# Patient Record
Sex: MALE | Race: BLACK OR AFRICAN AMERICAN | Employment: OTHER | URBAN - METROPOLITAN AREA
[De-identification: names, ages, dates, MRNs, and addresses within clinical notes are randomized per-mention and may not be internally consistent; named-entity substitution may affect disease eponyms.]

---

## 2017-05-04 PROBLEM — I50.9 CHF, ACUTE ON CHRONIC (HCC): Status: ACTIVE | Noted: 2017-05-04

## 2017-05-04 PROBLEM — I10 ESSENTIAL HYPERTENSION: Status: ACTIVE | Noted: 2017-05-04

## 2017-05-05 PROBLEM — M54.2 NECK PAIN: Status: ACTIVE | Noted: 2017-05-05

## 2018-03-01 ASSESSMENT — COPD QUESTIONNAIRES: CAT_SEVERITY: SEVERE

## 2018-03-01 ASSESSMENT — ENCOUNTER SYMPTOMS: SHORTNESS OF BREATH: 1

## 2018-03-01 NOTE — ANESTHESIA PRE-OP
Gabriele Astorga       Department of Anesthesiology  Pre-Anesthesia Evaluation/Consultation       Name:  Gabriele Astorga                                         Age:  74 y.o. MRN:  [id number]           Procedure (Scheduled):  Cervical fusion anterior and disection , C4-C5, C5-C6, C6-C7  Surgeon:  Dr. Medina Ramirez     Allergies   Allergen Reactions    Ace Inhibitors Shortness Of Breath and Swelling    Lisinopril Other (See Comments)     Angioedema     Patient Active Problem List   Diagnosis    Hepatitis C    Testicular swelling    [facility]    NSTEMI (non-ST elevated myocardial infarction) [facility]    CHF, acute on chronic ([facility]    Essential hypertension    Neck pain    Cervical disc herniation     Past Medical History:   Diagnosis Date    Chest pain 3-6-2015    lexiscan nuclear stress    Chronic diastolic CHF (congestive heart failure) ([facility]     Echo 1/18/2016; EF 57%    COPD (chronic obstructive pulmonary disease) (HCC)     Hepatitis C     Hilar lymphadenopathy     CT 1/2016; 1.8 cm    Hypertension     Irregular heart beat     Oxygen dependent      Past Surgical History:   Procedure Laterality Date    COLONOSCOPY      HERNIA REPAIR      tracee inguinal repair     Social History   Substance Use Topics    Smoking status: Former Smoker     Packs/day: 0.50     Types: Cigarettes     Quit date: 1/16/2015    Smokeless tobacco: Never Used    Alcohol use 8.4 oz/week     14 Cans of beer per week      Comment: 2-3 beers daily     Medications  No current facility-administered medications on file prior to encounter.       Current Outpatient Prescriptions on File Prior to Encounter   Medication Sig Dispense Refill    budesonide-formoterol (SYMBICORT) 160-4.5 MCG/ACT AERO Inhale 2 puffs into the lungs 2 times daily Do & bring      cetirizine (ZYRTEC) 10 MG tablet Take 10 mg by mouth daily      hydrOXYzine (ATARAX) 25 MG tablet Take 25 mg by mouth nightly      losartan (COZAAR) 100 MG tablet Take 31.3 35/11/3331     HCG (If Applicable) No results found for: PREGTESTUR, PREGSERUM, HCG, HCGQUANT   ABGs No results found for: PHART, PO2ART, QRI8QTZ, CXY6GOO, BEART, B3SETZYP   Type & Screen (If Applicable)  No results found for: [place] Obrien [address]     Radiology (05/07/17)  1. No evidence of pulmonary embolism. .   2. Cardiomegaly. 3. Severe centrilobular emphysematous changes most significant within   the upper lobes. There is bibasal dependent subsegmental atelectasis,   right lower lobe fibrosis, and interval left lower lobe discoid   atelectasis. 4. Enlarged main common artery consistent with pulmonary hypertension. 5. Stable 1.8 cm right hilar lymph node. Cardiac Testing (5/5/17)   Mild left ventricular concentric hypertrophy noted.   Normal left ventricular ejection fraction.   Measured ejection fraction is 65 %.   The left atrium is mildly dilated.   Mildly dilated right ventricle.   Mild right ventricular hypertrophy.   Mildly enlarged right atrium size.   Physiologic and/or trace tricuspid regurgitation.   Mildly dilated aortic root. EKG (5/12/17) unable to view results in [facility]. Anesthesia Evaluation  Patient summary reviewed and Nursing notes reviewed no history of anesthetic complications:   Airway: Mallampati: II  TM distance: >3 FB   Neck ROM: full  Comment: Pt demonstrated full ROM. However, c/o pain on doing so. Mouth opening: > = 3 FB Dental:          Pulmonary:   (+) COPD: severe,  shortness of breath: chronic,  decreased breath sounds,  asthma (pt has hay fever.): allergic asthma,                           ROS comment: Quit smoking in 2013. Before quitting pt smoked \"at least\" 0.5 ppd for 50+ years.    Cardiovascular:    (+) hypertension: no interval change, dysrhythmias (pt states he gets an \"extra beat from time to time\"):, CHF:,       ECG reviewed  Rhythm: regular  Rate: normal  Echocardiogram reviewed         Beta Blocker:  Dose within 24 Hrs (pt takes carvedilol

## 2018-03-08 NOTE — H&P
Neck Pain    This is a chronic problem. The current episode started more than 1 year ago. The problem occurs constantly. The problem has been gradually worsening. The pain is associated with nothing. The pain is present in the left side and midline. The quality of the pain is described as stabbing and shooting. The pain is at a severity of 8/10. The pain is moderate. The symptoms are aggravated by position. Stiffness is present in the morning. Associated symptoms include chest pain, headaches and tingling. Pertinent negatives include no fever, leg pain, numbness, pain with swallowing, paresis, photophobia, syncope, trouble swallowing, visual change, weakness or weight loss. He has tried NSAIDs for the symptoms. The treatment provided mild relief.         Review of Systems   Negative fr headache  Negative for abdominal pain  Negative for seizure  Negative for stroke. Respiratory: Positive for shortness of breath. Cardiovascular: Positive for chest pain and palpitations. Negative for syncope. Gastrointestinal: Negative. Endocrine: Negative. Genitourinary: Negative. Musculoskeletal: Positive for neck pain. Skin: Negative. Allergic/Immunologic: Negative. Neurological: Positive for tingling and headaches. Negative for weakness and numbness. Hematological: Bruises/bleeds easily. Psychiatric/Behavioral: Positive for dysphoric mood. The patient is nervous/anxious.          Objective:   Physical Exam   Constitutional: He is oriented to person, place, and time. He appears well-developed and well-nourished. No distress. He is not intubated. HENT:   Head: Normocephalic and atraumatic. Right Ear: External ear normal.   Left Ear: External ear normal.   Nose: Nose normal.   Mouth/Throat: Oropharynx is clear and moist. No oropharyngeal exudate. Eyes: Conjunctivae and EOM are normal. Pupils are equal, round, and reactive to light. Right eye exhibits no discharge. Left eye exhibits no discharge.  No

## 2018-03-09 ENCOUNTER — HOSPITAL ENCOUNTER (OUTPATIENT)
Dept: SURGERY | Age: 68
Discharge: HOME | End: 2018-03-10
Attending: NEUROLOGICAL SURGERY | Admitting: NEUROLOGICAL SURGERY
Payer: COMMERCIAL

## 2018-03-09 DIAGNOSIS — M54.2 NECK PAIN: Primary | ICD-10-CM

## 2018-03-09 DIAGNOSIS — Z01.812 PRE-OPERATIVE LABORATORY EXAMINATION: ICD-10-CM

## 2018-03-09 PROBLEM — M50.20 CERVICAL DISC HERNIATION: Status: ACTIVE | Noted: 2018-03-09

## 2018-03-09 PROCEDURE — G8988 SELF CARE GOAL STATUS: HCPCS

## 2018-03-09 PROCEDURE — 76000 FLUOROSCOPY <1 HR PHYS/QHP: CPT

## 2018-03-09 PROCEDURE — 9990 CHARGE CONVERSION

## 2018-03-09 PROCEDURE — 97530 THERAPEUTIC ACTIVITIES: CPT

## 2018-03-09 PROCEDURE — C1713 ANCHOR/SCREW BN/BN,TIS/BN: HCPCS

## 2018-03-09 PROCEDURE — 97161 PT EVAL LOW COMPLEX 20 MIN: CPT

## 2018-03-09 PROCEDURE — G8979 MOBILITY GOAL STATUS: HCPCS

## 2018-03-09 PROCEDURE — 00600 ANES PX CRV SPINE&CORD NOS: CPT

## 2018-03-09 PROCEDURE — G8987 SELF CARE CURRENT STATUS: HCPCS

## 2018-03-09 PROCEDURE — 88304 TISSUE EXAM BY PATHOLOGIST: CPT

## 2018-03-09 PROCEDURE — L8699 PROSTHETIC IMPLANT NOS: HCPCS

## 2018-03-09 PROCEDURE — 97165 OT EVAL LOW COMPLEX 30 MIN: CPT

## 2018-03-09 PROCEDURE — 22554 ARTHRD ANT NTRBD MIN DSC CRV: CPT

## 2018-03-09 PROCEDURE — G8978 MOBILITY CURRENT STATUS: HCPCS

## 2018-03-09 RX ORDER — SODIUM CHLORIDE 0.9 % (FLUSH) 0.9 %
10 SYRINGE (ML) INJECTION PRN
Status: DISCONTINUED | OUTPATIENT
Start: 2018-03-09 | End: 2018-03-09 | Stop reason: HOSPADM

## 2018-03-09 RX ORDER — ACETAMINOPHEN 325 MG/1
650 TABLET ORAL EVERY 4 HOURS PRN
Status: DISCONTINUED | OUTPATIENT
Start: 2018-03-09 | End: 2018-03-10 | Stop reason: HOSPADM

## 2018-03-09 RX ORDER — HYDROCODONE BITARTRATE AND ACETAMINOPHEN 5; 325 MG/1; MG/1
2 TABLET ORAL EVERY 4 HOURS PRN
Status: DISCONTINUED | OUTPATIENT
Start: 2018-03-09 | End: 2018-03-10 | Stop reason: HOSPADM

## 2018-03-09 RX ORDER — FAMOTIDINE 20 MG/1
20 TABLET, FILM COATED ORAL 2 TIMES DAILY
Status: DISCONTINUED | OUTPATIENT
Start: 2018-03-09 | End: 2018-03-10 | Stop reason: HOSPADM

## 2018-03-09 RX ORDER — PREDNISOLONE ACETATE 10 MG/ML
1 SUSPENSION/ DROPS OPHTHALMIC 2 TIMES DAILY
Status: DISCONTINUED | OUTPATIENT
Start: 2018-03-09 | End: 2018-03-10 | Stop reason: HOSPADM

## 2018-03-09 RX ORDER — MEPERIDINE HYDROCHLORIDE 50 MG/ML
12.5 INJECTION INTRAMUSCULAR; INTRAVENOUS; SUBCUTANEOUS EVERY 5 MIN PRN
Status: DISCONTINUED | OUTPATIENT
Start: 2018-03-09 | End: 2018-03-09 | Stop reason: HOSPADM

## 2018-03-09 RX ORDER — CETIRIZINE HYDROCHLORIDE 10 MG/1
10 TABLET ORAL DAILY
Status: DISCONTINUED | OUTPATIENT
Start: 2018-03-09 | End: 2018-03-10 | Stop reason: HOSPADM

## 2018-03-09 RX ORDER — DOCUSATE SODIUM 100 MG/1
100 CAPSULE, LIQUID FILLED ORAL 2 TIMES DAILY
Status: DISCONTINUED | OUTPATIENT
Start: 2018-03-09 | End: 2018-03-10 | Stop reason: HOSPADM

## 2018-03-09 RX ORDER — HYDROXYZINE HYDROCHLORIDE 10 MG/1
25 TABLET, FILM COATED ORAL NIGHTLY
Status: DISCONTINUED | OUTPATIENT
Start: 2018-03-09 | End: 2018-03-10 | Stop reason: HOSPADM

## 2018-03-09 RX ORDER — DEXAMETHASONE SODIUM PHOSPHATE 4 MG/ML
4 INJECTION, SOLUTION INTRA-ARTICULAR; INTRALESIONAL; INTRAMUSCULAR; INTRAVENOUS; SOFT TISSUE EVERY 6 HOURS
Status: COMPLETED | OUTPATIENT
Start: 2018-03-09 | End: 2018-03-10

## 2018-03-09 RX ORDER — POLYVINYL ALCOHOL 14 MG/ML
1 SOLUTION/ DROPS OPHTHALMIC DAILY
Status: DISCONTINUED | OUTPATIENT
Start: 2018-03-09 | End: 2018-03-10 | Stop reason: HOSPADM

## 2018-03-09 RX ORDER — LOSARTAN POTASSIUM 50 MG/1
100 TABLET ORAL DAILY
Status: DISCONTINUED | OUTPATIENT
Start: 2018-03-09 | End: 2018-03-10 | Stop reason: HOSPADM

## 2018-03-09 RX ORDER — LABETALOL HYDROCHLORIDE 5 MG/ML
5 INJECTION, SOLUTION INTRAVENOUS EVERY 10 MIN PRN
Status: DISCONTINUED | OUTPATIENT
Start: 2018-03-09 | End: 2018-03-09 | Stop reason: HOSPADM

## 2018-03-09 RX ORDER — DOXYCYCLINE HYCLATE 100 MG/1
100 CAPSULE ORAL DAILY
Status: DISCONTINUED | OUTPATIENT
Start: 2018-03-09 | End: 2018-03-10 | Stop reason: HOSPADM

## 2018-03-09 RX ORDER — ONDANSETRON 2 MG/ML
4 INJECTION INTRAMUSCULAR; INTRAVENOUS EVERY 6 HOURS PRN
Status: DISCONTINUED | OUTPATIENT
Start: 2018-03-09 | End: 2018-03-10 | Stop reason: HOSPADM

## 2018-03-09 RX ORDER — KETOCONAZOLE 20 MG/G
CREAM TOPICAL DAILY
Status: DISCONTINUED | OUTPATIENT
Start: 2018-03-09 | End: 2018-03-10 | Stop reason: HOSPADM

## 2018-03-09 RX ORDER — HYDROMORPHONE HCL 110MG/55ML
0.25 PATIENT CONTROLLED ANALGESIA SYRINGE INTRAVENOUS EVERY 5 MIN PRN
Status: DISCONTINUED | OUTPATIENT
Start: 2018-03-09 | End: 2018-03-09 | Stop reason: HOSPADM

## 2018-03-09 RX ORDER — SODIUM CHLORIDE 0.9 % (FLUSH) 0.9 %
10 SYRINGE (ML) INJECTION EVERY 12 HOURS SCHEDULED
Status: DISCONTINUED | OUTPATIENT
Start: 2018-03-09 | End: 2018-03-10 | Stop reason: HOSPADM

## 2018-03-09 RX ORDER — SODIUM CHLORIDE 0.9 % (FLUSH) 0.9 %
10 SYRINGE (ML) INJECTION PRN
Status: DISCONTINUED | OUTPATIENT
Start: 2018-03-09 | End: 2018-03-10 | Stop reason: HOSPADM

## 2018-03-09 RX ORDER — ALBUTEROL SULFATE 2.5 MG/3ML
2.5 SOLUTION RESPIRATORY (INHALATION) EVERY 6 HOURS PRN
Status: DISCONTINUED | OUTPATIENT
Start: 2018-03-09 | End: 2018-03-10 | Stop reason: HOSPADM

## 2018-03-09 RX ORDER — SODIUM CHLORIDE 9 MG/ML
INJECTION, SOLUTION INTRAVENOUS CONTINUOUS
Status: DISCONTINUED | OUTPATIENT
Start: 2018-03-09 | End: 2018-03-09

## 2018-03-09 RX ORDER — HYDROCODONE BITARTRATE AND ACETAMINOPHEN 5; 325 MG/1; MG/1
1 TABLET ORAL EVERY 4 HOURS PRN
Status: DISCONTINUED | OUTPATIENT
Start: 2018-03-09 | End: 2018-03-10 | Stop reason: HOSPADM

## 2018-03-09 RX ORDER — CARVEDILOL 25 MG/1
25 TABLET ORAL 2 TIMES DAILY WITH MEALS
Status: DISCONTINUED | OUTPATIENT
Start: 2018-03-09 | End: 2018-03-10 | Stop reason: HOSPADM

## 2018-03-09 RX ORDER — CYCLOBENZAPRINE HCL 10 MG
10 TABLET ORAL 3 TIMES DAILY PRN
Status: DISCONTINUED | OUTPATIENT
Start: 2018-03-09 | End: 2018-03-10 | Stop reason: HOSPADM

## 2018-03-09 RX ORDER — ALBUTEROL SULFATE 90 UG/1
2 AEROSOL, METERED RESPIRATORY (INHALATION) EVERY 6 HOURS PRN
Status: DISCONTINUED | OUTPATIENT
Start: 2018-03-09 | End: 2018-03-10 | Stop reason: HOSPADM

## 2018-03-09 RX ORDER — SODIUM CHLORIDE 0.9 % (FLUSH) 0.9 %
10 SYRINGE (ML) INJECTION EVERY 12 HOURS SCHEDULED
Status: DISCONTINUED | OUTPATIENT
Start: 2018-03-09 | End: 2018-03-09 | Stop reason: HOSPADM

## 2018-03-09 RX ORDER — HYDROMORPHONE HCL 110MG/55ML
0.5 PATIENT CONTROLLED ANALGESIA SYRINGE INTRAVENOUS EVERY 5 MIN PRN
Status: DISCONTINUED | OUTPATIENT
Start: 2018-03-09 | End: 2018-03-09 | Stop reason: HOSPADM

## 2018-03-09 RX ORDER — PROMETHAZINE HYDROCHLORIDE 25 MG/ML
25 INJECTION, SOLUTION INTRAMUSCULAR; INTRAVENOUS PRN
Status: DISCONTINUED | OUTPATIENT
Start: 2018-03-09 | End: 2018-03-09 | Stop reason: HOSPADM

## 2018-03-09 RX ADMIN — CARVEDILOL 25 MG: 25 TABLET, FILM COATED ORAL at 17:02

## 2018-03-09 RX ADMIN — LOSARTAN POTASSIUM 100 MG: 50 TABLET, FILM COATED ORAL at 17:03

## 2018-03-09 RX ADMIN — Medication: at 21:36

## 2018-03-09 RX ADMIN — DEXAMETHASONE SODIUM PHOSPHATE 4 MG: 4 INJECTION, SOLUTION INTRAMUSCULAR; INTRAVENOUS at 13:38

## 2018-03-09 RX ADMIN — HYDROMORPHONE HYDROCHLORIDE 0.5 MG: 1 INJECTION, SOLUTION INTRAMUSCULAR; INTRAVENOUS; SUBCUTANEOUS at 13:21

## 2018-03-09 RX ADMIN — PREDNISOLONE ACETATE 1 DROP: 10 SUSPENSION/ DROPS OPHTHALMIC at 21:33

## 2018-03-09 RX ADMIN — CEFAZOLIN SODIUM 2 G: 2 SOLUTION INTRAVENOUS at 21:30

## 2018-03-09 RX ADMIN — LABETALOL HYDROCHLORIDE 5 MG: 5 INJECTION, SOLUTION INTRAVENOUS at 09:41

## 2018-03-09 RX ADMIN — SODIUM CHLORIDE: 9 INJECTION, SOLUTION INTRAVENOUS at 06:29

## 2018-03-09 RX ADMIN — HYDROMORPHONE HYDROCHLORIDE 0.5 MG: 1 INJECTION, SOLUTION INTRAMUSCULAR; INTRAVENOUS; SUBCUTANEOUS at 21:28

## 2018-03-09 RX ADMIN — DEXAMETHASONE SODIUM PHOSPHATE 4 MG: 4 INJECTION, SOLUTION INTRAMUSCULAR; INTRAVENOUS at 17:21

## 2018-03-09 RX ADMIN — DEXAMETHASONE SODIUM PHOSPHATE 4 MG: 4 INJECTION, SOLUTION INTRAMUSCULAR; INTRAVENOUS at 23:49

## 2018-03-09 RX ADMIN — LABETALOL HYDROCHLORIDE 5 MG: 5 INJECTION, SOLUTION INTRAVENOUS at 10:10

## 2018-03-09 RX ADMIN — DOCUSATE SODIUM 100 MG: 100 CAPSULE, LIQUID FILLED ORAL at 21:38

## 2018-03-09 RX ADMIN — HYDROXYZINE HYDROCHLORIDE 25 MG: 10 TABLET, FILM COATED ORAL at 21:32

## 2018-03-09 RX ADMIN — CEFAZOLIN SODIUM 2 G: 2 SOLUTION INTRAVENOUS at 13:46

## 2018-03-09 RX ADMIN — DOXYCYCLINE HYCLATE 100 MG: 100 CAPSULE, GELATIN COATED ORAL at 17:02

## 2018-03-09 RX ADMIN — Medication 10 ML: at 21:37

## 2018-03-09 RX ADMIN — FAMOTIDINE 20 MG: 20 TABLET ORAL at 21:38

## 2018-03-09 RX ADMIN — Medication 0.25 MG: at 10:05

## 2018-03-09 RX ADMIN — Medication 10 ML: at 23:50

## 2018-03-09 ASSESSMENT — PAIN DESCRIPTION - PAIN TYPE
TYPE: SURGICAL PAIN
TYPE: ACUTE PAIN

## 2018-03-09 ASSESSMENT — PAIN - FUNCTIONAL ASSESSMENT: PAIN_FUNCTIONAL_ASSESSMENT: 0-10

## 2018-03-09 ASSESSMENT — PAIN SCALES - GENERAL
PAINLEVEL_OUTOF10: 5
PAINLEVEL_OUTOF10: 0
PAINLEVEL_OUTOF10: 8
PAINLEVEL_OUTOF10: 3
PAINLEVEL_OUTOF10: 0
PAINLEVEL_OUTOF10: 8
PAINLEVEL_OUTOF10: 0

## 2018-03-09 ASSESSMENT — PAIN DESCRIPTION - DESCRIPTORS
DESCRIPTORS: CONSTANT;ACHING
DESCRIPTORS: ACHING;DISCOMFORT;DULL
DESCRIPTORS: PRESSURE

## 2018-03-09 ASSESSMENT — PAIN DESCRIPTION - FREQUENCY: FREQUENCY: INTERMITTENT

## 2018-03-09 ASSESSMENT — PAIN DESCRIPTION - LOCATION
LOCATION: NECK
LOCATION: NECK

## 2018-03-09 NOTE — PAYOR INFORMATION
Patient Tennie Schaumann:  [de-identified]  Primary AUTH/CERT:    [address] Name:   [address]  Primary Insurance Plan Name:  [employer] [facility]  Primary Insurance Group Number:    Primary Insurance Plan Type: X  Primary Insurance Policy Number:  [id number]

## 2018-03-09 NOTE — PROGRESS NOTES
Povidine-iodine 5% nasal antiseptic pre-op prep completed 15 seconds per nare and repeated. 2% CHG pre-op skin prep completed in appropriate order using all 6 cloths:    With 1st cloth, wipe the neck, chest, and abdomen. Scrub inside nd  around the navel/belly-button area.  With 2nd cloth, wipe both arms, starting each with the shoulder  and ending at the fingertips. Be sure to thoroughly wipe the arm  pit area.  With 3rd cloth, wipe the right and left hip followed by the groin. Be sure to wipe folds in the abdominal and groin areas.  With 4th cloth, wipe both legs, starting at the thigh and ending   at the toes. Be sure to thoroughly wipe behind the knees.  With 5th cloth, wipe the back starting at the base of the neck and   Ending at the waist line.  With 6th cloth, wipe the buttocks.

## 2018-03-10 VITALS
WEIGHT: 157 LBS | OXYGEN SATURATION: 94 % | SYSTOLIC BLOOD PRESSURE: 133 MMHG | TEMPERATURE: 98.4 F | RESPIRATION RATE: 16 BRPM | BODY MASS INDEX: 23.79 KG/M2 | HEIGHT: 68 IN | HEART RATE: 65 BPM | DIASTOLIC BLOOD PRESSURE: 81 MMHG

## 2018-03-10 PROCEDURE — 2580000003 HC RX 258: Performed by: NEUROLOGICAL SURGERY

## 2018-03-10 PROCEDURE — 99024 POSTOP FOLLOW-UP VISIT: CPT | Performed by: PHYSICIAN ASSISTANT

## 2018-03-10 PROCEDURE — 97530 THERAPEUTIC ACTIVITIES: CPT

## 2018-03-10 PROCEDURE — 2700000000 HC OXYGEN THERAPY PER DAY

## 2018-03-10 PROCEDURE — 6360000002 HC RX W HCPCS: Performed by: NEUROLOGICAL SURGERY

## 2018-03-10 PROCEDURE — 6370000000 HC RX 637 (ALT 250 FOR IP): Performed by: NEUROLOGICAL SURGERY

## 2018-03-10 RX ORDER — CYCLOBENZAPRINE HCL 10 MG
10 TABLET ORAL 3 TIMES DAILY PRN
Qty: 90 TABLET | Refills: 0 | Status: SHIPPED | OUTPATIENT
Start: 2018-03-10 | End: 2018-05-14 | Stop reason: SDUPTHER

## 2018-03-10 RX ORDER — HYDROCODONE BITARTRATE AND ACETAMINOPHEN 5; 325 MG/1; MG/1
1 TABLET ORAL EVERY 4 HOURS PRN
Qty: 120 TABLET | Refills: 0 | Status: SHIPPED | OUTPATIENT
Start: 2018-03-10 | End: 2018-04-11 | Stop reason: SDUPTHER

## 2018-03-10 RX ADMIN — CARVEDILOL 25 MG: 25 TABLET, FILM COATED ORAL at 09:45

## 2018-03-10 RX ADMIN — LOSARTAN POTASSIUM 100 MG: 50 TABLET, FILM COATED ORAL at 09:49

## 2018-03-10 RX ADMIN — DEXAMETHASONE SODIUM PHOSPHATE 4 MG: 4 INJECTION, SOLUTION INTRAMUSCULAR; INTRAVENOUS at 06:59

## 2018-03-10 RX ADMIN — CEFAZOLIN SODIUM 2 G: 2 SOLUTION INTRAVENOUS at 06:59

## 2018-03-10 RX ADMIN — HYDROMORPHONE HYDROCHLORIDE 0.5 MG: 1 INJECTION, SOLUTION INTRAMUSCULAR; INTRAVENOUS; SUBCUTANEOUS at 09:43

## 2018-03-10 RX ADMIN — KETOCONAZOLE: 20 CREAM TOPICAL at 09:51

## 2018-03-10 RX ADMIN — FAMOTIDINE 20 MG: 20 TABLET ORAL at 09:45

## 2018-03-10 RX ADMIN — MOMETASONE FUROATE AND FORMOTEROL FUMARATE DIHYDRATE 2 PUFF: 100; 5 AEROSOL RESPIRATORY (INHALATION) at 09:45

## 2018-03-10 RX ADMIN — Medication: at 09:51

## 2018-03-10 RX ADMIN — CETIRIZINE HYDROCHLORIDE 10 MG: 10 TABLET, FILM COATED ORAL at 09:51

## 2018-03-10 RX ADMIN — Medication 10 ML: at 15:43

## 2018-03-10 RX ADMIN — Medication 10 ML: at 06:59

## 2018-03-10 RX ADMIN — DOCUSATE SODIUM 100 MG: 100 CAPSULE, LIQUID FILLED ORAL at 09:45

## 2018-03-10 RX ADMIN — Medication 10 ML: at 02:00

## 2018-03-10 RX ADMIN — DOXYCYCLINE HYCLATE 100 MG: 100 CAPSULE, GELATIN COATED ORAL at 09:50

## 2018-03-10 RX ADMIN — HYDROMORPHONE HYDROCHLORIDE 0.5 MG: 1 INJECTION, SOLUTION INTRAMUSCULAR; INTRAVENOUS; SUBCUTANEOUS at 15:39

## 2018-03-10 RX ADMIN — PREDNISOLONE ACETATE 1 DROP: 10 SUSPENSION/ DROPS OPHTHALMIC at 09:46

## 2018-03-10 RX ADMIN — HYDROMORPHONE HYDROCHLORIDE 0.5 MG: 1 INJECTION, SOLUTION INTRAMUSCULAR; INTRAVENOUS; SUBCUTANEOUS at 02:00

## 2018-03-10 RX ADMIN — POLYVINYL ALCOHOL 1 DROP: 14 SOLUTION/ DROPS OPHTHALMIC at 09:52

## 2018-03-10 RX ADMIN — CEFAZOLIN SODIUM 2 G: 2 SOLUTION INTRAVENOUS at 15:05

## 2018-03-10 ASSESSMENT — PAIN DESCRIPTION - PROGRESSION
CLINICAL_PROGRESSION: GRADUALLY IMPROVING
CLINICAL_PROGRESSION: GRADUALLY IMPROVING

## 2018-03-10 ASSESSMENT — PAIN SCALES - GENERAL
PAINLEVEL_OUTOF10: 8
PAINLEVEL_OUTOF10: 3
PAINLEVEL_OUTOF10: 9
PAINLEVEL_OUTOF10: 7
PAINLEVEL_OUTOF10: 0
PAINLEVEL_OUTOF10: 4

## 2018-03-10 ASSESSMENT — PAIN DESCRIPTION - DESCRIPTORS
DESCRIPTORS: ACHING
DESCRIPTORS: ACHING
DESCRIPTORS: ACHING;DISCOMFORT;SORE

## 2018-03-10 ASSESSMENT — PAIN DESCRIPTION - LOCATION
LOCATION: NECK

## 2018-03-10 ASSESSMENT — PAIN DESCRIPTION - ORIENTATION
ORIENTATION: RIGHT;ANTERIOR
ORIENTATION: RIGHT;ANTERIOR

## 2018-03-10 ASSESSMENT — PAIN DESCRIPTION - PAIN TYPE
TYPE: SURGICAL PAIN

## 2018-03-10 NOTE — OP NOTE
[address]                   [address] [facility]  [facility]                                 OPERATIVE REPORT    PATIENT NAME: Hien Méndez                   :        1950  MED REC NO:   [id number]                            ROOM:       [id number]  ACCOUNT NO:   [de-identified]                          ADMIT DATE: 2018  PROVIDER:     Jennyfer Daly MD    DATE OF PROCEDURE:  2018    PREOPERATIVE DIAGNOSIS:  C4-C5, C5-C6, and C6-C7 cervical herniated nucleus  pulposus. POSTOPERATIVE DIAGNOSIS:  C4-C5, C5-C6, and C6-C7 cervical herniated  nucleus pulposus. OPERATION PERFORMED:  1. C4-C5, C5-C6, and C6-C7 anterior cervical diskectomy and fusion with  the use of Medtronic Cornerstone machined allograft and Pouring Poundstronic Zevo  plate and 38-YD screws. 2.  Use of intraoperative fluoroscopy, interpreted by myself, the surgeon. ANESTHESIA:  Generalized endotracheal anesthesia. SURGEON:  Jennyfer Daly MD    ASSISTANT:  Husam De La Cruz PA-C    COMPLICATIONS:  None. ESTIMATED BLOOD LOSS:  50 mL. SPECIMEN:  Disk. OPERATIVE INDICATIONS:  The patient is a 53-year-old gentleman who  presented to the office with severe neck pain radiated into his arms with  numbness, tingling, and weakness. He had an MRI that showed he had severe  spinal stenosis. He did have evidence of myelopathy on examination and  after risks, benefits, and alternatives were discussed with the patient, it  was determined that he would undergo the above-listed procedure. OPERATIVE PROCEDURE:  The patient was brought into the operating room. A  time-out was performed where he was identified by his name, medical record  number, and the operative procedure, which he was about to undergo. Next,  induction of generalized endotracheal anesthesia was then commenced. Upon  completion of induction of generalized endotracheal anesthesia, he received  preoperative antibiotics. body.  I distracted it  across the C5-C6 disk space, performed an annulotomy with a #11 blade. I  removed disk material with pituitary rongeurs and curettes. I prepared  both the superior and inferior endplates. I then took down the posterior  longitudinal ligament going from the right uncovertebral joint to the left  uncovertebral joint and after this was done, I then placed a #7 Medtronic  Cornerstone rasp followed by #7 Medtronic Cornerstone trial and ultimately  a #7 Medtronic Cornerstone piece of machined allograft into the C5-C6 disk  space. I removed the [place] post that was in the C5 vertebral body. I  placed into the C7 vertebral body at the [place] across the C6-C7 disk  space, performed an annulotomy with a #11 blade. I removed the disk  material with pituitary rongeurs and curettes and I then took the posterior  longitudinal ligament from the left uncovertebral joint through the right  uncovertebral joint and after I completed the diskectomy, I prepared the  endplate. I placed a #6 Medtronic Cornerstone trial followed by #6  Medtronic Cornerstone rasp and ultimately #6 Medtronic Cornerstone piece of  machined allograft into the C6-C7 disk space. Next, I then proceeded to  then place a Medtronic Zevo plate along the anterior aspect of the spine. I used the intraoperative fluoroscopy to ensure that the plate was placed  appropriately. I used 15-mm screws to fix the plate to the spine and after  this was done, I then inspected the wound for any evidence of bleeding. Adequate hemostasis was obtained using both monopolar and bipolar cautery. I then irrigated the wound with copious amount of antibiotic impregnated  saline and proceeded to then close the wound in layers using 2-0 Vicryl for  the platysma, 3-0 Vicryl for the subcutaneous layer, and 4-0 Monocryl in a  subcuticular fashion for the skin. Dermabond was applied over the skin  surface. A dry sterile dressing was placed over this.   The

## 2018-03-10 NOTE — PLAN OF CARE
Problem: Falls - Risk of  Intervention: Fall precautions    Jose Antonio alert, conversant, doing many of his ADLs this am.   Declined flu shot though we offered. Cousin came to pick him up / give him ride home.

## 2018-03-10 NOTE — PROGRESS NOTES
Department of Neurosurgery  Progress Note    CHIEF COMPLAINT: pt s/p cervical fusion. SUBJECTIVE:  Tolerating op site pain ok. No arm pain. No CP or SOB    REVIEW OF SYSTEMS :  Constitutional: Negative for chills and fever. Neurological: Negative for dizziness, tremors and speech change. OBJECTIVE:   VITALS:  BP (!) 160/101   Pulse 57   Temp 98.1 °F (36.7 °C)   Resp 16   Ht 5' 8\" (1.727 m)   Wt 157 lb (71.2 kg)   SpO2 99%   BMI 23.87 kg/m²   PHYSICAL:  CONSTITUTIONAL:  awake, alert, cooperative, no apparent distress, and appears stated age. MCGEE. Op stie benign.   Dressing removed, c-collar on    DATA:  CBC:   Lab Results   Component Value Date    WBC 6.3 03/01/2018    RBC 4.48 03/01/2018    HGB 13.6 03/01/2018    HCT 43.1 03/01/2018    MCV 96.2 03/01/2018    MCH 30.4 03/01/2018    MCHC 31.6 03/01/2018    RDW 13.8 03/01/2018     03/01/2018    MPV 10.5 03/01/2018     BMP:    Lab Results   Component Value Date     03/01/2018    K 4.6 03/01/2018    CL 97 03/01/2018    CO2 33 03/01/2018    BUN 19 03/01/2018    LABALBU 4.4 08/15/2016    LABALBU 4.6 07/14/2011    CREATININE 1.1 03/01/2018    CALCIUM 9.9 03/01/2018    GFRAA >60 03/01/2018    LABGLOM >60 03/01/2018    GLUCOSE 96 03/01/2018    GLUCOSE 101 07/14/2011     PT/INR:    Lab Results   Component Value Date    PROTIME 11.9 03/01/2018    PROTIME 13.8 07/14/2011    INR 1.1 03/01/2018     PTT:    Lab Results   Component Value Date    APTT 31.3 08/15/2016   [APTT}    Current Inpatient Medications  Current Facility-Administered Medications: albuterol sulfate  (90 Base) MCG/ACT inhaler 2 puff, 2 puff, Inhalation, Q6H PRN  albuterol (PROVENTIL) nebulizer solution 2.5 mg, 2.5 mg, Nebulization, Q6H PRN  mometasone-formoterol (DULERA) 100-5 MCG/ACT inhaler 2 puff, 2 puff, Inhalation, BID  polyvinyl alcohol (LIQUIFILM TEARS) 1.4 % ophthalmic solution 1 drop, 1 drop, Both Eyes, Daily  carvedilol (COREG) tablet 25 mg, 25 mg, Oral, BID WC  cetirizine (ZYRTEC) tablet 10 mg, 10 mg, Oral, Daily  doxycycline hyclate (VIBRAMYCIN) capsule 100 mg, 100 mg, Oral, Daily  hydrocortisone 2.5 % cream, , Topical, BID  hydrOXYzine (ATARAX) tablet 25 mg, 25 mg, Oral, Nightly  ketoconazole (NIZORAL) 2 % cream, , Topical, Daily  losartan (COZAAR) tablet 100 mg, 100 mg, Oral, Daily  prednisoLONE acetate (PRED FORTE) 1 % ophthalmic suspension 1 drop, 1 drop, Both Eyes, BID  sodium chloride flush 0.9 % injection 10 mL, 10 mL, Intravenous, 2 times per day  sodium chloride flush 0.9 % injection 10 mL, 10 mL, Intravenous, PRN  acetaminophen (TYLENOL) tablet 650 mg, 650 mg, Oral, Q4H PRN  docusate sodium (COLACE) capsule 100 mg, 100 mg, Oral, BID  ondansetron (ZOFRAN) injection 4 mg, 4 mg, Intravenous, Q6H PRN  ceFAZolin (ANCEF) 2 g in dextrose 3 % 50 mL IVPB (duplex), 2 g, Intravenous, Q8H  HYDROcodone-acetaminophen (NORCO) 5-325 MG per tablet 1 tablet, 1 tablet, Oral, Q4H PRN **OR** HYDROcodone-acetaminophen (NORCO) 5-325 MG per tablet 2 tablet, 2 tablet, Oral, Q4H PRN  HYDROmorphone (DILAUDID) injection 0.25 mg, 0.25 mg, Intravenous, Q3H PRN **OR** HYDROmorphone (DILAUDID) injection 0.5 mg, 0.5 mg, Intravenous, Q3H PRN  cyclobenzaprine (FLEXERIL) tablet 10 mg, 10 mg, Oral, TID PRN  benzocaine-menthol (CEPACOL SORE THROAT) lozenge 1 lozenge, 1 lozenge, Oral, Q2H PRN  famotidine (PEPCID) tablet 20 mg, 20 mg, Oral, BID    ASSESSMENT:   · POD#1 C4-7 ACDF - doing well    PLAN:  · PT/OT  · Pain control  · Cervical collar x 3 months  · D/c home      Electronically signed by KUMAR Briggs on 3/10/2018 at 9:31 AM

## 2018-03-15 ENCOUNTER — TELEPHONE (OUTPATIENT)
Dept: NEUROSURGERY | Age: 68
End: 2018-03-15

## 2018-03-15 NOTE — TELEPHONE ENCOUNTER
Patient states there is swelling above and below his incision that was not there yesterday. No redness or drainage. No other symptoms noted.      [phone]

## 2018-04-06 DIAGNOSIS — M54.2 NECK PAIN: Primary | ICD-10-CM

## 2018-04-11 ENCOUNTER — HOSPITAL ENCOUNTER (OUTPATIENT)
Age: 68
Discharge: HOME | End: 2018-04-13
Payer: COMMERCIAL

## 2018-04-11 ENCOUNTER — HOSPITAL ENCOUNTER (OUTPATIENT)
Dept: GENERAL RADIOLOGY | Age: 68
Discharge: HOME | End: 2018-04-13
Payer: COMMERCIAL

## 2018-04-11 ENCOUNTER — OFFICE VISIT (OUTPATIENT)
Dept: NEUROSURGERY | Age: 68
End: 2018-04-11

## 2018-04-11 VITALS
HEART RATE: 93 BPM | HEIGHT: 68 IN | SYSTOLIC BLOOD PRESSURE: 141 MMHG | DIASTOLIC BLOOD PRESSURE: 114 MMHG | WEIGHT: 154 LBS | BODY MASS INDEX: 23.34 KG/M2

## 2018-04-11 DIAGNOSIS — M54.2 NECK PAIN: ICD-10-CM

## 2018-04-11 PROCEDURE — 99024 POSTOP FOLLOW-UP VISIT: CPT | Performed by: PHYSICIAN ASSISTANT

## 2018-04-11 PROCEDURE — 72040 X-RAY EXAM NECK SPINE 2-3 VW: CPT

## 2018-04-11 RX ORDER — HYDROCODONE BITARTRATE AND ACETAMINOPHEN 5; 325 MG/1; MG/1
1 TABLET ORAL EVERY 4 HOURS PRN
Qty: 120 TABLET | Refills: 0 | Status: SHIPPED | OUTPATIENT
Start: 2018-04-11 | End: 2018-05-14 | Stop reason: SDUPTHER

## 2018-05-14 ENCOUNTER — TELEPHONE (OUTPATIENT)
Dept: NEUROSURGERY | Age: 68
End: 2018-05-14

## 2018-05-14 DIAGNOSIS — M54.2 NECK PAIN: ICD-10-CM

## 2018-05-14 RX ORDER — HYDROCODONE BITARTRATE AND ACETAMINOPHEN 5; 325 MG/1; MG/1
1 TABLET ORAL EVERY 4 HOURS PRN
Qty: 120 TABLET | Refills: 0 | Status: SHIPPED | OUTPATIENT
Start: 2018-05-14 | End: 2018-06-11 | Stop reason: SDUPTHER

## 2018-05-14 RX ORDER — CYCLOBENZAPRINE HCL 10 MG
10 TABLET ORAL 3 TIMES DAILY PRN
Qty: 90 TABLET | Refills: 0 | Status: SHIPPED | OUTPATIENT
Start: 2018-05-14 | End: 2018-06-11 | Stop reason: SDUPTHER

## 2018-05-18 DIAGNOSIS — M54.2 NECK PAIN: Primary | ICD-10-CM

## 2018-05-30 ENCOUNTER — OFFICE VISIT (OUTPATIENT)
Dept: NEUROSURGERY | Age: 68
End: 2018-05-30

## 2018-05-30 VITALS
WEIGHT: 152 LBS | HEIGHT: 66 IN | SYSTOLIC BLOOD PRESSURE: 140 MMHG | DIASTOLIC BLOOD PRESSURE: 60 MMHG | HEART RATE: 93 BPM | BODY MASS INDEX: 24.43 KG/M2

## 2018-05-30 DIAGNOSIS — M54.2 NECK PAIN: Primary | ICD-10-CM

## 2018-05-30 PROCEDURE — 99024 POSTOP FOLLOW-UP VISIT: CPT | Performed by: PHYSICIAN ASSISTANT

## 2018-05-31 DIAGNOSIS — M54.2 NECK PAIN: Primary | ICD-10-CM

## 2018-06-04 ENCOUNTER — HOSPITAL ENCOUNTER (OUTPATIENT)
Dept: GENERAL RADIOLOGY | Age: 68
Discharge: HOME | End: 2018-06-06
Payer: COMMERCIAL

## 2018-06-04 ENCOUNTER — HOSPITAL ENCOUNTER (OUTPATIENT)
Age: 68
Discharge: HOME | End: 2018-06-06
Payer: COMMERCIAL

## 2018-06-04 DIAGNOSIS — M54.2 NECK PAIN: ICD-10-CM

## 2018-06-04 PROCEDURE — 72040 X-RAY EXAM NECK SPINE 2-3 VW: CPT

## 2018-06-11 DIAGNOSIS — M54.2 NECK PAIN: ICD-10-CM

## 2018-06-11 RX ORDER — CYCLOBENZAPRINE HCL 10 MG
10 TABLET ORAL 3 TIMES DAILY PRN
Qty: 90 TABLET | Refills: 0 | Status: SHIPPED | OUTPATIENT
Start: 2018-06-11 | End: 2019-06-11

## 2018-06-11 RX ORDER — HYDROCODONE BITARTRATE AND ACETAMINOPHEN 5; 325 MG/1; MG/1
1 TABLET ORAL EVERY 4 HOURS PRN
Qty: 120 TABLET | Refills: 0 | Status: ON HOLD | OUTPATIENT
Start: 2018-06-11 | End: 2019-05-02 | Stop reason: SDUPTHER

## 2018-07-11 DIAGNOSIS — M54.2 NECK PAIN: Primary | ICD-10-CM

## 2018-09-21 ENCOUNTER — APPOINTMENT (OUTPATIENT)
Dept: GENERAL RADIOLOGY | Age: 68
DRG: 190 | End: 2018-09-21
Payer: COMMERCIAL

## 2018-09-21 ENCOUNTER — HOSPITAL ENCOUNTER (INPATIENT)
Age: 68
LOS: 2 days | Discharge: HOME | DRG: 190 | End: 2018-09-24
Attending: EMERGENCY MEDICINE | Admitting: FAMILY MEDICINE
Payer: COMMERCIAL

## 2018-09-21 DIAGNOSIS — J96.01 ACUTE RESPIRATORY FAILURE WITH HYPOXIA AND HYPERCAPNIA (HCC): Primary | ICD-10-CM

## 2018-09-21 DIAGNOSIS — J44.1 COPD EXACERBATION (HCC): ICD-10-CM

## 2018-09-21 DIAGNOSIS — J96.02 ACUTE RESPIRATORY FAILURE WITH HYPOXIA AND HYPERCAPNIA (HCC): Primary | ICD-10-CM

## 2018-09-21 LAB
BASOPHILS ABSOLUTE: 0.06 E9/L (ref 0–0.2)
BASOPHILS RELATIVE PERCENT: 0.7 % (ref 0–2)
EKG ATRIAL RATE: 105 BPM
EKG P AXIS: 78 DEGREES
EKG P-R INTERVAL: 170 MS
EKG Q-T INTERVAL: 364 MS
EKG QRS DURATION: 84 MS
EKG QTC CALCULATION (BAZETT): 481 MS
EKG R AXIS: 62 DEGREES
EKG T AXIS: 63 DEGREES
EKG VENTRICULAR RATE: 105 BPM
EOSINOPHILS ABSOLUTE: 0.01 E9/L (ref 0.05–0.5)
EOSINOPHILS RELATIVE PERCENT: 0.1 % (ref 0–6)
HCT VFR BLD CALC: 42.4 % (ref 37–54)
HEMOGLOBIN: 13.1 G/DL (ref 12.5–16.5)
IMMATURE GRANULOCYTES #: 0.04 E9/L
IMMATURE GRANULOCYTES %: 0.5 % (ref 0–5)
LYMPHOCYTES ABSOLUTE: 1.14 E9/L (ref 1.5–4)
LYMPHOCYTES RELATIVE PERCENT: 13.1 % (ref 20–42)
MCH RBC QN AUTO: 30.1 PG (ref 26–35)
MCHC RBC AUTO-ENTMCNC: 30.9 % (ref 32–34.5)
MCV RBC AUTO: 97.5 FL (ref 80–99.9)
MONOCYTES ABSOLUTE: 0.97 E9/L (ref 0.1–0.95)
MONOCYTES RELATIVE PERCENT: 11.2 % (ref 2–12)
NEUTROPHILS ABSOLUTE: 6.47 E9/L (ref 1.8–7.3)
NEUTROPHILS RELATIVE PERCENT: 74.4 % (ref 43–80)
PDW BLD-RTO: 12.6 FL (ref 11.5–15)
PLATELET # BLD: 246 E9/L (ref 130–450)
PMV BLD AUTO: 10.3 FL (ref 7–12)
RBC # BLD: 4.35 E12/L (ref 3.8–5.8)
WBC # BLD: 8.7 E9/L (ref 4.5–11.5)

## 2018-09-21 PROCEDURE — 85025 COMPLETE CBC W/AUTO DIFF WBC: CPT

## 2018-09-21 PROCEDURE — 71045 X-RAY EXAM CHEST 1 VIEW: CPT

## 2018-09-21 PROCEDURE — 80048 BASIC METABOLIC PNL TOTAL CA: CPT

## 2018-09-21 PROCEDURE — 96375 TX/PRO/DX INJ NEW DRUG ADDON: CPT

## 2018-09-21 PROCEDURE — 83605 ASSAY OF LACTIC ACID: CPT

## 2018-09-21 PROCEDURE — 36415 COLL VENOUS BLD VENIPUNCTURE: CPT

## 2018-09-21 PROCEDURE — 6360000002 HC RX W HCPCS: Performed by: EMERGENCY MEDICINE

## 2018-09-21 PROCEDURE — 93005 ELECTROCARDIOGRAM TRACING: CPT | Performed by: EMERGENCY MEDICINE

## 2018-09-21 PROCEDURE — 84484 ASSAY OF TROPONIN QUANT: CPT

## 2018-09-21 PROCEDURE — 87040 BLOOD CULTURE FOR BACTERIA: CPT

## 2018-09-21 PROCEDURE — 99285 EMERGENCY DEPT VISIT HI MDM: CPT

## 2018-09-21 RX ORDER — SODIUM CHLORIDE 0.9 % (FLUSH) 0.9 %
10 SYRINGE (ML) INJECTION PRN
Status: DISCONTINUED | OUTPATIENT
Start: 2018-09-21 | End: 2018-09-24 | Stop reason: HOSPADM

## 2018-09-21 RX ORDER — IPRATROPIUM BROMIDE AND ALBUTEROL SULFATE 2.5; .5 MG/3ML; MG/3ML
3 SOLUTION RESPIRATORY (INHALATION) ONCE
Status: COMPLETED | OUTPATIENT
Start: 2018-09-21 | End: 2018-09-22

## 2018-09-21 RX ORDER — METHYLPREDNISOLONE SODIUM SUCCINATE 125 MG/2ML
125 INJECTION, POWDER, LYOPHILIZED, FOR SOLUTION INTRAMUSCULAR; INTRAVENOUS ONCE
Status: COMPLETED | OUTPATIENT
Start: 2018-09-21 | End: 2018-09-21

## 2018-09-21 RX ADMIN — METHYLPREDNISOLONE SODIUM SUCCINATE 125 MG: 125 INJECTION, POWDER, FOR SOLUTION INTRAMUSCULAR; INTRAVENOUS at 23:55

## 2018-09-21 ASSESSMENT — ENCOUNTER SYMPTOMS
CHEST TIGHTNESS: 1
DIARRHEA: 0
EYE PAIN: 0
NAUSEA: 0
SHORTNESS OF BREATH: 1
EYE REDNESS: 0
SORE THROAT: 0
ABDOMINAL PAIN: 0
WHEEZING: 0
SINUS PRESSURE: 0
COUGH: 0
VOMITING: 0
EYE DISCHARGE: 0
BACK PAIN: 0

## 2018-09-22 PROBLEM — J96.02 ACUTE RESPIRATORY FAILURE WITH HYPOXIA AND HYPERCAPNIA (HCC): Status: ACTIVE | Noted: 2018-09-22

## 2018-09-22 PROBLEM — J96.01 ACUTE RESPIRATORY FAILURE WITH HYPOXIA AND HYPERCAPNIA (HCC): Status: ACTIVE | Noted: 2018-09-22

## 2018-09-22 LAB
AADO2: 82.3 MMHG
ALBUMIN SERPL-MCNC: 4.6 G/DL (ref 3.5–5.2)
ALP BLD-CCNC: 60 U/L (ref 40–129)
ALT SERPL-CCNC: 13 U/L (ref 0–40)
AMPHETAMINE SCREEN, URINE: NOT DETECTED
ANION GAP SERPL CALCULATED.3IONS-SCNC: 11 MMOL/L (ref 7–16)
ANION GAP SERPL CALCULATED.3IONS-SCNC: 6 MMOL/L (ref 7–16)
AST SERPL-CCNC: 30 U/L (ref 0–39)
B.E.: 6.8 MMOL/L (ref -3–3)
B.E.: 8.5 MMOL/L (ref -3–3)
BARBITURATE SCREEN URINE: NOT DETECTED
BASOPHILS ABSOLUTE: 0 E9/L (ref 0–0.2)
BASOPHILS RELATIVE PERCENT: 0 % (ref 0–2)
BENZODIAZEPINE SCREEN, URINE: NOT DETECTED
BILIRUB SERPL-MCNC: 0.5 MG/DL (ref 0–1.2)
BUN BLDV-MCNC: 14 MG/DL (ref 8–23)
BUN BLDV-MCNC: 16 MG/DL (ref 8–23)
CALCIUM SERPL-MCNC: 9.6 MG/DL (ref 8.6–10.2)
CALCIUM SERPL-MCNC: 9.7 MG/DL (ref 8.6–10.2)
CANNABINOID SCREEN URINE: POSITIVE
CARDIOPULMONARY BYPASS: NO
CHLORIDE BLD-SCNC: 91 MMOL/L (ref 98–107)
CHLORIDE BLD-SCNC: 92 MMOL/L (ref 98–107)
CHOLESTEROL, TOTAL: 232 MG/DL (ref 0–199)
CO2: 36 MMOL/L (ref 22–29)
CO2: 39 MMOL/L (ref 22–29)
COCAINE METABOLITE SCREEN URINE: NOT DETECTED
COHB: 1.1 % (ref 0–1.5)
CREAT SERPL-MCNC: 1.2 MG/DL (ref 0.7–1.2)
CREAT SERPL-MCNC: 1.3 MG/DL (ref 0.7–1.2)
CRITICAL NOTIFICATION: YES
CRITICAL: ABNORMAL
D DIMER: <200 NG/ML DDU
DATE ANALYZED: ABNORMAL
DATE OF COLLECTION: ABNORMAL
DELIVERY SYSTEMS: ABNORMAL
DEVICE: ABNORMAL
EOSINOPHILS ABSOLUTE: 0.06 E9/L (ref 0.05–0.5)
EOSINOPHILS RELATIVE PERCENT: 1 % (ref 0–6)
FIO2 ARTERIAL: 4
FIO2: 40 %
GFR AFRICAN AMERICAN: >60
GFR AFRICAN AMERICAN: >60
GFR NON-AFRICAN AMERICAN: >60 ML/MIN/1.73
GFR NON-AFRICAN AMERICAN: >60 ML/MIN/1.73
GLUCOSE BLD-MCNC: 150 MG/DL (ref 74–109)
GLUCOSE BLD-MCNC: 154 MG/DL (ref 74–109)
HCO3 ARTERIAL: 37.4 MMOL/L (ref 22–26)
HCO3: 34 MMOL/L (ref 22–26)
HCT VFR BLD CALC: 43 % (ref 37–54)
HDLC SERPL-MCNC: 82 MG/DL
HEMOGLOBIN: 13.5 G/DL (ref 12.5–16.5)
HHB: 1.3 % (ref 0–5)
L. PNEUMOPHILA SEROGP 1 UR AG: NORMAL
LAB: ABNORMAL
LACTIC ACID: 1.2 MMOL/L (ref 0.5–2.2)
LDL CHOLESTEROL CALCULATED: 137 MG/DL (ref 0–99)
LYMPHOCYTES ABSOLUTE: 0.55 E9/L (ref 1.5–4)
LYMPHOCYTES RELATIVE PERCENT: 10 % (ref 20–42)
Lab: ABNORMAL
MAGNESIUM: 2.3 MG/DL (ref 1.6–2.6)
MCH RBC QN AUTO: 30.5 PG (ref 26–35)
MCHC RBC AUTO-ENTMCNC: 31.4 % (ref 32–34.5)
MCV RBC AUTO: 97.1 FL (ref 80–99.9)
METHADONE SCREEN, URINE: NOT DETECTED
METHB: 0.2 % (ref 0–1.5)
MODE: ABNORMAL
MONOCYTES ABSOLUTE: 0.33 E9/L (ref 0.1–0.95)
MONOCYTES RELATIVE PERCENT: 6 % (ref 2–12)
NEUTROPHILS ABSOLUTE: 4.57 E9/L (ref 1.8–7.3)
NEUTROPHILS RELATIVE PERCENT: 83 % (ref 43–80)
NUCLEATED RED BLOOD CELLS: 1 /100 WBC
O2 CONTENT: 19.8 ML/DL
O2 SATURATION: 95.6 % (ref 92–98.5)
O2 SATURATION: 98.7 % (ref 92–98.5)
O2HB: 97.4 % (ref 94–97)
OPERATOR ID: 377
OPERATOR ID: 557
OPIATE SCREEN URINE: NOT DETECTED
PATIENT TEMP: 37 C
PCO2 ARTERIAL: 70.8 MMHG (ref 35–45)
PCO2: 59.7 MMHG (ref 35–45)
PDW BLD-RTO: 12.7 FL (ref 11.5–15)
PEEP/CPAP: 6 CMH2O
PFO2: 3.11 MMHG/%
PH BLOOD GAS: 7.33 (ref 7.35–7.45)
PH BLOOD GAS: 7.37 (ref 7.35–7.45)
PHENCYCLIDINE SCREEN URINE: NOT DETECTED
PHOSPHORUS: 3.6 MG/DL (ref 2.5–4.5)
PLATELET # BLD: 264 E9/L (ref 130–450)
PMV BLD AUTO: 10.5 FL (ref 7–12)
PO2 ARTERIAL: 88.5 MMHG (ref 60–80)
PO2: 124.2 MMHG (ref 60–100)
POTASSIUM REFLEX MAGNESIUM: 4.3 MMOL/L (ref 3.5–5)
POTASSIUM SERPL-SCNC: 5.1 MMOL/L (ref 3.5–5)
PRO-BNP: 415 PG/ML (ref 0–125)
PROPOXYPHENE SCREEN: NOT DETECTED
PS: 12 CMH20
RBC # BLD: 4.43 E12/L (ref 3.8–5.8)
RBC # BLD: NORMAL 10*6/UL
RI(T): 0.66
SODIUM BLD-SCNC: 136 MMOL/L (ref 132–146)
SODIUM BLD-SCNC: 139 MMOL/L (ref 132–146)
SOURCE, BLOOD GAS: ABNORMAL
SOURCE, BLOOD GAS: ABNORMAL
STREP PNEUMONIAE ANTIGEN, URINE: NORMAL
THB: 14.3 G/DL (ref 11.5–16.5)
TIME ANALYZED: 715
TOTAL PROTEIN: 8.9 G/DL (ref 6.4–8.3)
TRIGL SERPL-MCNC: 63 MG/DL (ref 0–149)
TROPONIN: <0.01 NG/ML (ref 0–0.03)
TSH SERPL DL<=0.05 MIU/L-ACNC: 0.32 UIU/ML (ref 0.27–4.2)
VLDLC SERPL CALC-MCNC: 13 MG/DL
WBC # BLD: 5.5 E9/L (ref 4.5–11.5)

## 2018-09-22 PROCEDURE — 2580000003 HC RX 258: Performed by: FAMILY MEDICINE

## 2018-09-22 PROCEDURE — 83735 ASSAY OF MAGNESIUM: CPT

## 2018-09-22 PROCEDURE — 83880 ASSAY OF NATRIURETIC PEPTIDE: CPT

## 2018-09-22 PROCEDURE — 36415 COLL VENOUS BLD VENIPUNCTURE: CPT

## 2018-09-22 PROCEDURE — 2500000003 HC RX 250 WO HCPCS: Performed by: EMERGENCY MEDICINE

## 2018-09-22 PROCEDURE — 6370000000 HC RX 637 (ALT 250 FOR IP): Performed by: INTERNAL MEDICINE

## 2018-09-22 PROCEDURE — 82805 BLOOD GASES W/O2 SATURATION: CPT

## 2018-09-22 PROCEDURE — 96365 THER/PROPH/DIAG IV INF INIT: CPT

## 2018-09-22 PROCEDURE — 94640 AIRWAY INHALATION TREATMENT: CPT

## 2018-09-22 PROCEDURE — 6360000002 HC RX W HCPCS: Performed by: FAMILY MEDICINE

## 2018-09-22 PROCEDURE — 94660 CPAP INITIATION&MGMT: CPT

## 2018-09-22 PROCEDURE — 84100 ASSAY OF PHOSPHORUS: CPT

## 2018-09-22 PROCEDURE — 82803 BLOOD GASES ANY COMBINATION: CPT

## 2018-09-22 PROCEDURE — 85025 COMPLETE CBC W/AUTO DIFF WBC: CPT

## 2018-09-22 PROCEDURE — 6360000002 HC RX W HCPCS: Performed by: INTERNAL MEDICINE

## 2018-09-22 PROCEDURE — 87450 HC DIRECT STREP B ANTIGEN: CPT

## 2018-09-22 PROCEDURE — 87040 BLOOD CULTURE FOR BACTERIA: CPT

## 2018-09-22 PROCEDURE — 85378 FIBRIN DEGRADE SEMIQUANT: CPT

## 2018-09-22 PROCEDURE — 94761 N-INVAS EAR/PLS OXIMETRY MLT: CPT

## 2018-09-22 PROCEDURE — 84443 ASSAY THYROID STIM HORMONE: CPT

## 2018-09-22 PROCEDURE — 6360000002 HC RX W HCPCS

## 2018-09-22 PROCEDURE — 6370000000 HC RX 637 (ALT 250 FOR IP): Performed by: FAMILY MEDICINE

## 2018-09-22 PROCEDURE — 94644 CONT INHLJ TX 1ST HOUR: CPT

## 2018-09-22 PROCEDURE — 80061 LIPID PANEL: CPT

## 2018-09-22 PROCEDURE — 2580000003 HC RX 258: Performed by: EMERGENCY MEDICINE

## 2018-09-22 PROCEDURE — 2700000000 HC OXYGEN THERAPY PER DAY

## 2018-09-22 PROCEDURE — 2060000000 HC ICU INTERMEDIATE R&B

## 2018-09-22 PROCEDURE — 80307 DRUG TEST PRSMV CHEM ANLYZR: CPT

## 2018-09-22 PROCEDURE — 80053 COMPREHEN METABOLIC PANEL: CPT

## 2018-09-22 PROCEDURE — 6370000000 HC RX 637 (ALT 250 FOR IP): Performed by: EMERGENCY MEDICINE

## 2018-09-22 PROCEDURE — 36600 WITHDRAWAL OF ARTERIAL BLOOD: CPT

## 2018-09-22 PROCEDURE — G0480 DRUG TEST DEF 1-7 CLASSES: HCPCS

## 2018-09-22 RX ORDER — BUDESONIDE 0.5 MG/2ML
0.5 INHALANT ORAL 2 TIMES DAILY
Status: DISCONTINUED | OUTPATIENT
Start: 2018-09-22 | End: 2018-09-24 | Stop reason: HOSPADM

## 2018-09-22 RX ORDER — ALBUTEROL SULFATE 2.5 MG/3ML
2.5 SOLUTION RESPIRATORY (INHALATION) 4 TIMES DAILY
Status: DISCONTINUED | OUTPATIENT
Start: 2018-09-22 | End: 2018-09-22

## 2018-09-22 RX ORDER — LOSARTAN POTASSIUM 100 MG/1
100 TABLET ORAL DAILY
Status: DISCONTINUED | OUTPATIENT
Start: 2018-09-22 | End: 2018-09-24 | Stop reason: HOSPADM

## 2018-09-22 RX ORDER — METHYLPREDNISOLONE SODIUM SUCCINATE 40 MG/ML
20 INJECTION, POWDER, LYOPHILIZED, FOR SOLUTION INTRAMUSCULAR; INTRAVENOUS EVERY 8 HOURS
Status: DISCONTINUED | OUTPATIENT
Start: 2018-09-22 | End: 2018-09-24 | Stop reason: HOSPADM

## 2018-09-22 RX ORDER — IPRATROPIUM BROMIDE AND ALBUTEROL SULFATE 2.5; .5 MG/3ML; MG/3ML
3 SOLUTION RESPIRATORY (INHALATION) EVERY 4 HOURS
Status: DISCONTINUED | OUTPATIENT
Start: 2018-09-22 | End: 2018-09-24 | Stop reason: HOSPADM

## 2018-09-22 RX ORDER — TRIAMCINOLONE ACETONIDE 1 MG/G
CREAM TOPICAL 2 TIMES DAILY
Status: DISCONTINUED | OUTPATIENT
Start: 2018-09-22 | End: 2018-09-24 | Stop reason: HOSPADM

## 2018-09-22 RX ORDER — METHYLPREDNISOLONE SODIUM SUCCINATE 125 MG/2ML
80 INJECTION, POWDER, LYOPHILIZED, FOR SOLUTION INTRAMUSCULAR; INTRAVENOUS EVERY 8 HOURS
Status: DISCONTINUED | OUTPATIENT
Start: 2018-09-22 | End: 2018-09-22

## 2018-09-22 RX ORDER — POLYVINYL ALCOHOL 14 MG/ML
1 SOLUTION/ DROPS OPHTHALMIC DAILY
Status: DISCONTINUED | OUTPATIENT
Start: 2018-09-22 | End: 2018-09-24 | Stop reason: HOSPADM

## 2018-09-22 RX ORDER — FORMOTEROL FUMARATE 20 UG/2ML
20 SOLUTION RESPIRATORY (INHALATION) 2 TIMES DAILY
Status: DISCONTINUED | OUTPATIENT
Start: 2018-09-22 | End: 2018-09-24 | Stop reason: HOSPADM

## 2018-09-22 RX ORDER — KETOCONAZOLE 20 MG/G
CREAM TOPICAL DAILY
Status: DISCONTINUED | OUTPATIENT
Start: 2018-09-22 | End: 2018-09-24 | Stop reason: HOSPADM

## 2018-09-22 RX ORDER — SODIUM CHLORIDE 0.9 % (FLUSH) 0.9 %
10 SYRINGE (ML) INJECTION EVERY 12 HOURS SCHEDULED
Status: DISCONTINUED | OUTPATIENT
Start: 2018-09-22 | End: 2018-09-24 | Stop reason: HOSPADM

## 2018-09-22 RX ORDER — ASPIRIN 81 MG/1
81 TABLET, CHEWABLE ORAL DAILY
Status: DISCONTINUED | OUTPATIENT
Start: 2018-09-22 | End: 2018-09-24 | Stop reason: HOSPADM

## 2018-09-22 RX ORDER — ALBUTEROL SULFATE 2.5 MG/3ML
2.5 SOLUTION RESPIRATORY (INHALATION) EVERY 6 HOURS PRN
Status: DISCONTINUED | OUTPATIENT
Start: 2018-09-22 | End: 2018-09-22

## 2018-09-22 RX ORDER — IPRATROPIUM BROMIDE AND ALBUTEROL SULFATE 2.5; .5 MG/3ML; MG/3ML
1 SOLUTION RESPIRATORY (INHALATION)
Status: DISCONTINUED | OUTPATIENT
Start: 2018-09-22 | End: 2018-09-22

## 2018-09-22 RX ORDER — PREDNISOLONE ACETATE 10 MG/ML
1 SUSPENSION/ DROPS OPHTHALMIC 2 TIMES DAILY
Status: DISCONTINUED | OUTPATIENT
Start: 2018-09-22 | End: 2018-09-24 | Stop reason: HOSPADM

## 2018-09-22 RX ORDER — ALBUTEROL SULFATE 90 UG/1
2 AEROSOL, METERED RESPIRATORY (INHALATION) EVERY 6 HOURS PRN
Status: DISCONTINUED | OUTPATIENT
Start: 2018-09-22 | End: 2018-09-22

## 2018-09-22 RX ORDER — HYDROXYZINE HYDROCHLORIDE 25 MG/1
25 TABLET, FILM COATED ORAL NIGHTLY
Status: DISCONTINUED | OUTPATIENT
Start: 2018-09-22 | End: 2018-09-24 | Stop reason: HOSPADM

## 2018-09-22 RX ORDER — HYDROCODONE BITARTRATE AND ACETAMINOPHEN 5; 325 MG/1; MG/1
1 TABLET ORAL EVERY 4 HOURS PRN
Status: DISCONTINUED | OUTPATIENT
Start: 2018-09-22 | End: 2018-09-24 | Stop reason: HOSPADM

## 2018-09-22 RX ORDER — VITAMIN E 268 MG
400 CAPSULE ORAL DAILY
Status: DISCONTINUED | OUTPATIENT
Start: 2018-09-22 | End: 2018-09-24 | Stop reason: HOSPADM

## 2018-09-22 RX ORDER — CETIRIZINE HYDROCHLORIDE 10 MG/1
10 TABLET ORAL DAILY
Status: DISCONTINUED | OUTPATIENT
Start: 2018-09-22 | End: 2018-09-24 | Stop reason: HOSPADM

## 2018-09-22 RX ORDER — ACETAMINOPHEN 325 MG/1
650 TABLET ORAL EVERY 4 HOURS PRN
Status: DISCONTINUED | OUTPATIENT
Start: 2018-09-22 | End: 2018-09-24 | Stop reason: HOSPADM

## 2018-09-22 RX ORDER — SODIUM CHLORIDE 0.9 % (FLUSH) 0.9 %
10 SYRINGE (ML) INJECTION PRN
Status: DISCONTINUED | OUTPATIENT
Start: 2018-09-22 | End: 2018-09-24 | Stop reason: HOSPADM

## 2018-09-22 RX ORDER — CYCLOBENZAPRINE HCL 10 MG
10 TABLET ORAL 3 TIMES DAILY PRN
Status: DISCONTINUED | OUTPATIENT
Start: 2018-09-22 | End: 2018-09-24 | Stop reason: HOSPADM

## 2018-09-22 RX ORDER — CARVEDILOL 25 MG/1
25 TABLET ORAL 2 TIMES DAILY WITH MEALS
Status: DISCONTINUED | OUTPATIENT
Start: 2018-09-22 | End: 2018-09-24 | Stop reason: HOSPADM

## 2018-09-22 RX ADMIN — METHYLPREDNISOLONE SODIUM SUCCINATE 20 MG: 40 INJECTION, POWDER, FOR SOLUTION INTRAMUSCULAR; INTRAVENOUS at 16:29

## 2018-09-22 RX ADMIN — HYDROCORTISONE: 25 CREAM TOPICAL at 20:57

## 2018-09-22 RX ADMIN — Medication 10 ML: at 21:01

## 2018-09-22 RX ADMIN — ASPIRIN 81 MG 81 MG: 81 TABLET ORAL at 09:36

## 2018-09-22 RX ADMIN — POLYVINYL ALCOHOL 1 DROP: 14 SOLUTION/ DROPS OPHTHALMIC at 09:36

## 2018-09-22 RX ADMIN — ENOXAPARIN SODIUM 40 MG: 40 INJECTION, SOLUTION INTRAVENOUS; SUBCUTANEOUS at 03:19

## 2018-09-22 RX ADMIN — ROFLUMILAST 500 MCG: 500 TABLET ORAL at 14:02

## 2018-09-22 RX ADMIN — HYDROXYZINE HYDROCHLORIDE 25 MG: 25 TABLET, FILM COATED ORAL at 21:01

## 2018-09-22 RX ADMIN — PREDNISOLONE ACETATE 1 DROP: 10 SUSPENSION/ DROPS OPHTHALMIC at 20:56

## 2018-09-22 RX ADMIN — DOXYCYCLINE 100 MG: 100 INJECTION, POWDER, LYOPHILIZED, FOR SOLUTION INTRAVENOUS at 01:18

## 2018-09-22 RX ADMIN — TRIAMCINOLONE ACETONIDE: 1 CREAM TOPICAL at 09:37

## 2018-09-22 RX ADMIN — FORMOTEROL FUMARATE DIHYDRATE 20 MCG: 20 SOLUTION RESPIRATORY (INHALATION) at 09:31

## 2018-09-22 RX ADMIN — KETOCONAZOLE: 20 CREAM TOPICAL at 09:37

## 2018-09-22 RX ADMIN — IPRATROPIUM BROMIDE AND ALBUTEROL SULFATE 3 AMPULE: .5; 3 SOLUTION RESPIRATORY (INHALATION) at 00:02

## 2018-09-22 RX ADMIN — CARVEDILOL 25 MG: 25 TABLET, FILM COATED ORAL at 16:28

## 2018-09-22 RX ADMIN — ENOXAPARIN SODIUM 40 MG: 40 INJECTION SUBCUTANEOUS at 21:00

## 2018-09-22 RX ADMIN — TRIAMCINOLONE ACETONIDE: 1 CREAM TOPICAL at 20:57

## 2018-09-22 RX ADMIN — PREDNISOLONE ACETATE 1 DROP: 10 SUSPENSION/ DROPS OPHTHALMIC at 09:36

## 2018-09-22 RX ADMIN — IPRATROPIUM BROMIDE AND ALBUTEROL SULFATE 1 AMPULE: .5; 3 SOLUTION RESPIRATORY (INHALATION) at 06:13

## 2018-09-22 RX ADMIN — FORMOTEROL FUMARATE DIHYDRATE 20 MCG: 20 SOLUTION RESPIRATORY (INHALATION) at 17:33

## 2018-09-22 RX ADMIN — METHYLPREDNISOLONE SODIUM SUCCINATE 80 MG: 125 INJECTION, POWDER, FOR SOLUTION INTRAMUSCULAR; INTRAVENOUS at 09:38

## 2018-09-22 RX ADMIN — CETIRIZINE HYDROCHLORIDE 10 MG: 10 TABLET, FILM COATED ORAL at 09:36

## 2018-09-22 RX ADMIN — HYDROCORTISONE: 25 CREAM TOPICAL at 09:37

## 2018-09-22 RX ADMIN — CYCLOBENZAPRINE HYDROCHLORIDE 10 MG: 10 TABLET, FILM COATED ORAL at 21:00

## 2018-09-22 RX ADMIN — LOSARTAN POTASSIUM 100 MG: 100 TABLET ORAL at 11:49

## 2018-09-22 RX ADMIN — BUDESONIDE 500 MCG: 0.5 SUSPENSION RESPIRATORY (INHALATION) at 17:33

## 2018-09-22 RX ADMIN — NEOMYCIN, POLYMYXIN B SULFATES, DEXAMETHASONE: 1; 3.5; 1 OINTMENT OPHTHALMIC at 21:01

## 2018-09-22 RX ADMIN — CARVEDILOL 25 MG: 25 TABLET, FILM COATED ORAL at 09:36

## 2018-09-22 RX ADMIN — HYDROCODONE BITARTRATE AND ACETAMINOPHEN 1 TABLET: 5; 325 TABLET ORAL at 21:00

## 2018-09-22 RX ADMIN — IPRATROPIUM BROMIDE AND ALBUTEROL SULFATE 1 AMPULE: .5; 3 SOLUTION RESPIRATORY (INHALATION) at 21:59

## 2018-09-22 RX ADMIN — IPRATROPIUM BROMIDE AND ALBUTEROL SULFATE 1 AMPULE: .5; 3 SOLUTION RESPIRATORY (INHALATION) at 13:18

## 2018-09-22 RX ADMIN — Medication 10 ML: at 09:37

## 2018-09-22 RX ADMIN — BUDESONIDE 500 MCG: 0.5 SUSPENSION RESPIRATORY (INHALATION) at 09:33

## 2018-09-22 RX ADMIN — METHYLPREDNISOLONE SODIUM SUCCINATE 20 MG: 40 INJECTION, POWDER, FOR SOLUTION INTRAMUSCULAR; INTRAVENOUS at 23:25

## 2018-09-22 ASSESSMENT — PAIN SCALES - GENERAL
PAINLEVEL_OUTOF10: 0
PAINLEVEL_OUTOF10: 0
PAINLEVEL_OUTOF10: 6
PAINLEVEL_OUTOF10: 7

## 2018-09-22 ASSESSMENT — PAIN DESCRIPTION - ORIENTATION: ORIENTATION: ANTERIOR

## 2018-09-22 ASSESSMENT — PAIN DESCRIPTION - PAIN TYPE: TYPE: CHRONIC PAIN

## 2018-09-22 ASSESSMENT — PAIN DESCRIPTION - LOCATION: LOCATION: NECK

## 2018-09-22 NOTE — CONSULTS
bring    Historical Provider, MD   cetirizine (ZYRTEC) 10 MG tablet Take 10 mg by mouth daily    Historical Provider, MD   hydrOXYzine (ATARAX) 25 MG tablet Take 25 mg by mouth nightly    Historical Provider, MD   losartan (COZAAR) 100 MG tablet Take 100 mg by mouth daily    Historical Provider, MD   DOXYCYCLINE HYCLATE PO Take by mouth daily    Historical Provider, MD   ketoconazole (NIZORAL) 2 % cream Apply topically daily Apply topically daily. Historical Provider, MD   hydrocortisone 2.5 % cream Apply topically 2 times daily Apply topically 2 times daily. Historical Provider, MD   triamcinolone (KENALOG) 0.1 % cream Apply topically 2 times daily Apply topically 2 times daily. Historical Provider, MD   OXYGEN Inhale 3 L into the lungs    Historical Provider, MD   carvedilol (COREG) 25 MG tablet Take 25 mg by mouth 2 times daily (with meals) 2 tablets two times per day    Historical Provider, MD   vitamin E 400 UNIT capsule Take 400 Units by mouth daily    Historical Provider, MD   carboxymethylcellulose 1 % ophthalmic solution Place 1 drop into both eyes daily    Historical Provider, MD   prednisoLONE acetate (PRED FORTE) 1 % ophthalmic suspension Place 1 drop into both eyes 2 times daily    Historical Provider, MD   Neomycin-Polymyxin-Dexameth (MAXITROL) 0.1 % OINT Place 0.5 inches into both eyes nightly    Historical Provider, MD   albuterol (PROVENTIL HFA;VENTOLIN HFA) 108 (90 BASE) MCG/ACT inhaler Inhale 2 puffs into the lungs every 6 hours as needed for Wheezing or Shortness of Breath     Historical Provider, MD   albuterol (PROVENTIL) (2.5 MG/3ML) 0.083% nebulizer solution Take 3 mLs by nebulization every 6 hours as needed for Wheezing. 10/8/12   Lexus Gonzalez MD   aspirin 81 MG tablet Take 81 mg by mouth daily.       Historical Provider, MD       Allergies  Ace inhibitors and Lisinopril    Social Hx  Social History     Social History    Marital status: Single     Spouse name: N/A    Number of children: N/A    Years of education: N/A     Occupational History    Not on file. Social History Main Topics    Smoking status: Former Smoker     Packs/day: 0.50     Types: Cigarettes     Quit date: 1/16/2015    Smokeless tobacco: Never Used    Alcohol use 8.4 oz/week     14 Cans of beer per week      Comment: 2-3 beers daily    Drug use: No    Sexual activity: No     Other Topics Concern    Not on file     Social History Narrative    No narrative on file       Review of Systems  All bolded are positive; please see HPI  General:  Fever, chills, diaphoresis, fatigue, malaise, night sweats, weight loss  Psychological:  Anxiety, disorientation, hallucinations. ENT:  Epistaxis, headaches, vertigo, visual changes. Cardiovascular:  Chest pain, irregular heartbeats, palpitations, paroxysmal nocturnal dyspnea. Respiratory:  Shortness of breath, coughing, sputum production, hemoptysis, wheezing, orthopnea.   Gastrointestinal:  Nausea, vomiting, diarrhea, heartburn, constipation, abdominal pain, hematemesis, hematochezia, melena, acholic stools  Genito-Urinary:  Dysuria, urgency, frequency, hematuria  Musculoskeletal:  Joint pain, joint stiffness, joint swelling, muscle pain  Neurology:  Headache, focal neurological deficits, weakness, numbness, paresthesia  Derm:  Rashes, ulcers, excoriations, bruising  Extremities:  Decreased ROM, peripheral edema, mottling    Physical Examination  Vitals:  BP (!) 181/92   Pulse 72   Temp 97.6 °F (36.4 °C) (Oral)   Resp 28   Ht 5' 6\" (1.676 m)   Wt 150 lb 2 oz (68.1 kg)   SpO2 100%   BMI 24.23 kg/m²   General Appearance:  awake, alert, and oriented to person, place, time, and purpose; appears stated age and cooperative; on BiPAP  HEENT:  NCAT; PERRL; conjunctiva pink, sclera clear  Neck:  no adenopathy, bruit, JVD, tenderness, masses, or nodules; supple, symmetrical, trachea midline, thyroid not enlarged  Lung:  Severely diminished bilaterally; no use of accessory

## 2018-09-22 NOTE — H&P
tablet, Take 100 mg by mouth daily  DOXYCYCLINE HYCLATE PO, Take by mouth daily  ketoconazole (NIZORAL) 2 % cream, Apply topically daily Apply topically daily. hydrocortisone 2.5 % cream, Apply topically 2 times daily Apply topically 2 times daily. triamcinolone (KENALOG) 0.1 % cream, Apply topically 2 times daily Apply topically 2 times daily. OXYGEN, Inhale 3 L into the lungs  carvedilol (COREG) 25 MG tablet, Take 25 mg by mouth 2 times daily (with meals) 2 tablets two times per day  vitamin E 400 UNIT capsule, Take 400 Units by mouth daily  carboxymethylcellulose 1 % ophthalmic solution, Place 1 drop into both eyes daily  prednisoLONE acetate (PRED FORTE) 1 % ophthalmic suspension, Place 1 drop into both eyes 2 times daily  Neomycin-Polymyxin-Dexameth (MAXITROL) 0.1 % OINT, Place 0.5 inches into both eyes nightly  albuterol (PROVENTIL HFA;VENTOLIN HFA) 108 (90 BASE) MCG/ACT inhaler, Inhale 2 puffs into the lungs every 6 hours as needed for Wheezing or Shortness of Breath   albuterol (PROVENTIL) (2.5 MG/3ML) 0.083% nebulizer solution, Take 3 mLs by nebulization every 6 hours as needed for Wheezing. aspirin 81 MG tablet, Take 81 mg by mouth daily. Allergies:  Ace inhibitors and Lisinopril    Social History:   TOBACCO:   reports that he quit smoking about 3 years ago. His smoking use included Cigarettes. He smoked 0.50 packs per day. He has never used smokeless tobacco.  ETOH:   reports that he drinks about 8.4 oz of alcohol per week . DRUGS:   reports that he does not use drugs.     Family History:   Family History   Problem Relation Age of Onset    Heart Disease Mother     Alcohol Abuse Father     Diabetes Father     Diabetes Sister     Heart Disease Sister     Diabetes Sister     High Blood Pressure Sister      REVIEW OF SYSTEMS:  CONSTITUTIONAL:  positive for  sweats and fatigue  HEENT:  negative  RESPIRATORY:  positive for  dyspnea, wheezing and pleuritic pain  CARDIOVASCULAR:  positive for

## 2018-09-22 NOTE — ED NOTES
Pt came in on RA sating 51%. Pt states that he was only off the o2 for the drive over. Pt placed on 6L nc. Pt has hx of COPD.       Vaughn Toth RN  09/21/18 [id number]

## 2018-09-22 NOTE — PROGRESS NOTES
Dr. Danita Horta returned call regarding admission orders. Home meds were reviewed with Dr. Cindy Sutton and all home meds were ordered except Doxycycline Hyclate. Dr. Cindy Sutton was notified that patient received Doxycycline 100mg IV in ED. She also gave the following new orders: 1) activity as tolerated; 2) regular diet; 3) Lovenox 40mg subcutaneous daily. RN notified Dr. Cindy Sutton that patient received Lovenox 40mg while in ED at approximately 0320 and she stated to start Lovenox daily on 9/22/18 at 2100;  4) Methylprednisolone 80mg IV every eight hours; 5) Continue bi-pap with settings as ordered; 6) Consult Dr. Kandis Harada.

## 2018-09-23 ENCOUNTER — APPOINTMENT (OUTPATIENT)
Dept: CT IMAGING | Age: 68
DRG: 190 | End: 2018-09-23
Payer: COMMERCIAL

## 2018-09-23 LAB
ALBUMIN SERPL-MCNC: 3.8 G/DL (ref 3.5–5.2)
ALP BLD-CCNC: 74 U/L (ref 40–129)
ALT SERPL-CCNC: 9 U/L (ref 0–40)
ANION GAP SERPL CALCULATED.3IONS-SCNC: 5 MMOL/L (ref 7–16)
AST SERPL-CCNC: 19 U/L (ref 0–39)
BASOPHILS ABSOLUTE: 0.01 E9/L (ref 0–0.2)
BASOPHILS RELATIVE PERCENT: 0.1 % (ref 0–2)
BILIRUB SERPL-MCNC: 0.3 MG/DL (ref 0–1.2)
BUN BLDV-MCNC: 20 MG/DL (ref 8–23)
CALCIUM SERPL-MCNC: 9.1 MG/DL (ref 8.6–10.2)
CHLORIDE BLD-SCNC: 95 MMOL/L (ref 98–107)
CO2: 36 MMOL/L (ref 22–29)
CREAT SERPL-MCNC: 1.1 MG/DL (ref 0.7–1.2)
EOSINOPHILS ABSOLUTE: 0 E9/L (ref 0.05–0.5)
EOSINOPHILS RELATIVE PERCENT: 0 % (ref 0–6)
GFR AFRICAN AMERICAN: >60
GFR NON-AFRICAN AMERICAN: >60 ML/MIN/1.73
GLUCOSE BLD-MCNC: 210 MG/DL (ref 74–109)
HCT VFR BLD CALC: 37.2 % (ref 37–54)
HEMOGLOBIN: 11.8 G/DL (ref 12.5–16.5)
IMMATURE GRANULOCYTES #: 0.13 E9/L
IMMATURE GRANULOCYTES %: 1 % (ref 0–5)
LYMPHOCYTES ABSOLUTE: 0.62 E9/L (ref 1.5–4)
LYMPHOCYTES RELATIVE PERCENT: 4.8 % (ref 20–42)
MCH RBC QN AUTO: 30.5 PG (ref 26–35)
MCHC RBC AUTO-ENTMCNC: 31.7 % (ref 32–34.5)
MCV RBC AUTO: 96.1 FL (ref 80–99.9)
MONOCYTES ABSOLUTE: 0.42 E9/L (ref 0.1–0.95)
MONOCYTES RELATIVE PERCENT: 3.2 % (ref 2–12)
NEUTROPHILS ABSOLUTE: 11.77 E9/L (ref 1.8–7.3)
NEUTROPHILS RELATIVE PERCENT: 90.9 % (ref 43–80)
PDW BLD-RTO: 12.5 FL (ref 11.5–15)
PLATELET # BLD: 270 E9/L (ref 130–450)
PMV BLD AUTO: 10.8 FL (ref 7–12)
POTASSIUM SERPL-SCNC: 4.7 MMOL/L (ref 3.5–5)
RBC # BLD: 3.87 E12/L (ref 3.8–5.8)
SODIUM BLD-SCNC: 136 MMOL/L (ref 132–146)
TOTAL PROTEIN: 7.5 G/DL (ref 6.4–8.3)
WBC # BLD: 13 E9/L (ref 4.5–11.5)

## 2018-09-23 PROCEDURE — 80053 COMPREHEN METABOLIC PANEL: CPT

## 2018-09-23 PROCEDURE — 6370000000 HC RX 637 (ALT 250 FOR IP): Performed by: INTERNAL MEDICINE

## 2018-09-23 PROCEDURE — 6360000002 HC RX W HCPCS: Performed by: INTERNAL MEDICINE

## 2018-09-23 PROCEDURE — 94640 AIRWAY INHALATION TREATMENT: CPT

## 2018-09-23 PROCEDURE — 6370000000 HC RX 637 (ALT 250 FOR IP): Performed by: FAMILY MEDICINE

## 2018-09-23 PROCEDURE — 2060000000 HC ICU INTERMEDIATE R&B

## 2018-09-23 PROCEDURE — 2700000000 HC OXYGEN THERAPY PER DAY

## 2018-09-23 PROCEDURE — 87486 CHLMYD PNEUM DNA AMP PROBE: CPT

## 2018-09-23 PROCEDURE — 85025 COMPLETE CBC W/AUTO DIFF WBC: CPT

## 2018-09-23 PROCEDURE — 2580000003 HC RX 258: Performed by: FAMILY MEDICINE

## 2018-09-23 PROCEDURE — 87633 RESP VIRUS 12-25 TARGETS: CPT

## 2018-09-23 PROCEDURE — 71260 CT THORAX DX C+: CPT

## 2018-09-23 PROCEDURE — 6360000004 HC RX CONTRAST MEDICATION: Performed by: RADIOLOGY

## 2018-09-23 PROCEDURE — 87798 DETECT AGENT NOS DNA AMP: CPT

## 2018-09-23 PROCEDURE — 94660 CPAP INITIATION&MGMT: CPT

## 2018-09-23 PROCEDURE — 36415 COLL VENOUS BLD VENIPUNCTURE: CPT

## 2018-09-23 PROCEDURE — 87581 M.PNEUMON DNA AMP PROBE: CPT

## 2018-09-23 PROCEDURE — 6360000002 HC RX W HCPCS: Performed by: FAMILY MEDICINE

## 2018-09-23 RX ORDER — ALBUTEROL SULFATE 90 UG/1
2 AEROSOL, METERED RESPIRATORY (INHALATION)
Status: DISCONTINUED | OUTPATIENT
Start: 2018-09-23 | End: 2018-09-24 | Stop reason: HOSPADM

## 2018-09-23 RX ORDER — DOXYCYCLINE HYCLATE 100 MG/1
100 CAPSULE ORAL EVERY 12 HOURS SCHEDULED
Status: DISCONTINUED | OUTPATIENT
Start: 2018-09-23 | End: 2018-09-24 | Stop reason: HOSPADM

## 2018-09-23 RX ADMIN — CYCLOBENZAPRINE HYDROCHLORIDE 10 MG: 10 TABLET, FILM COATED ORAL at 20:36

## 2018-09-23 RX ADMIN — ALBUTEROL SULFATE 2 PUFF: 90 AEROSOL, METERED RESPIRATORY (INHALATION) at 13:37

## 2018-09-23 RX ADMIN — BUDESONIDE 500 MCG: 0.5 SUSPENSION RESPIRATORY (INHALATION) at 06:01

## 2018-09-23 RX ADMIN — Medication 10 ML: at 20:36

## 2018-09-23 RX ADMIN — FORMOTEROL FUMARATE DIHYDRATE 20 MCG: 20 SOLUTION RESPIRATORY (INHALATION) at 18:03

## 2018-09-23 RX ADMIN — IPRATROPIUM BROMIDE AND ALBUTEROL SULFATE 3 AMPULE: .5; 3 SOLUTION RESPIRATORY (INHALATION) at 22:14

## 2018-09-23 RX ADMIN — ASPIRIN 81 MG 81 MG: 81 TABLET ORAL at 08:40

## 2018-09-23 RX ADMIN — VITAMIN E CAP 400 UNIT 400 UNITS: 400 CAP at 09:34

## 2018-09-23 RX ADMIN — Medication 10 ML: at 08:41

## 2018-09-23 RX ADMIN — IOPAMIDOL 80 ML: 755 INJECTION, SOLUTION INTRAVENOUS at 10:01

## 2018-09-23 RX ADMIN — IPRATROPIUM BROMIDE AND ALBUTEROL SULFATE 1 AMPULE: .5; 3 SOLUTION RESPIRATORY (INHALATION) at 13:08

## 2018-09-23 RX ADMIN — NEOMYCIN, POLYMYXIN B SULFATES, DEXAMETHASONE: 1; 3.5; 1 OINTMENT OPHTHALMIC at 20:40

## 2018-09-23 RX ADMIN — LOSARTAN POTASSIUM 100 MG: 100 TABLET ORAL at 09:34

## 2018-09-23 RX ADMIN — BUDESONIDE 500 MCG: 0.5 SUSPENSION RESPIRATORY (INHALATION) at 18:04

## 2018-09-23 RX ADMIN — KETOCONAZOLE: 20 CREAM TOPICAL at 08:41

## 2018-09-23 RX ADMIN — METHYLPREDNISOLONE SODIUM SUCCINATE 20 MG: 40 INJECTION, POWDER, FOR SOLUTION INTRAMUSCULAR; INTRAVENOUS at 16:07

## 2018-09-23 RX ADMIN — ROFLUMILAST 500 MCG: 500 TABLET ORAL at 08:40

## 2018-09-23 RX ADMIN — FORMOTEROL FUMARATE DIHYDRATE 20 MCG: 20 SOLUTION RESPIRATORY (INHALATION) at 06:01

## 2018-09-23 RX ADMIN — HYDROCODONE BITARTRATE AND ACETAMINOPHEN 1 TABLET: 5; 325 TABLET ORAL at 20:37

## 2018-09-23 RX ADMIN — CARVEDILOL 25 MG: 25 TABLET, FILM COATED ORAL at 16:07

## 2018-09-23 RX ADMIN — PREDNISOLONE ACETATE 1 DROP: 10 SUSPENSION/ DROPS OPHTHALMIC at 20:40

## 2018-09-23 RX ADMIN — DOXYCYCLINE HYCLATE 100 MG: 100 CAPSULE ORAL at 20:37

## 2018-09-23 RX ADMIN — CETIRIZINE HYDROCHLORIDE 10 MG: 10 TABLET, FILM COATED ORAL at 08:40

## 2018-09-23 RX ADMIN — PREDNISOLONE ACETATE 1 DROP: 10 SUSPENSION/ DROPS OPHTHALMIC at 08:39

## 2018-09-23 RX ADMIN — HYDROXYZINE HYDROCHLORIDE 25 MG: 25 TABLET, FILM COATED ORAL at 20:36

## 2018-09-23 RX ADMIN — ENOXAPARIN SODIUM 40 MG: 40 INJECTION SUBCUTANEOUS at 08:40

## 2018-09-23 RX ADMIN — POLYVINYL ALCOHOL 1 DROP: 14 SOLUTION/ DROPS OPHTHALMIC at 08:39

## 2018-09-23 RX ADMIN — IPRATROPIUM BROMIDE AND ALBUTEROL SULFATE 1 AMPULE: .5; 3 SOLUTION RESPIRATORY (INHALATION) at 09:25

## 2018-09-23 RX ADMIN — METHYLPREDNISOLONE SODIUM SUCCINATE 20 MG: 40 INJECTION, POWDER, FOR SOLUTION INTRAMUSCULAR; INTRAVENOUS at 08:39

## 2018-09-23 RX ADMIN — CARVEDILOL 25 MG: 25 TABLET, FILM COATED ORAL at 08:39

## 2018-09-23 ASSESSMENT — PAIN DESCRIPTION - PAIN TYPE: TYPE: CHRONIC PAIN

## 2018-09-23 ASSESSMENT — PAIN SCALES - GENERAL
PAINLEVEL_OUTOF10: 0
PAINLEVEL_OUTOF10: 7

## 2018-09-23 ASSESSMENT — PAIN DESCRIPTION - LOCATION: LOCATION: NECK

## 2018-09-23 NOTE — PROGRESS NOTES
When asked patient for a emergency contact.  Patient states if something happen to him \"please call [place]\"

## 2018-09-23 NOTE — CONSULTS
chest CT now  and compare to the one done in 2017 for followup on the infiltrate whether  he has a superimposed pneumonia and followup regarding this right hilar  lymphadenopathy, high risk for malignancy. Thank you kindly for opportunity in consulting your patient.         Ying Pope MD    D: 09/23/2018 9:15:51       T: 09/23/2018 9:18:53     [email or web address]  Job#: [id number]     Doc#: [id number]    CC:

## 2018-09-23 NOTE — PROGRESS NOTES
HPI:  The patient seems to be resting comfortably during my examination today. He states his respiratory status continues to improve but he has not yet returned to baseline. He has coughing with minimal sputum production. He has become more ambulatory. He has been evaluated by the pulmonary team. Patient voiced no new complaints since hospital admission. Questions, answers, and tests reviewed. ROS:  Cardiovascular:   Denies any chest pain, irregular heartbeats, or palpitations. Respiratory:   Continued but improving shortness of breath. Intermittent periods of coughing without sputum production. Gastrointestinal:   Denies nausea, vomiting, diarrhea, or constipation. Denies any abdominal pain. Extremities:   Denies any lower extremity swelling or edema. Neurology:    Denies any headache or focal neurological deficits. No weakness or paresthesia. Derm:    Denies any rashes, ulcers, or excoriations. Denies bruising. Genitourinary:    Denies any urgency, frequency, hematuria. Voiding without difficulty. Physical Exam:    Vitals:    09/23/18 0834   BP: (!) 152/73   Pulse: 80   Resp: 19   Temp: 98.1 °F (36.7 °C)   SpO2: 100%       HEENT:  PERRLA. EOMI. Sclera clear. Buccal mucosa moist. Nasal cannula oxygen is in place. Neck:  Supple. Trachea midline. No thyromegaly. No JVD. No bruits. Heart:  Rhythm regular, rate controlled. No murmurs. Lungs:  Symmetrical. Tight bilaterally with decreased breath sounds throughout. Abdomen: Soft. Non-tender. Non-distended. Bowel sounds positive. No organomegaly or masses. No pain on palpation    Extremities:  Peripheral pulses present. No peripheral edema. No ulcers. Neurologic:  Alert x 3. No focal deficit. Cranial nerves grossly intact. Skin:  No petechia. No hemorrhage. No wounds.     CBC with Differential:    Lab Results   Component Value Date    WBC 13.0 09/23/2018    RBC 3.87 09/23/2018    HGB 11.8 09/23/2018    HCT 37.2 09/23/2018    PLT C  5. Hx diastolic CHF  6. Hx tobacco abuse    Plan:   The patient's respiratory status continues to improve. He has been weaned back to his home requirements for nasal cannula oxygen. The pulmonary team is providing consultation. We will continue forward with antibiotic therapy, nebulized respiratory medications, and IV corticosteroids. We will await final respiratory cultures. Probable discharge home in the next 48 hours. Continue current therapy. See orders for further plan of care.     Hernandez Sosa D.O.  11:45 AM  9/23/2018

## 2018-09-24 VITALS
RESPIRATION RATE: 18 BRPM | WEIGHT: 150.13 LBS | HEIGHT: 66 IN | SYSTOLIC BLOOD PRESSURE: 136 MMHG | DIASTOLIC BLOOD PRESSURE: 78 MMHG | HEART RATE: 72 BPM | TEMPERATURE: 97.9 F | OXYGEN SATURATION: 100 % | BODY MASS INDEX: 24.13 KG/M2

## 2018-09-24 LAB
ALBUMIN SERPL-MCNC: 3.6 G/DL (ref 3.5–5.2)
ALP BLD-CCNC: 63 U/L (ref 40–129)
ALT SERPL-CCNC: 9 U/L (ref 0–40)
ANION GAP SERPL CALCULATED.3IONS-SCNC: 4 MMOL/L (ref 7–16)
AST SERPL-CCNC: 15 U/L (ref 0–39)
BASOPHILS ABSOLUTE: 0.01 E9/L (ref 0–0.2)
BASOPHILS RELATIVE PERCENT: 0.1 % (ref 0–2)
BILIRUB SERPL-MCNC: 0.2 MG/DL (ref 0–1.2)
BUN BLDV-MCNC: 18 MG/DL (ref 8–23)
CALCIUM SERPL-MCNC: 9 MG/DL (ref 8.6–10.2)
CHLORIDE BLD-SCNC: 97 MMOL/L (ref 98–107)
CO2: 38 MMOL/L (ref 22–29)
CREAT SERPL-MCNC: 1 MG/DL (ref 0.7–1.2)
EOSINOPHILS ABSOLUTE: 0 E9/L (ref 0.05–0.5)
EOSINOPHILS RELATIVE PERCENT: 0 % (ref 0–6)
FILM ARRAY ADENOVIRUS: NORMAL
FILM ARRAY BORDETELLA PERTUSSIS: NORMAL
FILM ARRAY CHLAMYDOPHILIA PNEUMONIAE: NORMAL
FILM ARRAY CORONAVIRUS 229E: NORMAL
FILM ARRAY CORONAVIRUS HKU1: NORMAL
FILM ARRAY CORONAVIRUS NL63: NORMAL
FILM ARRAY CORONAVIRUS OC43: NORMAL
FILM ARRAY INFLUENZA A VIRUS 09H1: NORMAL
FILM ARRAY INFLUENZA A VIRUS H1: NORMAL
FILM ARRAY INFLUENZA A VIRUS H3: NORMAL
FILM ARRAY INFLUENZA A VIRUS: NORMAL
FILM ARRAY INFLUENZA B: NORMAL
FILM ARRAY METAPNEUMOVIRUS: NORMAL
FILM ARRAY MYCOPLASMA PNEUMONIAE: NORMAL
FILM ARRAY PARAINFLUENZA VIRUS 1: NORMAL
FILM ARRAY PARAINFLUENZA VIRUS 2: NORMAL
FILM ARRAY PARAINFLUENZA VIRUS 3: NORMAL
FILM ARRAY PARAINFLUENZA VIRUS 4: NORMAL
FILM ARRAY RESPIRATORY SYNCITIAL VIRUS: NORMAL
FILM ARRAY RHINOVIRUS/ENTEROVIRUS: NORMAL
GFR AFRICAN AMERICAN: >60
GFR NON-AFRICAN AMERICAN: >60 ML/MIN/1.73
GLUCOSE BLD-MCNC: 114 MG/DL (ref 74–109)
HCT VFR BLD CALC: 35.8 % (ref 37–54)
HEMOGLOBIN: 11.3 G/DL (ref 12.5–16.5)
IMMATURE GRANULOCYTES #: 0.42 E9/L
IMMATURE GRANULOCYTES %: 2.4 % (ref 0–5)
LYMPHOCYTES ABSOLUTE: 0.66 E9/L (ref 1.5–4)
LYMPHOCYTES RELATIVE PERCENT: 3.8 % (ref 20–42)
MCH RBC QN AUTO: 30.5 PG (ref 26–35)
MCHC RBC AUTO-ENTMCNC: 31.6 % (ref 32–34.5)
MCV RBC AUTO: 96.5 FL (ref 80–99.9)
MONOCYTES ABSOLUTE: 1.23 E9/L (ref 0.1–0.95)
MONOCYTES RELATIVE PERCENT: 7.1 % (ref 2–12)
NEUTROPHILS ABSOLUTE: 15.12 E9/L (ref 1.8–7.3)
NEUTROPHILS RELATIVE PERCENT: 86.6 % (ref 43–80)
PDW BLD-RTO: 12.8 FL (ref 11.5–15)
PLATELET # BLD: 289 E9/L (ref 130–450)
PMV BLD AUTO: 10.2 FL (ref 7–12)
POTASSIUM SERPL-SCNC: 4.2 MMOL/L (ref 3.5–5)
RBC # BLD: 3.71 E12/L (ref 3.8–5.8)
SODIUM BLD-SCNC: 139 MMOL/L (ref 132–146)
TOTAL PROTEIN: 6.7 G/DL (ref 6.4–8.3)
WBC # BLD: 17.4 E9/L (ref 4.5–11.5)

## 2018-09-24 PROCEDURE — 94660 CPAP INITIATION&MGMT: CPT

## 2018-09-24 PROCEDURE — 2580000003 HC RX 258: Performed by: FAMILY MEDICINE

## 2018-09-24 PROCEDURE — 6370000000 HC RX 637 (ALT 250 FOR IP): Performed by: INTERNAL MEDICINE

## 2018-09-24 PROCEDURE — 80053 COMPREHEN METABOLIC PANEL: CPT

## 2018-09-24 PROCEDURE — 85025 COMPLETE CBC W/AUTO DIFF WBC: CPT

## 2018-09-24 PROCEDURE — 2700000000 HC OXYGEN THERAPY PER DAY

## 2018-09-24 PROCEDURE — 36415 COLL VENOUS BLD VENIPUNCTURE: CPT

## 2018-09-24 PROCEDURE — 6360000002 HC RX W HCPCS: Performed by: FAMILY MEDICINE

## 2018-09-24 PROCEDURE — 94640 AIRWAY INHALATION TREATMENT: CPT

## 2018-09-24 PROCEDURE — 6370000000 HC RX 637 (ALT 250 FOR IP): Performed by: FAMILY MEDICINE

## 2018-09-24 PROCEDURE — 6360000002 HC RX W HCPCS: Performed by: INTERNAL MEDICINE

## 2018-09-24 RX ORDER — PREDNISONE 20 MG/1
20 TABLET ORAL 2 TIMES DAILY
Qty: 20 TABLET | Refills: 0 | Status: SHIPPED | OUTPATIENT
Start: 2018-09-24 | End: 2018-10-04

## 2018-09-24 RX ADMIN — PREDNISOLONE ACETATE 1 DROP: 10 SUSPENSION/ DROPS OPHTHALMIC at 09:00

## 2018-09-24 RX ADMIN — ENOXAPARIN SODIUM 40 MG: 40 INJECTION SUBCUTANEOUS at 08:55

## 2018-09-24 RX ADMIN — BUDESONIDE 500 MCG: 0.5 SUSPENSION RESPIRATORY (INHALATION) at 06:10

## 2018-09-24 RX ADMIN — CARVEDILOL 25 MG: 25 TABLET, FILM COATED ORAL at 08:55

## 2018-09-24 RX ADMIN — ASPIRIN 81 MG 81 MG: 81 TABLET ORAL at 08:55

## 2018-09-24 RX ADMIN — ROFLUMILAST 500 MCG: 500 TABLET ORAL at 08:55

## 2018-09-24 RX ADMIN — FORMOTEROL FUMARATE DIHYDRATE 20 MCG: 20 SOLUTION RESPIRATORY (INHALATION) at 06:10

## 2018-09-24 RX ADMIN — METHYLPREDNISOLONE SODIUM SUCCINATE 20 MG: 40 INJECTION, POWDER, FOR SOLUTION INTRAMUSCULAR; INTRAVENOUS at 00:28

## 2018-09-24 RX ADMIN — LOSARTAN POTASSIUM 100 MG: 100 TABLET ORAL at 08:55

## 2018-09-24 RX ADMIN — VITAMIN E CAP 400 UNIT 400 UNITS: 400 CAP at 08:55

## 2018-09-24 RX ADMIN — DOXYCYCLINE HYCLATE 100 MG: 100 CAPSULE ORAL at 08:55

## 2018-09-24 RX ADMIN — CETIRIZINE HYDROCHLORIDE 10 MG: 10 TABLET, FILM COATED ORAL at 08:55

## 2018-09-24 RX ADMIN — IPRATROPIUM BROMIDE AND ALBUTEROL SULFATE 1 AMPULE: .5; 3 SOLUTION RESPIRATORY (INHALATION) at 10:02

## 2018-09-24 RX ADMIN — POLYVINYL ALCOHOL 1 DROP: 14 SOLUTION/ DROPS OPHTHALMIC at 09:01

## 2018-09-24 RX ADMIN — METHYLPREDNISOLONE SODIUM SUCCINATE 20 MG: 40 INJECTION, POWDER, FOR SOLUTION INTRAMUSCULAR; INTRAVENOUS at 08:55

## 2018-09-24 RX ADMIN — IPRATROPIUM BROMIDE AND ALBUTEROL SULFATE 3 AMPULE: .5; 3 SOLUTION RESPIRATORY (INHALATION) at 01:43

## 2018-09-24 RX ADMIN — Medication 10 ML: at 08:55

## 2018-09-24 ASSESSMENT — PAIN SCALES - GENERAL
PAINLEVEL_OUTOF10: 0
PAINLEVEL_OUTOF10: 0

## 2018-09-24 NOTE — DISCHARGE SUMMARY
hypertension, COPD, former smoker. History of CHF Findings: A portable AP erect inspiratory view of the chest was obtained and compared to the previous exam of 3/1/2018. Slightly limited level of inspiration is present. There is patchy increased density within the lung bases bilaterally. No pleural effusion or pneumothorax is seen bilaterally. Cardiac silhouette is mildly enlarged, unchanged. Osseous structures are not significantly changed, as visualized. Slightly limited level of inspiration with bibasilar lung infiltrates and/or atelectasis         HOSPITAL COURSE:   The patient did very well throughout the hospital stay. He was placed on IV corticosteroids, nebulized respiratory medications, and antibiotics. His respiratory status improved. He was weaned back to his at-home nasal cannula oxygen requirements. He was evaluated by the pulmonary team who will follow him as an outpatient. Lab work and vital signs stabilized. He was deemed acceptable for discharge home. BRIEF PHYSICAL EXAMINATION AND LABORATORIES ON DAY OF DISCHARGE:  VITALS:  /78   Pulse 72   Temp 97.9 °F (36.6 °C) (Oral)   Resp 18   Ht 5' 6\" (1.676 m)   Wt 150 lb 2 oz (68.1 kg)   SpO2 100%   BMI 24.23 kg/m²     HEENT:  PERRLA. EOMI. Sclera clear. Buccal mucosa moist. Nasal cannula oxygen was in place. Neck:  Supple. Trachea midline. No thyromegaly. No JVD. No bruits. Heart:  Rhythm regular, rate controlled. No murmurs. Lungs:  Symmetrical. Clear to auscultation bilaterally. No wheezes. No rhonchi. No rales. Abdomen: Soft. Non-tender. Non-distended. Bowel sounds positive. No organomegaly or masses. No pain on palpation    Extremities:  Peripheral pulses present. No peripheral edema. No ulcers. Neurologic:  Alert x 3. No focal deficit. Cranial nerves grossly intact. Skin:  No petechia. No hemorrhage. No wounds. DISPOSITION:  The patient's condition is good.   At this time the patient is without objective evidence of an acute process requiring continuing hospitalization or inpatient management. They are stable for discharge with outpatient follow-up. I have spoken with the patient and discussed the results of the current hospitalization, in addition to providing specific details for the plan of care and counseling regarding the diagnosis and prognosis. The plan has been discussed in detail and they are aware of the specific conditions for emergent return, as well as the importance of follow-up. Their questions are answered at this time and they are agreeable with the plan for discharge to home    DISCHARGE MEDICATIONS:    Rain Wren   Home Medication Instructions F:[id number]    Printed on:09/24/18 2492   Medication Information                      albuterol (PROVENTIL HFA;VENTOLIN HFA) 108 (90 BASE) MCG/ACT inhaler  Inhale 2 puffs into the lungs every 6 hours as needed for Wheezing or Shortness of Breath              albuterol (PROVENTIL) (2.5 MG/3ML) 0.083% nebulizer solution  Take 3 mLs by nebulization every 6 hours as needed for Wheezing. aspirin 81 MG tablet  Take 81 mg by mouth daily. budesonide-formoterol (SYMBICORT) 160-4.5 MCG/ACT AERO  Inhale 2 puffs into the lungs 2 times daily Do & bring             carboxymethylcellulose 1 % ophthalmic solution  Place 1 drop into both eyes daily             carvedilol (COREG) 25 MG tablet  Take 25 mg by mouth 2 times daily (with meals) 2 tablets two times per day             cetirizine (ZYRTEC) 10 MG tablet  Take 10 mg by mouth daily             cyclobenzaprine (FLEXERIL) 10 MG tablet  Take 1 tablet by mouth 3 times daily as needed for Muscle spasms             DOXYCYCLINE HYCLATE PO  Take by mouth daily             HYDROcodone-acetaminophen (NORCO) 5-325 MG per tablet  Take 1 tablet by mouth every 4 hours as needed for Pain. .             hydrocortisone 2.5 % cream  Apply topically 2 times daily Apply topically 2 times

## 2018-09-24 NOTE — PROGRESS NOTES
Department of Family Practice  Adult Daily Progress Note      JAVIER BUTTS IS SEEN IN FOLLOW UP TODAY  [address]. HE S TOLERATING TREATMENT. HE IS BREATHING A LITTLE BETTER.     OBJECTIVE    Physical  VITALS:  BP (!) 144/80   Pulse 70   Temp 98.4 °F (36.9 °C) (Oral)   Resp 18   Ht 5' 6\" (1.676 m)   Wt 150 lb 2 oz (68.1 kg)   SpO2 98%   BMI 24.23 kg/m²   CONSTITUTIONAL:  awake, alert, cooperative, no apparent distress, and appears stated age  LUNGS:  No increased work of breathing, FAIR air exchange, clear to auscultation bilaterally, no crackles or wheezing  CARDIOVASCULAR:  Normal apical impulse, regular rate and rhythm, normal S1 and S2, no S3 or S4, and no murmur noted  ABDOMEN:  No scars, normal bowel sounds, soft, non-distended, non-tender, no masses palpated, no hepatosplenomegally  Data  CBC with Differential:    Lab Results   Component Value Date    WBC 13.0 09/23/2018    RBC 3.87 09/23/2018    HGB 11.8 09/23/2018    HCT 37.2 09/23/2018     09/23/2018    MCV 96.1 09/23/2018    MCH 30.5 09/23/2018    MCHC 31.7 09/23/2018    RDW 12.5 09/23/2018    NRBC 1.0 09/22/2018    SEGSPCT 60 04/24/2013    LYMPHOPCT 4.8 09/23/2018    MONOPCT 3.2 09/23/2018    BASOPCT 0.1 09/23/2018    MONOSABS 0.42 09/23/2018    LYMPHSABS 0.62 09/23/2018    EOSABS 0.00 09/23/2018    BASOSABS 0.01 09/23/2018     BMP:    Lab Results   Component Value Date     09/23/2018    K 4.7 09/23/2018    K 4.3 09/21/2018    CL 95 09/23/2018    CO2 36 09/23/2018    BUN 20 09/23/2018    LABALBU 3.8 09/23/2018    LABALBU 4.6 07/14/2011    CREATININE 1.1 09/23/2018    CALCIUM 9.1 09/23/2018    GFRAA >60 09/23/2018    LABGLOM >60 09/23/2018    GLUCOSE 210 09/23/2018    GLUCOSE 101 07/14/2011     Current Medications  Scheduled Meds:   doxycycline hyclate  100 mg Oral 2 times per day    sodium chloride flush  10 mL Intravenous 2 times per day    aspirin  81 mg Oral Daily    polyvinyl alcohol  1 drop Both Eyes Daily    carvedilol 25 mg Oral BID WC    cetirizine  10 mg Oral Daily    hydrocortisone   Topical BID    hydrOXYzine  25 mg Oral Nightly    ketoconazole   Topical Daily    losartan  100 mg Oral Daily    Neomycin-Polymyxin-Dexameth   Both Eyes Nightly    prednisoLONE acetate  1 drop Both Eyes BID    triamcinolone   Topical BID    vitamin E  400 Units Oral Daily    enoxaparin  40 mg Subcutaneous Daily    budesonide  0.5 mg Nebulization BID    ipratropium-albuterol  3 ampule Inhalation Q4H    formoterol  20 mcg Nebulization BID    Roflumilast  500 mcg Oral Daily    methylPREDNISolone  20 mg Intravenous Q8H     Continuous Infusions:  PRN Meds:.albuterol sulfate HFA, sodium chloride flush, acetaminophen, cyclobenzaprine, HYDROcodone-acetaminophen, sodium chloride flush    ASSESSMENT AND PLAN      Principal Problem:    Acute respiratory failure with hypoxia and hypercapnia (HCC)  Active Problems:    COPD exacerbation (HCC)  Resolved Problems:    * No resolved hospital problems. *      CONTINUE PLAN OF CARE.

## 2018-09-24 NOTE — PLAN OF CARE
Problem: Falls - Risk of:  Goal: Will remain free from falls  Will remain free from falls   Outcome: Ongoing      Problem: Pain:  Goal: Control of chronic pain  Control of chronic pain   Outcome: Ongoing

## 2018-09-24 NOTE — PROGRESS NOTES
Pulmonary (Dr. Victor Hugo Cramer)    9/24/2018 8:10 AM  Subjective:   Admit Date: 9/21/2018  PCP: Sandy Varma DO  Interval History:  Medications and notes reviewed. Notes and data reviewed. No new complaints. States he is going to go home. Has not used BiPAP      Data:   Scheduled Meds:   doxycycline hyclate  100 mg Oral 2 times per day    sodium chloride flush  10 mL Intravenous 2 times per day    aspirin  81 mg Oral Daily    polyvinyl alcohol  1 drop Both Eyes Daily    carvedilol  25 mg Oral BID WC    cetirizine  10 mg Oral Daily    hydrocortisone   Topical BID    hydrOXYzine  25 mg Oral Nightly    ketoconazole   Topical Daily    losartan  100 mg Oral Daily    Neomycin-Polymyxin-Dexameth   Both Eyes Nightly    prednisoLONE acetate  1 drop Both Eyes BID    triamcinolone   Topical BID    vitamin E  400 Units Oral Daily    enoxaparin  40 mg Subcutaneous Daily    budesonide  0.5 mg Nebulization BID    ipratropium-albuterol  3 ampule Inhalation Q4H    formoterol  20 mcg Nebulization BID    Roflumilast  500 mcg Oral Daily    methylPREDNISolone  20 mg Intravenous Q8H     Continuous Infusions:  PRN Meds:albuterol sulfate HFA, sodium chloride flush, acetaminophen, cyclobenzaprine, HYDROcodone-acetaminophen, sodium chloride flush    No intake/output data recorded. Intake/Output Summary (Last 24 hours) at 09/24/18 0810  Last data filed at 09/23/18 0834   Gross per 24 hour   Intake                0 ml   Output              320 ml   Net             -320 ml     -----------------------------------------------------------------  RAD: Xr Chest Portable    Result Date: 9/22/2018  Location:200 Exam: XR CHEST PORTABLE Indications: Shortness of breath, hypertension, COPD, former smoker. History of CHF Findings: A portable AP erect inspiratory view of the chest was obtained and compared to the previous exam of 3/1/2018. Slightly limited level of inspiration is present.  There is patchy increased density within the lung bases bilaterally. No pleural effusion or pneumothorax is seen bilaterally. Cardiac silhouette is mildly enlarged, unchanged. Osseous structures are not significantly changed, as visualized. Slightly limited level of inspiration with bibasilar lung infiltrates and/or atelectasis       Objective:   Vitals: /78   Pulse 72   Temp 97.9 °F (36.6 °C) (Oral)   Resp 18   Ht 5' 6\" (1.676 m)   Wt 150 lb 2 oz (68.1 kg)   SpO2 100%   BMI 24.23 kg/m²  O2 Flow Rate (L/min): 4 L/min  Neck: no adenopathy and no jugular venous distention  Lungs: diminished breath sounds bilaterally  Heart: regular rate and rhythm, S1, S2 normal, no murmur, click, rub or gallop  Abdomen: Abdomen soft, non-tender. BS normal. No masses,  No organomegaly  Extremities: extremities normal, atraumatic, no cyanosis or edema      Assessment:   1. Acute on chronic respiratory failure, decompensated. 2.  Chronic respiratory acidosis with a compensatory metabolic alkalosis, decompensated. 3.  COPD exacerbated. 4.  History of right hilar lymphadenopathy on a previous CT, improved  5. History of nicotine addiction most of his life, quit in 2015. 6.  Marijuana abuse. 7.  Suspected noncompliance. Plan:   1. Continue same medications. Lymphadenopathy has improved. No further CT needed at this time. Will need annual CXR. 2. I will follow in office if he keeps an appointment. 3. Must stop marijuana,  4. Little to add from pulmonary stand, will sign off. Thank you. He has returned to baseline. Please call if I can help.     Judith Sellers MD

## 2018-09-27 LAB
BLOOD CULTURE, ROUTINE: NORMAL
CANNABINOIDS CONF, URINE: 72 NG/ML
CULTURE, BLOOD 2: NORMAL

## 2019-01-02 ENCOUNTER — HOSPITAL ENCOUNTER (OUTPATIENT)
Dept: NON INVASIVE DIAGNOSTICS | Age: 69
Discharge: HOME | End: 2019-01-02
Payer: COMMERCIAL

## 2019-01-02 LAB
LV EF: 52 %
LVEF MODALITY: NORMAL

## 2019-01-02 PROCEDURE — 93306 TTE W/DOPPLER COMPLETE: CPT

## 2019-02-26 DIAGNOSIS — M54.2 NECK PAIN: Primary | ICD-10-CM

## 2019-03-13 ENCOUNTER — OFFICE VISIT (OUTPATIENT)
Dept: NEUROSURGERY | Age: 69
End: 2019-03-13
Payer: COMMERCIAL

## 2019-03-13 VITALS
DIASTOLIC BLOOD PRESSURE: 92 MMHG | SYSTOLIC BLOOD PRESSURE: 136 MMHG | WEIGHT: 183 LBS | HEIGHT: 66 IN | BODY MASS INDEX: 29.41 KG/M2 | HEART RATE: 92 BPM

## 2019-03-13 DIAGNOSIS — M54.2 NECK PAIN: Primary | ICD-10-CM

## 2019-03-13 PROCEDURE — G8419 CALC BMI OUT NRM PARAM NOF/U: HCPCS | Performed by: PHYSICIAN ASSISTANT

## 2019-03-13 PROCEDURE — 1036F TOBACCO NON-USER: CPT | Performed by: PHYSICIAN ASSISTANT

## 2019-03-13 PROCEDURE — 3017F COLORECTAL CA SCREEN DOC REV: CPT | Performed by: PHYSICIAN ASSISTANT

## 2019-03-13 PROCEDURE — G8427 DOCREV CUR MEDS BY ELIG CLIN: HCPCS | Performed by: PHYSICIAN ASSISTANT

## 2019-03-13 PROCEDURE — 1123F ACP DISCUSS/DSCN MKR DOCD: CPT | Performed by: PHYSICIAN ASSISTANT

## 2019-03-13 PROCEDURE — G8484 FLU IMMUNIZE NO ADMIN: HCPCS | Performed by: PHYSICIAN ASSISTANT

## 2019-03-13 PROCEDURE — 1101F PT FALLS ASSESS-DOCD LE1/YR: CPT | Performed by: PHYSICIAN ASSISTANT

## 2019-03-13 PROCEDURE — G8598 ASA/ANTIPLAT THER USED: HCPCS | Performed by: PHYSICIAN ASSISTANT

## 2019-03-13 PROCEDURE — 99213 OFFICE O/P EST LOW 20 MIN: CPT | Performed by: PHYSICIAN ASSISTANT

## 2019-03-13 PROCEDURE — 4040F PNEUMOC VAC/ADMIN/RCVD: CPT | Performed by: PHYSICIAN ASSISTANT

## 2019-03-27 ENCOUNTER — HOSPITAL ENCOUNTER (OUTPATIENT)
Age: 69
Discharge: HOME | End: 2019-03-29
Payer: COMMERCIAL

## 2019-03-27 ENCOUNTER — HOSPITAL ENCOUNTER (OUTPATIENT)
Dept: GENERAL RADIOLOGY | Age: 69
Discharge: HOME | End: 2019-03-29
Payer: COMMERCIAL

## 2019-03-27 DIAGNOSIS — M54.2 NECK PAIN: ICD-10-CM

## 2019-03-27 PROCEDURE — 72040 X-RAY EXAM NECK SPINE 2-3 VW: CPT

## 2019-04-03 ENCOUNTER — TELEPHONE (OUTPATIENT)
Dept: NEUROSURGERY | Age: 69
End: 2019-04-03

## 2019-04-26 ENCOUNTER — APPOINTMENT (OUTPATIENT)
Dept: GENERAL RADIOLOGY | Age: 69
DRG: 190 | End: 2019-04-26
Payer: COMMERCIAL

## 2019-04-26 ENCOUNTER — HOSPITAL ENCOUNTER (INPATIENT)
Age: 69
LOS: 7 days | Discharge: HOME | DRG: 190 | End: 2019-05-03
Attending: EMERGENCY MEDICINE | Admitting: FAMILY MEDICINE
Payer: COMMERCIAL

## 2019-04-26 ENCOUNTER — APPOINTMENT (OUTPATIENT)
Dept: CT IMAGING | Age: 69
DRG: 190 | End: 2019-04-26
Payer: COMMERCIAL

## 2019-04-26 DIAGNOSIS — M54.2 NECK PAIN: ICD-10-CM

## 2019-04-26 DIAGNOSIS — R07.9 CHEST PAIN, UNSPECIFIED TYPE: Primary | ICD-10-CM

## 2019-04-26 LAB
ALBUMIN SERPL-MCNC: 4.2 G/DL (ref 3.5–5.2)
ALP BLD-CCNC: 61 U/L (ref 40–129)
ALT SERPL-CCNC: 21 U/L (ref 0–40)
ANION GAP SERPL CALCULATED.3IONS-SCNC: 4 MMOL/L (ref 7–16)
AST SERPL-CCNC: 47 U/L (ref 0–39)
BILIRUB SERPL-MCNC: 0.5 MG/DL (ref 0–1.2)
BUN BLDV-MCNC: 18 MG/DL (ref 8–23)
CALCIUM SERPL-MCNC: 9.5 MG/DL (ref 8.6–10.2)
CHLORIDE BLD-SCNC: 93 MMOL/L (ref 98–107)
CO2: 38 MMOL/L (ref 22–29)
CREAT SERPL-MCNC: 1.1 MG/DL (ref 0.7–1.2)
GFR AFRICAN AMERICAN: >60
GFR NON-AFRICAN AMERICAN: >60 ML/MIN/1.73
GLUCOSE BLD-MCNC: 87 MG/DL (ref 74–99)
HCT VFR BLD CALC: 37.8 % (ref 37–54)
HEMOGLOBIN: 11.7 G/DL (ref 12.5–16.5)
MAGNESIUM: 2.2 MG/DL (ref 1.6–2.6)
MCH RBC QN AUTO: 30.5 PG (ref 26–35)
MCHC RBC AUTO-ENTMCNC: 31 % (ref 32–34.5)
MCV RBC AUTO: 98.4 FL (ref 80–99.9)
PDW BLD-RTO: 13.1 FL (ref 11.5–15)
PLATELET # BLD: 271 E9/L (ref 130–450)
PMV BLD AUTO: 10.8 FL (ref 7–12)
POTASSIUM SERPL-SCNC: 4.8 MMOL/L (ref 3.5–5)
PRO-BNP: 52 PG/ML (ref 0–125)
RBC # BLD: 3.84 E12/L (ref 3.8–5.8)
SODIUM BLD-SCNC: 135 MMOL/L (ref 132–146)
TOTAL PROTEIN: 8.2 G/DL (ref 6.4–8.3)
TROPONIN: <0.01 NG/ML (ref 0–0.03)
TROPONIN: <0.01 NG/ML (ref 0–0.03)
WBC # BLD: 6.5 E9/L (ref 4.5–11.5)

## 2019-04-26 PROCEDURE — 2580000003 HC RX 258: Performed by: EMERGENCY MEDICINE

## 2019-04-26 PROCEDURE — 6370000000 HC RX 637 (ALT 250 FOR IP): Performed by: INTERNAL MEDICINE

## 2019-04-26 PROCEDURE — 80053 COMPREHEN METABOLIC PANEL: CPT

## 2019-04-26 PROCEDURE — 84484 ASSAY OF TROPONIN QUANT: CPT

## 2019-04-26 PROCEDURE — 6370000000 HC RX 637 (ALT 250 FOR IP): Performed by: EMERGENCY MEDICINE

## 2019-04-26 PROCEDURE — 96375 TX/PRO/DX INJ NEW DRUG ADDON: CPT

## 2019-04-26 PROCEDURE — 85027 COMPLETE CBC AUTOMATED: CPT

## 2019-04-26 PROCEDURE — 71046 X-RAY EXAM CHEST 2 VIEWS: CPT

## 2019-04-26 PROCEDURE — 99285 EMERGENCY DEPT VISIT HI MDM: CPT

## 2019-04-26 PROCEDURE — 93005 ELECTROCARDIOGRAM TRACING: CPT | Performed by: EMERGENCY MEDICINE

## 2019-04-26 PROCEDURE — 83735 ASSAY OF MAGNESIUM: CPT

## 2019-04-26 PROCEDURE — 94640 AIRWAY INHALATION TREATMENT: CPT

## 2019-04-26 PROCEDURE — 6360000002 HC RX W HCPCS: Performed by: EMERGENCY MEDICINE

## 2019-04-26 PROCEDURE — 36415 COLL VENOUS BLD VENIPUNCTURE: CPT

## 2019-04-26 PROCEDURE — 71275 CT ANGIOGRAPHY CHEST: CPT

## 2019-04-26 PROCEDURE — 83880 ASSAY OF NATRIURETIC PEPTIDE: CPT

## 2019-04-26 PROCEDURE — 6360000004 HC RX CONTRAST MEDICATION: Performed by: RADIOLOGY

## 2019-04-26 PROCEDURE — 96374 THER/PROPH/DIAG INJ IV PUSH: CPT

## 2019-04-26 PROCEDURE — 1200000000 HC SEMI PRIVATE

## 2019-04-26 RX ORDER — KETOROLAC TROMETHAMINE 15 MG/ML
15 INJECTION, SOLUTION INTRAMUSCULAR; INTRAVENOUS ONCE
Status: COMPLETED | OUTPATIENT
Start: 2019-04-26 | End: 2019-04-26

## 2019-04-26 RX ORDER — CARVEDILOL 25 MG/1
25 TABLET ORAL 2 TIMES DAILY WITH MEALS
Status: DISCONTINUED | OUTPATIENT
Start: 2019-04-27 | End: 2019-05-03 | Stop reason: HOSPADM

## 2019-04-26 RX ORDER — CYCLOBENZAPRINE HCL 10 MG
10 TABLET ORAL 3 TIMES DAILY PRN
Status: DISCONTINUED | OUTPATIENT
Start: 2019-04-26 | End: 2019-05-03 | Stop reason: HOSPADM

## 2019-04-26 RX ORDER — 0.9 % SODIUM CHLORIDE 0.9 %
500 INTRAVENOUS SOLUTION INTRAVENOUS ONCE
Status: COMPLETED | OUTPATIENT
Start: 2019-04-26 | End: 2019-04-26

## 2019-04-26 RX ORDER — CETIRIZINE HYDROCHLORIDE 10 MG/1
10 TABLET ORAL DAILY
Status: DISCONTINUED | OUTPATIENT
Start: 2019-04-27 | End: 2019-05-03 | Stop reason: HOSPADM

## 2019-04-26 RX ORDER — LOSARTAN POTASSIUM 50 MG/1
100 TABLET ORAL DAILY
Status: DISCONTINUED | OUTPATIENT
Start: 2019-04-27 | End: 2019-04-27

## 2019-04-26 RX ORDER — KETOCONAZOLE 20 MG/G
CREAM TOPICAL DAILY
Status: DISCONTINUED | OUTPATIENT
Start: 2019-04-27 | End: 2019-05-03 | Stop reason: HOSPADM

## 2019-04-26 RX ORDER — ALBUTEROL SULFATE 2.5 MG/3ML
2.5 SOLUTION RESPIRATORY (INHALATION) EVERY 6 HOURS PRN
Status: DISCONTINUED | OUTPATIENT
Start: 2019-04-26 | End: 2019-05-03 | Stop reason: HOSPADM

## 2019-04-26 RX ORDER — ASPIRIN 81 MG/1
81 TABLET ORAL DAILY
Status: DISCONTINUED | OUTPATIENT
Start: 2019-04-27 | End: 2019-05-03 | Stop reason: HOSPADM

## 2019-04-26 RX ORDER — PREDNISOLONE ACETATE 10 MG/ML
1 SUSPENSION/ DROPS OPHTHALMIC 2 TIMES DAILY
Status: DISCONTINUED | OUTPATIENT
Start: 2019-04-26 | End: 2019-05-03 | Stop reason: HOSPADM

## 2019-04-26 RX ORDER — ASPIRIN 81 MG/1
243 TABLET, CHEWABLE ORAL ONCE
Status: COMPLETED | OUTPATIENT
Start: 2019-04-26 | End: 2019-04-26

## 2019-04-26 RX ORDER — TRIAMCINOLONE ACETONIDE 1 MG/G
CREAM TOPICAL 2 TIMES DAILY
Status: DISCONTINUED | OUTPATIENT
Start: 2019-04-26 | End: 2019-05-03 | Stop reason: HOSPADM

## 2019-04-26 RX ORDER — SODIUM CHLORIDE 0.9 % (FLUSH) 0.9 %
10 SYRINGE (ML) INJECTION EVERY 12 HOURS SCHEDULED
Status: DISCONTINUED | OUTPATIENT
Start: 2019-04-26 | End: 2019-05-03 | Stop reason: HOSPADM

## 2019-04-26 RX ORDER — NITROGLYCERIN 0.4 MG/1
0.4 TABLET SUBLINGUAL EVERY 5 MIN PRN
Status: COMPLETED | OUTPATIENT
Start: 2019-04-26 | End: 2019-04-27

## 2019-04-26 RX ORDER — POLYVINYL ALCOHOL 14 MG/ML
1 SOLUTION/ DROPS OPHTHALMIC DAILY
Status: DISCONTINUED | OUTPATIENT
Start: 2019-04-27 | End: 2019-05-03 | Stop reason: HOSPADM

## 2019-04-26 RX ORDER — ACETAMINOPHEN 325 MG/1
650 TABLET ORAL EVERY 4 HOURS PRN
Status: DISCONTINUED | OUTPATIENT
Start: 2019-04-26 | End: 2019-05-03 | Stop reason: HOSPADM

## 2019-04-26 RX ORDER — FENTANYL CITRATE 50 UG/ML
50 INJECTION, SOLUTION INTRAMUSCULAR; INTRAVENOUS ONCE
Status: COMPLETED | OUTPATIENT
Start: 2019-04-26 | End: 2019-04-26

## 2019-04-26 RX ORDER — SODIUM CHLORIDE 0.9 % (FLUSH) 0.9 %
10 SYRINGE (ML) INJECTION PRN
Status: DISCONTINUED | OUTPATIENT
Start: 2019-04-26 | End: 2019-05-03 | Stop reason: HOSPADM

## 2019-04-26 RX ORDER — HYDROXYZINE HYDROCHLORIDE 25 MG/1
25 TABLET, FILM COATED ORAL NIGHTLY
Status: DISCONTINUED | OUTPATIENT
Start: 2019-04-26 | End: 2019-05-03 | Stop reason: HOSPADM

## 2019-04-26 RX ORDER — HYDROCODONE BITARTRATE AND ACETAMINOPHEN 5; 325 MG/1; MG/1
1 TABLET ORAL EVERY 4 HOURS PRN
Status: DISCONTINUED | OUTPATIENT
Start: 2019-04-26 | End: 2019-05-03 | Stop reason: HOSPADM

## 2019-04-26 RX ORDER — IPRATROPIUM BROMIDE AND ALBUTEROL SULFATE 2.5; .5 MG/3ML; MG/3ML
1 SOLUTION RESPIRATORY (INHALATION) ONCE
Status: COMPLETED | OUTPATIENT
Start: 2019-04-26 | End: 2019-04-26

## 2019-04-26 RX ORDER — ONDANSETRON 2 MG/ML
4 INJECTION INTRAMUSCULAR; INTRAVENOUS EVERY 6 HOURS PRN
Status: DISCONTINUED | OUTPATIENT
Start: 2019-04-26 | End: 2019-05-03 | Stop reason: HOSPADM

## 2019-04-26 RX ADMIN — HYDROCODONE BITARTRATE AND ACETAMINOPHEN 1 TABLET: 5; 325 TABLET ORAL at 22:59

## 2019-04-26 RX ADMIN — FENTANYL CITRATE 50 MCG: 50 INJECTION INTRAMUSCULAR; INTRAVENOUS at 18:10

## 2019-04-26 RX ADMIN — NITROGLYCERIN 0.4 MG: 0.4 TABLET SUBLINGUAL at 15:47

## 2019-04-26 RX ADMIN — IPRATROPIUM BROMIDE AND ALBUTEROL SULFATE 1 AMPULE: .5; 3 SOLUTION RESPIRATORY (INHALATION) at 16:01

## 2019-04-26 RX ADMIN — NITROGLYCERIN 0.4 MG: 0.4 TABLET SUBLINGUAL at 23:39

## 2019-04-26 RX ADMIN — KETOROLAC TROMETHAMINE 15 MG: 15 INJECTION, SOLUTION INTRAMUSCULAR; INTRAVENOUS at 17:10

## 2019-04-26 RX ADMIN — IOPAMIDOL 80 ML: 755 INJECTION, SOLUTION INTRAVENOUS at 20:51

## 2019-04-26 RX ADMIN — SODIUM CHLORIDE 500 ML: 9 INJECTION, SOLUTION INTRAVENOUS at 18:10

## 2019-04-26 RX ADMIN — ASPIRIN 81 MG 243 MG: 81 TABLET ORAL at 15:47

## 2019-04-26 ASSESSMENT — PAIN DESCRIPTION - PROGRESSION
CLINICAL_PROGRESSION: NOT CHANGED
CLINICAL_PROGRESSION: GRADUALLY IMPROVING

## 2019-04-26 ASSESSMENT — PAIN DESCRIPTION - DESCRIPTORS
DESCRIPTORS: DISCOMFORT
DESCRIPTORS: ACHING;THROBBING;DISCOMFORT

## 2019-04-26 ASSESSMENT — PAIN SCALES - GENERAL
PAINLEVEL_OUTOF10: 7
PAINLEVEL_OUTOF10: 2
PAINLEVEL_OUTOF10: 0

## 2019-04-26 ASSESSMENT — PAIN - FUNCTIONAL ASSESSMENT
PAIN_FUNCTIONAL_ASSESSMENT: PREVENTS OR INTERFERES SOME ACTIVE ACTIVITIES AND ADLS
PAIN_FUNCTIONAL_ASSESSMENT: PREVENTS OR INTERFERES SOME ACTIVE ACTIVITIES AND ADLS

## 2019-04-26 ASSESSMENT — PAIN DESCRIPTION - LOCATION
LOCATION: CHEST
LOCATION: CHEST
LOCATION: CHEST;LEG

## 2019-04-26 ASSESSMENT — PAIN DESCRIPTION - ORIENTATION
ORIENTATION: LEFT
ORIENTATION: MID

## 2019-04-26 ASSESSMENT — PAIN DESCRIPTION - PAIN TYPE
TYPE: ACUTE PAIN

## 2019-04-26 ASSESSMENT — ENCOUNTER SYMPTOMS
VOMITING: 0
ABDOMINAL PAIN: 0
NAUSEA: 0
SHORTNESS OF BREATH: 1
CHEST TIGHTNESS: 0

## 2019-04-26 ASSESSMENT — PAIN DESCRIPTION - FREQUENCY
FREQUENCY: INTERMITTENT
FREQUENCY: INTERMITTENT

## 2019-04-26 ASSESSMENT — PAIN DESCRIPTION - ONSET
ONSET: ON-GOING
ONSET: ON-GOING

## 2019-04-26 NOTE — ED NOTES
Dr Thomas Fischer notified of frequent PVC's, strips printed placed in chart.      Jenny Castellon RN  04/26/19 6901

## 2019-04-26 NOTE — ED PROVIDER NOTES
DO Melissa       EKG: Sinus rhythm, rate of 78, normal axis, no ST elevations, T-wave inversion in lead V3 but no other abnormalities. No significant changes. Compared to 9/21/18. ED Course as of Apr 28 0046 Fri Apr 26, 2019   1620 After 1 nitroglycerin tablet that did not change his discomfort at all, blood pressure did drop to about 249 systolic.    [SO]   9511 Pt showing multiple pvc's on monitor.    [SO]      ED Course User Index  [SO] Jennifer DO Moreno       --------------------------------------------- PAST HISTORY ---------------------------------------------  Past Medical History:  has a past medical history of Chest pain, Chronic diastolic CHF (congestive heart failure) ([facility] [address] [facility] Hepatitis C, Hilar lymphadenopathy, Hypertension, Irregular heart beat, and Oxygen dependent. Past Surgical History:  has a past surgical history that includes Colonoscopy; hernia repair; and cervical fusion (03/09/2018). Social History:  reports that he quit smoking about 4 years ago. His smoking use included cigarettes. He smoked 0.50 packs per day. He has never used smokeless tobacco. He reports that he drinks about 8.4 oz of alcohol per week. He reports that he does not use drugs. Family History: family history includes Alcohol Abuse in his father; Diabetes in his father, sister, and sister; Heart Disease in his mother and sister; High Blood Pressure in his sister. The patients home medications have been reviewed.     Allergies: Ace inhibitors and Lisinopril    -------------------------------------------------- RESULTS -------------------------------------------------    LABS:  Results for orders placed or performed during the hospital encounter of 04/26/19   CBC   Result Value Ref Range    WBC 6.5 4.5 - 11.5 E9/L    RBC 3.84 3.80 - 5.80 E12/L    Hemoglobin 11.7 (L) 12.5 - 16.5 g/dL    Hematocrit 37.8 37.0 - 54.0 %    MCV 98.4 80.0 - 99.9 fL    MCH 30.5 26.0 - 35.0 pg    MCHC 31.0 (L) 32.0 - 34.5 %    RDW 13.1 11.5 - 15.0 fL    Platelets 943 330 - 259 E9/L    MPV 10.8 7.0 - 12.0 fL   Comprehensive Metabolic Panel   Result Value Ref Range    Sodium 135 132 - 146 mmol/L    Potassium 4.8 3.5 - 5.0 mmol/L    Chloride 93 (L) 98 - 107 mmol/L    CO2 38 (H) 22 - 29 mmol/L    Anion Gap 4 (L) 7 - 16 mmol/L    Glucose 87 74 - 99 mg/dL    BUN 18 8 - 23 mg/dL    CREATININE 1.1 0.7 - 1.2 mg/dL    GFR Non-African American >60 >=60 mL/min/1.73    GFR African American >60     Calcium 9.5 8.6 - 10.2 mg/dL    Total Protein 8.2 6.4 - 8.3 g/dL    Alb 4.2 3.5 - 5.2 g/dL    Total Bilirubin 0.5 0.0 - 1.2 mg/dL    Alkaline Phosphatase 61 40 - 129 U/L    ALT 21 0 - 40 U/L    AST 47 (H) 0 - 39 U/L   Troponin   Result Value Ref Range    Troponin <0.01 0.00 - 0.03 ng/mL   Brain Natriuretic Peptide   Result Value Ref Range    Pro-BNP 52 0 - 125 pg/mL   Magnesium   Result Value Ref Range    Magnesium 2.2 1.6 - 2.6 mg/dL   Troponin   Result Value Ref Range    Troponin <0.01 0.00 - 0.03 ng/mL   Basic Metabolic Panel   Result Value Ref Range    Sodium 137 132 - 146 mmol/L    Potassium 4.8 3.5 - 5.0 mmol/L    Chloride 96 (L) 98 - 107 mmol/L    CO2 36 (H) 22 - 29 mmol/L    Anion Gap 5 (L) 7 - 16 mmol/L    Glucose 100 (H) 74 - 99 mg/dL    BUN 24 (H) 8 - 23 mg/dL    CREATININE 1.5 (H) 0.7 - 1.2 mg/dL    GFR Non-African American 56 >=60 mL/min/1.73    GFR African American 56     Calcium 9.1 8.6 - 10.2 mg/dL   CBC   Result Value Ref Range    WBC 6.5 4.5 - 11.5 E9/L    RBC 3.43 (L) 3.80 - 5.80 E12/L    Hemoglobin 10.3 (L) 12.5 - 16.5 g/dL    Hematocrit 33.7 (L) 37.0 - 54.0 %    MCV 98.3 80.0 - 99.9 fL    MCH 30.0 26.0 - 35.0 pg    MCHC 30.6 (L) 32.0 - 34.5 %    RDW 12.6 11.5 - 15.0 fL    Platelets 114 148 - 133 E9/L    MPV 10.1 7.0 - 12.0 fL   TSH without Reflex   Result Value Ref Range    TSH 1.330 0.270 - 4.200 uIU/mL   Phosphorus   Result Value Ref Range    Phosphorus 4.4 2.5 - 4.5 mg/dL   Magnesium   Result Value Ref Range    Magnesium 2.3 1.6 - 2.6 mg/dL   Lipid Panel   Result Value Ref Range    Cholesterol, Total 172 0 - 199 mg/dL    Triglycerides 81 0 - 149 mg/dL    HDL 48 >40 mg/dL    LDL Calculated 108 (H) 0 - 99 mg/dL    VLDL Cholesterol Calculated 16 mg/dL   Hemoglobin A1C   Result Value Ref Range    Hemoglobin A1C 5.1 4.0 - 5.6 %   Troponin   Result Value Ref Range    Troponin <0.01 0.00 - 0.03 ng/mL   Troponin   Result Value Ref Range    Troponin <0.01 0.00 - 0.03 ng/mL   EKG 12 Lead   Result Value Ref Range    Ventricular Rate 78 BPM    Atrial Rate 78 BPM    P-R Interval 182 ms    QRS Duration 80 ms    Q-T Interval 390 ms    QTc Calculation (Bazett) 444 ms    P Axis 75 degrees    R Axis 59 degrees    T Axis 75 degrees   EKG 12 Lead   Result Value Ref Range    Ventricular Rate 74 BPM    Atrial Rate 74 BPM    P-R Interval 182 ms    QRS Duration 82 ms    Q-T Interval 410 ms    QTc Calculation (Bazett) 455 ms    P Axis 63 degrees    R Axis 0 degrees    T Axis 42 degrees       RADIOLOGY:  CTA CHEST W CONTRAST   Final Result   1. No evidence of pulmonary embolism. XR CHEST STANDARD (2 VW)   Final Result   1. Chest pain.         ------------------------- NURSING NOTES AND VITALS REVIEWED ---------------------------  Date / Time Roomed:  4/26/2019  3:03 PM  ED Bed Assignment:  [id number]    The nursing notes within the ED encounter and vital signs as below have been reviewed.      Patient Vitals for the past 24 hrs:   BP Temp Temp src Pulse Resp SpO2   04/27/19 1916 -- -- -- -- 24 93 %   04/27/19 1600 125/71 97.8 °F (36.6 °C) -- 70 16 100 %   04/27/19 0830 120/66 98.1 °F (36.7 °C) Oral 68 18 99 %       Oxygen Saturation Interpretation: Normal    ------------------------------------------ PROGRESS NOTES ------------------------------------------  Re-evaluation(s):  Patients symptoms show no change  Repeat physical examination is not changed    Counseling:  I have spoken with the patient and discussed todays results, in addition to providing specific details for the plan of care and counseling regarding the diagnosis and prognosis. Their questions are answered at this time and they are agreeable with the plan of admission.    --------------------------------- ADDITIONAL PROVIDER NOTES ---------------------------------  Consultations:  Spoke with Dr. Bandar Carrillo. Discussed case. They will admit the patient. This patient's ED course included: a personal history and physicial examination and re-evaluation prior to disposition    This patient has remained hemodynamically stable during their ED course. Diagnosis:  1. Chest pain, unspecified type        Disposition:  Patient's disposition: Admit to telemetry  Patient's condition is stable.            Porfirio Zarate DO  04/28/19 [id number]

## 2019-04-27 LAB
ANION GAP SERPL CALCULATED.3IONS-SCNC: 5 MMOL/L (ref 7–16)
BUN BLDV-MCNC: 24 MG/DL (ref 8–23)
CALCIUM SERPL-MCNC: 9.1 MG/DL (ref 8.6–10.2)
CHLORIDE BLD-SCNC: 96 MMOL/L (ref 98–107)
CHOLESTEROL, TOTAL: 172 MG/DL (ref 0–199)
CO2: 36 MMOL/L (ref 22–29)
CREAT SERPL-MCNC: 1.5 MG/DL (ref 0.7–1.2)
EKG ATRIAL RATE: 78 BPM
EKG P AXIS: 75 DEGREES
EKG P-R INTERVAL: 182 MS
EKG Q-T INTERVAL: 390 MS
EKG QRS DURATION: 80 MS
EKG QTC CALCULATION (BAZETT): 444 MS
EKG R AXIS: 59 DEGREES
EKG T AXIS: 75 DEGREES
EKG VENTRICULAR RATE: 78 BPM
GFR AFRICAN AMERICAN: 56
GFR NON-AFRICAN AMERICAN: 56 ML/MIN/1.73
GLUCOSE BLD-MCNC: 100 MG/DL (ref 74–99)
HBA1C MFR BLD: 5.1 % (ref 4–5.6)
HCT VFR BLD CALC: 33.7 % (ref 37–54)
HDLC SERPL-MCNC: 48 MG/DL
HEMOGLOBIN: 10.3 G/DL (ref 12.5–16.5)
LDL CHOLESTEROL CALCULATED: 108 MG/DL (ref 0–99)
MAGNESIUM: 2.3 MG/DL (ref 1.6–2.6)
MCH RBC QN AUTO: 30 PG (ref 26–35)
MCHC RBC AUTO-ENTMCNC: 30.6 % (ref 32–34.5)
MCV RBC AUTO: 98.3 FL (ref 80–99.9)
PDW BLD-RTO: 12.6 FL (ref 11.5–15)
PHOSPHORUS: 4.4 MG/DL (ref 2.5–4.5)
PLATELET # BLD: 256 E9/L (ref 130–450)
PMV BLD AUTO: 10.1 FL (ref 7–12)
POTASSIUM SERPL-SCNC: 4.8 MMOL/L (ref 3.5–5)
RBC # BLD: 3.43 E12/L (ref 3.8–5.8)
SODIUM BLD-SCNC: 137 MMOL/L (ref 132–146)
TRIGL SERPL-MCNC: 81 MG/DL (ref 0–149)
TROPONIN: <0.01 NG/ML (ref 0–0.03)
TROPONIN: <0.01 NG/ML (ref 0–0.03)
TSH SERPL DL<=0.05 MIU/L-ACNC: 1.33 UIU/ML (ref 0.27–4.2)
VLDLC SERPL CALC-MCNC: 16 MG/DL
WBC # BLD: 6.5 E9/L (ref 4.5–11.5)

## 2019-04-27 PROCEDURE — 80048 BASIC METABOLIC PNL TOTAL CA: CPT

## 2019-04-27 PROCEDURE — 84443 ASSAY THYROID STIM HORMONE: CPT

## 2019-04-27 PROCEDURE — 6360000002 HC RX W HCPCS: Performed by: INTERNAL MEDICINE

## 2019-04-27 PROCEDURE — 2580000003 HC RX 258: Performed by: INTERNAL MEDICINE

## 2019-04-27 PROCEDURE — 85027 COMPLETE CBC AUTOMATED: CPT

## 2019-04-27 PROCEDURE — 36415 COLL VENOUS BLD VENIPUNCTURE: CPT

## 2019-04-27 PROCEDURE — 80061 LIPID PANEL: CPT

## 2019-04-27 PROCEDURE — 1200000000 HC SEMI PRIVATE

## 2019-04-27 PROCEDURE — 6370000000 HC RX 637 (ALT 250 FOR IP): Performed by: INTERNAL MEDICINE

## 2019-04-27 PROCEDURE — 94640 AIRWAY INHALATION TREATMENT: CPT

## 2019-04-27 PROCEDURE — 84484 ASSAY OF TROPONIN QUANT: CPT

## 2019-04-27 PROCEDURE — 2700000000 HC OXYGEN THERAPY PER DAY

## 2019-04-27 PROCEDURE — 84100 ASSAY OF PHOSPHORUS: CPT

## 2019-04-27 PROCEDURE — 83036 HEMOGLOBIN GLYCOSYLATED A1C: CPT

## 2019-04-27 PROCEDURE — 83735 ASSAY OF MAGNESIUM: CPT

## 2019-04-27 RX ORDER — SODIUM CHLORIDE 9 MG/ML
INJECTION, SOLUTION INTRAVENOUS CONTINUOUS
Status: DISCONTINUED | OUTPATIENT
Start: 2019-04-27 | End: 2019-05-03 | Stop reason: HOSPADM

## 2019-04-27 RX ADMIN — POLYVINYL ALCOHOL 1 DROP: 14 SOLUTION/ DROPS OPHTHALMIC at 10:28

## 2019-04-27 RX ADMIN — CARVEDILOL 25 MG: 25 TABLET, FILM COATED ORAL at 17:21

## 2019-04-27 RX ADMIN — HYDROCODONE BITARTRATE AND ACETAMINOPHEN 1 TABLET: 5; 325 TABLET ORAL at 17:21

## 2019-04-27 RX ADMIN — PREDNISOLONE ACETATE 1 DROP: 10 SUSPENSION/ DROPS OPHTHALMIC at 20:28

## 2019-04-27 RX ADMIN — ALBUTEROL SULFATE 2.5 MG: 2.5 SOLUTION RESPIRATORY (INHALATION) at 19:13

## 2019-04-27 RX ADMIN — SODIUM CHLORIDE: 9 INJECTION, SOLUTION INTRAVENOUS at 13:28

## 2019-04-27 RX ADMIN — HYDROCODONE BITARTRATE AND ACETAMINOPHEN 1 TABLET: 5; 325 TABLET ORAL at 23:34

## 2019-04-27 RX ADMIN — TRIAMCINOLONE ACETONIDE: 1 CREAM TOPICAL at 10:27

## 2019-04-27 RX ADMIN — Medication 10 ML: at 10:29

## 2019-04-27 RX ADMIN — ENOXAPARIN SODIUM 40 MG: 40 INJECTION SUBCUTANEOUS at 10:32

## 2019-04-27 RX ADMIN — NITROGLYCERIN 0.4 MG: 0.4 TABLET SUBLINGUAL at 10:23

## 2019-04-27 RX ADMIN — PREDNISOLONE ACETATE 1 DROP: 10 SUSPENSION/ DROPS OPHTHALMIC at 10:28

## 2019-04-27 RX ADMIN — ALBUTEROL SULFATE 2.5 MG: 2.5 SOLUTION RESPIRATORY (INHALATION) at 06:44

## 2019-04-27 RX ADMIN — TRIAMCINOLONE ACETONIDE: 1 CREAM TOPICAL at 20:27

## 2019-04-27 RX ADMIN — HYDROCORTISONE: 25 CREAM TOPICAL at 20:27

## 2019-04-27 RX ADMIN — HYDROXYZINE HYDROCHLORIDE 25 MG: 25 TABLET ORAL at 20:27

## 2019-04-27 RX ADMIN — HYDROCODONE BITARTRATE AND ACETAMINOPHEN 1 TABLET: 5; 325 TABLET ORAL at 06:17

## 2019-04-27 RX ADMIN — Medication 10 ML: at 20:28

## 2019-04-27 ASSESSMENT — PAIN DESCRIPTION - LOCATION
LOCATION: LEG
LOCATION: CHEST;LEG
LOCATION: CHEST;BACK

## 2019-04-27 ASSESSMENT — PAIN DESCRIPTION - ORIENTATION
ORIENTATION: LEFT
ORIENTATION: LEFT

## 2019-04-27 ASSESSMENT — PAIN SCALES - GENERAL
PAINLEVEL_OUTOF10: 0
PAINLEVEL_OUTOF10: 9
PAINLEVEL_OUTOF10: 0
PAINLEVEL_OUTOF10: 6
PAINLEVEL_OUTOF10: 10
PAINLEVEL_OUTOF10: 0
PAINLEVEL_OUTOF10: 5

## 2019-04-27 ASSESSMENT — PAIN DESCRIPTION - DESCRIPTORS
DESCRIPTORS: SHOOTING;ACHING;DISCOMFORT
DESCRIPTORS: ACHING
DESCRIPTORS: ACHING;SORE;DISCOMFORT

## 2019-04-27 ASSESSMENT — PAIN DESCRIPTION - PAIN TYPE
TYPE: ACUTE PAIN

## 2019-04-27 ASSESSMENT — PAIN DESCRIPTION - FREQUENCY: FREQUENCY: INTERMITTENT

## 2019-04-27 ASSESSMENT — PAIN DESCRIPTION - DIRECTION: RADIATING_TOWARDS: DOWN LEGS

## 2019-04-27 ASSESSMENT — PAIN DESCRIPTION - ONSET: ONSET: ON-GOING

## 2019-04-27 ASSESSMENT — PAIN DESCRIPTION - PROGRESSION: CLINICAL_PROGRESSION: NOT CHANGED

## 2019-04-27 ASSESSMENT — PAIN - FUNCTIONAL ASSESSMENT: PAIN_FUNCTIONAL_ASSESSMENT: PREVENTS OR INTERFERES SOME ACTIVE ACTIVITIES AND ADLS

## 2019-04-27 NOTE — PROGRESS NOTES
No hemorrhage. No wounds. CBC with Differential:    Lab Results   Component Value Date    WBC 6.5 04/27/2019    RBC 3.43 04/27/2019    HGB 10.3 04/27/2019    HCT 33.7 04/27/2019     04/27/2019    MCV 98.3 04/27/2019    MCH 30.0 04/27/2019    MCHC 30.6 04/27/2019    RDW 12.6 04/27/2019    NRBC 1.0 09/22/2018    SEGSPCT 60 04/24/2013    LYMPHOPCT 3.8 09/24/2018    MONOPCT 7.1 09/24/2018    BASOPCT 0.1 09/24/2018    MONOSABS 1.23 09/24/2018    LYMPHSABS 0.66 09/24/2018    EOSABS 0.00 09/24/2018    BASOSABS 0.01 09/24/2018     BMP:    Lab Results   Component Value Date     04/27/2019    K 4.8 04/27/2019    K 4.3 09/21/2018    CL 96 04/27/2019    CO2 36 04/27/2019    BUN 24 04/27/2019    LABALBU 4.2 04/26/2019    LABALBU 4.6 07/14/2011    CREATININE 1.5 04/27/2019    CALCIUM 9.1 04/27/2019    GFRAA 56 04/27/2019    LABGLOM 56 04/27/2019    GLUCOSE 100 04/27/2019    GLUCOSE 101 07/14/2011       Other Data:      Intake/Output Summary (Last 24 hours) at 4/27/2019 1134  Last data filed at 4/27/2019 2555  Gross per 24 hour   Intake 240 ml   Output --   Net 240 ml         Scheduled Medications:   aspirin  81 mg Oral Daily    mometasone-formoterol  2 puff Inhalation BID    polyvinyl alcohol  1 drop Both Eyes Daily    carvedilol  25 mg Oral BID WC    cetirizine  10 mg Oral Daily    hydrocortisone   Topical BID    hydrOXYzine  25 mg Oral Nightly    ketoconazole   Topical Daily    prednisoLONE acetate  1 drop Both Eyes BID    Roflumilast  500 mcg Oral Daily    triamcinolone   Topical BID    sodium chloride flush  10 mL Intravenous 2 times per day    enoxaparin  40 mg Subcutaneous Daily         Infusion Medications:   sodium chloride         Assessment:   1. Chest pain, r/o ACS  2. Acute renal insufficiency  3. Oxygen-dependent COPD with chronic respiratory failure  4. Hypertension  5. Chronic diastolic CHF  6.  Hepatitis C    Plan:   The patient states he underwent stress test and echocardiogram within the past 6 months with his primary cardiologist. We will await further recommendations from the cardiovascular team. Gentle IV fluid resuscitation will be undertaken for the mild renal insufficiency. Cozaar will be held temporarily. I have encouraged him to become more ambulatory. Continue current therapy. See orders for further plan of care.     Jerri Iqbal D.O.  11:34 AM  4/27/2019

## 2019-04-27 NOTE — PLAN OF CARE
Problem: Falls - Risk of:  Goal: Will remain free from falls  Description  Will remain free from falls  Outcome: Met This Shift  Note:   No falls this shift. Problem: Pain:  Goal: Control of acute pain  Description  Control of acute pain  Outcome: Met This Shift  Note:   Chest pain resolved after 1 nitro early on midnight shift. No further complaints of leg pain-was relieved with norco late on afternoon shift.

## 2019-04-27 NOTE — PLAN OF CARE
Problem: Falls - Risk of:  Goal: Will remain free from falls  Description  Will remain free from falls  4/27/2019 1132 by David Kumar RN  Outcome: Met This Shift     Problem: Falls - Risk of:  Goal: Absence of physical injury  Description  Absence of physical injury  Outcome: Met This Shift     Problem: Pain:  Goal: Pain level will decrease  Description  Pain level will decrease  Outcome: Met This Shift     Problem: Pain:  Goal: Control of acute pain  Description  Control of acute pain  4/27/2019 1132 by David Kumar RN  Outcome: Met This Shift     Problem: Pain:  Goal: Control of chronic pain  Description  Control of chronic pain  Outcome: Met This Shift

## 2019-04-27 NOTE — H&P
Blood Pressure Sister      Social History     Socioeconomic History    Marital status: Single     Spouse name: Not on file    Number of children: Not on file    Years of education: Not on file    Highest education level: Not on file   Occupational History    Not on file   Social Needs    Financial resource strain: Not on file    Food insecurity:     Worry: Not on file     Inability: Not on file    Transportation needs:     Medical: Not on file     Non-medical: Not on file   Tobacco Use    Smoking status: Former Smoker     Packs/day: 0.50     Types: Cigarettes     Last attempt to quit: 2015     Years since quittin.2    Smokeless tobacco: Never Used   Substance and Sexual Activity    Alcohol use: Yes     Alcohol/week: 8.4 oz     Types: 14 Cans of beer per week     Comment: occas    Drug use: No    Sexual activity: Never   Lifestyle    Physical activity:     Days per week: Not on file     Minutes per session: Not on file    Stress: Not on file   Relationships    Social connections:     Talks on phone: Not on file     Gets together: Not on file     Attends [employer] service: Not on file     Active member of club or organization: Not on file     Attends meetings of clubs or organizations: Not on file     Relationship status: Not on file    Intimate partner violence:     Fear of current or ex partner: Not on file     Emotionally abused: Not on file     Physically abused: Not on file     Forced sexual activity: Not on file   Other Topics Concern    Not on file   Social History Narrative    Not on file     Prior to Admission medications    Medication Sig Start Date End Date Taking? Authorizing Provider   Roflumilast (DALIRESP) 500 MCG tablet Take 500 mcg by mouth daily 18  Yes Minesh Holliday,    HYDROcodone-acetaminophen (NORCO) 5-325 MG per tablet Take 1 tablet by mouth every 4 hours as needed for Pain. . 18 Yes KUMAR Romo   budesonide-formoterol (SYMBICORT) 160-4.5 MCG/ACT AERO Inhale 2 puffs into the lungs 2 times daily Do & bring   Yes Historical Provider, MD   cetirizine (ZYRTEC) 10 MG tablet Take 10 mg by mouth daily   Yes Historical Provider, MD   hydrOXYzine (ATARAX) 25 MG tablet Take 25 mg by mouth nightly   Yes Historical Provider, MD   losartan (COZAAR) 100 MG tablet Take 100 mg by mouth daily   Yes Historical Provider, MD   ketoconazole (NIZORAL) 2 % cream Apply topically daily Apply topically daily. Yes Historical Provider, MD   hydrocortisone 2.5 % cream Apply topically 2 times daily Apply topically 2 times daily. Yes Historical Provider, MD   triamcinolone (KENALOG) 0.1 % cream Apply topically 2 times daily Apply topically 2 times daily. Yes Historical Provider, MD   OXYGEN Inhale 3 L into the lungs   Yes Historical Provider, MD   carvedilol (COREG) 25 MG tablet Take 25 mg by mouth 2 times daily (with meals) 2 tablets two times per day   Yes Historical Provider, MD   vitamin E 400 UNIT capsule Take 400 Units by mouth daily   Yes Historical Provider, MD   carboxymethylcellulose 1 % ophthalmic solution Place 1 drop into both eyes daily   Yes Historical Provider, MD   prednisoLONE acetate (PRED FORTE) 1 % ophthalmic suspension Place 1 drop into both eyes 2 times daily   Yes Historical Provider, MD   Neomycin-Polymyxin-Dexameth (MAXITROL) 0.1 % OINT Place 0.5 inches into both eyes nightly   Yes Historical Provider, MD   albuterol (PROVENTIL HFA;VENTOLIN HFA) 108 (90 BASE) MCG/ACT inhaler Inhale 2 puffs into the lungs every 6 hours as needed for Wheezing or Shortness of Breath    Yes Historical Provider, MD   albuterol (PROVENTIL) (2.5 MG/3ML) 0.083% nebulizer solution Take 3 mLs by nebulization every 6 hours as needed for Wheezing. 10/8/12  Yes Claudette Cava, MD   aspirin 81 MG tablet Take 81 mg by mouth daily.      Yes Historical Provider, MD   cyclobenzaprine (FLEXERIL) 10 MG tablet Take 1 tablet by mouth 3 times daily as needed for Muscle spasms 6/11/18 6/11/19 KUMAR Raman   DOXYCYCLINE HYCLATE PO Take by mouth daily    Historical Provider, MD     Allergies: Ace inhibitors and Lisinopril    Review of Systems  All bolded are positive; please see HPI  General:  Fever, chills, diaphoresis, fatigue, malaise, night sweats, weight loss  Psychological:  Anxiety, disorientation, hallucinations. ENT:  Epistaxis, vertigo, visual changes. Cardiovascular:  Chest pain, irregular heartbeats, palpitations, paroxysmal nocturnal dyspnea. Respiratory:  Shortness of breath, coughing, sputum production, hemoptysis, wheezing, orthopnea. Gastrointestinal:  Nausea, vomiting, diarrhea, heartburn, constipation, abdominal pain, hematemesis, hematochezia, melena, acholic stools  Genito-Urinary:  Dysuria, urgency, frequency, hematuria  Musculoskeletal:  Joint pain, joint stiffness, joint swelling, muscle pain  Neurology:  Headache, focal neurological deficits, weakness, numbness, paresthesia  Derm:  Rashes, ulcers, excoriations, bruising  Extremities:  Decreased ROM, peripheral edema, mottling    Physical Examination  BP (!) 142/87   Pulse 89   Temp 98.4 °F (36.9 °C) (Oral)   Resp 20   Ht 5' 6\" (1.676 m)   Wt 170 lb (77.1 kg)   SpO2 94%   BMI 27.44 kg/m²   General: patient is awake, alert, and fully oriented; they appear stated age and are cooperative during the examination; they are in no apparent distress and do not exhibit any labored breathing at this time  HEENT: unremarkable  Neck: supple with trachea midline and without obvious JVD or bruit  Cardiac: regular rate and rhythm without obvious murmur, rub, or gallop  Lungs:  Moderately diminished breath sounds bilaterally without wheezes, rhonchi, or rales  Abdomen: soft, nontender, and nondistended with normal active bowel sounds and without organomegaly  Extremities: atraumatic, without ulcers or edema  Neurologic: mental status is as above, cranial nerves are grossly intact, the patient moves all extremities spontaneously, and no

## 2019-04-28 ENCOUNTER — APPOINTMENT (OUTPATIENT)
Dept: GENERAL RADIOLOGY | Age: 69
DRG: 190 | End: 2019-04-28
Payer: COMMERCIAL

## 2019-04-28 PROBLEM — M25.421 ELBOW EFFUSION, RIGHT: Status: ACTIVE | Noted: 2019-04-28

## 2019-04-28 LAB
ANION GAP SERPL CALCULATED.3IONS-SCNC: 5 MMOL/L (ref 7–16)
BUN BLDV-MCNC: 16 MG/DL (ref 8–23)
CALCIUM SERPL-MCNC: 9 MG/DL (ref 8.6–10.2)
CHLORIDE BLD-SCNC: 97 MMOL/L (ref 98–107)
CO2: 34 MMOL/L (ref 22–29)
CREAT SERPL-MCNC: 1.2 MG/DL (ref 0.7–1.2)
GFR AFRICAN AMERICAN: >60
GFR NON-AFRICAN AMERICAN: >60 ML/MIN/1.73
GLUCOSE BLD-MCNC: 97 MG/DL (ref 74–99)
HCT VFR BLD CALC: 33.4 % (ref 37–54)
HEMOGLOBIN: 10.1 G/DL (ref 12.5–16.5)
MCH RBC QN AUTO: 30.1 PG (ref 26–35)
MCHC RBC AUTO-ENTMCNC: 30.2 % (ref 32–34.5)
MCV RBC AUTO: 99.7 FL (ref 80–99.9)
PDW BLD-RTO: 12.9 FL (ref 11.5–15)
PLATELET # BLD: 262 E9/L (ref 130–450)
PMV BLD AUTO: 10.2 FL (ref 7–12)
POTASSIUM SERPL-SCNC: 4.5 MMOL/L (ref 3.5–5)
RBC # BLD: 3.35 E12/L (ref 3.8–5.8)
SODIUM BLD-SCNC: 136 MMOL/L (ref 132–146)
WBC # BLD: 5.6 E9/L (ref 4.5–11.5)

## 2019-04-28 PROCEDURE — 2580000003 HC RX 258: Performed by: INTERNAL MEDICINE

## 2019-04-28 PROCEDURE — 1200000000 HC SEMI PRIVATE

## 2019-04-28 PROCEDURE — 6360000002 HC RX W HCPCS: Performed by: INTERNAL MEDICINE

## 2019-04-28 PROCEDURE — 87633 RESP VIRUS 12-25 TARGETS: CPT

## 2019-04-28 PROCEDURE — 6370000000 HC RX 637 (ALT 250 FOR IP): Performed by: INTERNAL MEDICINE

## 2019-04-28 PROCEDURE — 94640 AIRWAY INHALATION TREATMENT: CPT

## 2019-04-28 PROCEDURE — 87581 M.PNEUMON DNA AMP PROBE: CPT

## 2019-04-28 PROCEDURE — 73070 X-RAY EXAM OF ELBOW: CPT

## 2019-04-28 PROCEDURE — 36415 COLL VENOUS BLD VENIPUNCTURE: CPT

## 2019-04-28 PROCEDURE — 87486 CHLMYD PNEUM DNA AMP PROBE: CPT

## 2019-04-28 PROCEDURE — 80048 BASIC METABOLIC PNL TOTAL CA: CPT

## 2019-04-28 PROCEDURE — 2700000000 HC OXYGEN THERAPY PER DAY

## 2019-04-28 PROCEDURE — 87798 DETECT AGENT NOS DNA AMP: CPT

## 2019-04-28 PROCEDURE — 85027 COMPLETE CBC AUTOMATED: CPT

## 2019-04-28 RX ADMIN — ALBUTEROL SULFATE 2.5 MG: 2.5 SOLUTION RESPIRATORY (INHALATION) at 06:34

## 2019-04-28 RX ADMIN — ASPIRIN 81 MG: 81 TABLET, COATED ORAL at 09:20

## 2019-04-28 RX ADMIN — PREDNISOLONE ACETATE 1 DROP: 10 SUSPENSION/ DROPS OPHTHALMIC at 22:02

## 2019-04-28 RX ADMIN — HYDROCORTISONE: 25 CREAM TOPICAL at 22:02

## 2019-04-28 RX ADMIN — HYDROXYZINE HYDROCHLORIDE 25 MG: 25 TABLET ORAL at 22:01

## 2019-04-28 RX ADMIN — HYDROCODONE BITARTRATE AND ACETAMINOPHEN 1 TABLET: 5; 325 TABLET ORAL at 22:05

## 2019-04-28 RX ADMIN — CARVEDILOL 25 MG: 25 TABLET, FILM COATED ORAL at 16:10

## 2019-04-28 RX ADMIN — ROFLUMILAST 500 MCG: 500 TABLET ORAL at 09:20

## 2019-04-28 RX ADMIN — SODIUM CHLORIDE: 9 INJECTION, SOLUTION INTRAVENOUS at 02:45

## 2019-04-28 RX ADMIN — TRIAMCINOLONE ACETONIDE: 1 CREAM TOPICAL at 22:02

## 2019-04-28 RX ADMIN — HYDROCODONE BITARTRATE AND ACETAMINOPHEN 1 TABLET: 5; 325 TABLET ORAL at 09:25

## 2019-04-28 RX ADMIN — TRIAMCINOLONE ACETONIDE: 1 CREAM TOPICAL at 09:30

## 2019-04-28 RX ADMIN — ENOXAPARIN SODIUM 40 MG: 40 INJECTION SUBCUTANEOUS at 09:23

## 2019-04-28 RX ADMIN — HYDROCODONE BITARTRATE AND ACETAMINOPHEN 1 TABLET: 5; 325 TABLET ORAL at 16:10

## 2019-04-28 RX ADMIN — CETIRIZINE HYDROCHLORIDE 10 MG: 10 TABLET, FILM COATED ORAL at 09:20

## 2019-04-28 RX ADMIN — Medication 10 ML: at 22:03

## 2019-04-28 RX ADMIN — CARVEDILOL 25 MG: 25 TABLET, FILM COATED ORAL at 09:20

## 2019-04-28 RX ADMIN — ALBUTEROL SULFATE 2.5 MG: 2.5 SOLUTION RESPIRATORY (INHALATION) at 14:29

## 2019-04-28 RX ADMIN — SODIUM CHLORIDE: 9 INJECTION, SOLUTION INTRAVENOUS at 16:10

## 2019-04-28 ASSESSMENT — PAIN DESCRIPTION - ONSET
ONSET: ON-GOING
ONSET: ON-GOING

## 2019-04-28 ASSESSMENT — PAIN DESCRIPTION - DESCRIPTORS
DESCRIPTORS: ACHING;DISCOMFORT;DULL;HEADACHE
DESCRIPTORS: ACHING;DISCOMFORT;CONSTANT

## 2019-04-28 ASSESSMENT — PAIN DESCRIPTION - PAIN TYPE
TYPE: ACUTE PAIN
TYPE: ACUTE PAIN;CHRONIC PAIN

## 2019-04-28 ASSESSMENT — PAIN SCALES - GENERAL
PAINLEVEL_OUTOF10: 0
PAINLEVEL_OUTOF10: 6
PAINLEVEL_OUTOF10: 2
PAINLEVEL_OUTOF10: 3
PAINLEVEL_OUTOF10: 8
PAINLEVEL_OUTOF10: 6
PAINLEVEL_OUTOF10: 0
PAINLEVEL_OUTOF10: 2

## 2019-04-28 ASSESSMENT — PAIN DESCRIPTION - PROGRESSION
CLINICAL_PROGRESSION: NOT CHANGED
CLINICAL_PROGRESSION: NOT CHANGED

## 2019-04-28 ASSESSMENT — PAIN - FUNCTIONAL ASSESSMENT
PAIN_FUNCTIONAL_ASSESSMENT: ACTIVITIES ARE NOT PREVENTED
PAIN_FUNCTIONAL_ASSESSMENT: ACTIVITIES ARE NOT PREVENTED

## 2019-04-28 ASSESSMENT — PAIN DESCRIPTION - FREQUENCY
FREQUENCY: INTERMITTENT
FREQUENCY: INTERMITTENT

## 2019-04-28 ASSESSMENT — PAIN DESCRIPTION - ORIENTATION: ORIENTATION: RIGHT;LEFT;ANTERIOR

## 2019-04-28 ASSESSMENT — PAIN DESCRIPTION - LOCATION: LOCATION: BACK;HEAD;CHEST

## 2019-04-28 NOTE — CONSULTS
smoking about 4 years ago. His smoking use included cigarettes. He smoked 0.50 packs per day. He has never used smokeless tobacco. He reports that he drinks about 8.4 oz of alcohol per week. He reports that he does not use drugs. Family History:    family history includes Alcohol Abuse in his father; Diabetes in his father, sister, and sister; Heart Disease in his mother and sister; High Blood Pressure in his sister. .     Allergies:  Ace inhibitors and Lisinopril    MEDICATIONS:   aspirin  81 mg Oral Daily    mometasone-formoterol  2 puff Inhalation BID    polyvinyl alcohol  1 drop Both Eyes Daily    carvedilol  25 mg Oral BID WC    cetirizine  10 mg Oral Daily    hydrocortisone   Topical BID    hydrOXYzine  25 mg Oral Nightly    ketoconazole   Topical Daily    prednisoLONE acetate  1 drop Both Eyes BID    Roflumilast  500 mcg Oral Daily    triamcinolone   Topical BID    sodium chloride flush  10 mL Intravenous 2 times per day    enoxaparin  40 mg Subcutaneous Daily       OUTPT MEDS  Current Discharge Medication List      CONTINUE these medications which have NOT CHANGED    Details   Roflumilast (DALIRESP) 500 MCG tablet Take 500 mcg by mouth daily  Qty: 30 tablet, Refills: 0      HYDROcodone-acetaminophen (NORCO) 5-325 MG per tablet Take 1 tablet by mouth every 4 hours as needed for Pain. Christiano So: 120 tablet, Refills: 0    Associated Diagnoses: Neck pain      budesonide-formoterol (SYMBICORT) 160-4.5 MCG/ACT AERO Inhale 2 puffs into the lungs 2 times daily Do & bring      cetirizine (ZYRTEC) 10 MG tablet Take 10 mg by mouth daily      hydrOXYzine (ATARAX) 25 MG tablet Take 25 mg by mouth nightly      losartan (COZAAR) 100 MG tablet Take 100 mg by mouth daily      ketoconazole (NIZORAL) 2 % cream Apply topically daily Apply topically daily. hydrocortisone 2.5 % cream Apply topically 2 times daily Apply topically 2 times daily.       triamcinolone (KENALOG) 0.1 % cream Apply topically 2 times daily Apply topically 2 times daily. OXYGEN Inhale 3 L into the lungs      carvedilol (COREG) 25 MG tablet Take 25 mg by mouth 2 times daily (with meals) 2 tablets two times per day      vitamin E 400 UNIT capsule Take 400 Units by mouth daily      carboxymethylcellulose 1 % ophthalmic solution Place 1 drop into both eyes daily      prednisoLONE acetate (PRED FORTE) 1 % ophthalmic suspension Place 1 drop into both eyes 2 times daily      Neomycin-Polymyxin-Dexameth (MAXITROL) 0.1 % OINT Place 0.5 inches into both eyes nightly      albuterol (PROVENTIL HFA;VENTOLIN HFA) 108 (90 BASE) MCG/ACT inhaler Inhale 2 puffs into the lungs every 6 hours as needed for Wheezing or Shortness of Breath       albuterol (PROVENTIL) (2.5 MG/3ML) 0.083% nebulizer solution Take 3 mLs by nebulization every 6 hours as needed for Wheezing. Qty: 120 each, Refills: 1      aspirin 81 MG tablet Take 81 mg by mouth daily. cyclobenzaprine (FLEXERIL) 10 MG tablet Take 1 tablet by mouth 3 times daily as needed for Muscle spasms  Qty: 90 tablet, Refills: 0    Associated Diagnoses: Neck pain      DOXYCYCLINE HYCLATE PO Take by mouth daily                 PHYSICAL EXAM:      BP (!) 146/94   Pulse 98   Temp 98.4 °F (36.9 °C) (Oral)   Resp 20   Ht 5' 6\" (1.676 m)   Wt 170 lb (77.1 kg)   SpO2 96%   BMI 27.44 kg/m²        Examination:    General: Alert and oriented, No acute distress. Appears as stated age. Eye:  Pupils are equal, round and reactive to light, Extraocular movements are intact, Normal conjunctiva. Head: Normocephalic, Normal hearing, Oral mucosa is moist.  Neck: Supple, No carotid bruit, No jugular venous distention, No lymphadenopathy. Respiratory: Slightly diminished air exchange bilaterally secondary to COPD, Respirations are non-labored, Breath sounds are equal, Symmetrical chest wall expansion. Cardiovascular: Normal rate, No murmur, No gallop, Good pulses equal in all extremities, No edema.   Gastrointestinal: Soft, Non-tender, Non-distended, Normal bowel sounds. Musculoskeletal: Normal strength, No tenderness, No deformity. Integumentary:  Warm, Dry. Neurologic: Alert, Oriented, No focal deficits. Cognition and Speech: Oriented, Speech clear and coherent. Psychiatric: Cooperative, Appropriate mood & affect, Normal judgment. ECG: Sinus rhythm, PVCs, unremarkable. CBC:   Recent Labs     04/27/19 0447 04/28/19  0801   WBC 6.5 5.6   HGB 10.3* 10.1*   HCT 33.7* 33.4*    262     BMP:   Recent Labs     04/27/19 0447 04/28/19  0801    136   K 4.8 4.5   CO2 36* 34*   BUN 24* 16   CREATININE 1.5* 1.2   LABGLOM 56 >60   GLUCOSE 100* 97     BNP: No results for input(s): BNP in the last 72 hours. PT/INR: No results for input(s): PROTIME, INR in the last 72 hours. APTT:No results for input(s): APTT in the last 72 hours. CARDIAC ENZYMES:  Recent Labs     04/26/19 2025 04/27/19  0024 04/27/19  0447   TROPONINI <0.01 <0.01 <0.01     FASTING LIPID PANEL:  Lab Results   Component Value Date    HDL 48 04/27/2019    LDLCALC 108 04/27/2019    TRIG 81 04/27/2019     LIVER PROFILE:  Recent Labs     04/26/19  1546   AST 47*   ALT 21   LABALBU 4.2       Radiology:  Xr Chest Standard (2 Vw)    Result Date: 4/26/2019  LOCATION: 200 EXAM: XR CHEST (2 VW) COMPARISON: None HISTORY: Chest pain TECHNIQUE: PA and lateral  views of the chest were obtained. FINDINGS:  SUPPORT DEVICES: None. LUNGS: Clear with no areas of consolidation. No lung nodules. PLEURA: No effusions or pneumothorax. LUNG VOLUMES: Bullous emphysema again noted. MEDIASTINAL STRUCTURES: No lymphadenopathy. Normal aortic contour. HEART SIZE: Normal. UPPER ABDOMEN: Unremarkable. BONES AND SOFT TISSUES: No fracture or soft tissue abnormality. 1. Chest pain.      Cta Chest W Contrast    Result Date: 4/26/2019  LOCATION:200 EXAM: CTA CHEST W CONTRAST COMPARISON: None HISTORY:  Pain shortness of breath TECHNIQUE: Axial CT images are obtained of the chest. Coronal and sagittal reconstructions were obtained with 3-D maximum intensity projection (MIP) reconstructed images. These were performed on a separate workstation with concurrent supervision for detailed evaluation of the pulmonary arteries. CONTRAST: 80 mL Isovue-370 intravenous contrast. FINDINGS: SUPPORT DEVICES: None LUNGS/CENTRAL AIRWAYS/PLEURA: Centrilobular and bullous emphysema is identified. No areas of consolidation seen. Linear volume loss seen in the lung bases bilaterally. PULMONARY ARTERIES: Evaluation is adequate for pulmonary embolus. No filling defects identified to the subsegmental level. HEART/PERICARDIUM/GREAT VESSELS: Cardiac size is normal.  There is no pericardial effusion. The great vessels of the chest are normal in caliber. LYMPH NODES: No thoracic adenopathy by size criteria. NECK BASE/CHEST WALL/DIAPHRAGM: No soft tissue lesions or diaphragmatic abnormality. UPPER ABDOMEN: Limited images through the upper abdomen are unremarkable. OSSEOUS STRUCTURES: No suspicious lytic or blastic lesions. No fractures. 1. No evidence of pulmonary embolism. IMPRESSION:      Recurrent chest heaviness. COPD per  Remote heavy smoker. Strong family history of coronary disease of his mother. Hypertension. RECOMMENDATIONS:  1. Patient reported having recent nuclear stress test with Dr. Kb Philip that was negative. 2. He has some significant risk factors. 3. His chest pain could be unstable angina. 4. Continue S1.  5. Continue beta blocker. 6. May need cardiac catheterization. 7. I don't think coronary CT angiogram can't be done since his baseline heart rate is high. 8. Dr. Kb Philip will resume his cardiac care tomorrow morning and may consider transferring him to [facility] for heart catheterization.           Fanta Zamora MD, [facility] [place]

## 2019-04-28 NOTE — PROGRESS NOTES
HPI:  Evita Corona was evaluated by the cardiovascular team and we are awaiting final recommendations. He continues to have mild substernal chest discomfort. He admits to ongoing shortness of breath with exertion. He admits to mild coughing without sputum production. Lab work and vital signs are stable. Patient voiced no new complaints since hospital admission. Questions, answers, and tests reviewed. ROS:  Cardiovascular:   Admits to ongoing mild substernal chest discomfort. Respiratory:   Admits to acute on chronic shortness of breath is most noticeable with exertion. Gastrointestinal:   Denies nausea, vomiting, diarrhea, or constipation. Denies any abdominal pain. Extremities:   Denies any lower extremity swelling or edema. Neurology:    Denies any headache or focal neurological deficits. No weakness or paresthesia. Derm:    Denies any rashes, ulcers, or excoriations. Denies bruising. Genitourinary:    Denies any urgency, frequency, hematuria. Voiding without difficulty. Physical Exam:    Vitals:    04/28/19 0800   BP: (!) 146/94   Pulse: 98   Resp: 20   Temp: 98.4 °F (36.9 °C)   SpO2: 96%       HEENT:  PERRLA. EOMI. Sclera clear. Buccal mucosa moist. Nasal cannula oxygen is in place. Neck:  Supple. Trachea midline. No thyromegaly. No JVD. No bruits. Heart:  Rhythm regular, rate controlled. No murmurs. Lungs:  Symmetrical. Clear to auscultation bilaterally. No wheezes. No rhonchi. No rales. Abdomen: Soft. Non-tender. Non-distended. Bowel sounds positive. No organomegaly or masses. No pain on palpation    Extremities:  Peripheral pulses present. No peripheral edema. No ulcers. Neurologic:  Alert x 3. No focal deficit. Cranial nerves grossly intact. Skin:  No petechia. No hemorrhage. No wounds.     CBC with Differential:    Lab Results   Component Value Date    WBC 5.6 04/28/2019    RBC 3.35 04/28/2019    HGB 10.1 04/28/2019    HCT 33.4 04/28/2019     04/28/2019    MCV 99.7 04/28/2019    MCH 30.1 04/28/2019    MCHC 30.2 04/28/2019    RDW 12.9 04/28/2019    NRBC 1.0 09/22/2018    SEGSPCT 60 04/24/2013    LYMPHOPCT 3.8 09/24/2018    MONOPCT 7.1 09/24/2018    BASOPCT 0.1 09/24/2018    MONOSABS 1.23 09/24/2018    LYMPHSABS 0.66 09/24/2018    EOSABS 0.00 09/24/2018    BASOSABS 0.01 09/24/2018     BMP:    Lab Results   Component Value Date     04/28/2019    K 4.5 04/28/2019    K 4.3 09/21/2018    CL 97 04/28/2019    CO2 34 04/28/2019    BUN 16 04/28/2019    LABALBU 4.2 04/26/2019    LABALBU 4.6 07/14/2011    CREATININE 1.2 04/28/2019    CALCIUM 9.0 04/28/2019    GFRAA >60 04/28/2019    LABGLOM >60 04/28/2019    GLUCOSE 97 04/28/2019    GLUCOSE 101 07/14/2011       Other Data:      Intake/Output Summary (Last 24 hours) at 4/28/2019 1152  Last data filed at 4/28/2019 0940  Gross per 24 hour   Intake 2261.25 ml   Output 900 ml   Net 1361.25 ml         Scheduled Medications:   aspirin  81 mg Oral Daily    mometasone-formoterol  2 puff Inhalation BID    polyvinyl alcohol  1 drop Both Eyes Daily    carvedilol  25 mg Oral BID WC    cetirizine  10 mg Oral Daily    hydrocortisone   Topical BID    hydrOXYzine  25 mg Oral Nightly    ketoconazole   Topical Daily    prednisoLONE acetate  1 drop Both Eyes BID    Roflumilast  500 mcg Oral Daily    triamcinolone   Topical BID    sodium chloride flush  10 mL Intravenous 2 times per day    enoxaparin  40 mg Subcutaneous Daily         Infusion Medications:   sodium chloride 75 mL/hr at 04/28/19 0245       Assessment:   1. Chest pain, r/o ACS  2. Acute renal insufficiency  3. Oxygen-dependent COPD with chronic respiratory failure  4. Hypertension  5. Chronic diastolic CHF  6.  Hepatitis C    Plan:   The patient states he underwent stress test and echocardiogram within the past 6 months with his primary cardiologist. We will await further recommendations from the cardiovascular team as the patient continues to deal with mild substernal chest discomfort. Respiratory cultures will be obtained we will assess a repeat chest x-ray tomorrow. Gentle IV fluid resuscitation will be undertaken for the mild renal insufficiency. Cozaar will be held temporarily. I have encouraged him to become more ambulatory. Continue current therapy. See orders for further plan of care.     Pau Sanford D.O.  11:52 AM  4/28/2019

## 2019-04-28 NOTE — PROGRESS NOTES
Department of Family Practice  Adult Daily Progress Note      JAVIER BUTTS IS SEEN IN FOLLOW UP ON 4 TELEMETRY. HE CONTINUES TO HAVE SOME CHEST PAIN. HE IS SHORT OF BREATH WITH ACTIVITY. DR. Maria Guadalupe Rivas WILL BE SEEING HIM TOMORROW. HE HAS NOT BEEN SEEN IN THE OFFICE RECENTLY. HE BRINGS TO MY ATTENTION PROBLEMS WITH HIS ELBOWS.  THEY ARE SWOLLEN AND PAINFUL.       OBJECTIVE    Physical  VITALS:  BP (!) 167/92   Pulse 69   Temp 98.4 °F (36.9 °C) (Oral)   Resp 20   Ht 5' 6\" (1.676 m)   Wt 170 lb (77.1 kg)   SpO2 99%   BMI 27.44 kg/m²   CONSTITUTIONAL:  awake, alert, cooperative, CONVERSATIONAL DYSPNEA, and appears stated age  LUNGS:  SLIGHT increased work of breathing, good air exchange, clear to auscultation bilaterally, no crackles or wheezing  CARDIOVASCULAR:  Normal apical impulse, regular rate and rhythm, normal S1 and S2, no S3 or S4, and no murmur noted  ABDOMEN:  No scars, normal bowel sounds, soft, non-distended, non-tender, no masses palpated, no hepatosplenomegally  MUSCULOSKELETAL:  there is no redness, warmth, THERE IS  swelling of the ELBOW JOINT; POSSIBLE EFFUSION ON THE RIGHT  BILATERAL ELBOW:  swelling present  range of motion APPEARS PRESERVED  SKIN:  CALLOUS ON THE LEFT  Data  CBC with Differential:    Lab Results   Component Value Date    WBC 5.6 04/28/2019    RBC 3.35 04/28/2019    HGB 10.1 04/28/2019    HCT 33.4 04/28/2019     04/28/2019    MCV 99.7 04/28/2019    MCH 30.1 04/28/2019    MCHC 30.2 04/28/2019    RDW 12.9 04/28/2019    NRBC 1.0 09/22/2018    SEGSPCT 60 04/24/2013    LYMPHOPCT 3.8 09/24/2018    MONOPCT 7.1 09/24/2018    BASOPCT 0.1 09/24/2018    MONOSABS 1.23 09/24/2018    LYMPHSABS 0.66 09/24/2018    EOSABS 0.00 09/24/2018    BASOSABS 0.01 09/24/2018     BMP:    Lab Results   Component Value Date     04/28/2019    K 4.5 04/28/2019    K 4.3 09/21/2018    CL 97 04/28/2019    CO2 34 04/28/2019    BUN 16 04/28/2019    LABALBU 4.2 04/26/2019    LABALBU 4.6 07/14/2011    CREATININE 1.2 04/28/2019    CALCIUM 9.0 04/28/2019    GFRAA >60 04/28/2019    LABGLOM >60 04/28/2019    GLUCOSE 97 04/28/2019    GLUCOSE 101 07/14/2011     Current Medications  Scheduled Meds:   aspirin  81 mg Oral Daily    mometasone-formoterol  2 puff Inhalation BID    polyvinyl alcohol  1 drop Both Eyes Daily    carvedilol  25 mg Oral BID WC    cetirizine  10 mg Oral Daily    hydrocortisone   Topical BID    hydrOXYzine  25 mg Oral Nightly    ketoconazole   Topical Daily    prednisoLONE acetate  1 drop Both Eyes BID    Roflumilast  500 mcg Oral Daily    triamcinolone   Topical BID    sodium chloride flush  10 mL Intravenous 2 times per day    enoxaparin  40 mg Subcutaneous Daily     Continuous Infusions:   sodium chloride 50 mL/hr at 04/28/19 1610     PRN Meds:.albuterol, cyclobenzaprine, HYDROcodone-acetaminophen, sodium chloride flush, magnesium hydroxide, ondansetron, acetaminophen    ASSESSMENT AND PLAN      Principal Problem:    Chest pain  Active Problems:    COPD exacerbation (HCC)    Essential hypertension  Resolved Problems:    * No resolved hospital problems. *      X-RAY BILATERAL ELBOWS. TO SEE DR. ARRIETA, HOPEFULLY TOMORROW.

## 2019-04-29 ENCOUNTER — TELEPHONE (OUTPATIENT)
Dept: NEUROSURGERY | Age: 69
End: 2019-04-29

## 2019-04-29 ENCOUNTER — APPOINTMENT (OUTPATIENT)
Dept: CT IMAGING | Age: 69
DRG: 190 | End: 2019-04-29
Payer: COMMERCIAL

## 2019-04-29 ENCOUNTER — APPOINTMENT (OUTPATIENT)
Dept: GENERAL RADIOLOGY | Age: 69
DRG: 190 | End: 2019-04-29
Payer: COMMERCIAL

## 2019-04-29 LAB
ANION GAP SERPL CALCULATED.3IONS-SCNC: 5 MMOL/L (ref 7–16)
BUN BLDV-MCNC: 12 MG/DL (ref 8–23)
CALCIUM SERPL-MCNC: 9.3 MG/DL (ref 8.6–10.2)
CHLORIDE BLD-SCNC: 97 MMOL/L (ref 98–107)
CO2: 34 MMOL/L (ref 22–29)
CREAT SERPL-MCNC: 1.1 MG/DL (ref 0.7–1.2)
FILM ARRAY ADENOVIRUS: NORMAL
FILM ARRAY BORDETELLA PERTUSSIS: NORMAL
FILM ARRAY CHLAMYDOPHILIA PNEUMONIAE: NORMAL
FILM ARRAY CORONAVIRUS 229E: NORMAL
FILM ARRAY CORONAVIRUS HKU1: NORMAL
FILM ARRAY CORONAVIRUS NL63: NORMAL
FILM ARRAY CORONAVIRUS OC43: NORMAL
FILM ARRAY INFLUENZA A VIRUS 09H1: NORMAL
FILM ARRAY INFLUENZA A VIRUS H1: NORMAL
FILM ARRAY INFLUENZA A VIRUS H3: NORMAL
FILM ARRAY INFLUENZA A VIRUS: NORMAL
FILM ARRAY INFLUENZA B: NORMAL
FILM ARRAY METAPNEUMOVIRUS: NORMAL
FILM ARRAY MYCOPLASMA PNEUMONIAE: NORMAL
FILM ARRAY PARAINFLUENZA VIRUS 1: NORMAL
FILM ARRAY PARAINFLUENZA VIRUS 2: NORMAL
FILM ARRAY PARAINFLUENZA VIRUS 3: NORMAL
FILM ARRAY PARAINFLUENZA VIRUS 4: NORMAL
FILM ARRAY RESPIRATORY SYNCITIAL VIRUS: NORMAL
FILM ARRAY RHINOVIRUS/ENTEROVIRUS: NORMAL
GFR AFRICAN AMERICAN: >60
GFR NON-AFRICAN AMERICAN: >60 ML/MIN/1.73
GLUCOSE BLD-MCNC: 102 MG/DL (ref 74–99)
HCT VFR BLD CALC: 36.9 % (ref 37–54)
HEMOGLOBIN: 11.1 G/DL (ref 12.5–16.5)
MCH RBC QN AUTO: 30.2 PG (ref 26–35)
MCHC RBC AUTO-ENTMCNC: 30.1 % (ref 32–34.5)
MCV RBC AUTO: 100.3 FL (ref 80–99.9)
PDW BLD-RTO: 12.7 FL (ref 11.5–15)
PLATELET # BLD: 280 E9/L (ref 130–450)
PMV BLD AUTO: 9.5 FL (ref 7–12)
POTASSIUM SERPL-SCNC: 4.5 MMOL/L (ref 3.5–5)
RBC # BLD: 3.68 E12/L (ref 3.8–5.8)
SODIUM BLD-SCNC: 136 MMOL/L (ref 132–146)
WBC # BLD: 6.8 E9/L (ref 4.5–11.5)

## 2019-04-29 PROCEDURE — 6370000000 HC RX 637 (ALT 250 FOR IP): Performed by: INTERNAL MEDICINE

## 2019-04-29 PROCEDURE — 80048 BASIC METABOLIC PNL TOTAL CA: CPT

## 2019-04-29 PROCEDURE — 97165 OT EVAL LOW COMPLEX 30 MIN: CPT

## 2019-04-29 PROCEDURE — 2500000003 HC RX 250 WO HCPCS: Performed by: RADIOLOGY

## 2019-04-29 PROCEDURE — 36415 COLL VENOUS BLD VENIPUNCTURE: CPT

## 2019-04-29 PROCEDURE — 1200000000 HC SEMI PRIVATE

## 2019-04-29 PROCEDURE — 97161 PT EVAL LOW COMPLEX 20 MIN: CPT | Performed by: PHYSICAL THERAPIST

## 2019-04-29 PROCEDURE — 6360000004 HC RX CONTRAST MEDICATION: Performed by: RADIOLOGY

## 2019-04-29 PROCEDURE — 75574 CT ANGIO HRT W/3D IMAGE: CPT

## 2019-04-29 PROCEDURE — 6360000002 HC RX W HCPCS: Performed by: INTERNAL MEDICINE

## 2019-04-29 PROCEDURE — 2580000003 HC RX 258: Performed by: INTERNAL MEDICINE

## 2019-04-29 PROCEDURE — 71045 X-RAY EXAM CHEST 1 VIEW: CPT

## 2019-04-29 PROCEDURE — 97535 SELF CARE MNGMENT TRAINING: CPT

## 2019-04-29 PROCEDURE — 97530 THERAPEUTIC ACTIVITIES: CPT

## 2019-04-29 PROCEDURE — 97530 THERAPEUTIC ACTIVITIES: CPT | Performed by: PHYSICAL THERAPIST

## 2019-04-29 PROCEDURE — 6370000000 HC RX 637 (ALT 250 FOR IP): Performed by: SPECIALIST

## 2019-04-29 PROCEDURE — 85027 COMPLETE CBC AUTOMATED: CPT

## 2019-04-29 PROCEDURE — 94640 AIRWAY INHALATION TREATMENT: CPT

## 2019-04-29 PROCEDURE — 2700000000 HC OXYGEN THERAPY PER DAY

## 2019-04-29 RX ORDER — VERAPAMIL HYDROCHLORIDE 2.5 MG/ML
5 INJECTION, SOLUTION INTRAVENOUS ONCE
Status: COMPLETED | OUTPATIENT
Start: 2019-04-29 | End: 2019-04-29

## 2019-04-29 RX ORDER — DOXYCYCLINE HYCLATE 100 MG/1
100 CAPSULE ORAL EVERY 12 HOURS SCHEDULED
Status: DISCONTINUED | OUTPATIENT
Start: 2019-04-29 | End: 2019-05-03 | Stop reason: HOSPADM

## 2019-04-29 RX ORDER — METOPROLOL TARTRATE 50 MG/1
100 TABLET, FILM COATED ORAL
Status: COMPLETED | OUTPATIENT
Start: 2019-04-29 | End: 2019-04-29

## 2019-04-29 RX ADMIN — HYDROCODONE BITARTRATE AND ACETAMINOPHEN 1 TABLET: 5; 325 TABLET ORAL at 13:11

## 2019-04-29 RX ADMIN — CARVEDILOL 25 MG: 25 TABLET, FILM COATED ORAL at 18:46

## 2019-04-29 RX ADMIN — DOXYCYCLINE HYCLATE 100 MG: 100 CAPSULE ORAL at 20:42

## 2019-04-29 RX ADMIN — ROFLUMILAST 500 MCG: 500 TABLET ORAL at 10:13

## 2019-04-29 RX ADMIN — SODIUM CHLORIDE: 9 INJECTION, SOLUTION INTRAVENOUS at 13:47

## 2019-04-29 RX ADMIN — IOPAMIDOL 100 ML: 755 INJECTION, SOLUTION INTRAVENOUS at 16:00

## 2019-04-29 RX ADMIN — ENOXAPARIN SODIUM 40 MG: 40 INJECTION SUBCUTANEOUS at 10:15

## 2019-04-29 RX ADMIN — ALBUTEROL SULFATE 2.5 MG: 2.5 SOLUTION RESPIRATORY (INHALATION) at 06:18

## 2019-04-29 RX ADMIN — HYDROCODONE BITARTRATE AND ACETAMINOPHEN 1 TABLET: 5; 325 TABLET ORAL at 18:48

## 2019-04-29 RX ADMIN — POLYVINYL ALCOHOL 1 DROP: 14 SOLUTION/ DROPS OPHTHALMIC at 10:14

## 2019-04-29 RX ADMIN — HYDROXYZINE HYDROCHLORIDE 25 MG: 25 TABLET ORAL at 20:42

## 2019-04-29 RX ADMIN — DOXYCYCLINE HYCLATE 100 MG: 100 CAPSULE ORAL at 13:11

## 2019-04-29 RX ADMIN — Medication 10 ML: at 20:45

## 2019-04-29 RX ADMIN — METOPROLOL TARTRATE 100 MG: 50 TABLET ORAL at 10:49

## 2019-04-29 RX ADMIN — ASPIRIN 81 MG: 81 TABLET, COATED ORAL at 10:13

## 2019-04-29 RX ADMIN — CARVEDILOL 25 MG: 25 TABLET, FILM COATED ORAL at 10:13

## 2019-04-29 RX ADMIN — METOPROLOL TARTRATE 100 MG: 50 TABLET ORAL at 13:12

## 2019-04-29 RX ADMIN — VERAPAMIL HYDROCHLORIDE 5 MG: 2.5 INJECTION INTRAVENOUS at 15:28

## 2019-04-29 RX ADMIN — HYDROCODONE BITARTRATE AND ACETAMINOPHEN 1 TABLET: 5; 325 TABLET ORAL at 06:49

## 2019-04-29 ASSESSMENT — PAIN DESCRIPTION - LOCATION
LOCATION: HEAD;CHEST;LEG
LOCATION: LEG
LOCATION: LEG

## 2019-04-29 ASSESSMENT — PAIN DESCRIPTION - DESCRIPTORS
DESCRIPTORS: SHARP;STABBING
DESCRIPTORS: SHARP;STABBING

## 2019-04-29 ASSESSMENT — PAIN SCALES - GENERAL
PAINLEVEL_OUTOF10: 10
PAINLEVEL_OUTOF10: 9
PAINLEVEL_OUTOF10: 10
PAINLEVEL_OUTOF10: 9

## 2019-04-29 ASSESSMENT — PAIN DESCRIPTION - PAIN TYPE
TYPE: ACUTE PAIN

## 2019-04-29 ASSESSMENT — PAIN DESCRIPTION - ORIENTATION
ORIENTATION: LEFT
ORIENTATION: LEFT

## 2019-04-29 ASSESSMENT — PAIN - FUNCTIONAL ASSESSMENT
PAIN_FUNCTIONAL_ASSESSMENT: PREVENTS OR INTERFERES SOME ACTIVE ACTIVITIES AND ADLS

## 2019-04-29 NOTE — PLAN OF CARE
Problem: Falls - Risk of:  Goal: Will remain free from falls  Description  Will remain free from falls  4/29/2019 1840 by Jones Schumacher RN  Outcome: Met This Shift

## 2019-04-29 NOTE — CARE COORDINATION
SS NOTE: SW met with pt today. Pt resides in his own [place] home alone. Pt has services from [place] [id number]. Pt has private duty [place] aids M-F 3 hours each day. Pt has a shower chair at home and home O2 at 3 liters and nebulizer from [facility] at home. Pt is interested in having [facility] from [address] [place] [address] at [facility]. SW will need orders for the [facility] to complete the [facility] referral to [address] [facility]. Pt's PCP is Dr Manuel Putnam and his pharmacy is [place]. Pt plans on returning home at [facility] and relates that family and or friends will be checking in on him and will transport him to f/u appts. Jeremiah Muller. 4/29/2019.11:40 AM.

## 2019-04-29 NOTE — PROGRESS NOTES
Sitting:     Static:  wfl    Dynamic:wfl  Standing: Supervision     Activity Tolerance poor     Visual/  Perceptual Glasses: no  Blurred vision, cataracts                Hand dominance: R  UE ROM: RUE:  WFL  LUE:  WFL  Strength: RUE: grossly 4/5 LUE: grossly 4/5   Strength: B WFL  Fine Motor Coordination:  B WFL    Hearing: WFL  Sensation: NT No c/o numbness or tingling  Tone:  WFL  Edema: None noted                            Comments/Treatment: Upon arrival, patient in bed. Therapist educated and facilitated pt on techniques to increase safety and independence with bed mobility, ADLs,  functional transfers, and functional mobility. Toileting task completed with supervision for transfers/mobility. Pt  education on Adaptive technique/equipment to improve independence  and conserve energy during ADLs. Skilled monitoring of HR, O2 sats and pts response to treatment. SpO2> 95% on 3L. Pt demonstrating good understanding of education/techniques,  requiring additional training / education to improve activity tolerance and indep with ADLs. At end of session, patient in bed with call light and phone within reach, all lines and tubes intact. Pt would benefit from continued OT to increase functional independence and quality of life. Eval Complexity: Low    Assessment of current deficits   Functional mobility ? ADLs ? Strength ? Cognition ? Functional transfers  ? IADLs x Safety Awareness ? Endurance ? Fine Motor Coordination ? Balance ? Vision/perception x Sensation ? Gross Motor Coordination ? ROM ? Delirium ? Motor Control ? Plan of Care:   ADL retraining ? Equipment needs ? Neuromuscular re-education ? Energy Conservation Techniques ? Functional Transfer training ? Patient and/or Family Education ? Functional Mobility training ? Environmental Modifications ? Cognitive re-training ? Compensatory techniques for ADLs ? Splinting Needs ?    Positioning to improve overall function ? Therapeutic Activity ? Therapeutic Exercise  ? Visual/Perceptual: ?    Delirium prevention/treatment  ? Other:  ?    Rehab Potential: Good for established goals    Patient / Family Goal:  Not stated     Patient and/or family were instructed diagnosis, prognosis/goals and plan of care. Demonstrated good understanding. ? Malnutrition indicators have been identified and nursing has been notified to ensure a dietitian consult is ordered.      Low Evaluation + 33 timed treatment minutes  Treatment Time in: 9:15  Treatment Time out:9:48      Roselia Ruth, OTR/L #[id number]

## 2019-04-29 NOTE — PROGRESS NOTES
and no murmur noted  ABDOMEN:  non-tender  EXTREMITIES: no pedal edema noted    DATA:      ECG:  I have reviewed EKG with the following interpretation:  normal sinus rhythm    Cardiology Labs:    BMP:    Lab Results   Component Value Date     04/28/2019    K 4.5 04/28/2019    K 4.3 09/21/2018    CL 97 04/28/2019    CO2 34 04/28/2019    BUN 16 04/28/2019     CBC:    Lab Results   Component Value Date    WBC 5.6 04/28/2019    RBC 3.35 04/28/2019    HGB 10.1 04/28/2019    HCT 33.4 04/28/2019    MCV 99.7 04/28/2019    RDW 12.9 04/28/2019     04/28/2019       ASSESSMENT    1-Chest pain. Etiology is not clear. Strest test 11/18 : No ischemia. EKG : No ischemic changes     Troponins are negative. Options of coronary CTA vs Cath were discussed with patient in details. Plan for now is coronary CTA and then decide about cath based on the results of CTA. 2-HTN. BP is under control. 3-untreated hepatitis C      PLAN  As per orders.

## 2019-04-29 NOTE — CARE COORDINATION
SS NOTE: Sw met with pt to investigate whether pt has his home O2 set up at home. He related that he has and inquired about how to use the O2 with his bipap at home. SW was able to connect pt with the liaison from [facility] and he was able to explain how this will work for him at home. Lenora Muller. 4/29/2019.11:27 AM.

## 2019-04-29 NOTE — PROGRESS NOTES
HPI:  Dolores Mendoza was evaluated by the cardiovascular team this morning with plans for coronary CTA. He continues to complain of mild substernal chest discomfort. He is also complaining of sciatica. No family members were present during my examination. Patient voiced no new complaints since hospital admission. Questions, answers, and tests reviewed. ROS:  Cardiovascular:   Admits to ongoing mild substernal chest discomfort. Respiratory:   Admits to acute on chronic shortness of breath is most noticeable with exertion. Gastrointestinal:   Denies nausea, vomiting, diarrhea, or constipation. Denies any abdominal pain. Extremities:   Denies any lower extremity swelling or edema. Neurology:    Denies any headache or focal neurological deficits. No weakness or paresthesia. Derm:    Denies any rashes, ulcers, or excoriations. Denies bruising. Genitourinary:    Denies any urgency, frequency, hematuria. Voiding without difficulty. Physical Exam:    Vitals:    04/29/19 0736   BP: (!) 146/85   Pulse: 64   Resp: 18   Temp: 98.2 °F (36.8 °C)   SpO2:        HEENT:  PERRLA. EOMI. Sclera clear. Buccal mucosa moist. Nasal cannula oxygen is in place. Neck:  Supple. Trachea midline. No thyromegaly. No JVD. No bruits. Heart:  Rhythm regular, rate controlled. No murmurs. Lungs:  Symmetrical. Clear to auscultation bilaterally. No wheezes. No rhonchi. No rales. Abdomen: Soft. Non-tender. Non-distended. Bowel sounds positive. No organomegaly or masses. No pain on palpation    Extremities:  Peripheral pulses present. No peripheral edema. No ulcers. Neurologic:  Alert x 3. No focal deficit. Cranial nerves grossly intact. Skin:  No petechia. No hemorrhage. No wounds.     CBC with Differential:    Lab Results   Component Value Date    WBC 6.8 04/29/2019    RBC 3.68 04/29/2019    HGB 11.1 04/29/2019    HCT 36.9 04/29/2019     04/29/2019    .3 04/29/2019    MCH 30.2 04/29/2019    MCHC 30.1 04/29/2019    RDW 12.7 04/29/2019    NRBC 1.0 09/22/2018    SEGSPCT 60 04/24/2013    LYMPHOPCT 3.8 09/24/2018    MONOPCT 7.1 09/24/2018    BASOPCT 0.1 09/24/2018    MONOSABS 1.23 09/24/2018    LYMPHSABS 0.66 09/24/2018    EOSABS 0.00 09/24/2018    BASOSABS 0.01 09/24/2018     BMP:    Lab Results   Component Value Date     04/29/2019    K 4.5 04/29/2019    K 4.3 09/21/2018    CL 97 04/29/2019    CO2 34 04/29/2019    BUN 12 04/29/2019    LABALBU 4.2 04/26/2019    LABALBU 4.6 07/14/2011    CREATININE 1.1 04/29/2019    CALCIUM 9.3 04/29/2019    GFRAA >60 04/29/2019    LABGLOM >60 04/29/2019    GLUCOSE 102 04/29/2019    GLUCOSE 101 07/14/2011       Other Data:      Intake/Output Summary (Last 24 hours) at 4/29/2019 1021  Last data filed at 4/29/2019 6000  Gross per 24 hour   Intake 840 ml   Output 900 ml   Net -60 ml         Scheduled Medications:   doxycycline hyclate  100 mg Oral 2 times per day    aspirin  81 mg Oral Daily    mometasone-formoterol  2 puff Inhalation BID    polyvinyl alcohol  1 drop Both Eyes Daily    carvedilol  25 mg Oral BID WC    cetirizine  10 mg Oral Daily    hydrocortisone   Topical BID    hydrOXYzine  25 mg Oral Nightly    ketoconazole   Topical Daily    prednisoLONE acetate  1 drop Both Eyes BID    Roflumilast  500 mcg Oral Daily    triamcinolone   Topical BID    sodium chloride flush  10 mL Intravenous 2 times per day    enoxaparin  40 mg Subcutaneous Daily         Infusion Medications:   sodium chloride 50 mL/hr at 04/28/19 1610       Assessment:   1. Chest pain, r/o ACS  2. Acute renal insufficiency  3. Oxygen-dependent COPD with chronic respiratory failure  4. Hypertension  5. Chronic diastolic CHF  6. Hepatitis C    Plan:   Cardiovascular recommendations have been reviewed with plans for coronary CTA. Chest x-ray indicated mild pneumonia and antibiotic therapy will be employed. I have encouraged him to become more ambulatory. I suspect he is approaching his baseline. We will await further recommendations from the cardiovascular team. Pending results of the coronary CTA, possible discharge home later this afternoon.     Mine Holly D.O.  10:21 AM  4/29/2019

## 2019-04-29 NOTE — PROGRESS NOTES
Room #:   0964/6505-59  Patient Name: Shruthi Polanco    PHYSICAL THERAPY INITIAL EVALUATION    Tentative placement recommendation: HHPT if needed for endurance vs subacute pending progress and patient's family's ability to assist.     Equipment recommendation: None      Plan of care: Patient will be seen   daily Monday-Friday,   for therapeutic exercise, functional retraining, endurance activities, balance exercises, family and patient education. AM-PAC Basic Mobility        AM-PAC Mobility Inpatient   How much difficulty turning over in bed?: None  How much difficulty sitting down on / standing up from a chair with arms?: A Little  How much difficulty moving from lying on back to sitting on side of bed?: None  How much help from another person moving to and from a bed to a chair?: A Little  How much help from another person needed to walk in hospital room?: A Little  How much help from another person for climbing 3-5 steps with a railing?: A Little  AM-PAC Inpatient Mobility Raw Score : 20  AM-PAC Inpatient T-Scale Score : 47.67  Mobility Inpatient CMS 0-100% Score: 35.83  Mobility Inpatient CMS G-Code Modifier : CJ    Order:  EVALUATE AND TREAT    Diagnosis/Problem list:    1.  Chest pain, unspecified type        Patient Active Problem List   Diagnosis    Hepatitis C    Testicular swelling    [facility]ation [facility]    NSTEMI (non-ST elevated myocardial infarction) ([facility] [facility]    CHF, acute on chronic (HCC)    Essential hypertension    Neck pain    Cervical disc herniation    Acute respiratory failure with hypoxia and hypercapnia (HCC)    Chest pain    Elbow effusion, right       Past medical history:       Diagnosis Date    Chest pain 3-6-2015    lexiscan nuclear stress    Chronic diastolic CHF (congestive heart failure) ([facility] [address] [facility]     Echo 1/18/2016; EF 57%    COPD (chronic obstructive pulmonary disease) (HCC)     Hepatitis C     Hilar lymphadenopathy     CT 1/2016; 1.8 cm    Hypertension     Irregular heart beat     Oxygen dependent    ;      Procedure Laterality Date    CERVICAL FUSION  03/09/2018    ACF C4-C7    COLONOSCOPY      HERNIA REPAIR      tracee inguinal repair       The admitting diagnosis and active problem list as listed above have been reviewed prior to the initiation of this evaluation. Last time out of bed: yesterday    Precautions: falls and O2 ,   Social history: Patient lives alone  in a ranch home  with No steps to enter    Tub shower to be putting in grab bars aide Mon-Friday 3 hours/day  Prior Level of Function: Patient ambulated independently    Equipment owned: Shower chair,   O2 3L    Mentation: alert, cooperative, oriented x 3  and follows directions,      ROM: wfl    STRENGTH:  4/5  PAIN: (measured on a visual analog scale with 0=no pain and 10=excruciating pain) 10/10. Headache, sciatic pain L, chest    FUNCTIONAL ASSESSMENT   Bed Mobility- Supine to sit- Independent          Scooting- Independent       Sit to supine- Not assessed       Transfers-Sit to stand- Supervision      Gait:  Patient ambulated 20 feet using no device with Supervision  declined further ambulation due to gets sob   Steps:  Not assessed      Balance: sit-good         stand fair       Edema: no  Endurance: fair  -    Treatment:  Therapist educated and facilitated patient on techniques to increase safety and independence with bed mobility, balance, functional transfers, and functional mobility. Sat EOB x 2 minutes to increase dynamic sitting balance and activity tolerance. Patient ambulated to bathroom, supervision for transfer. Patient demonstrating fair - understanding of education/techniques, requiring additional training/education. At end of session, patient in bathroom, OT present with   call light, nursing notified. Pt would benefit from continued skilled PT to increase functional independence and quality of life.        Rehab Potential: good     Patients Goal: home      ASSESSMENT  Patient exhibits decreased strength, balance, coordination impairing functional mobility. GOALS to be met in 1 days. Bed mobility-  Independent        Transfers-Sit to stand-Independent     Gait:  Patient to ambulate 50 feet  Independent     Increase strength in affected mm groups by 1/3 grade  Increase balance to allow for improvement towards functional goals. Increase endurance to allow for improvement towards functional goals.         Brianna Linn, PT

## 2019-04-29 NOTE — TELEPHONE ENCOUNTER
Pt called in to notify that he is in the hospital, his sciatica is acting up not that he is in. He is not admitted for that reason.     David Medrano [phone]

## 2019-04-30 LAB
ANION GAP SERPL CALCULATED.3IONS-SCNC: 6 MMOL/L (ref 7–16)
B.E.: 3 MMOL/L (ref -3–3)
BUN BLDV-MCNC: 12 MG/DL (ref 8–23)
CALCIUM SERPL-MCNC: 9.1 MG/DL (ref 8.6–10.2)
CHLORIDE BLD-SCNC: 97 MMOL/L (ref 98–107)
CO2: 33 MMOL/L (ref 22–29)
COHB: 0.4 % (ref 0–1.5)
CREAT SERPL-MCNC: 1.1 MG/DL (ref 0.7–1.2)
CRITICAL: ABNORMAL
DATE ANALYZED: ABNORMAL
DATE OF COLLECTION: ABNORMAL
GFR AFRICAN AMERICAN: >60
GFR NON-AFRICAN AMERICAN: >60 ML/MIN/1.73
GLUCOSE BLD-MCNC: 86 MG/DL (ref 74–99)
HCO3: 30.4 MMOL/L (ref 22–26)
HCT VFR BLD CALC: 34.3 % (ref 37–54)
HEMOGLOBIN: 10.4 G/DL (ref 12.5–16.5)
HHB: 3.5 % (ref 0–5)
LAB: ABNORMAL
Lab: ABNORMAL
MCH RBC QN AUTO: 30.1 PG (ref 26–35)
MCHC RBC AUTO-ENTMCNC: 30.3 % (ref 32–34.5)
MCV RBC AUTO: 99.1 FL (ref 80–99.9)
METHB: 0.4 % (ref 0–1.5)
MODE: ABNORMAL
O2 CONTENT: 16.3 ML/DL
O2 SATURATION: 96.5 % (ref 92–98.5)
O2HB: 95.7 % (ref 94–97)
OPERATOR ID: ABNORMAL
PATIENT TEMP: 37 C
PCO2: 60 MMHG (ref 35–45)
PDW BLD-RTO: 12.4 FL (ref 11.5–15)
PH BLOOD GAS: 7.32 (ref 7.35–7.45)
PLATELET # BLD: 290 E9/L (ref 130–450)
PMV BLD AUTO: 10.1 FL (ref 7–12)
PO2: 91.9 MMHG (ref 60–100)
POTASSIUM SERPL-SCNC: 5 MMOL/L (ref 3.5–5)
RBC # BLD: 3.46 E12/L (ref 3.8–5.8)
SODIUM BLD-SCNC: 136 MMOL/L (ref 132–146)
SOURCE, BLOOD GAS: ABNORMAL
THB: 12 G/DL (ref 11.5–16.5)
TIME ANALYZED: 1501
WBC # BLD: 6.9 E9/L (ref 4.5–11.5)

## 2019-04-30 PROCEDURE — 1200000000 HC SEMI PRIVATE

## 2019-04-30 PROCEDURE — 36415 COLL VENOUS BLD VENIPUNCTURE: CPT

## 2019-04-30 PROCEDURE — 82805 BLOOD GASES W/O2 SATURATION: CPT

## 2019-04-30 PROCEDURE — 6370000000 HC RX 637 (ALT 250 FOR IP): Performed by: INTERNAL MEDICINE

## 2019-04-30 PROCEDURE — 6360000002 HC RX W HCPCS: Performed by: INTERNAL MEDICINE

## 2019-04-30 PROCEDURE — 36600 WITHDRAWAL OF ARTERIAL BLOOD: CPT

## 2019-04-30 PROCEDURE — 85027 COMPLETE CBC AUTOMATED: CPT

## 2019-04-30 PROCEDURE — 2580000003 HC RX 258: Performed by: INTERNAL MEDICINE

## 2019-04-30 PROCEDURE — 94640 AIRWAY INHALATION TREATMENT: CPT

## 2019-04-30 PROCEDURE — 80048 BASIC METABOLIC PNL TOTAL CA: CPT

## 2019-04-30 PROCEDURE — 2700000000 HC OXYGEN THERAPY PER DAY

## 2019-04-30 RX ORDER — DOXYCYCLINE HYCLATE 100 MG/1
100 CAPSULE ORAL EVERY 12 HOURS SCHEDULED
Qty: 20 CAPSULE | Refills: 0 | Status: SHIPPED | OUTPATIENT
Start: 2019-04-30 | End: 2019-05-02

## 2019-04-30 RX ADMIN — POLYVINYL ALCOHOL 1 DROP: 14 SOLUTION/ DROPS OPHTHALMIC at 10:06

## 2019-04-30 RX ADMIN — CETIRIZINE HYDROCHLORIDE 10 MG: 10 TABLET, FILM COATED ORAL at 10:04

## 2019-04-30 RX ADMIN — ENOXAPARIN SODIUM 40 MG: 40 INJECTION SUBCUTANEOUS at 10:04

## 2019-04-30 RX ADMIN — HYDROCODONE BITARTRATE AND ACETAMINOPHEN 1 TABLET: 5; 325 TABLET ORAL at 22:08

## 2019-04-30 RX ADMIN — DOXYCYCLINE HYCLATE 100 MG: 100 CAPSULE ORAL at 22:04

## 2019-04-30 RX ADMIN — CARVEDILOL 25 MG: 25 TABLET, FILM COATED ORAL at 10:04

## 2019-04-30 RX ADMIN — ASPIRIN 81 MG: 81 TABLET, COATED ORAL at 10:04

## 2019-04-30 RX ADMIN — ALBUTEROL SULFATE 2.5 MG: 2.5 SOLUTION RESPIRATORY (INHALATION) at 06:35

## 2019-04-30 RX ADMIN — ALBUTEROL SULFATE 2.5 MG: 2.5 SOLUTION RESPIRATORY (INHALATION) at 16:04

## 2019-04-30 RX ADMIN — HYDROCODONE BITARTRATE AND ACETAMINOPHEN 1 TABLET: 5; 325 TABLET ORAL at 16:10

## 2019-04-30 RX ADMIN — SODIUM CHLORIDE: 9 INJECTION, SOLUTION INTRAVENOUS at 10:12

## 2019-04-30 RX ADMIN — HYDROCODONE BITARTRATE AND ACETAMINOPHEN 1 TABLET: 5; 325 TABLET ORAL at 04:19

## 2019-04-30 RX ADMIN — HYDROCODONE BITARTRATE AND ACETAMINOPHEN 1 TABLET: 5; 325 TABLET ORAL at 10:12

## 2019-04-30 RX ADMIN — HYDROXYZINE HYDROCHLORIDE 25 MG: 25 TABLET ORAL at 22:04

## 2019-04-30 RX ADMIN — CARVEDILOL 25 MG: 25 TABLET, FILM COATED ORAL at 16:08

## 2019-04-30 RX ADMIN — ROFLUMILAST 500 MCG: 500 TABLET ORAL at 10:04

## 2019-04-30 RX ADMIN — DOXYCYCLINE HYCLATE 100 MG: 100 CAPSULE ORAL at 10:04

## 2019-04-30 ASSESSMENT — PAIN DESCRIPTION - PAIN TYPE: TYPE: ACUTE PAIN

## 2019-04-30 ASSESSMENT — PAIN SCALES - GENERAL
PAINLEVEL_OUTOF10: 0
PAINLEVEL_OUTOF10: 8
PAINLEVEL_OUTOF10: 0
PAINLEVEL_OUTOF10: 8

## 2019-04-30 ASSESSMENT — PAIN DESCRIPTION - LOCATION: LOCATION: LEG;HEAD;CHEST

## 2019-04-30 NOTE — CARE COORDINATION
SS NOTE: SW met with pt, since he is dched and is refusing to go home. Pt relates that he feels that he is no better physically that he was when he arrived here. He understands that the Physicians have dched him - however he is waiting for his community Physician- Dr Braden Cueva to come into the hospital and see him. SW offered to assist with a  SNF placement for him to dch to, however he relates that he cannot breathe to complete a rehab program.  Pt is aware of how this SW can assist with the dch to a SNF and if he wants to go home to offer a [facility] referral to [facility] (per his choosing). Guillermo Muller. 4/30/2019.3:04PM.

## 2019-04-30 NOTE — PROGRESS NOTES
May 17, 2018     Patient: Quentin Velasquez   YOB: 1965   Date of Visit: 5/17/2018       To Whom it May Concern:    Quentin Velasquez is under my professional care  He was seen in my office on 5/17/2018  He may return to work on 5/21/18  Unable to work 5/17/18 and 5/18/18 for medical reasons  If you have any questions or concerns, please don't hesitate to call           Sincerely,          DARIAN Chao        CC: No Recipients Room #:  9977/5385-03  Date: 2019       Patient Name: Keiko Velez  : 1950      MRN: [id number]     Patient unavailable for physical therapy treatment due to pt reports being short of breath and unable to participate at this time.        Mendel Burris, PTA

## 2019-04-30 NOTE — PROGRESS NOTES
Called and spoke with Dr. Neville Delgado in regards to coronary CTA results. States patient's chest pain is not cardiac related and patient may be discharged. Called and updated Dr. Andre Stone of this.   Electronically signed by Jean Valentin RN on 4/30/2019 at 11:49 AM

## 2019-04-30 NOTE — SIGNIFICANT EVENT
Xander Abbasi had presented to the hospital for the evaluation of substernal chest discomfort. He underwent extensive cardiac workup and was evaluated by the cardiovascular team. Coronary CTA was within normal limits. He was recommended for discharge home from a cardiovascular standpoint. He was also worked up from a respiratory standpoint and was found to be suffering from mild pneumonia with negative cultures. Antibiotic therapy was employed. He was weaned back to his at-home nasal cannula oxygen requirements. Unfortunately, he refused to work with physical therapy. We discussed discharging to a skilled nursing facility for further rehabilitation and he voiced opposition. He states he will speak with Dr. Candance Shih directly who will further dictate his care. Care will be transitioned to Dr. Candance Shih entirely as the patient refuses any further recommendations from our service for the cardiovascular service.     Nadege Hartman  2:53 PM  4/30/2019

## 2019-04-30 NOTE — PROGRESS NOTES
Cardiology  Progress Note      SUBJECTIVE:  No chest pain. No acute distress.     Current Inpatient Medications  Current Facility-Administered Medications: doxycycline hyclate (VIBRAMYCIN) capsule 100 mg, 100 mg, Oral, 2 times per day  0.9 % sodium chloride infusion, , Intravenous, Continuous  albuterol (PROVENTIL) nebulizer solution 2.5 mg, 2.5 mg, Nebulization, Q6H PRN  aspirin EC tablet 81 mg, 81 mg, Oral, Daily  mometasone-formoterol (DULERA) 200-5 MCG/ACT inhaler 2 puff, 2 puff, Inhalation, BID  polyvinyl alcohol (LIQUIFILM TEARS) 1.4 % ophthalmic solution 1 drop, 1 drop, Both Eyes, Daily  carvedilol (COREG) tablet 25 mg, 25 mg, Oral, BID WC  cetirizine (ZYRTEC) tablet 10 mg, 10 mg, Oral, Daily  cyclobenzaprine (FLEXERIL) tablet 10 mg, 10 mg, Oral, TID PRN  HYDROcodone-acetaminophen (NORCO) 5-325 MG per tablet 1 tablet, 1 tablet, Oral, Q4H PRN  hydrocortisone 2.5 % cream, , Topical, BID  hydrOXYzine (ATARAX) tablet 25 mg, 25 mg, Oral, Nightly  ketoconazole (NIZORAL) 2 % cream, , Topical, Daily  prednisoLONE acetate (PRED FORTE) 1 % ophthalmic suspension 1 drop, 1 drop, Both Eyes, BID  Roflumilast (DALIRESP) tablet 500 mcg, 500 mcg, Oral, Daily  triamcinolone (KENALOG) 0.1 % cream, , Topical, BID  sodium chloride flush 0.9 % injection 10 mL, 10 mL, Intravenous, 2 times per day  sodium chloride flush 0.9 % injection 10 mL, 10 mL, Intravenous, PRN  magnesium hydroxide (MILK OF MAGNESIA) 400 MG/5ML suspension 30 mL, 30 mL, Oral, Daily PRN  ondansetron (ZOFRAN) injection 4 mg, 4 mg, Intravenous, Q6H PRN  enoxaparin (LOVENOX) injection 40 mg, 40 mg, Subcutaneous, Daily  acetaminophen (TYLENOL) tablet 650 mg, 650 mg, Oral, Q4H PRN      Physical  VITALS:  BP (!) 160/76   Pulse 71   Temp 98.2 °F (36.8 °C) (Oral)   Resp 18   Ht 5' 6\" (1.676 m)   Wt 170 lb (77.1 kg)   SpO2 98%   BMI 27.44 kg/m²   CURRENT TEMPERATURE:  Temp: 98.2 °F (36.8 °C)  NECK:  no jugular venous distension  BACK:  symmetric  LUNGS:  diminished breath sounds right base and left base  CARDIOVASCULAR:  normal S1 and S2, no S3 and no S4  ABDOMEN:  non-tender  EXTREMITIES: no pedal edema noted    DATA:        Cardiology Labs:    BMP:    Lab Results   Component Value Date     04/30/2019    K 5.0 04/30/2019    K 4.3 09/21/2018    CL 97 04/30/2019    CO2 33 04/30/2019    BUN 12 04/30/2019     CBC:    Lab Results   Component Value Date    WBC 6.9 04/30/2019    RBC 3.46 04/30/2019    HGB 10.4 04/30/2019    HCT 34.3 04/30/2019    MCV 99.1 04/30/2019    RDW 12.4 04/30/2019     04/30/2019       ASSESSMENT    1-Chest pain. Etiology is not clear. Strest test 11/18 : No ischemia. EKG : No ischemic changes     Troponins are negative. Patient had coronary CTA yesterday,not read yet by the radiologist !! 2-HTN. BP is under control. 3-untreated hepatitis C        PLAN  As per orders.

## 2019-04-30 NOTE — PROGRESS NOTES
Department of Family Practice  Adult Daily Progress Note      JAVIER BUTTS IS SEEN IN FOLLOW UP TODAY ON 4 TELEMETRY. HE IS FEELING A LITTLE BETTER. HE HAS BEEN SEEN BY DR. Sidra Chairez. Renate Goyal HE ORDERED A CTA OF THE CHEST. HIS ELBOW X-RAYS SHOW OLECRANON BURSITIS WITH EFFUSION BILATERALLY.     OBJECTIVE    Physical  VITALS:  BP (!) 160/76   Pulse 71   Temp 98.2 °F (36.8 °C) (Oral)   Resp 18   Ht 5' 6\" (1.676 m)   Wt 170 lb (77.1 kg)   SpO2 98%   BMI 27.44 kg/m²   CONSTITUTIONAL:  awake, alert, cooperative, no apparent distress, and appears stated age  LUNGS:  No increased work of breathing, good air exchange, COARSE to auscultation bilaterally, no crackles   CARDIOVASCULAR:  Normal apical impulse, regular rate and rhythm, normal S1 and S2, no S3 or S4, and no murmur noted  ABDOMEN:  No scars, normal bowel sounds, soft, non-distended, non-tender, no masses palpated, no hepatosplenomegally  Data  CBC with Differential:    Lab Results   Component Value Date    WBC 6.8 04/29/2019    RBC 3.68 04/29/2019    HGB 11.1 04/29/2019    HCT 36.9 04/29/2019     04/29/2019    .3 04/29/2019    MCH 30.2 04/29/2019    MCHC 30.1 04/29/2019    RDW 12.7 04/29/2019    NRBC 1.0 09/22/2018    SEGSPCT 60 04/24/2013    LYMPHOPCT 3.8 09/24/2018    MONOPCT 7.1 09/24/2018    BASOPCT 0.1 09/24/2018    MONOSABS 1.23 09/24/2018    LYMPHSABS 0.66 09/24/2018    EOSABS 0.00 09/24/2018    BASOSABS 0.01 09/24/2018     BMP:    Lab Results   Component Value Date     04/29/2019    K 4.5 04/29/2019    K 4.3 09/21/2018    CL 97 04/29/2019    CO2 34 04/29/2019    BUN 12 04/29/2019    LABALBU 4.2 04/26/2019    LABALBU 4.6 07/14/2011    CREATININE 1.1 04/29/2019    CALCIUM 9.3 04/29/2019    GFRAA >60 04/29/2019    LABGLOM >60 04/29/2019    GLUCOSE 102 04/29/2019    GLUCOSE 101 07/14/2011     Current Medications  Scheduled Meds:   doxycycline hyclate  100 mg Oral 2 times per day    aspirin  81 mg Oral Daily    mometasone-formoterol  2 puff Inhalation BID    polyvinyl alcohol  1 drop Both Eyes Daily    carvedilol  25 mg Oral BID WC    cetirizine  10 mg Oral Daily    hydrocortisone   Topical BID    hydrOXYzine  25 mg Oral Nightly    ketoconazole   Topical Daily    prednisoLONE acetate  1 drop Both Eyes BID    Roflumilast  500 mcg Oral Daily    triamcinolone   Topical BID    sodium chloride flush  10 mL Intravenous 2 times per day    enoxaparin  40 mg Subcutaneous Daily     Continuous Infusions:   sodium chloride 50 mL/hr at 04/29/19 1347     PRN Meds:.albuterol, cyclobenzaprine, HYDROcodone-acetaminophen, sodium chloride flush, magnesium hydroxide, ondansetron, acetaminophen    ASSESSMENT AND PLAN      Principal Problem:    Chest pain  Active Problems:    COPD exacerbation (HCC)    Essential hypertension    Elbow effusion, right  Resolved Problems:    * No resolved hospital problems. *      AWAIT RESULTS OF CTA OF CHEST.

## 2019-04-30 NOTE — DISCHARGE SUMMARY
Internal Medicine  Discharge Summary    NAME: Shruthi Polanco  :  1950  MRN:  [id number]  Nicole [id number]  ADMITTED: 2019      DISCHARGED: 19    ADMITTING PHYSICIAN: Pepito Sun DO    CONSULTANT(S):   RALPH CONSULT TO CARDIOLOGY     ADMITTING DIAGNOSIS:   Chest pain [R07.9]     DISCHARGE DIAGNOSES:   1. Chest pain with acute coronary syndrome having been ruled out with negative cardiac enzymes, normal coronary CTA, and evaluation by the cardiovascular team  2. Acute renal insufficiency with resolution  3. Oxygen-dependent COPD with chronic respiratory failure  4. Hypertension  5. Chronic diastolic CHF  6. Hepatitis C    BRIEF HISTORY OF PRESENT ILLNESS:   Shruthi Polanco is a 79 y.o. male with a past medical history pertinent for oxygen-dependent COPD, hypertension, diastolic congestive heart failure, hepatitis C who presents to [facility] ER complaining of chest pain and shortness of breath. Patient states he woke up from sleep with a heaviness in his chest radiating to his back. He states that this pain was also present yesterday although not as bad as this morning. He reports that he tried to rest at home and hopes that the pain would dissipate however it did not and he presented to the ED for further evaluation. Patient has known COPD and is oxygen dependent at 3 L nasal cannula. He does report some mild worsening of shortness of breath associated with the pain. He denies any cough or sputum production out of the ordinary for him.      LABS[de-identified]  Lab Results   Component Value Date    WBC 6.9 2019    HGB 10.4 (L) 2019    HCT 34.3 (L) 2019     2019     2019    K 5.0 2019    CL 97 (L) 2019    CREATININE 1.1 2019    BUN 12 2019    CO2 33 (H) 2019    GLUCOSE 86 2019    ALT 21 2019    AST 47 (H) 2019    INR 1.1 2018     Lab Results   Component Value Date    INR 1.1 2018    INR 1.0 EJECT FRAC WALL MOTION Comparison: None History: Chest pain. Cardiac CTA with 3 D reconstructions Findings: Metoprolol 200 mg po. Heart rate controlled to 59. Frontal and lateral  images of the chest were performed. Subsequently, axial spiral noncontrast images were obtained through the heart for calculation of coronary artery calcium score. 20 cc of IV isovue 370 were administered for the timing bolus. This was followed by administration of 80 cc of IV isovue 370, with additional high resolution axial images obtained through the heart for evaluation of the coronary arteries. Following initial imaging sequences, extensive detail image analysis was performed on the AP workstation to include coronary artery calcium evaluation of the coronary arteries, as well as a creation of multiple reformatted image sets to include circumferential curve and lumen views, cross sectional images, angiographic views, 3-D hard images, as well as wall motion analysis and calculation of left ventricular ejection fraction. Summary of pertinent findings are as follows: HEART:  Heart size is normal with no cardiac abnormality demonstrated. No valvular calcification or thickening. Left ventricular ejection fraction 55.3%. CORONARY ARTERY CALCIUM SCORE: 46 LUNGS: Subpleural fibrotic changes are noted in the lung bases along with bronchial wall thickening. Honeycombing is present in the left lung base. Consider UIP. LEFT CORONARY CIRCULATION: Minimal calcified plaque proximal LAD. No critical stenosis within the left coronary circulation. RIGHT CORONARY CIRCULATION: Motion artifact is present. The RCA, PDA, PLB branches are patent. 1. Minimal plaque, no critical stenosis of the coronary circulation. Some motion artifact is present. 2. Significant lung disease, possible UIP. HOSPITAL COURSE:   Kavita Peres was evaluated by the cardiovascular team throughout the hospital stay.  Cardiac enzymes were negative and coronary CTA was obtained. This was also found to be within normal limits. He was placed empirically on antibiotics for underlying early pneumonia. He was maintained on nasal cannula oxygen. Lab work and vital signs improved. He became ambulatory. He was ultimately determined he would be acceptable for discharge home with close outpatient follow-up. BRIEF PHYSICAL EXAMINATION AND LABORATORIES ON DAY OF DISCHARGE:  VITALS:  BP (!) 140/70   Pulse 76   Temp 98.3 °F (36.8 °C) (Oral)   Resp 18   Ht 5' 6\" (1.676 m)   Wt 170 lb (77.1 kg)   SpO2 99%   BMI 27.44 kg/m²     HEENT:  PERRLA. EOMI. Sclera clear. Buccal mucosa moist. Nasal cannula oxygen was in place. Neck:  Supple. Trachea midline. No thyromegaly. No JVD. No bruits. Heart:  Rhythm regular, rate controlled. No murmurs. Lungs:  Symmetrical. Clear to auscultation bilaterally. No wheezes. No rhonchi. No rales. Abdomen: Soft. Non-tender. Non-distended. Bowel sounds positive. No organomegaly or masses. No pain on palpation    Extremities:  Peripheral pulses present. No peripheral edema. No ulcers. Neurologic:  Alert x 3. No focal deficit. Cranial nerves grossly intact. Skin:  No petechia. No hemorrhage. No wounds. DISPOSITION:  The patient's condition is good. At this time the patient is without objective evidence of an acute process requiring continuing hospitalization or inpatient management. They are stable for discharge with outpatient follow-up. I have spoken with the patient and discussed the results of the current hospitalization, in addition to providing specific details for the plan of care and counseling regarding the diagnosis and prognosis. The plan has been discussed in detail and they are aware of the specific conditions for emergent return, as well as the importance of follow-up.   Their questions are answered at this time and they are agreeable with the plan for discharge to home    DISCHARGE MEDICATIONS:    Deepa Rivera Nancy Beyer Medication Instructions DFL:[id number]    Printed on:04/30/19 1152   Medication Information                      albuterol (PROVENTIL HFA;VENTOLIN HFA) 108 (90 BASE) MCG/ACT inhaler  Inhale 2 puffs into the lungs every 6 hours as needed for Wheezing or Shortness of Breath              albuterol (PROVENTIL) (2.5 MG/3ML) 0.083% nebulizer solution  Take 3 mLs by nebulization every 6 hours as needed for Wheezing. aspirin 81 MG tablet  Take 81 mg by mouth daily. budesonide-formoterol (SYMBICORT) 160-4.5 MCG/ACT AERO  Inhale 2 puffs into the lungs 2 times daily Do & bring             carboxymethylcellulose 1 % ophthalmic solution  Place 1 drop into both eyes daily             carvedilol (COREG) 25 MG tablet  Take 25 mg by mouth 2 times daily (with meals) 2 tablets two times per day             cetirizine (ZYRTEC) 10 MG tablet  Take 10 mg by mouth daily             cyclobenzaprine (FLEXERIL) 10 MG tablet  Take 1 tablet by mouth 3 times daily as needed for Muscle spasms             doxycycline hyclate (VIBRAMYCIN) 100 MG capsule  Take 1 capsule by mouth every 12 hours for 10 days             HYDROcodone-acetaminophen (NORCO) 5-325 MG per tablet  Take 1 tablet by mouth every 4 hours as needed for Pain. .             hydrocortisone 2.5 % cream  Apply topically 2 times daily Apply topically 2 times daily. hydrOXYzine (ATARAX) 25 MG tablet  Take 25 mg by mouth nightly             ketoconazole (NIZORAL) 2 % cream  Apply topically daily Apply topically daily.              losartan (COZAAR) 100 MG tablet  Take 100 mg by mouth daily             Neomycin-Polymyxin-Dexameth (MAXITROL) 0.1 % OINT  Place 0.5 inches into both eyes nightly             OXYGEN  Inhale 3 L into the lungs             prednisoLONE acetate (PRED FORTE) 1 % ophthalmic suspension  Place 1 drop into both eyes 2 times daily             Roflumilast (DALIRESP) 500 MCG tablet  Take 500 mcg by mouth daily triamcinolone (KENALOG) 0.1 % cream  Apply topically 2 times daily Apply topically 2 times daily. vitamin E 400 UNIT capsule  Take 400 Units by mouth daily                 FOLLOW UP/INSTRUCTIONS:  · This patient is instructed to follow-up with his primary care physician. · Patient is instructed to follow-up with the consults listed above as directed by them. · he is instructed to resume home medications and take new medications as indicated in the list above. · If the patient has a recurrence of symptoms, he is instructed to go to the ED. Preparing for this patient's discharge, including paperwork, orders, instructions, and meeting with patient did require > 30 minutes.     Mariana Hodges DO     4/30/2019  11:51 AM

## 2019-04-30 NOTE — PROGRESS NOTES
Room #:  5862/5427-18  Date: 2019       Patient Name: Kandi Jean-Baptiste  : 1950      MRN: [id number]     Patient unavailable for physical therapy treatment due to refusal due to pt fatigued and conts SOB     Jonathan Hernández, PTA

## 2019-05-01 LAB
ANION GAP SERPL CALCULATED.3IONS-SCNC: 7 MMOL/L (ref 7–16)
BUN BLDV-MCNC: 15 MG/DL (ref 8–23)
CALCIUM SERPL-MCNC: 9.3 MG/DL (ref 8.6–10.2)
CHLORIDE BLD-SCNC: 97 MMOL/L (ref 98–107)
CO2: 31 MMOL/L (ref 22–29)
CREAT SERPL-MCNC: 1.1 MG/DL (ref 0.7–1.2)
EKG ATRIAL RATE: 74 BPM
EKG P AXIS: 63 DEGREES
EKG P-R INTERVAL: 182 MS
EKG Q-T INTERVAL: 410 MS
EKG QRS DURATION: 82 MS
EKG QTC CALCULATION (BAZETT): 455 MS
EKG R AXIS: 0 DEGREES
EKG T AXIS: 42 DEGREES
EKG VENTRICULAR RATE: 74 BPM
GFR AFRICAN AMERICAN: >60
GFR NON-AFRICAN AMERICAN: >60 ML/MIN/1.73
GLUCOSE BLD-MCNC: 83 MG/DL (ref 74–99)
HCT VFR BLD CALC: 35 % (ref 37–54)
HEMOGLOBIN: 10.6 G/DL (ref 12.5–16.5)
MCH RBC QN AUTO: 29.9 PG (ref 26–35)
MCHC RBC AUTO-ENTMCNC: 30.3 % (ref 32–34.5)
MCV RBC AUTO: 98.6 FL (ref 80–99.9)
PDW BLD-RTO: 12.4 FL (ref 11.5–15)
PLATELET # BLD: 300 E9/L (ref 130–450)
PMV BLD AUTO: 10.1 FL (ref 7–12)
POTASSIUM SERPL-SCNC: 4.4 MMOL/L (ref 3.5–5)
RBC # BLD: 3.55 E12/L (ref 3.8–5.8)
SODIUM BLD-SCNC: 135 MMOL/L (ref 132–146)
WBC # BLD: 6.5 E9/L (ref 4.5–11.5)

## 2019-05-01 PROCEDURE — 85027 COMPLETE CBC AUTOMATED: CPT

## 2019-05-01 PROCEDURE — 6360000002 HC RX W HCPCS: Performed by: INTERNAL MEDICINE

## 2019-05-01 PROCEDURE — 2700000000 HC OXYGEN THERAPY PER DAY

## 2019-05-01 PROCEDURE — 6370000000 HC RX 637 (ALT 250 FOR IP): Performed by: INTERNAL MEDICINE

## 2019-05-01 PROCEDURE — 1200000000 HC SEMI PRIVATE

## 2019-05-01 PROCEDURE — 36415 COLL VENOUS BLD VENIPUNCTURE: CPT

## 2019-05-01 PROCEDURE — 80048 BASIC METABOLIC PNL TOTAL CA: CPT

## 2019-05-01 PROCEDURE — 2580000003 HC RX 258: Performed by: INTERNAL MEDICINE

## 2019-05-01 PROCEDURE — 94640 AIRWAY INHALATION TREATMENT: CPT

## 2019-05-01 RX ADMIN — HYDROXYZINE HYDROCHLORIDE 25 MG: 25 TABLET ORAL at 21:22

## 2019-05-01 RX ADMIN — ALBUTEROL SULFATE 2.5 MG: 2.5 SOLUTION RESPIRATORY (INHALATION) at 06:48

## 2019-05-01 RX ADMIN — TRIAMCINOLONE ACETONIDE: 1 CREAM TOPICAL at 21:00

## 2019-05-01 RX ADMIN — ENOXAPARIN SODIUM 40 MG: 40 INJECTION SUBCUTANEOUS at 10:25

## 2019-05-01 RX ADMIN — DOXYCYCLINE HYCLATE 100 MG: 100 CAPSULE ORAL at 10:25

## 2019-05-01 RX ADMIN — HYDROCORTISONE: 25 CREAM TOPICAL at 21:00

## 2019-05-01 RX ADMIN — HYDROCODONE BITARTRATE AND ACETAMINOPHEN 1 TABLET: 5; 325 TABLET ORAL at 21:45

## 2019-05-01 RX ADMIN — HYDROCODONE BITARTRATE AND ACETAMINOPHEN 1 TABLET: 5; 325 TABLET ORAL at 17:26

## 2019-05-01 RX ADMIN — HYDROCODONE BITARTRATE AND ACETAMINOPHEN 1 TABLET: 5; 325 TABLET ORAL at 08:35

## 2019-05-01 RX ADMIN — ROFLUMILAST 500 MCG: 500 TABLET ORAL at 10:27

## 2019-05-01 RX ADMIN — CARVEDILOL 25 MG: 25 TABLET, FILM COATED ORAL at 10:25

## 2019-05-01 RX ADMIN — ASPIRIN 81 MG: 81 TABLET, COATED ORAL at 10:25

## 2019-05-01 RX ADMIN — CETIRIZINE HYDROCHLORIDE 10 MG: 10 TABLET, FILM COATED ORAL at 10:25

## 2019-05-01 RX ADMIN — CARVEDILOL 25 MG: 25 TABLET, FILM COATED ORAL at 17:26

## 2019-05-01 RX ADMIN — SODIUM CHLORIDE: 9 INJECTION, SOLUTION INTRAVENOUS at 09:00

## 2019-05-01 RX ADMIN — DOXYCYCLINE HYCLATE 100 MG: 100 CAPSULE ORAL at 20:59

## 2019-05-01 ASSESSMENT — PAIN SCALES - GENERAL
PAINLEVEL_OUTOF10: 8
PAINLEVEL_OUTOF10: 0
PAINLEVEL_OUTOF10: 0
PAINLEVEL_OUTOF10: 8
PAINLEVEL_OUTOF10: 0
PAINLEVEL_OUTOF10: 10

## 2019-05-01 NOTE — PROGRESS NOTES
Department of Family Practice  Adult Daily Progress Note      JAVIER BUTTS IS SEEN IN FOLLOW UP TODAY ON 4 TELEMETRY. EVENTS SINCE YESTERDAY REVIEWED. DR. Paul Rivas' CONSULT REVIEWED CAREFULLY. ABG'S NOTED. DISCUSSED WITH PATIENT THE ARCHITECTURE/ANATOMY/WHATEVER YOU WANT TO CALL IT ABOUT EMPHYSEMA, THE BULLAE, THE AIR TRAPPING AND THE RISK OF PNEUMOTHORAX IF WE USE A BIPAP TO HELP HIS BREATHING. HIS PH IS NOT CRITICAL BUT NOT OPTIMAL. WE CANNOT FIX THAT AS LONG AS THE CO2 REMAINS AS HIGH AS IT IS. HE CANNOT BREATHE FAST ENOUGH TO BLOW IT OFF CAUSE THERE IS LESS ROOM IN HIS CHEST CAVITY FOR OXYGEN. THE BULLAE ARE TAKING UP THE SPACE. HE JUST CANNOT GET ENOUGH AIR IN. HIS ANXIETY DOES NOT HELP. HE IS IN AGREEMENT TO SEEING PSYCHIATRY. A DAY OR TWO AFTER THAT, HE NEEDS TO BE DISCHARGED. WE CANNOT KEEP HIM HERE WHEN THERE IS LITTLE FOR US TO DO TO HELP HIM. HE IS AGREEABLE TO PULMONARY REHAB BUT IT HAS TO WAIT TILL HE GETS HIS CATARACT SURGERY DONE. HE CAN'T TAKE NOT BEING ABLE TO SEE.   ( PERSONALLY, I WOULD RATHER BREATHE, BUT THAT IS MY PREFERENCE)    OBJECTIVE    Physical  VITALS:  BP (!) 164/90   Pulse 76   Temp 97.7 °F (36.5 °C) (Oral)   Resp 16   Ht 5' 6\" (1.676 m)   Wt 170 lb (77.1 kg)   SpO2 100%   BMI 27.44 kg/m²   CONSTITUTIONAL:  awake, alert, cooperative, no apparent distress, and appears stated age  LUNGS:  increased work of breathing PRESENT, POOR air exchange, clear to auscultation bilaterally, no crackles or wheezing  CARDIOVASCULAR:  Normal apical impulse, regular rate and rhythm, normal S1 and S2, no S3 or S4, and no murmur noted  ABDOMEN:  No scars, normal bowel sounds, soft, non-distended, non-tender, no masses palpated, no hepatosplenomegally  EXTREMITIES:  NO EDEMA  Data  CBC with Differential:    Lab Results   Component Value Date    WBC 6.5 05/01/2019    RBC 3.55 05/01/2019    HGB 10.6 05/01/2019    HCT 35.0 05/01/2019     05/01/2019    MCV 98.6 05/01/2019 MCH 29.9 05/01/2019    MCHC 30.3 05/01/2019    RDW 12.4 05/01/2019    NRBC 1.0 09/22/2018    SEGSPCT 60 04/24/2013    LYMPHOPCT 3.8 09/24/2018    MONOPCT 7.1 09/24/2018    BASOPCT 0.1 09/24/2018    MONOSABS 1.23 09/24/2018    LYMPHSABS 0.66 09/24/2018    EOSABS 0.00 09/24/2018    BASOSABS 0.01 09/24/2018     BMP:    Lab Results   Component Value Date     05/01/2019    K 4.4 05/01/2019    K 4.3 09/21/2018    CL 97 05/01/2019    CO2 31 05/01/2019    BUN 15 05/01/2019    LABALBU 4.2 04/26/2019    LABALBU 4.6 07/14/2011    CREATININE 1.1 05/01/2019    CALCIUM 9.3 05/01/2019    GFRAA >60 05/01/2019    LABGLOM >60 05/01/2019    GLUCOSE 83 05/01/2019    GLUCOSE 101 07/14/2011     Current Medications  Scheduled Meds:   doxycycline hyclate  100 mg Oral 2 times per day    aspirin  81 mg Oral Daily    mometasone-formoterol  2 puff Inhalation BID    polyvinyl alcohol  1 drop Both Eyes Daily    carvedilol  25 mg Oral BID WC    cetirizine  10 mg Oral Daily    hydrocortisone   Topical BID    hydrOXYzine  25 mg Oral Nightly    ketoconazole   Topical Daily    prednisoLONE acetate  1 drop Both Eyes BID    Roflumilast  500 mcg Oral Daily    triamcinolone   Topical BID    sodium chloride flush  10 mL Intravenous 2 times per day    enoxaparin  40 mg Subcutaneous Daily     Continuous Infusions:   sodium chloride 50 mL/hr at 05/01/19 0900     PRN Meds:.albuterol, cyclobenzaprine, HYDROcodone-acetaminophen, sodium chloride flush, magnesium hydroxide, ondansetron, acetaminophen    ASSESSMENT AND PLAN      Principal Problem:    Chest pain  Active Problems:    COPD exacerbation (HCC)    Essential hypertension    Elbow effusion, right  Resolved Problems:    * No resolved hospital problems. *      PSYCHIATRY CONSULT.

## 2019-05-01 NOTE — PROGRESS NOTES
Cardiology  Progress Note      SUBJECTIVE:  No chest pain. Dyspnea.       Current Inpatient Medications  Current Facility-Administered Medications: doxycycline hyclate (VIBRAMYCIN) capsule 100 mg, 100 mg, Oral, 2 times per day  0.9 % sodium chloride infusion, , Intravenous, Continuous  albuterol (PROVENTIL) nebulizer solution 2.5 mg, 2.5 mg, Nebulization, Q6H PRN  aspirin EC tablet 81 mg, 81 mg, Oral, Daily  mometasone-formoterol (DULERA) 200-5 MCG/ACT inhaler 2 puff, 2 puff, Inhalation, BID  polyvinyl alcohol (LIQUIFILM TEARS) 1.4 % ophthalmic solution 1 drop, 1 drop, Both Eyes, Daily  carvedilol (COREG) tablet 25 mg, 25 mg, Oral, BID WC  cetirizine (ZYRTEC) tablet 10 mg, 10 mg, Oral, Daily  cyclobenzaprine (FLEXERIL) tablet 10 mg, 10 mg, Oral, TID PRN  HYDROcodone-acetaminophen (NORCO) 5-325 MG per tablet 1 tablet, 1 tablet, Oral, Q4H PRN  hydrocortisone 2.5 % cream, , Topical, BID  hydrOXYzine (ATARAX) tablet 25 mg, 25 mg, Oral, Nightly  ketoconazole (NIZORAL) 2 % cream, , Topical, Daily  prednisoLONE acetate (PRED FORTE) 1 % ophthalmic suspension 1 drop, 1 drop, Both Eyes, BID  Roflumilast (DALIRESP) tablet 500 mcg, 500 mcg, Oral, Daily  triamcinolone (KENALOG) 0.1 % cream, , Topical, BID  sodium chloride flush 0.9 % injection 10 mL, 10 mL, Intravenous, 2 times per day  sodium chloride flush 0.9 % injection 10 mL, 10 mL, Intravenous, PRN  magnesium hydroxide (MILK OF MAGNESIA) 400 MG/5ML suspension 30 mL, 30 mL, Oral, Daily PRN  ondansetron (ZOFRAN) injection 4 mg, 4 mg, Intravenous, Q6H PRN  enoxaparin (LOVENOX) injection 40 mg, 40 mg, Subcutaneous, Daily  acetaminophen (TYLENOL) tablet 650 mg, 650 mg, Oral, Q4H PRN      Physical  VITALS:  BP (!) 165/76   Pulse 72   Temp 98.1 °F (36.7 °C) (Oral)   Resp 18   Ht 5' 6\" (1.676 m)   Wt 170 lb (77.1 kg)   SpO2 99%   BMI 27.44 kg/m²   CURRENT TEMPERATURE:  Temp: 98.1 °F (36.7 °C)  NECK:  no jugular venous distension  BACK:  symmetric  LUNGS:  diminished breath sounds right base and left base  CARDIOVASCULAR:  normal S1 and S2, no S3 and no S4  ABDOMEN:  non-tender  EXTREMITIES: pedal edema 1 +    DATA:        Cardiology Labs:    BMP:    Lab Results   Component Value Date     04/30/2019    K 5.0 04/30/2019    K 4.3 09/21/2018    CL 97 04/30/2019    CO2 33 04/30/2019    BUN 12 04/30/2019     CBC:    Lab Results   Component Value Date    WBC 6.9 04/30/2019    RBC 3.46 04/30/2019    HGB 10.4 04/30/2019    HCT 34.3 04/30/2019    MCV 99.1 04/30/2019    RDW 12.4 04/30/2019     04/30/2019       ASSESSMENT    1-Chest pain. Strest test 11/18 : No ischemia. EKG : No ischemic changes     Troponins are negative. coronary CTA: No significant stenosis     No need for further coronary  evaluation. 2-HTN. BP is under control. 3-Severe COPD     Main issue now is poor pulmonary status. 4-untreated hepatitis C        PLAN  As per orders.

## 2019-05-01 NOTE — PLAN OF CARE
Problem: Falls - Risk of:  Goal: Will remain free from falls  Description  Will remain free from falls  Outcome: Met This Shift  Goal: Absence of physical injury  Description  Absence of physical injury  Outcome: Met This Shift     Problem: Pain:  Goal: Pain level will decrease  Description  Pain level will decrease  Outcome: Met This Shift  Goal: Control of acute pain  Description  Control of acute pain  Outcome: Met This Shift  Goal: Control of chronic pain  Description  Control of chronic pain  Outcome: Met This Shift     Problem: Cardiac:  Goal: Ability to maintain vital signs within normal range will improve  Description  Ability to maintain vital signs within normal range will improve  Outcome: Met This Shift  Goal: Cardiovascular alteration will improve  Description  Cardiovascular alteration will improve  Outcome: Met This Shift     Problem: Physical Regulation:  Goal: Complications related to the disease process, condition or treatment will be avoided or minimized  Description  Complications related to the disease process, condition or treatment will be avoided or minimized  Outcome: Met This Shift

## 2019-05-01 NOTE — PROGRESS NOTES
Physical Therapy    [id number]    Patient unavailable for physical therapy treatment due to chest pain, SOB, and pain in L leg. [address] [facility], [facility]  Kenji Bowles, PTA

## 2019-05-01 NOTE — CONSULTS
hepatitis  C. PAST MEDICAL HISTORY:  I have seen this patient in my office on  04/10/2019, at that time I did send also information to Dr. Rashi Benoit. The  patient had presented to my office using 2 L pulse therapy and the  saturations were only 70%. He subsequently was increased to 4 L  continuously. He was told that he needed to be on 4 L continuously. Saturations improved to 96%. This had been recommended as you know he  followed the advice. Severe COPD. Last PFTs were 12/21/2018. FEV1 of  17%, DLCO of 30%. Also, his chest CTs have shown evidence of severe  bolus emphysema bilateral.  At one point in 2017, he had an enlarged  right hilar lymphadenopathy that had decreased in size. Has had a  history of mild pulmonary hypertension, severe deconditioning, nicotine  addiction for 53 years, quit four years ago, history of marijuana  smoking, which he has continued to do so intermittently, has been  advised against it, hepatitis B, deconditioning, anterior cervical  fusion, etc.  When I saw him in my office, 04/10/2019, I want him to  refer him to pulmonary rehabilitation. He told me he was unable to do  so. He wanted to go through cataract surgery that he was expected to  have one in May and the other one in June. Other history of importance,  has had a history of pneumonia, COPD, depression, hypertension, had been  on beta-blockers, heartburn, previous bleeding ulcers, seasonal  allergies. Has had a history of bilateral inguinal hernia repair, and  hepatitis. SMOKING HABITS:  Again smoked when I saw him initially in 2011. He had  smoke for 49 years, still smoking, at that time he was 64years old. He  said he started when he was about [de-identified] years old. 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 Prior to that, he did not work very much. He said he was incarcerated  13 years, then he was released in 2007.     FAMILY HISTORY:  He did not have any family history available. SOCIAL HISTORY:  He was ,  at one point, has two adult  children. Has had also previous echo on 01/02/2019. EF was 52%, RVSP of 31,  consistent with mild pulmonary hypertension. REVIEW OF SYSTEMS:  Severe dyspnea. Her arterial blood gases with a pH of 7.32, pCO2 of 60, pO2 of 91. This  was on 3 L. This was on yesterday's date. Has a hemoglobin of 10,  hematocrit of 35, WBC 6500. Total CO2 of 31, BUN of 15, creatinine of  1.1. REVIEW OF MEDICATIONS:  Tylenol, albuterol with Proventil, Coreg, _____,  Flexeril, Vibramycin, Lovenox, Norco, hydrocortisone, Atarax, _____,  milk of mag, Dulera, Zofran, Liquifilm tears, prednisolone, Daliresp,  triamcinolone cream.    PHYSICAL EXAMINATION:  VITAL SIGNS:  Temperature of 98, respiratory rate of 18, pulse of 73,  blood pressure 149/80, oximetries 97% on 3 L. Has a BMI of 27, weight  of 170 pounds. GENERAL:  Reveals a gentleman that appears to be older than his stated  age. He has a stubby beard and moustache. Otherwise negative. HEART:  Regular and regular. LUNGS:  Hyperinflated. Barrel chest.  Decreased air entry. No  wheezing. He is dyspneic at rest.  ABDOMEN:  Soft. Nontender. EXTREMITIES:  There is no clubbing, no cyanosis, no edema. CLINICAL IMPRESSION:  1. Acute on chronic respiratory failure. 2.  Respiratory acidosis with a compensatory metabolic alkalosis,  decompensated. 3.  End-stage COPD. 4.  Bolus emphysema, severe. 5.  Severe deconditioning. 6.  Multitude of other medical problems to include anxiety disorder does  play a role. Please note the arterial blood gases, what I would like to  do again I told him about rehab. He is upset about it. He does not  feel that he can do any rehab at all because he cannot walk, etc.  I  think this patient will be a good candidate for a pulmonary  rehabilitation program as an inpatient.   At this time, this patient is  extremely debilitated and going home I suspect that he will be  readmitted immediately. Prognosis is extremely poor on this patient. His lung capacities are just very poor. I will offer him a BiPAP,  noninvasive positive pressure ventilator. The problem with this is a  high risk because of the boli. So with this in mind, if I can maintain  an oxygenation without using a noninvasive positive pressure ventilator,  this might be the best approach. I do fear that applying a BiPAP to his  care will produce most likely attention pneumothorax, etc. and this  patient will certainly not survive. If the patient continues to need  support and deteriorate, it might be better to proceed with intubation  and mechanical ventilation. After that this patient probably will not  wean off the ventilator and he will require tracheostomy, PEG tube, and  long-term acute care facility, etc.  Again, the outlook is very poor. There is certainly an element of severe anxiety and possibly  superimposed depression and a psychiatric consult maybe beneficial ?    I thank you kindly for the opportunity consulting your patient.         Shad Stephens MD    D: 05/01/2019 9:48:51       T: 05/01/2019 11:17:05     [email or web address]  Job#: [id number]     Doc#: [id number]    CC:

## 2019-05-02 LAB
ANION GAP SERPL CALCULATED.3IONS-SCNC: 7 MMOL/L (ref 7–16)
BUN BLDV-MCNC: 14 MG/DL (ref 8–23)
CALCIUM SERPL-MCNC: 9.2 MG/DL (ref 8.6–10.2)
CHLORIDE BLD-SCNC: 97 MMOL/L (ref 98–107)
CO2: 30 MMOL/L (ref 22–29)
CREAT SERPL-MCNC: 1 MG/DL (ref 0.7–1.2)
GFR AFRICAN AMERICAN: >60
GFR NON-AFRICAN AMERICAN: >60 ML/MIN/1.73
GLUCOSE BLD-MCNC: 88 MG/DL (ref 74–99)
HCT VFR BLD CALC: 33.1 % (ref 37–54)
HEMOGLOBIN: 10.1 G/DL (ref 12.5–16.5)
MCH RBC QN AUTO: 30 PG (ref 26–35)
MCHC RBC AUTO-ENTMCNC: 30.5 % (ref 32–34.5)
MCV RBC AUTO: 98.2 FL (ref 80–99.9)
PDW BLD-RTO: 12.7 FL (ref 11.5–15)
PLATELET # BLD: 295 E9/L (ref 130–450)
PMV BLD AUTO: 10.4 FL (ref 7–12)
POTASSIUM SERPL-SCNC: 4.4 MMOL/L (ref 3.5–5)
RBC # BLD: 3.37 E12/L (ref 3.8–5.8)
SODIUM BLD-SCNC: 134 MMOL/L (ref 132–146)
WBC # BLD: 6.3 E9/L (ref 4.5–11.5)

## 2019-05-02 PROCEDURE — 2580000003 HC RX 258: Performed by: INTERNAL MEDICINE

## 2019-05-02 PROCEDURE — 80048 BASIC METABOLIC PNL TOTAL CA: CPT

## 2019-05-02 PROCEDURE — 6370000000 HC RX 637 (ALT 250 FOR IP): Performed by: INTERNAL MEDICINE

## 2019-05-02 PROCEDURE — 94640 AIRWAY INHALATION TREATMENT: CPT

## 2019-05-02 PROCEDURE — 1200000000 HC SEMI PRIVATE

## 2019-05-02 PROCEDURE — 36415 COLL VENOUS BLD VENIPUNCTURE: CPT

## 2019-05-02 PROCEDURE — 99221 1ST HOSP IP/OBS SF/LOW 40: CPT | Performed by: PSYCHIATRY & NEUROLOGY

## 2019-05-02 PROCEDURE — 6360000002 HC RX W HCPCS: Performed by: INTERNAL MEDICINE

## 2019-05-02 PROCEDURE — 85027 COMPLETE CBC AUTOMATED: CPT

## 2019-05-02 PROCEDURE — 2700000000 HC OXYGEN THERAPY PER DAY

## 2019-05-02 RX ORDER — ALBUTEROL SULFATE 90 UG/1
2 AEROSOL, METERED RESPIRATORY (INHALATION) EVERY 6 HOURS PRN
Status: ON HOLD | COMMUNITY
End: 2019-09-04

## 2019-05-02 RX ORDER — FLUTICASONE FUROATE AND VILANTEROL 200; 25 UG/1; UG/1
200 POWDER RESPIRATORY (INHALATION) DAILY
Status: ON HOLD | COMMUNITY
End: 2019-09-04

## 2019-05-02 RX ORDER — CETIRIZINE HYDROCHLORIDE 10 MG/1
10 TABLET ORAL DAILY
Status: ON HOLD | COMMUNITY
End: 2019-05-02 | Stop reason: HOSPADM

## 2019-05-02 RX ORDER — DOXYCYCLINE HYCLATE 100 MG/1
100 CAPSULE ORAL EVERY 12 HOURS SCHEDULED
Qty: 20 CAPSULE | Refills: 0 | Status: SHIPPED | OUTPATIENT
Start: 2019-05-02 | End: 2019-05-12

## 2019-05-02 RX ORDER — HYDROCODONE BITARTRATE AND ACETAMINOPHEN 5; 325 MG/1; MG/1
1 TABLET ORAL EVERY 4 HOURS PRN
Qty: 120 TABLET | Refills: 0 | Status: SHIPPED | OUTPATIENT
Start: 2019-05-02 | End: 2020-05-01

## 2019-05-02 RX ORDER — HYDROCHLOROTHIAZIDE 25 MG/1
1 TABLET ORAL DAILY
COMMUNITY
Start: 2013-04-24 | End: 2022-05-12

## 2019-05-02 RX ORDER — ERGOCALCIFEROL (VITAMIN D2) 1250 MCG
1 CAPSULE ORAL WEEKLY
Status: ON HOLD | COMMUNITY
End: 2019-09-04

## 2019-05-02 RX ORDER — PROMETHAZINE HYDROCHLORIDE AND CODEINE PHOSPHATE 6.25; 1 MG/5ML; MG/5ML
5 SYRUP ORAL EVERY 6 HOURS PRN
COMMUNITY
End: 2022-03-17

## 2019-05-02 RX ORDER — BUDESONIDE AND FORMOTEROL FUMARATE DIHYDRATE 160; 4.5 UG/1; UG/1
1 AEROSOL RESPIRATORY (INHALATION) 2 TIMES DAILY
Status: ON HOLD | COMMUNITY
End: 2019-09-04

## 2019-05-02 RX ORDER — CIPROFLOXACIN 500 MG/1
1 TABLET, FILM COATED ORAL 2 TIMES DAILY
Status: ON HOLD | COMMUNITY
Start: 2019-05-03 | End: 2019-05-02 | Stop reason: HOSPADM

## 2019-05-02 RX ADMIN — ALBUTEROL SULFATE 2.5 MG: 2.5 SOLUTION RESPIRATORY (INHALATION) at 06:48

## 2019-05-02 RX ADMIN — HYDROXYZINE HYDROCHLORIDE 25 MG: 25 TABLET ORAL at 20:28

## 2019-05-02 RX ADMIN — SODIUM CHLORIDE: 9 INJECTION, SOLUTION INTRAVENOUS at 23:25

## 2019-05-02 RX ADMIN — HYDROCODONE BITARTRATE AND ACETAMINOPHEN 1 TABLET: 5; 325 TABLET ORAL at 05:59

## 2019-05-02 RX ADMIN — DOXYCYCLINE HYCLATE 100 MG: 100 CAPSULE ORAL at 20:28

## 2019-05-02 RX ADMIN — HYDROCODONE BITARTRATE AND ACETAMINOPHEN 1 TABLET: 5; 325 TABLET ORAL at 20:35

## 2019-05-02 RX ADMIN — DOXYCYCLINE HYCLATE 100 MG: 100 CAPSULE ORAL at 08:05

## 2019-05-02 RX ADMIN — CARVEDILOL 25 MG: 25 TABLET, FILM COATED ORAL at 08:05

## 2019-05-02 RX ADMIN — POLYVINYL ALCOHOL 1 DROP: 14 SOLUTION/ DROPS OPHTHALMIC at 20:31

## 2019-05-02 RX ADMIN — SODIUM CHLORIDE: 9 INJECTION, SOLUTION INTRAVENOUS at 03:52

## 2019-05-02 RX ADMIN — ENOXAPARIN SODIUM 40 MG: 40 INJECTION SUBCUTANEOUS at 08:05

## 2019-05-02 RX ADMIN — ROFLUMILAST 500 MCG: 500 TABLET ORAL at 08:05

## 2019-05-02 RX ADMIN — ASPIRIN 81 MG: 81 TABLET, COATED ORAL at 08:05

## 2019-05-02 RX ADMIN — CARVEDILOL 25 MG: 25 TABLET, FILM COATED ORAL at 17:39

## 2019-05-02 RX ADMIN — ALBUTEROL SULFATE 2.5 MG: 2.5 SOLUTION RESPIRATORY (INHALATION) at 21:52

## 2019-05-02 ASSESSMENT — PAIN SCALES - GENERAL
PAINLEVEL_OUTOF10: 10
PAINLEVEL_OUTOF10: 0
PAINLEVEL_OUTOF10: 10
PAINLEVEL_OUTOF10: 0
PAINLEVEL_OUTOF10: 0

## 2019-05-02 ASSESSMENT — PAIN DESCRIPTION - LOCATION: LOCATION: HEAD;LEG

## 2019-05-02 ASSESSMENT — PAIN DESCRIPTION - PAIN TYPE: TYPE: CHRONIC PAIN

## 2019-05-02 ASSESSMENT — PAIN DESCRIPTION - DESCRIPTORS: DESCRIPTORS: ACHING

## 2019-05-02 NOTE — PROGRESS NOTES
Room #:  0722/3267-75  Date: 2019       Patient Name: Jany Matos  : 1950      MRN: [id number]     Patient unavailable for physical therapy treatment due to pt refusing at this time due to not feeling well, tired.       Jun Hill, PTA

## 2019-05-02 NOTE — PROGRESS NOTES
Pulmonary (Dr. Griselda Rosen)    5/2/2019 8:01 AM  Subjective:   Admit Date: 4/26/2019  PCP: Roxanna Boateng DO  Interval History: Medications and notes reviewed. Data reviewed. No new complaints      Data:   Scheduled Meds:   doxycycline hyclate  100 mg Oral 2 times per day    aspirin  81 mg Oral Daily    mometasone-formoterol  2 puff Inhalation BID    polyvinyl alcohol  1 drop Both Eyes Daily    carvedilol  25 mg Oral BID WC    cetirizine  10 mg Oral Daily    hydrocortisone   Topical BID    hydrOXYzine  25 mg Oral Nightly    ketoconazole   Topical Daily    prednisoLONE acetate  1 drop Both Eyes BID    Roflumilast  500 mcg Oral Daily    triamcinolone   Topical BID    sodium chloride flush  10 mL Intravenous 2 times per day    enoxaparin  40 mg Subcutaneous Daily     Continuous Infusions:   sodium chloride 50 mL/hr at 05/02/19 0352     PRN Meds:albuterol, cyclobenzaprine, HYDROcodone-acetaminophen, sodium chloride flush, magnesium hydroxide, ondansetron, acetaminophen    No intake/output data recorded. Intake/Output Summary (Last 24 hours) at 5/2/2019 0801  Last data filed at 5/2/2019 8017  Gross per 24 hour   Intake 1000 ml   Output 550 ml   Net 450 ml     -----------------------------------------------------------------  RAD: Xr Chest Standard (2 Vw)    Result Date: 4/26/2019  LOCATION: 200 EXAM: XR CHEST (2 VW) COMPARISON: None HISTORY: Chest pain TECHNIQUE: PA and lateral  views of the chest were obtained. FINDINGS:  SUPPORT DEVICES: None. LUNGS: Clear with no areas of consolidation. No lung nodules. PLEURA: No effusions or pneumothorax. LUNG VOLUMES: Bullous emphysema again noted. MEDIASTINAL STRUCTURES: No lymphadenopathy. Normal aortic contour. HEART SIZE: Normal. UPPER ABDOMEN: Unremarkable. BONES AND SOFT TISSUES: No fracture or soft tissue abnormality. 1. Chest pain.      Cta Chest W Contrast    Result Date: 4/26/2019  LOCATION:200 EXAM: CTA CHEST W CONTRAST COMPARISON: None HISTORY:  Pain shortness of breath TECHNIQUE: Axial CT images are obtained of the chest.  Coronal and sagittal reconstructions were obtained with 3-D maximum intensity projection (MIP) reconstructed images. These were performed on a separate workstation with concurrent supervision for detailed evaluation of the pulmonary arteries. CONTRAST: 80 mL Isovue-370 intravenous contrast. FINDINGS: SUPPORT DEVICES: None LUNGS/CENTRAL AIRWAYS/PLEURA: Centrilobular and bullous emphysema is identified. No areas of consolidation seen. Linear volume loss seen in the lung bases bilaterally. PULMONARY ARTERIES: Evaluation is adequate for pulmonary embolus. No filling defects identified to the subsegmental level. HEART/PERICARDIUM/GREAT VESSELS: Cardiac size is normal.  There is no pericardial effusion. The great vessels of the chest are normal in caliber. LYMPH NODES: No thoracic adenopathy by size criteria. NECK BASE/CHEST WALL/DIAPHRAGM: No soft tissue lesions or diaphragmatic abnormality. UPPER ABDOMEN: Limited images through the upper abdomen are unremarkable. OSSEOUS STRUCTURES: No suspicious lytic or blastic lesions. No fractures. 1. No evidence of pulmonary embolism. Objective:   Vitals: BP (!) 158/80   Pulse 75   Temp 98.3 °F (36.8 °C) (Oral)   Resp 18   Ht 5' 6\" (1.676 m)   Wt 170 lb (77.1 kg)   SpO2 100%   BMI 27.44 kg/m²  O2 Flow Rate (L/min): 3 L/min  Neck: no adenopathy and no jugular venous distention  Lungs: diminished breath sounds bilaterally  Heart: regular rate and rhythm, S1, S2 normal, no murmur, click, rub or gallop  Abdomen: Abdomen soft, non-tender. BS normal. No masses,  No organomegaly  Extremities: extremities normal, atraumatic, no cyanosis or edema      Assessment:   1. Acute on chronic respiratory failure. 2.  Respiratory acidosis with a compensatory metabolic alkalosis, decompensated. 3.  End-stage COPD. 4.  Bolus emphysema, severe. 5.  Severe deconditioning.   6.  anxiety disorder does

## 2019-05-02 NOTE — PROGRESS NOTES
Department of Family Practice  Adult Daily Progress Note      JAVIER BUTTS IS SEEN IN FOLLOW UP TODAY ON 4 TELEMETRY. HE IS TOLERATING TREATMENT. HE HAS BEEN SEEN BY PSYCHIATRY BUT HAS DECLINED ANY TREATMENT FOR HIS ANXIETY.       OBJECTIVE    Physical  VITALS:  BP (!) 185/84   Pulse 84   Temp 97.9 °F (36.6 °C) (Oral)   Resp 18   Ht 5' 6\" (1.676 m)   Wt 170 lb (77.1 kg)   SpO2 98%   BMI 27.44 kg/m²   CONSTITUTIONAL:  awake, alert, cooperative, no apparent distress, and appears stated age  LUNGS:  No increased work of breathing, good air exchange, clear to auscultation bilaterally, no crackles or wheezing  CARDIOVASCULAR:  Normal apical impulse, regular rate and rhythm, normal S1 and S2, no S3 or S4, and no murmur noted  ABDOMEN:  No scars, normal bowel sounds, soft, non-distended, non-tender, no masses palpated, no hepatosplenomegally  Data  CBC with Differential:    Lab Results   Component Value Date    WBC 6.3 05/02/2019    RBC 3.37 05/02/2019    HGB 10.1 05/02/2019    HCT 33.1 05/02/2019     05/02/2019    MCV 98.2 05/02/2019    MCH 30.0 05/02/2019    MCHC 30.5 05/02/2019    RDW 12.7 05/02/2019    NRBC 1.0 09/22/2018    SEGSPCT 60 04/24/2013    LYMPHOPCT 3.8 09/24/2018    MONOPCT 7.1 09/24/2018    BASOPCT 0.1 09/24/2018    MONOSABS 1.23 09/24/2018    LYMPHSABS 0.66 09/24/2018    EOSABS 0.00 09/24/2018    BASOSABS 0.01 09/24/2018     BMP:    Lab Results   Component Value Date     05/02/2019    K 4.4 05/02/2019    K 4.3 09/21/2018    CL 97 05/02/2019    CO2 30 05/02/2019    BUN 14 05/02/2019    LABALBU 4.2 04/26/2019    LABALBU 4.6 07/14/2011    CREATININE 1.0 05/02/2019    CALCIUM 9.2 05/02/2019    GFRAA >60 05/02/2019    LABGLOM >60 05/02/2019    GLUCOSE 88 05/02/2019    GLUCOSE 101 07/14/2011     Current Medications  Scheduled Meds:   doxycycline hyclate  100 mg Oral 2 times per day    aspirin  81 mg Oral Daily    mometasone-formoterol  2 puff Inhalation BID    polyvinyl alcohol  1 drop Both Eyes Daily    carvedilol  25 mg Oral BID WC    cetirizine  10 mg Oral Daily    hydrocortisone   Topical BID    hydrOXYzine  25 mg Oral Nightly    ketoconazole   Topical Daily    prednisoLONE acetate  1 drop Both Eyes BID    Roflumilast  500 mcg Oral Daily    triamcinolone   Topical BID    sodium chloride flush  10 mL Intravenous 2 times per day    enoxaparin  40 mg Subcutaneous Daily     Continuous Infusions:   sodium chloride 50 mL/hr at 05/02/19 0352     PRN Meds:.albuterol, cyclobenzaprine, HYDROcodone-acetaminophen, sodium chloride flush, magnesium hydroxide, ondansetron, acetaminophen    ASSESSMENT AND PLAN      Principal Problem:    Chest pain  Active Problems:    COPD exacerbation (HCC)    Essential hypertension    Elbow effusion, right  Resolved Problems:    * No resolved hospital problems. *      HE IS READY FOR DISCHARGE TOMORROW. HE JUST NEEDS HIS NARCOTIC PRESCRIPTION AS HE MISSED HIS [facility] OFFICE APPOINTMENT. HE HAS HAD HOME HEALTH SERVICES WITH [facility].

## 2019-05-02 NOTE — CONSULTS
Current Facility-Administered Medications: doxycycline hyclate (VIBRAMYCIN) capsule 100 mg, 100 mg, Oral, 2 times per day  0.9 % sodium chloride infusion, , Intravenous, Continuous  albuterol (PROVENTIL) nebulizer solution 2.5 mg, 2.5 mg, Nebulization, Q6H PRN  aspirin EC tablet 81 mg, 81 mg, Oral, Daily  mometasone-formoterol (DULERA) 200-5 MCG/ACT inhaler 2 puff, 2 puff, Inhalation, BID  polyvinyl alcohol (LIQUIFILM TEARS) 1.4 % ophthalmic solution 1 drop, 1 drop, Both Eyes, Daily  carvedilol (COREG) tablet 25 mg, 25 mg, Oral, BID WC  cetirizine (ZYRTEC) tablet 10 mg, 10 mg, Oral, Daily  cyclobenzaprine (FLEXERIL) tablet 10 mg, 10 mg, Oral, TID PRN  HYDROcodone-acetaminophen (NORCO) 5-325 MG per tablet 1 tablet, 1 tablet, Oral, Q4H PRN  hydrocortisone 2.5 % cream, , Topical, BID  hydrOXYzine (ATARAX) tablet 25 mg, 25 mg, Oral, Nightly  ketoconazole (NIZORAL) 2 % cream, , Topical, Daily  prednisoLONE acetate (PRED FORTE) 1 % ophthalmic suspension 1 drop, 1 drop, Both Eyes, BID  Roflumilast (DALIRESP) tablet 500 mcg, 500 mcg, Oral, Daily  triamcinolone (KENALOG) 0.1 % cream, , Topical, BID  sodium chloride flush 0.9 % injection 10 mL, 10 mL, Intravenous, 2 times per day  sodium chloride flush 0.9 % injection 10 mL, 10 mL, Intravenous, PRN  magnesium hydroxide (MILK OF MAGNESIA) 400 MG/5ML suspension 30 mL, 30 mL, Oral, Daily PRN  ondansetron (ZOFRAN) injection 4 mg, 4 mg, Intravenous, Q6H PRN  enoxaparin (LOVENOX) injection 40 mg, 40 mg, Subcutaneous, Daily  acetaminophen (TYLENOL) tablet 650 mg, 650 mg, Oral, Q4H PRN    ALLERGIES:  Ace inhibitors and Lisinopril    FAMILY PSYCHIATRIC HISTORY: Noncontributory. SOCIAL HISTORY: Patient is . He has one son and one daughter. On disability. SUBSTANCE ABUSE HISTORY:  reports that he quit smoking about 4 years ago. His smoking use included cigarettes. He smoked 0.50 packs per day.  He has never used smokeless tobacco. He reports that he drinks about 8.4 oz of alcohol per week. He reports that he does not use drugs. VITALS:   Vitals:    05/02/19 0830   BP: (!) 153/94   Pulse: 70   Resp: 18   Temp: 97.9 °F (36.6 °C)   SpO2: 98%     MENTAL STATUS EXAMINATION  Black male appears age. Superficially cooperative. Decreased activity. Gait not assessed. Strength and tone normal. Gait not assessed. Mood euthymic. Affect restricted. Speech clear. Thoughts generally organized without loosening. Content future-oriented. No suicidal or homicidal ideations. No paranoia, delusions or hallucinations. Orientation, concentration, recent and remote memory grossly intact. Fund of knowledge fair. Language use fair. Insight and judgement questionable. ASSESSMENT:  Anxiety Disorder, unspecified     COPD    PLAN & RECOMMENDATIONS: Suspect anxiety and breathing issues exacerbating each other which often happens with severe COPD patients. A low dose benzo may or may not help but regardless patient is declining anxiolytic medication at this time. OK to discharge from my standpoint. He can follow-up with primary care.     Kristie Florence M.D. 5/2/2019 10:35 AM

## 2019-05-03 VITALS
BODY MASS INDEX: 27.32 KG/M2 | OXYGEN SATURATION: 100 % | HEART RATE: 76 BPM | RESPIRATION RATE: 20 BRPM | HEIGHT: 66 IN | TEMPERATURE: 97.9 F | DIASTOLIC BLOOD PRESSURE: 79 MMHG | WEIGHT: 170 LBS | SYSTOLIC BLOOD PRESSURE: 139 MMHG

## 2019-05-03 LAB
ANION GAP SERPL CALCULATED.3IONS-SCNC: 6 MMOL/L (ref 7–16)
BUN BLDV-MCNC: 14 MG/DL (ref 8–23)
CALCIUM SERPL-MCNC: 9 MG/DL (ref 8.6–10.2)
CHLORIDE BLD-SCNC: 99 MMOL/L (ref 98–107)
CO2: 33 MMOL/L (ref 22–29)
CREAT SERPL-MCNC: 1 MG/DL (ref 0.7–1.2)
GFR AFRICAN AMERICAN: >60
GFR NON-AFRICAN AMERICAN: >60 ML/MIN/1.73
GLUCOSE BLD-MCNC: 83 MG/DL (ref 74–99)
HCT VFR BLD CALC: 31.7 % (ref 37–54)
HEMOGLOBIN: 9.7 G/DL (ref 12.5–16.5)
MCH RBC QN AUTO: 29.8 PG (ref 26–35)
MCHC RBC AUTO-ENTMCNC: 30.6 % (ref 32–34.5)
MCV RBC AUTO: 97.5 FL (ref 80–99.9)
PDW BLD-RTO: 12.6 FL (ref 11.5–15)
PLATELET # BLD: 291 E9/L (ref 130–450)
PMV BLD AUTO: 10.2 FL (ref 7–12)
POTASSIUM SERPL-SCNC: 4.2 MMOL/L (ref 3.5–5)
RBC # BLD: 3.25 E12/L (ref 3.8–5.8)
SODIUM BLD-SCNC: 138 MMOL/L (ref 132–146)
WBC # BLD: 5.8 E9/L (ref 4.5–11.5)

## 2019-05-03 PROCEDURE — 6370000000 HC RX 637 (ALT 250 FOR IP): Performed by: INTERNAL MEDICINE

## 2019-05-03 PROCEDURE — 80048 BASIC METABOLIC PNL TOTAL CA: CPT

## 2019-05-03 PROCEDURE — 2580000003 HC RX 258: Performed by: INTERNAL MEDICINE

## 2019-05-03 PROCEDURE — 85027 COMPLETE CBC AUTOMATED: CPT

## 2019-05-03 PROCEDURE — 36415 COLL VENOUS BLD VENIPUNCTURE: CPT

## 2019-05-03 PROCEDURE — 2700000000 HC OXYGEN THERAPY PER DAY

## 2019-05-03 RX ADMIN — Medication 10 ML: at 09:08

## 2019-05-03 RX ADMIN — ASPIRIN 81 MG: 81 TABLET, COATED ORAL at 09:08

## 2019-05-03 RX ADMIN — ROFLUMILAST 500 MCG: 500 TABLET ORAL at 09:08

## 2019-05-03 RX ADMIN — CARVEDILOL 25 MG: 25 TABLET, FILM COATED ORAL at 09:08

## 2019-05-03 RX ADMIN — DOXYCYCLINE HYCLATE 100 MG: 100 CAPSULE ORAL at 09:08

## 2019-05-03 ASSESSMENT — PAIN SCALES - GENERAL
PAINLEVEL_OUTOF10: 0
PAINLEVEL_OUTOF10: 0

## 2019-05-03 NOTE — PROGRESS NOTES
Pulmonary (Dr. Heena Soriano)    5/3/2019 10:28 AM  Subjective:   Admit Date: 4/26/2019  PCP: Bertha Davenport,   Interval History: Medications and notes reviewed. Data reviewed. No new complaints, keeps eyes closed. Wants to have cataract procedure before anything else      Data:   Scheduled Meds:   doxycycline hyclate  100 mg Oral 2 times per day    aspirin  81 mg Oral Daily    mometasone-formoterol  2 puff Inhalation BID    polyvinyl alcohol  1 drop Both Eyes Daily    carvedilol  25 mg Oral BID WC    cetirizine  10 mg Oral Daily    hydrocortisone   Topical BID    hydrOXYzine  25 mg Oral Nightly    ketoconazole   Topical Daily    prednisoLONE acetate  1 drop Both Eyes BID    Roflumilast  500 mcg Oral Daily    triamcinolone   Topical BID    sodium chloride flush  10 mL Intravenous 2 times per day    enoxaparin  40 mg Subcutaneous Daily     Continuous Infusions:   sodium chloride 50 mL/hr at 05/02/19 2325     PRN Meds:albuterol, cyclobenzaprine, HYDROcodone-acetaminophen, sodium chloride flush, magnesium hydroxide, ondansetron, acetaminophen    I/O this shift:  In: 180 [P.O.:180]  Out: -     Intake/Output Summary (Last 24 hours) at 5/3/2019 1028  Last data filed at 5/3/2019 0840  Gross per 24 hour   Intake 300 ml   Output 300 ml   Net 0 ml     -----------------------------------------------------------------  RAD: Xr Chest Standard (2 Vw)    Result Date: 4/26/2019  LOCATION: 200 EXAM: XR CHEST (2 VW) COMPARISON: None HISTORY: Chest pain TECHNIQUE: PA and lateral  views of the chest were obtained. FINDINGS:  SUPPORT DEVICES: None. LUNGS: Clear with no areas of consolidation. No lung nodules. PLEURA: No effusions or pneumothorax. LUNG VOLUMES: Bullous emphysema again noted. MEDIASTINAL STRUCTURES: No lymphadenopathy. Normal aortic contour. HEART SIZE: Normal. UPPER ABDOMEN: Unremarkable. BONES AND SOFT TISSUES: No fracture or soft tissue abnormality. 1. Chest pain.      Cta Chest W Contrast    Result Date: 4/26/2019  LOCATION:200 EXAM: CTA CHEST W CONTRAST COMPARISON: None HISTORY:  Pain shortness of breath TECHNIQUE: Axial CT images are obtained of the chest.  Coronal and sagittal reconstructions were obtained with 3-D maximum intensity projection (MIP) reconstructed images. These were performed on a separate workstation with concurrent supervision for detailed evaluation of the pulmonary arteries. CONTRAST: 80 mL Isovue-370 intravenous contrast. FINDINGS: SUPPORT DEVICES: None LUNGS/CENTRAL AIRWAYS/PLEURA: Centrilobular and bullous emphysema is identified. No areas of consolidation seen. Linear volume loss seen in the lung bases bilaterally. PULMONARY ARTERIES: Evaluation is adequate for pulmonary embolus. No filling defects identified to the subsegmental level. HEART/PERICARDIUM/GREAT VESSELS: Cardiac size is normal.  There is no pericardial effusion. The great vessels of the chest are normal in caliber. LYMPH NODES: No thoracic adenopathy by size criteria. NECK BASE/CHEST WALL/DIAPHRAGM: No soft tissue lesions or diaphragmatic abnormality. UPPER ABDOMEN: Limited images through the upper abdomen are unremarkable. OSSEOUS STRUCTURES: No suspicious lytic or blastic lesions. No fractures. 1. No evidence of pulmonary embolism. Objective:   Vitals: /79   Pulse 76   Temp 97.9 °F (36.6 °C) (Oral)   Resp 20   Ht 5' 6\" (1.676 m)   Wt 170 lb (77.1 kg)   SpO2 100%   BMI 27.44 kg/m²  O2 Flow Rate (L/min): 3 L/min  Neck: no adenopathy and no jugular venous distention  Lungs: diminished breath sounds bilaterally  Heart: regular rate and rhythm, S1, S2 normal, no murmur, click, rub or gallop  Abdomen: Abdomen soft, non-tender. BS normal. No masses,  No organomegaly  Extremities: extremities normal, atraumatic, no cyanosis or edema      Assessment:   1. Acute on chronic respiratory failure. 2.  Respiratory acidosis with a compensatory metabolic alkalosis, decompensated. 3.  End-stage COPD.   4. Bolus emphysema, severe. 5.  Severe deconditioning. 6.  anxiety disorder does       Plan:   1. Continue support. ,high risk for procedures/    Grazyna Calhoun MD

## 2019-09-04 ENCOUNTER — APPOINTMENT (OUTPATIENT)
Dept: GENERAL RADIOLOGY | Age: 69
DRG: 189 | End: 2019-09-04
Payer: COMMERCIAL

## 2019-09-04 ENCOUNTER — HOSPITAL ENCOUNTER (INPATIENT)
Age: 69
LOS: 10 days | Discharge: HOME | DRG: 189 | End: 2019-09-14
Attending: EMERGENCY MEDICINE | Admitting: FAMILY MEDICINE
Payer: COMMERCIAL

## 2019-09-04 DIAGNOSIS — J44.1 COPD EXACERBATION (HCC): ICD-10-CM

## 2019-09-04 DIAGNOSIS — R07.9 CHEST PAIN, UNSPECIFIED TYPE: ICD-10-CM

## 2019-09-04 DIAGNOSIS — J96.22 ACUTE ON CHRONIC RESPIRATORY FAILURE WITH HYPOXIA AND HYPERCAPNIA (HCC): Primary | ICD-10-CM

## 2019-09-04 DIAGNOSIS — J96.21 ACUTE ON CHRONIC RESPIRATORY FAILURE WITH HYPOXIA AND HYPERCAPNIA (HCC): Primary | ICD-10-CM

## 2019-09-04 LAB
ANION GAP SERPL CALCULATED.3IONS-SCNC: 10 MMOL/L (ref 7–16)
B.E.: 7.1 MMOL/L (ref -3–3)
BUN BLDV-MCNC: 18 MG/DL (ref 8–23)
CALCIUM SERPL-MCNC: 9.6 MG/DL (ref 8.6–10.2)
CHLORIDE BLD-SCNC: 94 MMOL/L (ref 98–107)
CO2: 36 MMOL/L (ref 22–29)
COHB: 0.6 % (ref 0–1.5)
CREAT SERPL-MCNC: 1.1 MG/DL (ref 0.7–1.2)
CRITICAL: ABNORMAL
D DIMER: 210 NG/ML DDU
DATE ANALYZED: ABNORMAL
DATE OF COLLECTION: ABNORMAL
EKG ATRIAL RATE: 103 BPM
EKG P AXIS: 76 DEGREES
EKG P-R INTERVAL: 164 MS
EKG Q-T INTERVAL: 356 MS
EKG QRS DURATION: 76 MS
EKG QTC CALCULATION (BAZETT): 466 MS
EKG R AXIS: 3 DEGREES
EKG T AXIS: 58 DEGREES
EKG VENTRICULAR RATE: 103 BPM
GFR AFRICAN AMERICAN: >60
GFR NON-AFRICAN AMERICAN: >60 ML/MIN/1.73
GLUCOSE BLD-MCNC: 103 MG/DL (ref 74–99)
HCO3: 34.4 MMOL/L (ref 22–26)
HCT VFR BLD CALC: 40.9 % (ref 37–54)
HEMOGLOBIN: 12.6 G/DL (ref 12.5–16.5)
HHB: 5.5 % (ref 0–5)
LAB: ABNORMAL
Lab: ABNORMAL
MCH RBC QN AUTO: 29.3 PG (ref 26–35)
MCHC RBC AUTO-ENTMCNC: 30.8 % (ref 32–34.5)
MCV RBC AUTO: 95.1 FL (ref 80–99.9)
METHB: 0.4 % (ref 0–1.5)
MODE: ABNORMAL
O2 CONTENT: 18 ML/DL
O2 SATURATION: 94.4 % (ref 92–98.5)
O2HB: 93.5 % (ref 94–97)
OPERATOR ID: ABNORMAL
PATIENT TEMP: 37
PCO2: 60.8 MMHG (ref 35–45)
PDW BLD-RTO: 14 FL (ref 11.5–15)
PH BLOOD GAS: 7.37 (ref 7.35–7.45)
PLATELET # BLD: 292 E9/L (ref 130–450)
PMV BLD AUTO: 9.9 FL (ref 7–12)
PO2: 75.1 MMHG (ref 60–100)
POTASSIUM SERPL-SCNC: 5.1 MMOL/L (ref 3.5–5)
PRO-BNP: 320 PG/ML (ref 0–125)
RBC # BLD: 4.3 E12/L (ref 3.8–5.8)
SODIUM BLD-SCNC: 140 MMOL/L (ref 132–146)
SOURCE, BLOOD GAS: ABNORMAL
THB: 13.7 G/DL (ref 11.5–16.5)
TIME ANALYZED: 220
TROPONIN: <0.01 NG/ML (ref 0–0.03)
WBC # BLD: 7.8 E9/L (ref 4.5–11.5)

## 2019-09-04 PROCEDURE — 2580000003 HC RX 258: Performed by: FAMILY MEDICINE

## 2019-09-04 PROCEDURE — 94640 AIRWAY INHALATION TREATMENT: CPT

## 2019-09-04 PROCEDURE — 93010 ELECTROCARDIOGRAM REPORT: CPT | Performed by: INTERNAL MEDICINE

## 2019-09-04 PROCEDURE — 6370000000 HC RX 637 (ALT 250 FOR IP): Performed by: EMERGENCY MEDICINE

## 2019-09-04 PROCEDURE — 99285 EMERGENCY DEPT VISIT HI MDM: CPT

## 2019-09-04 PROCEDURE — 83880 ASSAY OF NATRIURETIC PEPTIDE: CPT

## 2019-09-04 PROCEDURE — 6360000002 HC RX W HCPCS: Performed by: FAMILY MEDICINE

## 2019-09-04 PROCEDURE — 2700000000 HC OXYGEN THERAPY PER DAY

## 2019-09-04 PROCEDURE — 36415 COLL VENOUS BLD VENIPUNCTURE: CPT

## 2019-09-04 PROCEDURE — 96374 THER/PROPH/DIAG INJ IV PUSH: CPT

## 2019-09-04 PROCEDURE — 85027 COMPLETE CBC AUTOMATED: CPT

## 2019-09-04 PROCEDURE — 6370000000 HC RX 637 (ALT 250 FOR IP): Performed by: FAMILY MEDICINE

## 2019-09-04 PROCEDURE — 84484 ASSAY OF TROPONIN QUANT: CPT

## 2019-09-04 PROCEDURE — 80048 BASIC METABOLIC PNL TOTAL CA: CPT

## 2019-09-04 PROCEDURE — 1200000000 HC SEMI PRIVATE

## 2019-09-04 PROCEDURE — 6360000002 HC RX W HCPCS: Performed by: EMERGENCY MEDICINE

## 2019-09-04 PROCEDURE — 85378 FIBRIN DEGRADE SEMIQUANT: CPT

## 2019-09-04 PROCEDURE — 71045 X-RAY EXAM CHEST 1 VIEW: CPT

## 2019-09-04 PROCEDURE — 93005 ELECTROCARDIOGRAM TRACING: CPT | Performed by: EMERGENCY MEDICINE

## 2019-09-04 PROCEDURE — 82805 BLOOD GASES W/O2 SATURATION: CPT

## 2019-09-04 RX ORDER — CETIRIZINE HYDROCHLORIDE 10 MG/1
10 TABLET ORAL DAILY
Status: DISCONTINUED | OUTPATIENT
Start: 2019-09-04 | End: 2019-09-14 | Stop reason: HOSPADM

## 2019-09-04 RX ORDER — HYDROXYZINE HYDROCHLORIDE 25 MG/1
25 TABLET, FILM COATED ORAL NIGHTLY
Status: DISCONTINUED | OUTPATIENT
Start: 2019-09-04 | End: 2019-09-14 | Stop reason: HOSPADM

## 2019-09-04 RX ORDER — ACETAMINOPHEN 325 MG/1
650 TABLET ORAL EVERY 4 HOURS PRN
Status: DISCONTINUED | OUTPATIENT
Start: 2019-09-04 | End: 2019-09-14 | Stop reason: HOSPADM

## 2019-09-04 RX ORDER — CARVEDILOL 25 MG/1
50 TABLET ORAL 2 TIMES DAILY WITH MEALS
Status: DISCONTINUED | OUTPATIENT
Start: 2019-09-04 | End: 2019-09-14 | Stop reason: HOSPADM

## 2019-09-04 RX ORDER — ASPIRIN 81 MG/1
81 TABLET ORAL DAILY
Status: DISCONTINUED | OUTPATIENT
Start: 2019-09-04 | End: 2019-09-14 | Stop reason: HOSPADM

## 2019-09-04 RX ORDER — PROMETHAZINE HYDROCHLORIDE AND CODEINE PHOSPHATE 6.25; 1 MG/5ML; MG/5ML
5 SOLUTION ORAL EVERY 6 HOURS PRN
Status: DISCONTINUED | OUTPATIENT
Start: 2019-09-04 | End: 2019-09-14 | Stop reason: HOSPADM

## 2019-09-04 RX ORDER — POLYVINYL ALCOHOL 14 MG/ML
1 SOLUTION/ DROPS OPHTHALMIC DAILY PRN
Status: DISCONTINUED | OUTPATIENT
Start: 2019-09-04 | End: 2019-09-14 | Stop reason: HOSPADM

## 2019-09-04 RX ORDER — TRIAMCINOLONE ACETONIDE 1 MG/G
CREAM TOPICAL 2 TIMES DAILY
Status: DISCONTINUED | OUTPATIENT
Start: 2019-09-04 | End: 2019-09-04

## 2019-09-04 RX ORDER — VITAMIN E 268 MG
400 CAPSULE ORAL DAILY
Status: DISCONTINUED | OUTPATIENT
Start: 2019-09-04 | End: 2019-09-14 | Stop reason: HOSPADM

## 2019-09-04 RX ORDER — SODIUM CHLORIDE 0.9 % (FLUSH) 0.9 %
10 SYRINGE (ML) INJECTION PRN
Status: DISCONTINUED | OUTPATIENT
Start: 2019-09-04 | End: 2019-09-14 | Stop reason: HOSPADM

## 2019-09-04 RX ORDER — KETOCONAZOLE 20 MG/G
CREAM TOPICAL DAILY
Status: DISCONTINUED | OUTPATIENT
Start: 2019-09-04 | End: 2019-09-14 | Stop reason: HOSPADM

## 2019-09-04 RX ORDER — ALBUTEROL SULFATE 90 UG/1
2 AEROSOL, METERED RESPIRATORY (INHALATION) EVERY 6 HOURS PRN
Status: DISCONTINUED | OUTPATIENT
Start: 2019-09-04 | End: 2019-09-07

## 2019-09-04 RX ORDER — ALBUTEROL SULFATE 2.5 MG/3ML
2.5 SOLUTION RESPIRATORY (INHALATION) EVERY 4 HOURS PRN
Status: DISCONTINUED | OUTPATIENT
Start: 2019-09-04 | End: 2019-09-14 | Stop reason: HOSPADM

## 2019-09-04 RX ORDER — LOSARTAN POTASSIUM 50 MG/1
100 TABLET ORAL DAILY
Status: DISCONTINUED | OUTPATIENT
Start: 2019-09-04 | End: 2019-09-14 | Stop reason: HOSPADM

## 2019-09-04 RX ORDER — HYDROCHLOROTHIAZIDE 25 MG/1
25 TABLET ORAL DAILY
Status: DISCONTINUED | OUTPATIENT
Start: 2019-09-04 | End: 2019-09-14 | Stop reason: HOSPADM

## 2019-09-04 RX ORDER — SODIUM CHLORIDE 0.9 % (FLUSH) 0.9 %
10 SYRINGE (ML) INJECTION EVERY 12 HOURS SCHEDULED
Status: DISCONTINUED | OUTPATIENT
Start: 2019-09-04 | End: 2019-09-14 | Stop reason: HOSPADM

## 2019-09-04 RX ORDER — NEOMYCIN SULFATE, POLYMYXIN B SULFATE, AND DEXAMETHASONE 3.5; 10000; 1 MG/G; [USP'U]/G; MG/G
0.5 OINTMENT OPHTHALMIC NIGHTLY
Status: DISCONTINUED | OUTPATIENT
Start: 2019-09-04 | End: 2019-09-04

## 2019-09-04 RX ORDER — MORPHINE SULFATE 10 MG/ML
5 INJECTION, SOLUTION INTRAMUSCULAR; INTRAVENOUS ONCE
Status: COMPLETED | OUTPATIENT
Start: 2019-09-04 | End: 2019-09-04

## 2019-09-04 RX ORDER — HYDROCODONE BITARTRATE AND ACETAMINOPHEN 5; 325 MG/1; MG/1
1 TABLET ORAL EVERY 4 HOURS PRN
Status: DISCONTINUED | OUTPATIENT
Start: 2019-09-04 | End: 2019-09-14 | Stop reason: HOSPADM

## 2019-09-04 RX ORDER — IPRATROPIUM BROMIDE AND ALBUTEROL SULFATE 2.5; .5 MG/3ML; MG/3ML
3 SOLUTION RESPIRATORY (INHALATION) CONTINUOUS
Status: DISCONTINUED | OUTPATIENT
Start: 2019-09-04 | End: 2019-09-04

## 2019-09-04 RX ORDER — ASPIRIN 81 MG/1
324 TABLET, CHEWABLE ORAL ONCE
Status: COMPLETED | OUTPATIENT
Start: 2019-09-04 | End: 2019-09-04

## 2019-09-04 RX ORDER — NITROGLYCERIN 0.4 MG/1
0.4 TABLET SUBLINGUAL EVERY 5 MIN PRN
Status: COMPLETED | OUTPATIENT
Start: 2019-09-04 | End: 2019-09-04

## 2019-09-04 RX ORDER — METHYLPREDNISOLONE SODIUM SUCCINATE 125 MG/2ML
125 INJECTION, POWDER, LYOPHILIZED, FOR SOLUTION INTRAMUSCULAR; INTRAVENOUS ONCE
Status: COMPLETED | OUTPATIENT
Start: 2019-09-04 | End: 2019-09-04

## 2019-09-04 RX ORDER — METHYLPREDNISOLONE SODIUM SUCCINATE 125 MG/2ML
80 INJECTION, POWDER, LYOPHILIZED, FOR SOLUTION INTRAMUSCULAR; INTRAVENOUS EVERY 8 HOURS
Status: DISCONTINUED | OUTPATIENT
Start: 2019-09-04 | End: 2019-09-06

## 2019-09-04 RX ORDER — PREDNISOLONE ACETATE 10 MG/ML
1 SUSPENSION/ DROPS OPHTHALMIC 2 TIMES DAILY
Status: DISCONTINUED | OUTPATIENT
Start: 2019-09-04 | End: 2019-09-14 | Stop reason: HOSPADM

## 2019-09-04 RX ADMIN — ASPIRIN 81 MG CHEWABLE TABLET 324 MG: 81 TABLET CHEWABLE at 02:11

## 2019-09-04 RX ADMIN — CETIRIZINE HYDROCHLORIDE 10 MG: 10 TABLET, FILM COATED ORAL at 10:01

## 2019-09-04 RX ADMIN — NITROGLYCERIN 0.4 MG: 0.4 TABLET, ORALLY DISINTEGRATING SUBLINGUAL at 02:04

## 2019-09-04 RX ADMIN — METHYLPREDNISOLONE SODIUM SUCCINATE 125 MG: 125 INJECTION, POWDER, FOR SOLUTION INTRAMUSCULAR; INTRAVENOUS at 02:21

## 2019-09-04 RX ADMIN — ALBUTEROL SULFATE 2.5 MG: 2.5 SOLUTION RESPIRATORY (INHALATION) at 18:11

## 2019-09-04 RX ADMIN — MORPHINE SULFATE 5 MG: 10 INJECTION INTRAVENOUS at 04:54

## 2019-09-04 RX ADMIN — Medication 10 ML: at 20:51

## 2019-09-04 RX ADMIN — CARVEDILOL 50 MG: 25 TABLET, FILM COATED ORAL at 17:19

## 2019-09-04 RX ADMIN — PREDNISOLONE ACETATE 1 DROP: 10 SUSPENSION/ DROPS OPHTHALMIC at 20:49

## 2019-09-04 RX ADMIN — METHYLPREDNISOLONE SODIUM SUCCINATE 80 MG: 125 INJECTION, POWDER, FOR SOLUTION INTRAMUSCULAR; INTRAVENOUS at 17:20

## 2019-09-04 RX ADMIN — HYDROXYZINE HYDROCHLORIDE 25 MG: 25 TABLET, FILM COATED ORAL at 20:48

## 2019-09-04 RX ADMIN — NITROGLYCERIN 0.4 MG: 0.4 TABLET, ORALLY DISINTEGRATING SUBLINGUAL at 02:22

## 2019-09-04 RX ADMIN — HYDROCHLOROTHIAZIDE 25 MG: 25 TABLET ORAL at 10:01

## 2019-09-04 RX ADMIN — IPRATROPIUM BROMIDE AND ALBUTEROL SULFATE 3 AMPULE: .5; 3 SOLUTION RESPIRATORY (INHALATION) at 01:34

## 2019-09-04 RX ADMIN — NITROGLYCERIN 0.4 MG: 0.4 TABLET, ORALLY DISINTEGRATING SUBLINGUAL at 02:10

## 2019-09-04 RX ADMIN — POLYVINYL ALCOHOL 1 DROP: 14 SOLUTION/ DROPS OPHTHALMIC at 10:33

## 2019-09-04 RX ADMIN — KETOCONAZOLE: 20 CREAM TOPICAL at 10:33

## 2019-09-04 RX ADMIN — LOSARTAN POTASSIUM 100 MG: 50 TABLET, FILM COATED ORAL at 10:01

## 2019-09-04 RX ADMIN — ALBUTEROL SULFATE 2.5 MG: 2.5 SOLUTION RESPIRATORY (INHALATION) at 09:28

## 2019-09-04 RX ADMIN — HYDROCODONE BITARTRATE AND ACETAMINOPHEN 1 TABLET: 5; 325 TABLET ORAL at 20:56

## 2019-09-04 RX ADMIN — METHYLPREDNISOLONE SODIUM SUCCINATE 80 MG: 125 INJECTION, POWDER, FOR SOLUTION INTRAMUSCULAR; INTRAVENOUS at 10:02

## 2019-09-04 RX ADMIN — HYDROCODONE BITARTRATE AND ACETAMINOPHEN 1 TABLET: 5; 325 TABLET ORAL at 10:01

## 2019-09-04 RX ADMIN — Medication 10 ML: at 10:02

## 2019-09-04 RX ADMIN — CARVEDILOL 50 MG: 25 TABLET, FILM COATED ORAL at 10:01

## 2019-09-04 RX ADMIN — ENOXAPARIN SODIUM 40 MG: 40 INJECTION SUBCUTANEOUS at 10:02

## 2019-09-04 RX ADMIN — VITAMIN E CAP 400 UNIT 400 UNITS: 400 CAP at 10:33

## 2019-09-04 RX ADMIN — ALBUTEROL SULFATE 2.5 MG: 2.5 SOLUTION RESPIRATORY (INHALATION) at 13:31

## 2019-09-04 RX ADMIN — ASPIRIN 81 MG: 81 TABLET, COATED ORAL at 10:01

## 2019-09-04 ASSESSMENT — ENCOUNTER SYMPTOMS
EYE PAIN: 0
SORE THROAT: 0
BACK PAIN: 0
WHEEZING: 0
COUGH: 0
SINUS PRESSURE: 0
DIARRHEA: 0
SHORTNESS OF BREATH: 1
EYE DISCHARGE: 0
EYE REDNESS: 0
ABDOMINAL PAIN: 0
VOMITING: 0
NAUSEA: 0

## 2019-09-04 ASSESSMENT — PAIN SCALES - GENERAL
PAINLEVEL_OUTOF10: 5
PAINLEVEL_OUTOF10: 6
PAINLEVEL_OUTOF10: 0
PAINLEVEL_OUTOF10: 6
PAINLEVEL_OUTOF10: 6

## 2019-09-04 ASSESSMENT — PAIN DESCRIPTION - ORIENTATION: ORIENTATION: RIGHT

## 2019-09-04 ASSESSMENT — PAIN DESCRIPTION - FREQUENCY: FREQUENCY: CONTINUOUS

## 2019-09-04 ASSESSMENT — PAIN DESCRIPTION - LOCATION: LOCATION: NECK;SHOULDER

## 2019-09-04 ASSESSMENT — PAIN DESCRIPTION - PAIN TYPE: TYPE: CHRONIC PAIN

## 2019-09-04 ASSESSMENT — PAIN DESCRIPTION - DESCRIPTORS: DESCRIPTORS: ACHING;DISCOMFORT

## 2019-09-04 ASSESSMENT — PAIN - FUNCTIONAL ASSESSMENT: PAIN_FUNCTIONAL_ASSESSMENT: ACTIVITIES ARE NOT PREVENTED

## 2019-09-04 NOTE — ED PROVIDER NOTES
Patient is a 80 y/o male who presents to the ED with chest pain and shortness of breath. Patient states he had onset of symptoms yesterday. The pain has been constant and is currently 10/10. He states that he has a history of COPD and is always on 3 liters nasal cannula at home. Review of Systems   Constitutional: Negative for chills and fever. HENT: Negative for ear pain, sinus pressure and sore throat. Eyes: Negative for pain, discharge and redness. Respiratory: Positive for shortness of breath. Negative for cough and wheezing. Cardiovascular: Positive for chest pain. Gastrointestinal: Negative for abdominal pain, diarrhea, nausea and vomiting. Genitourinary: Negative for dysuria and frequency. Musculoskeletal: Negative for arthralgias and back pain. Skin: Negative for rash and wound. Neurological: Negative for weakness and headaches. Hematological: Negative for adenopathy. All other systems reviewed and are negative. Physical Exam   Constitutional: He is oriented to person, place, and time. He appears well-developed and well-nourished. No distress. HENT:   Head: Normocephalic and atraumatic. Right Ear: External ear normal.   Left Ear: External ear normal.   Nose: Nose normal.   Mouth/Throat: Oropharynx is clear and moist.   Eyes: Pupils are equal, round, and reactive to light. Conjunctivae are normal.   Neck: Normal range of motion. Neck supple. Cardiovascular: Regular rhythm and normal heart sounds. Tachycardia present. No murmur heard. Pulmonary/Chest: Effort normal. No stridor. No respiratory distress. He has no wheezes. He has no rales. Diminished breath sounds bilaterally. Abdominal: Soft. Bowel sounds are normal. He exhibits no distension and no mass. There is no tenderness. There is no guarding. Musculoskeletal: Normal range of motion. He exhibits no edema. Neurological: He is alert and oriented to person, place, and time.    Skin: Skin is warm

## 2019-09-05 LAB
ANION GAP SERPL CALCULATED.3IONS-SCNC: 9 MMOL/L (ref 7–16)
BUN BLDV-MCNC: 40 MG/DL (ref 8–23)
CALCIUM SERPL-MCNC: 9.4 MG/DL (ref 8.6–10.2)
CHLORIDE BLD-SCNC: 93 MMOL/L (ref 98–107)
CO2: 33 MMOL/L (ref 22–29)
CREAT SERPL-MCNC: 1.3 MG/DL (ref 0.7–1.2)
GFR AFRICAN AMERICAN: >60
GFR NON-AFRICAN AMERICAN: >60 ML/MIN/1.73
GLUCOSE BLD-MCNC: 141 MG/DL (ref 74–99)
POTASSIUM SERPL-SCNC: 5.3 MMOL/L (ref 3.5–5)
SODIUM BLD-SCNC: 135 MMOL/L (ref 132–146)

## 2019-09-05 PROCEDURE — 6370000000 HC RX 637 (ALT 250 FOR IP): Performed by: FAMILY MEDICINE

## 2019-09-05 PROCEDURE — 6360000002 HC RX W HCPCS: Performed by: FAMILY MEDICINE

## 2019-09-05 PROCEDURE — 1200000000 HC SEMI PRIVATE

## 2019-09-05 PROCEDURE — 80048 BASIC METABOLIC PNL TOTAL CA: CPT

## 2019-09-05 PROCEDURE — 36415 COLL VENOUS BLD VENIPUNCTURE: CPT

## 2019-09-05 PROCEDURE — 6370000000 HC RX 637 (ALT 250 FOR IP): Performed by: INTERNAL MEDICINE

## 2019-09-05 PROCEDURE — 94640 AIRWAY INHALATION TREATMENT: CPT

## 2019-09-05 PROCEDURE — 2580000003 HC RX 258: Performed by: FAMILY MEDICINE

## 2019-09-05 RX ORDER — IPRATROPIUM BROMIDE AND ALBUTEROL SULFATE 2.5; .5 MG/3ML; MG/3ML
1 SOLUTION RESPIRATORY (INHALATION) EVERY 4 HOURS
Status: DISCONTINUED | OUTPATIENT
Start: 2019-09-05 | End: 2019-09-14 | Stop reason: HOSPADM

## 2019-09-05 RX ADMIN — METHYLPREDNISOLONE SODIUM SUCCINATE 80 MG: 125 INJECTION, POWDER, FOR SOLUTION INTRAMUSCULAR; INTRAVENOUS at 02:04

## 2019-09-05 RX ADMIN — HYDROCHLOROTHIAZIDE 25 MG: 25 TABLET ORAL at 09:27

## 2019-09-05 RX ADMIN — CETIRIZINE HYDROCHLORIDE 10 MG: 10 TABLET, FILM COATED ORAL at 09:27

## 2019-09-05 RX ADMIN — PREDNISOLONE ACETATE 1 DROP: 10 SUSPENSION/ DROPS OPHTHALMIC at 22:14

## 2019-09-05 RX ADMIN — ASPIRIN 81 MG: 81 TABLET, COATED ORAL at 09:27

## 2019-09-05 RX ADMIN — IPRATROPIUM BROMIDE AND ALBUTEROL SULFATE 1 AMPULE: 2.5; .5 SOLUTION RESPIRATORY (INHALATION) at 18:14

## 2019-09-05 RX ADMIN — LOSARTAN POTASSIUM 100 MG: 50 TABLET, FILM COATED ORAL at 09:27

## 2019-09-05 RX ADMIN — Medication 10 ML: at 17:34

## 2019-09-05 RX ADMIN — Medication 10 ML: at 22:14

## 2019-09-05 RX ADMIN — ENOXAPARIN SODIUM 40 MG: 40 INJECTION SUBCUTANEOUS at 09:27

## 2019-09-05 RX ADMIN — Medication 10 ML: at 09:27

## 2019-09-05 RX ADMIN — IPRATROPIUM BROMIDE AND ALBUTEROL SULFATE 1 AMPULE: 2.5; .5 SOLUTION RESPIRATORY (INHALATION) at 09:15

## 2019-09-05 RX ADMIN — CARVEDILOL 50 MG: 25 TABLET, FILM COATED ORAL at 17:34

## 2019-09-05 RX ADMIN — VITAMIN E CAP 400 UNIT 400 UNITS: 400 CAP at 09:27

## 2019-09-05 RX ADMIN — HYDROXYZINE HYDROCHLORIDE 25 MG: 25 TABLET, FILM COATED ORAL at 22:14

## 2019-09-05 RX ADMIN — CARVEDILOL 50 MG: 25 TABLET, FILM COATED ORAL at 09:27

## 2019-09-05 RX ADMIN — IPRATROPIUM BROMIDE AND ALBUTEROL SULFATE 1 AMPULE: 2.5; .5 SOLUTION RESPIRATORY (INHALATION) at 14:40

## 2019-09-05 RX ADMIN — IPRATROPIUM BROMIDE AND ALBUTEROL SULFATE 1 AMPULE: 2.5; .5 SOLUTION RESPIRATORY (INHALATION) at 22:33

## 2019-09-05 RX ADMIN — KETOCONAZOLE: 20 CREAM TOPICAL at 09:32

## 2019-09-05 RX ADMIN — PREDNISOLONE ACETATE 1 DROP: 10 SUSPENSION/ DROPS OPHTHALMIC at 09:26

## 2019-09-05 RX ADMIN — HYDROCODONE BITARTRATE AND ACETAMINOPHEN 1 TABLET: 5; 325 TABLET ORAL at 16:12

## 2019-09-05 RX ADMIN — METHYLPREDNISOLONE SODIUM SUCCINATE 80 MG: 125 INJECTION, POWDER, FOR SOLUTION INTRAMUSCULAR; INTRAVENOUS at 17:34

## 2019-09-05 RX ADMIN — HYDROCODONE BITARTRATE AND ACETAMINOPHEN 1 TABLET: 5; 325 TABLET ORAL at 22:20

## 2019-09-05 RX ADMIN — METHYLPREDNISOLONE SODIUM SUCCINATE 80 MG: 125 INJECTION, POWDER, FOR SOLUTION INTRAMUSCULAR; INTRAVENOUS at 09:26

## 2019-09-05 ASSESSMENT — PAIN DESCRIPTION - DESCRIPTORS: DESCRIPTORS: ACHING;CONSTANT;DISCOMFORT

## 2019-09-05 ASSESSMENT — PAIN SCALES - GENERAL
PAINLEVEL_OUTOF10: 6
PAINLEVEL_OUTOF10: 7
PAINLEVEL_OUTOF10: 0
PAINLEVEL_OUTOF10: 0

## 2019-09-05 ASSESSMENT — PAIN DESCRIPTION - LOCATION: LOCATION: NECK

## 2019-09-05 ASSESSMENT — PAIN DESCRIPTION - PAIN TYPE: TYPE: CHRONIC PAIN

## 2019-09-05 NOTE — H&P
the lungs   vitamin E 400 UNIT capsule, Take 400 Units by mouth daily    Allergies:  Ace inhibitors and Lisinopril    Social History:   TOBACCO:   reports that he quit smoking about 4 years ago. His smoking use included cigarettes. He smoked 0.50 packs per day. He has never used smokeless tobacco.  ETOH:   reports that he drinks about 14.0 standard drinks of alcohol per week. DRUGS:   reports that he does not use drugs. Family History:       Problem Relation Age of Onset    Heart Disease Mother     Alcohol Abuse Father     Diabetes Father     Diabetes Sister     Heart Disease Sister     Diabetes Sister     High Blood Pressure Sister      REVIEW OF SYSTEMS:  CONSTITUTIONAL:  positive for  fatigue and malaise  HEENT:  negative  RESPIRATORY:  positive for  dyspnea, wheezing and chest pain  CARDIOVASCULAR:  positive for  chest pain, dyspnea  GASTROINTESTINAL:  negative  GENITOURINARY:  negative  PHYSICAL EXAM:    Vitals:  BP (!) 151/88   Pulse 67   Temp 97.6 °F (36.4 °C)   Resp 18   Ht 5' 6\" (1.676 m)   Wt 171 lb 4 oz (77.7 kg)   SpO2 100%   BMI 27.64 kg/m²     CONSTITUTIONAL:  awake, alert, cooperative, no apparent distress, and appears stated age  ENT:  Normocephalic, without obvious abnormality, atraumatic, sinuses nontender on palpation, external ears without lesions, oral pharynx with moist mucus membranes, tonsils without erythema or exudates, gums normal and good dentition.   BACK:  Symmetric, no curvature, spinous processes are non-tender on palpation, paraspinous muscles are non-tender on palpation, no costal vertebral tenderness  LUNGS:  SOME increased work of breathing, POOR air exchange, clear to auscultation bilaterally, no crackles or wheezing; DIMINISHED BREATH SOUNDS  CARDIOVASCULAR:  Normal apical impulse, regular rate and rhythm, normal S1 and S2, no S3 or S4, and no murmur noted  ABDOMEN:  No scars, normal bowel sounds, soft, non-distended, non-tender, no masses palpated, no

## 2019-09-05 NOTE — CONSULTS
Pulmonary Consult (Dr. Willard Kruse)        Reason for Consult:  Acute on chronic respiratory failure with hypoxia    CHIEF COMPLAINT:  Chest pain and shortness of breath    HISTORY OF PRESENT ILLNESS:                The patient is a 81 y.o. male that presented to the ED with complaints of shortness of breath and chest pain.  Onset of sx was one day prior to arrival. This morning he is a reluctant historian, states \"I don't feel like talking\"    Past Medical History:        Diagnosis Date    Chest pain 3-6-2015    lexiscan nuclear stress    Chronic diastolic CHF (congestive heart failure) ([address]     Echo 1/18/2016; EF 57%    COPD (chronic obstructive pulmonary disease) (HCC)     Hepatitis C     Hilar lymphadenopathy     CT 1/2016; 1.8 cm    History of echocardiogram 01/02/2019    EF 50%; Stage I diastolic dysfunction    Hypertension     Irregular heart beat     Oxygen dependent      Past Surgical History:        Procedure Laterality Date    CATARACT REMOVAL Right 05/2019    CATARACT REMOVAL Left 06/2019    CERVICAL FUSION  03/09/2018    ACF C4-C7    COLONOSCOPY      HERNIA REPAIR      tracee inguinal repair     Current Medications:    Current Facility-Administered Medications: sodium chloride flush 0.9 % injection 10 mL, 10 mL, Intravenous, 2 times per day  sodium chloride flush 0.9 % injection 10 mL, 10 mL, Intravenous, PRN  acetaminophen (TYLENOL) tablet 650 mg, 650 mg, Oral, Q4H PRN  enoxaparin (LOVENOX) injection 40 mg, 40 mg, Subcutaneous, Daily  albuterol sulfate  (90 Base) MCG/ACT inhaler 2 puff, 2 puff, Inhalation, Q6H PRN  albuterol (PROVENTIL) nebulizer solution 2.5 mg, 2.5 mg, Nebulization, Q4H PRN  aspirin EC tablet 81 mg, 81 mg, Oral, Daily  carvedilol (COREG) tablet 50 mg, 50 mg, Oral, BID WC  cetirizine (ZYRTEC) tablet 10 mg, 10 mg, Oral, Daily  hydrochlorothiazide (HYDRODIURIL) tablet 25 mg, 25 mg, Oral, Daily  HYDROcodone-acetaminophen (NORCO) 5-325 MG per tablet 1 tablet, 1 tablet, Oral,

## 2019-09-06 PROCEDURE — 2580000003 HC RX 258: Performed by: FAMILY MEDICINE

## 2019-09-06 PROCEDURE — 94640 AIRWAY INHALATION TREATMENT: CPT

## 2019-09-06 PROCEDURE — 6360000002 HC RX W HCPCS: Performed by: FAMILY MEDICINE

## 2019-09-06 PROCEDURE — 2700000000 HC OXYGEN THERAPY PER DAY

## 2019-09-06 PROCEDURE — 6370000000 HC RX 637 (ALT 250 FOR IP): Performed by: INTERNAL MEDICINE

## 2019-09-06 PROCEDURE — 6370000000 HC RX 637 (ALT 250 FOR IP): Performed by: FAMILY MEDICINE

## 2019-09-06 PROCEDURE — 1200000000 HC SEMI PRIVATE

## 2019-09-06 RX ORDER — METHYLPREDNISOLONE SODIUM SUCCINATE 125 MG/2ML
60 INJECTION, POWDER, LYOPHILIZED, FOR SOLUTION INTRAMUSCULAR; INTRAVENOUS EVERY 8 HOURS
Status: DISCONTINUED | OUTPATIENT
Start: 2019-09-07 | End: 2019-09-08

## 2019-09-06 RX ADMIN — ASPIRIN 81 MG: 81 TABLET, COATED ORAL at 09:18

## 2019-09-06 RX ADMIN — HYDROCODONE BITARTRATE AND ACETAMINOPHEN 1 TABLET: 5; 325 TABLET ORAL at 21:53

## 2019-09-06 RX ADMIN — IPRATROPIUM BROMIDE AND ALBUTEROL SULFATE 1 AMPULE: 2.5; .5 SOLUTION RESPIRATORY (INHALATION) at 08:59

## 2019-09-06 RX ADMIN — IPRATROPIUM BROMIDE AND ALBUTEROL SULFATE 1 AMPULE: 2.5; .5 SOLUTION RESPIRATORY (INHALATION) at 06:04

## 2019-09-06 RX ADMIN — PREDNISOLONE ACETATE 1 DROP: 10 SUSPENSION/ DROPS OPHTHALMIC at 11:24

## 2019-09-06 RX ADMIN — VITAMIN E CAP 400 UNIT 400 UNITS: 400 CAP at 09:19

## 2019-09-06 RX ADMIN — CETIRIZINE HYDROCHLORIDE 10 MG: 10 TABLET, FILM COATED ORAL at 09:18

## 2019-09-06 RX ADMIN — IPRATROPIUM BROMIDE AND ALBUTEROL SULFATE 1 AMPULE: 2.5; .5 SOLUTION RESPIRATORY (INHALATION) at 18:57

## 2019-09-06 RX ADMIN — CARVEDILOL 50 MG: 25 TABLET, FILM COATED ORAL at 17:16

## 2019-09-06 RX ADMIN — ENOXAPARIN SODIUM 40 MG: 40 INJECTION SUBCUTANEOUS at 09:21

## 2019-09-06 RX ADMIN — IPRATROPIUM BROMIDE AND ALBUTEROL SULFATE 1 AMPULE: 2.5; .5 SOLUTION RESPIRATORY (INHALATION) at 13:26

## 2019-09-06 RX ADMIN — Medication 10 ML: at 21:53

## 2019-09-06 RX ADMIN — METHYLPREDNISOLONE SODIUM SUCCINATE 80 MG: 125 INJECTION, POWDER, FOR SOLUTION INTRAMUSCULAR; INTRAVENOUS at 17:17

## 2019-09-06 RX ADMIN — METHYLPREDNISOLONE SODIUM SUCCINATE 80 MG: 125 INJECTION, POWDER, FOR SOLUTION INTRAMUSCULAR; INTRAVENOUS at 01:55

## 2019-09-06 RX ADMIN — IPRATROPIUM BROMIDE AND ALBUTEROL SULFATE 1 AMPULE: 2.5; .5 SOLUTION RESPIRATORY (INHALATION) at 22:43

## 2019-09-06 RX ADMIN — METHYLPREDNISOLONE SODIUM SUCCINATE 80 MG: 125 INJECTION, POWDER, FOR SOLUTION INTRAMUSCULAR; INTRAVENOUS at 09:17

## 2019-09-06 RX ADMIN — CARVEDILOL 50 MG: 25 TABLET, FILM COATED ORAL at 09:19

## 2019-09-06 RX ADMIN — HYDROXYZINE HYDROCHLORIDE 25 MG: 25 TABLET, FILM COATED ORAL at 22:58

## 2019-09-06 RX ADMIN — PREDNISOLONE ACETATE 1 DROP: 10 SUSPENSION/ DROPS OPHTHALMIC at 21:53

## 2019-09-06 RX ADMIN — Medication 10 ML: at 09:27

## 2019-09-06 RX ADMIN — KETOCONAZOLE: 20 CREAM TOPICAL at 09:29

## 2019-09-06 RX ADMIN — HYDROCODONE BITARTRATE AND ACETAMINOPHEN 1 TABLET: 5; 325 TABLET ORAL at 17:16

## 2019-09-06 RX ADMIN — HYDROCHLOROTHIAZIDE 25 MG: 25 TABLET ORAL at 09:18

## 2019-09-06 RX ADMIN — LOSARTAN POTASSIUM 100 MG: 50 TABLET, FILM COATED ORAL at 09:19

## 2019-09-06 ASSESSMENT — PAIN SCALES - GENERAL
PAINLEVEL_OUTOF10: 0
PAINLEVEL_OUTOF10: 8
PAINLEVEL_OUTOF10: 0
PAINLEVEL_OUTOF10: 2
PAINLEVEL_OUTOF10: 0
PAINLEVEL_OUTOF10: 8

## 2019-09-07 PROCEDURE — 1200000000 HC SEMI PRIVATE

## 2019-09-07 PROCEDURE — 6360000002 HC RX W HCPCS: Performed by: FAMILY MEDICINE

## 2019-09-07 PROCEDURE — 6370000000 HC RX 637 (ALT 250 FOR IP): Performed by: INTERNAL MEDICINE

## 2019-09-07 PROCEDURE — 94640 AIRWAY INHALATION TREATMENT: CPT

## 2019-09-07 PROCEDURE — 2580000003 HC RX 258: Performed by: FAMILY MEDICINE

## 2019-09-07 PROCEDURE — 6370000000 HC RX 637 (ALT 250 FOR IP): Performed by: FAMILY MEDICINE

## 2019-09-07 RX ADMIN — PREDNISOLONE ACETATE 1 DROP: 10 SUSPENSION/ DROPS OPHTHALMIC at 20:18

## 2019-09-07 RX ADMIN — IPRATROPIUM BROMIDE AND ALBUTEROL SULFATE 1 AMPULE: 2.5; .5 SOLUTION RESPIRATORY (INHALATION) at 16:46

## 2019-09-07 RX ADMIN — HYDROCORTISONE: 25 CREAM TOPICAL at 20:18

## 2019-09-07 RX ADMIN — ASPIRIN 81 MG: 81 TABLET, COATED ORAL at 08:31

## 2019-09-07 RX ADMIN — Medication 10 ML: at 20:19

## 2019-09-07 RX ADMIN — VITAMIN E CAP 400 UNIT 400 UNITS: 400 CAP at 08:31

## 2019-09-07 RX ADMIN — HYDROXYZINE HYDROCHLORIDE 25 MG: 25 TABLET, FILM COATED ORAL at 20:18

## 2019-09-07 RX ADMIN — IPRATROPIUM BROMIDE AND ALBUTEROL SULFATE 1 AMPULE: 2.5; .5 SOLUTION RESPIRATORY (INHALATION) at 07:10

## 2019-09-07 RX ADMIN — KETOCONAZOLE: 20 CREAM TOPICAL at 08:35

## 2019-09-07 RX ADMIN — ENOXAPARIN SODIUM 40 MG: 40 INJECTION SUBCUTANEOUS at 08:32

## 2019-09-07 RX ADMIN — PREDNISOLONE ACETATE 1 DROP: 10 SUSPENSION/ DROPS OPHTHALMIC at 08:33

## 2019-09-07 RX ADMIN — Medication 10 ML: at 14:44

## 2019-09-07 RX ADMIN — Medication 10 ML: at 01:29

## 2019-09-07 RX ADMIN — LOSARTAN POTASSIUM 100 MG: 50 TABLET, FILM COATED ORAL at 08:31

## 2019-09-07 RX ADMIN — CARVEDILOL 50 MG: 25 TABLET, FILM COATED ORAL at 17:18

## 2019-09-07 RX ADMIN — IPRATROPIUM BROMIDE AND ALBUTEROL SULFATE 1 AMPULE: 2.5; .5 SOLUTION RESPIRATORY (INHALATION) at 22:09

## 2019-09-07 RX ADMIN — METHYLPREDNISOLONE SODIUM SUCCINATE 60 MG: 125 INJECTION, POWDER, FOR SOLUTION INTRAMUSCULAR; INTRAVENOUS at 01:29

## 2019-09-07 RX ADMIN — PROMETHAZINE HYDROCHLORIDE AND CODEINE PHOSPHATE 5 ML: 6.25; 1 SOLUTION ORAL at 18:13

## 2019-09-07 RX ADMIN — CETIRIZINE HYDROCHLORIDE 10 MG: 10 TABLET, FILM COATED ORAL at 08:31

## 2019-09-07 RX ADMIN — METHYLPREDNISOLONE SODIUM SUCCINATE 60 MG: 125 INJECTION, POWDER, FOR SOLUTION INTRAMUSCULAR; INTRAVENOUS at 12:30

## 2019-09-07 RX ADMIN — METHYLPREDNISOLONE SODIUM SUCCINATE 60 MG: 125 INJECTION, POWDER, FOR SOLUTION INTRAMUSCULAR; INTRAVENOUS at 17:18

## 2019-09-07 RX ADMIN — HYDROCHLOROTHIAZIDE 25 MG: 25 TABLET ORAL at 08:31

## 2019-09-07 RX ADMIN — HYDROCODONE BITARTRATE AND ACETAMINOPHEN 1 TABLET: 5; 325 TABLET ORAL at 20:17

## 2019-09-07 RX ADMIN — CARVEDILOL 50 MG: 25 TABLET, FILM COATED ORAL at 08:32

## 2019-09-07 RX ADMIN — IPRATROPIUM BROMIDE AND ALBUTEROL SULFATE 1 AMPULE: 2.5; .5 SOLUTION RESPIRATORY (INHALATION) at 13:33

## 2019-09-07 ASSESSMENT — PAIN SCALES - GENERAL
PAINLEVEL_OUTOF10: 0
PAINLEVEL_OUTOF10: 0
PAINLEVEL_OUTOF10: 5

## 2019-09-08 LAB
ANION GAP SERPL CALCULATED.3IONS-SCNC: 6 MMOL/L (ref 7–16)
BUN BLDV-MCNC: 44 MG/DL (ref 8–23)
CALCIUM SERPL-MCNC: 8.9 MG/DL (ref 8.6–10.2)
CHLORIDE BLD-SCNC: 95 MMOL/L (ref 98–107)
CO2: 36 MMOL/L (ref 22–29)
CREAT SERPL-MCNC: 1.4 MG/DL (ref 0.7–1.2)
GFR AFRICAN AMERICAN: >60
GFR NON-AFRICAN AMERICAN: >60 ML/MIN/1.73
GLUCOSE BLD-MCNC: 163 MG/DL (ref 74–99)
POTASSIUM SERPL-SCNC: 4.8 MMOL/L (ref 3.5–5)
SODIUM BLD-SCNC: 137 MMOL/L (ref 132–146)

## 2019-09-08 PROCEDURE — 6360000002 HC RX W HCPCS: Performed by: FAMILY MEDICINE

## 2019-09-08 PROCEDURE — 2700000000 HC OXYGEN THERAPY PER DAY

## 2019-09-08 PROCEDURE — 94640 AIRWAY INHALATION TREATMENT: CPT

## 2019-09-08 PROCEDURE — 36415 COLL VENOUS BLD VENIPUNCTURE: CPT

## 2019-09-08 PROCEDURE — 1200000000 HC SEMI PRIVATE

## 2019-09-08 PROCEDURE — 6370000000 HC RX 637 (ALT 250 FOR IP): Performed by: INTERNAL MEDICINE

## 2019-09-08 PROCEDURE — 2580000003 HC RX 258: Performed by: FAMILY MEDICINE

## 2019-09-08 PROCEDURE — 6370000000 HC RX 637 (ALT 250 FOR IP): Performed by: FAMILY MEDICINE

## 2019-09-08 PROCEDURE — 80048 BASIC METABOLIC PNL TOTAL CA: CPT

## 2019-09-08 RX ORDER — METHYLPREDNISOLONE SODIUM SUCCINATE 40 MG/ML
40 INJECTION, POWDER, LYOPHILIZED, FOR SOLUTION INTRAMUSCULAR; INTRAVENOUS EVERY 8 HOURS
Status: DISCONTINUED | OUTPATIENT
Start: 2019-09-09 | End: 2019-09-10

## 2019-09-08 RX ADMIN — METHYLPREDNISOLONE SODIUM SUCCINATE 40 MG: 40 INJECTION, POWDER, FOR SOLUTION INTRAMUSCULAR; INTRAVENOUS at 23:51

## 2019-09-08 RX ADMIN — POLYVINYL ALCOHOL 1 DROP: 14 SOLUTION/ DROPS OPHTHALMIC at 08:59

## 2019-09-08 RX ADMIN — CARVEDILOL 50 MG: 25 TABLET, FILM COATED ORAL at 09:01

## 2019-09-08 RX ADMIN — IPRATROPIUM BROMIDE AND ALBUTEROL SULFATE 1 AMPULE: 2.5; .5 SOLUTION RESPIRATORY (INHALATION) at 05:07

## 2019-09-08 RX ADMIN — CETIRIZINE HYDROCHLORIDE 10 MG: 10 TABLET, FILM COATED ORAL at 09:00

## 2019-09-08 RX ADMIN — METHYLPREDNISOLONE SODIUM SUCCINATE 60 MG: 125 INJECTION, POWDER, FOR SOLUTION INTRAMUSCULAR; INTRAVENOUS at 09:01

## 2019-09-08 RX ADMIN — HYDROXYZINE HYDROCHLORIDE 25 MG: 25 TABLET, FILM COATED ORAL at 20:57

## 2019-09-08 RX ADMIN — PREDNISOLONE ACETATE 1 DROP: 10 SUSPENSION/ DROPS OPHTHALMIC at 20:56

## 2019-09-08 RX ADMIN — HYDROCODONE BITARTRATE AND ACETAMINOPHEN 1 TABLET: 5; 325 TABLET ORAL at 20:57

## 2019-09-08 RX ADMIN — PREDNISOLONE ACETATE 1 DROP: 10 SUSPENSION/ DROPS OPHTHALMIC at 09:01

## 2019-09-08 RX ADMIN — IPRATROPIUM BROMIDE AND ALBUTEROL SULFATE 1 AMPULE: 2.5; .5 SOLUTION RESPIRATORY (INHALATION) at 09:23

## 2019-09-08 RX ADMIN — CARVEDILOL 50 MG: 25 TABLET, FILM COATED ORAL at 16:22

## 2019-09-08 RX ADMIN — ASPIRIN 81 MG: 81 TABLET, COATED ORAL at 09:00

## 2019-09-08 RX ADMIN — HYDROCODONE BITARTRATE AND ACETAMINOPHEN 1 TABLET: 5; 325 TABLET ORAL at 09:05

## 2019-09-08 RX ADMIN — Medication 10 ML: at 09:02

## 2019-09-08 RX ADMIN — LOSARTAN POTASSIUM 100 MG: 50 TABLET, FILM COATED ORAL at 09:00

## 2019-09-08 RX ADMIN — HYDROCHLOROTHIAZIDE 25 MG: 25 TABLET ORAL at 09:00

## 2019-09-08 RX ADMIN — METHYLPREDNISOLONE SODIUM SUCCINATE 60 MG: 125 INJECTION, POWDER, FOR SOLUTION INTRAMUSCULAR; INTRAVENOUS at 16:22

## 2019-09-08 RX ADMIN — ENOXAPARIN SODIUM 40 MG: 40 INJECTION SUBCUTANEOUS at 09:00

## 2019-09-08 RX ADMIN — HYDROCORTISONE: 25 CREAM TOPICAL at 20:56

## 2019-09-08 RX ADMIN — Medication 10 ML: at 02:16

## 2019-09-08 RX ADMIN — IPRATROPIUM BROMIDE AND ALBUTEROL SULFATE 1 AMPULE: 2.5; .5 SOLUTION RESPIRATORY (INHALATION) at 13:22

## 2019-09-08 RX ADMIN — IPRATROPIUM BROMIDE AND ALBUTEROL SULFATE 1 AMPULE: 2.5; .5 SOLUTION RESPIRATORY (INHALATION) at 16:50

## 2019-09-08 RX ADMIN — VITAMIN E CAP 400 UNIT 400 UNITS: 400 CAP at 08:59

## 2019-09-08 RX ADMIN — METHYLPREDNISOLONE SODIUM SUCCINATE 60 MG: 125 INJECTION, POWDER, FOR SOLUTION INTRAMUSCULAR; INTRAVENOUS at 02:16

## 2019-09-08 RX ADMIN — IPRATROPIUM BROMIDE AND ALBUTEROL SULFATE 1 AMPULE: 2.5; .5 SOLUTION RESPIRATORY (INHALATION) at 21:57

## 2019-09-08 RX ADMIN — Medication 10 ML: at 20:57

## 2019-09-08 ASSESSMENT — PAIN SCALES - GENERAL
PAINLEVEL_OUTOF10: 0
PAINLEVEL_OUTOF10: 0
PAINLEVEL_OUTOF10: 4
PAINLEVEL_OUTOF10: 8

## 2019-09-09 LAB
ANION GAP SERPL CALCULATED.3IONS-SCNC: 9 MMOL/L (ref 7–16)
BASOPHILS ABSOLUTE: 0 E9/L (ref 0–0.2)
BASOPHILS RELATIVE PERCENT: 0 % (ref 0–2)
BUN BLDV-MCNC: 44 MG/DL (ref 8–23)
CALCIUM SERPL-MCNC: 9.1 MG/DL (ref 8.6–10.2)
CHLORIDE BLD-SCNC: 95 MMOL/L (ref 98–107)
CO2: 36 MMOL/L (ref 22–29)
CREAT SERPL-MCNC: 1.4 MG/DL (ref 0.7–1.2)
EOSINOPHILS ABSOLUTE: 0 E9/L (ref 0.05–0.5)
EOSINOPHILS RELATIVE PERCENT: 0 % (ref 0–6)
GFR AFRICAN AMERICAN: >60
GFR NON-AFRICAN AMERICAN: >60 ML/MIN/1.73
GLUCOSE BLD-MCNC: 164 MG/DL (ref 74–99)
HCT VFR BLD CALC: 43.5 % (ref 37–54)
HEMOGLOBIN: 12.9 G/DL (ref 12.5–16.5)
LYMPHOCYTES ABSOLUTE: 0.65 E9/L (ref 1.5–4)
LYMPHOCYTES RELATIVE PERCENT: 5 % (ref 20–42)
MCH RBC QN AUTO: 29.1 PG (ref 26–35)
MCHC RBC AUTO-ENTMCNC: 29.7 % (ref 32–34.5)
MCV RBC AUTO: 98 FL (ref 80–99.9)
MONOCYTES ABSOLUTE: 1.04 E9/L (ref 0.1–0.95)
MONOCYTES RELATIVE PERCENT: 8 % (ref 2–12)
NEUTROPHILS ABSOLUTE: 11.31 E9/L (ref 1.8–7.3)
NEUTROPHILS RELATIVE PERCENT: 87 % (ref 43–80)
PDW BLD-RTO: 13.9 FL (ref 11.5–15)
PLATELET # BLD: 345 E9/L (ref 130–450)
PMV BLD AUTO: 10.2 FL (ref 7–12)
POIKILOCYTES: ABNORMAL
POLYCHROMASIA: ABNORMAL
POTASSIUM SERPL-SCNC: 4.5 MMOL/L (ref 3.5–5)
RBC # BLD: 4.44 E12/L (ref 3.8–5.8)
SCHISTOCYTES: ABNORMAL
SODIUM BLD-SCNC: 140 MMOL/L (ref 132–146)
TARGET CELLS: ABNORMAL
WBC # BLD: 13 E9/L (ref 4.5–11.5)

## 2019-09-09 PROCEDURE — 94640 AIRWAY INHALATION TREATMENT: CPT

## 2019-09-09 PROCEDURE — 85025 COMPLETE CBC W/AUTO DIFF WBC: CPT

## 2019-09-09 PROCEDURE — 6370000000 HC RX 637 (ALT 250 FOR IP): Performed by: INTERNAL MEDICINE

## 2019-09-09 PROCEDURE — 36415 COLL VENOUS BLD VENIPUNCTURE: CPT

## 2019-09-09 PROCEDURE — 2580000003 HC RX 258: Performed by: FAMILY MEDICINE

## 2019-09-09 PROCEDURE — 1200000000 HC SEMI PRIVATE

## 2019-09-09 PROCEDURE — 2700000000 HC OXYGEN THERAPY PER DAY

## 2019-09-09 PROCEDURE — 80048 BASIC METABOLIC PNL TOTAL CA: CPT

## 2019-09-09 PROCEDURE — 6370000000 HC RX 637 (ALT 250 FOR IP): Performed by: FAMILY MEDICINE

## 2019-09-09 PROCEDURE — 6360000002 HC RX W HCPCS: Performed by: FAMILY MEDICINE

## 2019-09-09 RX ADMIN — IPRATROPIUM BROMIDE AND ALBUTEROL SULFATE 1 AMPULE: 2.5; .5 SOLUTION RESPIRATORY (INHALATION) at 21:59

## 2019-09-09 RX ADMIN — METHYLPREDNISOLONE SODIUM SUCCINATE 40 MG: 40 INJECTION, POWDER, FOR SOLUTION INTRAMUSCULAR; INTRAVENOUS at 16:11

## 2019-09-09 RX ADMIN — KETOCONAZOLE: 20 CREAM TOPICAL at 09:50

## 2019-09-09 RX ADMIN — Medication 10 ML: at 23:56

## 2019-09-09 RX ADMIN — HYDROCHLOROTHIAZIDE 25 MG: 25 TABLET ORAL at 09:45

## 2019-09-09 RX ADMIN — PROMETHAZINE HYDROCHLORIDE AND CODEINE PHOSPHATE 5 ML: 6.25; 1 SOLUTION ORAL at 16:12

## 2019-09-09 RX ADMIN — CARVEDILOL 50 MG: 25 TABLET, FILM COATED ORAL at 08:20

## 2019-09-09 RX ADMIN — HYDROCORTISONE: 25 CREAM TOPICAL at 09:48

## 2019-09-09 RX ADMIN — PREDNISOLONE ACETATE 1 DROP: 10 SUSPENSION/ DROPS OPHTHALMIC at 20:39

## 2019-09-09 RX ADMIN — METHYLPREDNISOLONE SODIUM SUCCINATE 40 MG: 40 INJECTION, POWDER, FOR SOLUTION INTRAMUSCULAR; INTRAVENOUS at 08:20

## 2019-09-09 RX ADMIN — Medication 10 ML: at 08:22

## 2019-09-09 RX ADMIN — VITAMIN E CAP 400 UNIT 400 UNITS: 400 CAP at 09:45

## 2019-09-09 RX ADMIN — ASPIRIN 81 MG: 81 TABLET, COATED ORAL at 09:45

## 2019-09-09 RX ADMIN — HYDROCODONE BITARTRATE AND ACETAMINOPHEN 1 TABLET: 5; 325 TABLET ORAL at 20:39

## 2019-09-09 RX ADMIN — ENOXAPARIN SODIUM 40 MG: 40 INJECTION SUBCUTANEOUS at 09:47

## 2019-09-09 RX ADMIN — LOSARTAN POTASSIUM 100 MG: 50 TABLET, FILM COATED ORAL at 09:45

## 2019-09-09 RX ADMIN — Medication 10 ML: at 09:06

## 2019-09-09 RX ADMIN — IPRATROPIUM BROMIDE AND ALBUTEROL SULFATE 1 AMPULE: 2.5; .5 SOLUTION RESPIRATORY (INHALATION) at 16:22

## 2019-09-09 RX ADMIN — IPRATROPIUM BROMIDE AND ALBUTEROL SULFATE 1 AMPULE: 2.5; .5 SOLUTION RESPIRATORY (INHALATION) at 04:49

## 2019-09-09 RX ADMIN — PREDNISOLONE ACETATE 1 DROP: 10 SUSPENSION/ DROPS OPHTHALMIC at 09:49

## 2019-09-09 RX ADMIN — CETIRIZINE HYDROCHLORIDE 10 MG: 10 TABLET, FILM COATED ORAL at 09:45

## 2019-09-09 RX ADMIN — PROMETHAZINE HYDROCHLORIDE AND CODEINE PHOSPHATE 5 ML: 6.25; 1 SOLUTION ORAL at 23:59

## 2019-09-09 RX ADMIN — Medication 10 ML: at 20:39

## 2019-09-09 RX ADMIN — CARVEDILOL 50 MG: 25 TABLET, FILM COATED ORAL at 16:11

## 2019-09-09 RX ADMIN — METHYLPREDNISOLONE SODIUM SUCCINATE 40 MG: 40 INJECTION, POWDER, FOR SOLUTION INTRAMUSCULAR; INTRAVENOUS at 23:55

## 2019-09-09 RX ADMIN — IPRATROPIUM BROMIDE AND ALBUTEROL SULFATE 1 AMPULE: 2.5; .5 SOLUTION RESPIRATORY (INHALATION) at 09:12

## 2019-09-09 RX ADMIN — HYDROCODONE BITARTRATE AND ACETAMINOPHEN 1 TABLET: 5; 325 TABLET ORAL at 14:10

## 2019-09-09 ASSESSMENT — PAIN SCALES - GENERAL
PAINLEVEL_OUTOF10: 0
PAINLEVEL_OUTOF10: 4
PAINLEVEL_OUTOF10: 5

## 2019-09-10 PROCEDURE — 97530 THERAPEUTIC ACTIVITIES: CPT

## 2019-09-10 PROCEDURE — 1200000000 HC SEMI PRIVATE

## 2019-09-10 PROCEDURE — 2580000003 HC RX 258: Performed by: FAMILY MEDICINE

## 2019-09-10 PROCEDURE — 6370000000 HC RX 637 (ALT 250 FOR IP): Performed by: FAMILY MEDICINE

## 2019-09-10 PROCEDURE — 2700000000 HC OXYGEN THERAPY PER DAY

## 2019-09-10 PROCEDURE — 94640 AIRWAY INHALATION TREATMENT: CPT

## 2019-09-10 PROCEDURE — 6370000000 HC RX 637 (ALT 250 FOR IP): Performed by: INTERNAL MEDICINE

## 2019-09-10 PROCEDURE — 97530 THERAPEUTIC ACTIVITIES: CPT | Performed by: PHYSICAL THERAPIST

## 2019-09-10 PROCEDURE — 97165 OT EVAL LOW COMPLEX 30 MIN: CPT

## 2019-09-10 PROCEDURE — 97161 PT EVAL LOW COMPLEX 20 MIN: CPT | Performed by: PHYSICAL THERAPIST

## 2019-09-10 PROCEDURE — 6360000002 HC RX W HCPCS: Performed by: FAMILY MEDICINE

## 2019-09-10 RX ORDER — METHYLPREDNISOLONE SODIUM SUCCINATE 40 MG/ML
20 INJECTION, POWDER, LYOPHILIZED, FOR SOLUTION INTRAMUSCULAR; INTRAVENOUS EVERY 8 HOURS
Status: DISCONTINUED | OUTPATIENT
Start: 2019-09-11 | End: 2019-09-11

## 2019-09-10 RX ADMIN — VITAMIN E CAP 400 UNIT 400 UNITS: 400 CAP at 09:05

## 2019-09-10 RX ADMIN — HYDROCHLOROTHIAZIDE 25 MG: 25 TABLET ORAL at 09:05

## 2019-09-10 RX ADMIN — Medication 10 ML: at 09:09

## 2019-09-10 RX ADMIN — CETIRIZINE HYDROCHLORIDE 10 MG: 10 TABLET, FILM COATED ORAL at 09:05

## 2019-09-10 RX ADMIN — ENOXAPARIN SODIUM 40 MG: 40 INJECTION SUBCUTANEOUS at 09:06

## 2019-09-10 RX ADMIN — METHYLPREDNISOLONE SODIUM SUCCINATE 40 MG: 40 INJECTION, POWDER, FOR SOLUTION INTRAMUSCULAR; INTRAVENOUS at 15:58

## 2019-09-10 RX ADMIN — IPRATROPIUM BROMIDE AND ALBUTEROL SULFATE 1 AMPULE: 2.5; .5 SOLUTION RESPIRATORY (INHALATION) at 17:04

## 2019-09-10 RX ADMIN — HYDROXYZINE HYDROCHLORIDE 25 MG: 25 TABLET, FILM COATED ORAL at 20:38

## 2019-09-10 RX ADMIN — IPRATROPIUM BROMIDE AND ALBUTEROL SULFATE 1 AMPULE: 2.5; .5 SOLUTION RESPIRATORY (INHALATION) at 09:13

## 2019-09-10 RX ADMIN — Medication 10 ML: at 20:39

## 2019-09-10 RX ADMIN — IPRATROPIUM BROMIDE AND ALBUTEROL SULFATE 1 AMPULE: 2.5; .5 SOLUTION RESPIRATORY (INHALATION) at 23:06

## 2019-09-10 RX ADMIN — HYDROCODONE BITARTRATE AND ACETAMINOPHEN 1 TABLET: 5; 325 TABLET ORAL at 15:58

## 2019-09-10 RX ADMIN — HYDROCODONE BITARTRATE AND ACETAMINOPHEN 1 TABLET: 5; 325 TABLET ORAL at 09:04

## 2019-09-10 RX ADMIN — PREDNISOLONE ACETATE 1 DROP: 10 SUSPENSION/ DROPS OPHTHALMIC at 20:39

## 2019-09-10 RX ADMIN — LOSARTAN POTASSIUM 100 MG: 50 TABLET, FILM COATED ORAL at 09:05

## 2019-09-10 RX ADMIN — ASPIRIN 81 MG: 81 TABLET, COATED ORAL at 09:05

## 2019-09-10 RX ADMIN — CARVEDILOL 50 MG: 25 TABLET, FILM COATED ORAL at 09:05

## 2019-09-10 RX ADMIN — CARVEDILOL 50 MG: 25 TABLET, FILM COATED ORAL at 17:15

## 2019-09-10 RX ADMIN — PREDNISOLONE ACETATE 1 DROP: 10 SUSPENSION/ DROPS OPHTHALMIC at 09:05

## 2019-09-10 RX ADMIN — METHYLPREDNISOLONE SODIUM SUCCINATE 40 MG: 40 INJECTION, POWDER, FOR SOLUTION INTRAMUSCULAR; INTRAVENOUS at 09:05

## 2019-09-10 ASSESSMENT — PAIN SCALES - GENERAL
PAINLEVEL_OUTOF10: 0
PAINLEVEL_OUTOF10: 3
PAINLEVEL_OUTOF10: 6

## 2019-09-10 ASSESSMENT — PAIN DESCRIPTION - LOCATION: LOCATION: NECK

## 2019-09-10 ASSESSMENT — PAIN DESCRIPTION - PROGRESSION: CLINICAL_PROGRESSION: NOT CHANGED

## 2019-09-10 ASSESSMENT — PAIN DESCRIPTION - ONSET: ONSET: ON-GOING

## 2019-09-10 ASSESSMENT — PAIN - FUNCTIONAL ASSESSMENT: PAIN_FUNCTIONAL_ASSESSMENT: ACTIVITIES ARE NOT PREVENTED

## 2019-09-10 ASSESSMENT — PAIN DESCRIPTION - DESCRIPTORS: DESCRIPTORS: ACHING;SORE

## 2019-09-10 ASSESSMENT — PAIN DESCRIPTION - FREQUENCY: FREQUENCY: CONTINUOUS

## 2019-09-10 ASSESSMENT — PAIN DESCRIPTION - ORIENTATION: ORIENTATION: POSTERIOR

## 2019-09-10 ASSESSMENT — PAIN DESCRIPTION - PAIN TYPE: TYPE: CHRONIC PAIN

## 2019-09-11 PROCEDURE — 1200000000 HC SEMI PRIVATE

## 2019-09-11 PROCEDURE — 6370000000 HC RX 637 (ALT 250 FOR IP): Performed by: FAMILY MEDICINE

## 2019-09-11 PROCEDURE — 2580000003 HC RX 258: Performed by: FAMILY MEDICINE

## 2019-09-11 PROCEDURE — 2700000000 HC OXYGEN THERAPY PER DAY

## 2019-09-11 PROCEDURE — 6360000002 HC RX W HCPCS: Performed by: FAMILY MEDICINE

## 2019-09-11 PROCEDURE — 6370000000 HC RX 637 (ALT 250 FOR IP): Performed by: INTERNAL MEDICINE

## 2019-09-11 PROCEDURE — 97110 THERAPEUTIC EXERCISES: CPT

## 2019-09-11 PROCEDURE — 97530 THERAPEUTIC ACTIVITIES: CPT

## 2019-09-11 PROCEDURE — 94640 AIRWAY INHALATION TREATMENT: CPT

## 2019-09-11 RX ORDER — METHYLPREDNISOLONE SODIUM SUCCINATE 40 MG/ML
20 INJECTION, POWDER, LYOPHILIZED, FOR SOLUTION INTRAMUSCULAR; INTRAVENOUS EVERY 12 HOURS
Status: COMPLETED | OUTPATIENT
Start: 2019-09-12 | End: 2019-09-12

## 2019-09-11 RX ORDER — PREDNISONE 20 MG/1
40 TABLET ORAL DAILY
Status: DISCONTINUED | OUTPATIENT
Start: 2019-09-13 | End: 2019-09-14 | Stop reason: HOSPADM

## 2019-09-11 RX ADMIN — CARVEDILOL 50 MG: 25 TABLET, FILM COATED ORAL at 17:07

## 2019-09-11 RX ADMIN — METHYLPREDNISOLONE SODIUM SUCCINATE 20 MG: 40 INJECTION, POWDER, FOR SOLUTION INTRAMUSCULAR; INTRAVENOUS at 00:46

## 2019-09-11 RX ADMIN — HYDROXYZINE HYDROCHLORIDE 25 MG: 25 TABLET, FILM COATED ORAL at 22:08

## 2019-09-11 RX ADMIN — VITAMIN E CAP 400 UNIT 400 UNITS: 400 CAP at 12:05

## 2019-09-11 RX ADMIN — PREDNISOLONE ACETATE 1 DROP: 10 SUSPENSION/ DROPS OPHTHALMIC at 12:06

## 2019-09-11 RX ADMIN — PREDNISOLONE ACETATE 1 DROP: 10 SUSPENSION/ DROPS OPHTHALMIC at 22:07

## 2019-09-11 RX ADMIN — KETOCONAZOLE: 20 CREAM TOPICAL at 12:05

## 2019-09-11 RX ADMIN — CARVEDILOL 50 MG: 25 TABLET, FILM COATED ORAL at 09:16

## 2019-09-11 RX ADMIN — HYDROCODONE BITARTRATE AND ACETAMINOPHEN 1 TABLET: 5; 325 TABLET ORAL at 09:20

## 2019-09-11 RX ADMIN — METHYLPREDNISOLONE SODIUM SUCCINATE 20 MG: 40 INJECTION, POWDER, FOR SOLUTION INTRAMUSCULAR; INTRAVENOUS at 09:17

## 2019-09-11 RX ADMIN — CETIRIZINE HYDROCHLORIDE 10 MG: 10 TABLET, FILM COATED ORAL at 09:16

## 2019-09-11 RX ADMIN — IPRATROPIUM BROMIDE AND ALBUTEROL SULFATE 1 AMPULE: 2.5; .5 SOLUTION RESPIRATORY (INHALATION) at 18:55

## 2019-09-11 RX ADMIN — HYDROCHLOROTHIAZIDE 25 MG: 25 TABLET ORAL at 09:17

## 2019-09-11 RX ADMIN — HYDROCODONE BITARTRATE AND ACETAMINOPHEN 1 TABLET: 5; 325 TABLET ORAL at 17:07

## 2019-09-11 RX ADMIN — HYDROCODONE BITARTRATE AND ACETAMINOPHEN 1 TABLET: 5; 325 TABLET ORAL at 22:09

## 2019-09-11 RX ADMIN — IPRATROPIUM BROMIDE AND ALBUTEROL SULFATE 1 AMPULE: 2.5; .5 SOLUTION RESPIRATORY (INHALATION) at 09:41

## 2019-09-11 RX ADMIN — Medication 10 ML: at 09:16

## 2019-09-11 RX ADMIN — Medication 10 ML: at 22:08

## 2019-09-11 RX ADMIN — LOSARTAN POTASSIUM 100 MG: 50 TABLET, FILM COATED ORAL at 09:16

## 2019-09-11 RX ADMIN — IPRATROPIUM BROMIDE AND ALBUTEROL SULFATE 1 AMPULE: 2.5; .5 SOLUTION RESPIRATORY (INHALATION) at 13:48

## 2019-09-11 RX ADMIN — ASPIRIN 81 MG: 81 TABLET, COATED ORAL at 09:16

## 2019-09-11 RX ADMIN — ENOXAPARIN SODIUM 40 MG: 40 INJECTION SUBCUTANEOUS at 09:17

## 2019-09-11 ASSESSMENT — PAIN SCALES - GENERAL
PAINLEVEL_OUTOF10: 0
PAINLEVEL_OUTOF10: 5
PAINLEVEL_OUTOF10: 0
PAINLEVEL_OUTOF10: 6
PAINLEVEL_OUTOF10: 0
PAINLEVEL_OUTOF10: 5

## 2019-09-11 ASSESSMENT — PAIN DESCRIPTION - PAIN TYPE
TYPE: ACUTE PAIN
TYPE: ACUTE PAIN

## 2019-09-11 ASSESSMENT — PAIN DESCRIPTION - DESCRIPTORS
DESCRIPTORS: HEADACHE
DESCRIPTORS: HEADACHE;ACHING

## 2019-09-11 ASSESSMENT — PAIN DESCRIPTION - LOCATION
LOCATION: HEAD
LOCATION: HEAD

## 2019-09-11 ASSESSMENT — PAIN DESCRIPTION - ONSET: ONSET: ON-GOING

## 2019-09-11 ASSESSMENT — PAIN DESCRIPTION - PROGRESSION
CLINICAL_PROGRESSION: NOT CHANGED
CLINICAL_PROGRESSION: NOT CHANGED

## 2019-09-11 ASSESSMENT — PAIN - FUNCTIONAL ASSESSMENT: PAIN_FUNCTIONAL_ASSESSMENT: PREVENTS OR INTERFERES SOME ACTIVE ACTIVITIES AND ADLS

## 2019-09-11 ASSESSMENT — PAIN DESCRIPTION - FREQUENCY: FREQUENCY: CONTINUOUS

## 2019-09-11 NOTE — CARE COORDINATION
Ss note:9/11/2019 11:47 AM c order noted and referral made to [facility].  CLAYTON Murphy

## 2019-09-11 NOTE — PLAN OF CARE
Problem: Falls - Risk of:  Goal: Will remain free from falls  Description  Will remain free from falls  9/10/2019 2209 by Hali Caballero RN  Outcome: Met This Shift     Problem: Falls - Risk of:  Goal: Absence of physical injury  Description  Absence of physical injury  9/10/2019 2209 by Hali Caballero RN  Outcome: Met This Shift     Problem: Pain:  Goal: Pain level will decrease  Description  Pain level will decrease  9/10/2019 2209 by Hali Caballero RN  Outcome: Met This Shift     Problem: Pain:  Goal: Control of acute pain  Description  Control of acute pain  9/10/2019 2209 by Hali Caballero RN  Outcome: Met This Shift     Problem: Pain:  Goal: Control of chronic pain  Description  Control of chronic pain  9/10/2019 2209 by Hali Caballero RN  Outcome: Met This Shift

## 2019-09-12 PROCEDURE — 6370000000 HC RX 637 (ALT 250 FOR IP): Performed by: FAMILY MEDICINE

## 2019-09-12 PROCEDURE — 6360000002 HC RX W HCPCS: Performed by: FAMILY MEDICINE

## 2019-09-12 PROCEDURE — 97530 THERAPEUTIC ACTIVITIES: CPT

## 2019-09-12 PROCEDURE — 1200000000 HC SEMI PRIVATE

## 2019-09-12 PROCEDURE — 94640 AIRWAY INHALATION TREATMENT: CPT

## 2019-09-12 PROCEDURE — 97110 THERAPEUTIC EXERCISES: CPT

## 2019-09-12 PROCEDURE — 2580000003 HC RX 258: Performed by: FAMILY MEDICINE

## 2019-09-12 PROCEDURE — 6370000000 HC RX 637 (ALT 250 FOR IP): Performed by: INTERNAL MEDICINE

## 2019-09-12 PROCEDURE — 2700000000 HC OXYGEN THERAPY PER DAY

## 2019-09-12 RX ADMIN — CETIRIZINE HYDROCHLORIDE 10 MG: 10 TABLET, FILM COATED ORAL at 08:21

## 2019-09-12 RX ADMIN — HYDROCODONE BITARTRATE AND ACETAMINOPHEN 1 TABLET: 5; 325 TABLET ORAL at 12:36

## 2019-09-12 RX ADMIN — IPRATROPIUM BROMIDE AND ALBUTEROL SULFATE 1 AMPULE: 2.5; .5 SOLUTION RESPIRATORY (INHALATION) at 14:18

## 2019-09-12 RX ADMIN — Medication 10 ML: at 21:22

## 2019-09-12 RX ADMIN — PREDNISOLONE ACETATE 1 DROP: 10 SUSPENSION/ DROPS OPHTHALMIC at 21:21

## 2019-09-12 RX ADMIN — HYDROCORTISONE: 25 CREAM TOPICAL at 08:20

## 2019-09-12 RX ADMIN — Medication 10 ML: at 08:21

## 2019-09-12 RX ADMIN — ASPIRIN 81 MG: 81 TABLET, COATED ORAL at 08:21

## 2019-09-12 RX ADMIN — CARVEDILOL 50 MG: 25 TABLET, FILM COATED ORAL at 08:21

## 2019-09-12 RX ADMIN — IPRATROPIUM BROMIDE AND ALBUTEROL SULFATE 1 AMPULE: 2.5; .5 SOLUTION RESPIRATORY (INHALATION) at 06:33

## 2019-09-12 RX ADMIN — HYDROCHLOROTHIAZIDE 25 MG: 25 TABLET ORAL at 08:21

## 2019-09-12 RX ADMIN — PREDNISOLONE ACETATE 1 DROP: 10 SUSPENSION/ DROPS OPHTHALMIC at 08:20

## 2019-09-12 RX ADMIN — ENOXAPARIN SODIUM 40 MG: 40 INJECTION SUBCUTANEOUS at 08:21

## 2019-09-12 RX ADMIN — LOSARTAN POTASSIUM 100 MG: 50 TABLET, FILM COATED ORAL at 08:21

## 2019-09-12 RX ADMIN — IPRATROPIUM BROMIDE AND ALBUTEROL SULFATE 1 AMPULE: 2.5; .5 SOLUTION RESPIRATORY (INHALATION) at 18:09

## 2019-09-12 RX ADMIN — METHYLPREDNISOLONE SODIUM SUCCINATE 20 MG: 40 INJECTION, POWDER, FOR SOLUTION INTRAMUSCULAR; INTRAVENOUS at 16:49

## 2019-09-12 RX ADMIN — METHYLPREDNISOLONE SODIUM SUCCINATE 20 MG: 40 INJECTION, POWDER, FOR SOLUTION INTRAMUSCULAR; INTRAVENOUS at 04:08

## 2019-09-12 RX ADMIN — CARVEDILOL 50 MG: 25 TABLET, FILM COATED ORAL at 16:49

## 2019-09-12 RX ADMIN — HYDROCODONE BITARTRATE AND ACETAMINOPHEN 1 TABLET: 5; 325 TABLET ORAL at 21:24

## 2019-09-12 RX ADMIN — VITAMIN E CAP 400 UNIT 400 UNITS: 400 CAP at 08:20

## 2019-09-12 RX ADMIN — IPRATROPIUM BROMIDE AND ALBUTEROL SULFATE 1 AMPULE: 2.5; .5 SOLUTION RESPIRATORY (INHALATION) at 23:01

## 2019-09-12 ASSESSMENT — PAIN - FUNCTIONAL ASSESSMENT: PAIN_FUNCTIONAL_ASSESSMENT: PREVENTS OR INTERFERES SOME ACTIVE ACTIVITIES AND ADLS

## 2019-09-12 ASSESSMENT — PAIN SCALES - GENERAL
PAINLEVEL_OUTOF10: 0
PAINLEVEL_OUTOF10: 6
PAINLEVEL_OUTOF10: 6
PAINLEVEL_OUTOF10: 0
PAINLEVEL_OUTOF10: 4

## 2019-09-12 ASSESSMENT — PAIN DESCRIPTION - PROGRESSION: CLINICAL_PROGRESSION: NOT CHANGED

## 2019-09-12 ASSESSMENT — PAIN DESCRIPTION - ONSET: ONSET: ON-GOING

## 2019-09-12 ASSESSMENT — PAIN DESCRIPTION - DESCRIPTORS: DESCRIPTORS: ACHING;CONSTANT;DULL;DISCOMFORT

## 2019-09-12 ASSESSMENT — PAIN DESCRIPTION - LOCATION: LOCATION: NECK

## 2019-09-12 ASSESSMENT — PAIN DESCRIPTION - FREQUENCY: FREQUENCY: CONTINUOUS

## 2019-09-12 ASSESSMENT — PAIN DESCRIPTION - PAIN TYPE: TYPE: ACUTE PAIN

## 2019-09-13 PROCEDURE — 2580000003 HC RX 258: Performed by: FAMILY MEDICINE

## 2019-09-13 PROCEDURE — 6370000000 HC RX 637 (ALT 250 FOR IP): Performed by: FAMILY MEDICINE

## 2019-09-13 PROCEDURE — 6360000002 HC RX W HCPCS: Performed by: FAMILY MEDICINE

## 2019-09-13 PROCEDURE — 6370000000 HC RX 637 (ALT 250 FOR IP): Performed by: INTERNAL MEDICINE

## 2019-09-13 PROCEDURE — 94640 AIRWAY INHALATION TREATMENT: CPT

## 2019-09-13 PROCEDURE — 2700000000 HC OXYGEN THERAPY PER DAY

## 2019-09-13 PROCEDURE — 1200000000 HC SEMI PRIVATE

## 2019-09-13 RX ORDER — PREDNISONE 20 MG/1
40 TABLET ORAL DAILY
Qty: 28 TABLET | Refills: 0 | Status: SHIPPED | OUTPATIENT
Start: 2019-09-14 | End: 2019-09-28

## 2019-09-13 RX ADMIN — ASPIRIN 81 MG: 81 TABLET, COATED ORAL at 09:31

## 2019-09-13 RX ADMIN — IPRATROPIUM BROMIDE AND ALBUTEROL SULFATE 1 AMPULE: 2.5; .5 SOLUTION RESPIRATORY (INHALATION) at 22:51

## 2019-09-13 RX ADMIN — CETIRIZINE HYDROCHLORIDE 10 MG: 10 TABLET, FILM COATED ORAL at 09:24

## 2019-09-13 RX ADMIN — PREDNISONE 40 MG: 20 TABLET ORAL at 09:25

## 2019-09-13 RX ADMIN — IPRATROPIUM BROMIDE AND ALBUTEROL SULFATE 1 AMPULE: 2.5; .5 SOLUTION RESPIRATORY (INHALATION) at 09:43

## 2019-09-13 RX ADMIN — PREDNISOLONE ACETATE 1 DROP: 10 SUSPENSION/ DROPS OPHTHALMIC at 10:15

## 2019-09-13 RX ADMIN — Medication 10 ML: at 23:06

## 2019-09-13 RX ADMIN — IPRATROPIUM BROMIDE AND ALBUTEROL SULFATE 1 AMPULE: 2.5; .5 SOLUTION RESPIRATORY (INHALATION) at 14:23

## 2019-09-13 RX ADMIN — IPRATROPIUM BROMIDE AND ALBUTEROL SULFATE 1 AMPULE: 2.5; .5 SOLUTION RESPIRATORY (INHALATION) at 06:51

## 2019-09-13 RX ADMIN — CARVEDILOL 50 MG: 25 TABLET, FILM COATED ORAL at 16:42

## 2019-09-13 RX ADMIN — PREDNISOLONE ACETATE 1 DROP: 10 SUSPENSION/ DROPS OPHTHALMIC at 23:05

## 2019-09-13 RX ADMIN — MOMETASONE FUROATE AND FORMOTEROL FUMARATE DIHYDRATE 2 PUFF: 200; 5 AEROSOL RESPIRATORY (INHALATION) at 06:51

## 2019-09-13 RX ADMIN — HYDROCODONE BITARTRATE AND ACETAMINOPHEN 1 TABLET: 5; 325 TABLET ORAL at 23:19

## 2019-09-13 RX ADMIN — VITAMIN E CAP 400 UNIT 400 UNITS: 400 CAP at 09:25

## 2019-09-13 RX ADMIN — LOSARTAN POTASSIUM 100 MG: 50 TABLET, FILM COATED ORAL at 09:25

## 2019-09-13 RX ADMIN — HYDROCHLOROTHIAZIDE 25 MG: 25 TABLET ORAL at 09:25

## 2019-09-13 RX ADMIN — ENOXAPARIN SODIUM 40 MG: 40 INJECTION SUBCUTANEOUS at 09:25

## 2019-09-13 RX ADMIN — HYDROCODONE BITARTRATE AND ACETAMINOPHEN 1 TABLET: 5; 325 TABLET ORAL at 09:31

## 2019-09-13 RX ADMIN — HYDROXYZINE HYDROCHLORIDE 25 MG: 25 TABLET, FILM COATED ORAL at 23:06

## 2019-09-13 RX ADMIN — CARVEDILOL 50 MG: 25 TABLET, FILM COATED ORAL at 09:24

## 2019-09-13 RX ADMIN — Medication 10 ML: at 09:25

## 2019-09-13 RX ADMIN — POLYVINYL ALCOHOL 1 DROP: 14 SOLUTION/ DROPS OPHTHALMIC at 09:24

## 2019-09-13 RX ADMIN — IPRATROPIUM BROMIDE AND ALBUTEROL SULFATE 1 AMPULE: 2.5; .5 SOLUTION RESPIRATORY (INHALATION) at 18:40

## 2019-09-13 ASSESSMENT — PAIN DESCRIPTION - ONSET
ONSET: ON-GOING
ONSET: ON-GOING

## 2019-09-13 ASSESSMENT — PAIN DESCRIPTION - DESCRIPTORS
DESCRIPTORS: ACHING;DISCOMFORT;CONSTANT
DESCRIPTORS: ACHING;CONSTANT;DISCOMFORT

## 2019-09-13 ASSESSMENT — PAIN DESCRIPTION - PAIN TYPE
TYPE: ACUTE PAIN
TYPE: CHRONIC PAIN

## 2019-09-13 ASSESSMENT — PAIN SCALES - GENERAL
PAINLEVEL_OUTOF10: 6
PAINLEVEL_OUTOF10: 6
PAINLEVEL_OUTOF10: 3
PAINLEVEL_OUTOF10: 0
PAINLEVEL_OUTOF10: 0

## 2019-09-13 ASSESSMENT — PAIN - FUNCTIONAL ASSESSMENT: PAIN_FUNCTIONAL_ASSESSMENT: ACTIVITIES ARE NOT PREVENTED

## 2019-09-13 ASSESSMENT — PAIN DESCRIPTION - PROGRESSION
CLINICAL_PROGRESSION: NOT CHANGED
CLINICAL_PROGRESSION: NOT CHANGED

## 2019-09-13 ASSESSMENT — PAIN DESCRIPTION - FREQUENCY
FREQUENCY: CONTINUOUS
FREQUENCY: CONTINUOUS

## 2019-09-13 ASSESSMENT — PAIN DESCRIPTION - LOCATION
LOCATION: NECK
LOCATION: NECK

## 2019-09-13 ASSESSMENT — PAIN DESCRIPTION - ORIENTATION
ORIENTATION: POSTERIOR
ORIENTATION: POSTERIOR

## 2019-09-13 NOTE — PLAN OF CARE
Problem: Falls - Risk of:  Goal: Will remain free from falls  Description  Will remain free from falls  9/13/2019 1306 by Mark Anthony Tena RN  Outcome: Met This Shift     Problem: Falls - Risk of:  Goal: Absence of physical injury  Description  Absence of physical injury  9/13/2019 1306 by Mark Anthony Tena RN  Outcome: Met This Shift     Problem: Pain:  Goal: Pain level will decrease  Description  Pain level will decrease  9/13/2019 1306 by Mark Anthony Tena RN  Outcome: Met This Shift     Problem: Pain:  Goal: Control of acute pain  Description  Control of acute pain  9/13/2019 1306 by Mark Anthony Tena RN  Outcome: Met This Shift     Problem: Pain:  Goal: Control of chronic pain  Description  Control of chronic pain  9/13/2019 1306 by Mark Anthony Tena RN  Outcome: Met This Shift

## 2019-09-14 VITALS
HEIGHT: 66 IN | WEIGHT: 172 LBS | RESPIRATION RATE: 18 BRPM | SYSTOLIC BLOOD PRESSURE: 144 MMHG | HEART RATE: 88 BPM | TEMPERATURE: 98.4 F | BODY MASS INDEX: 27.64 KG/M2 | DIASTOLIC BLOOD PRESSURE: 76 MMHG | OXYGEN SATURATION: 96 %

## 2019-09-14 PROCEDURE — 6370000000 HC RX 637 (ALT 250 FOR IP): Performed by: FAMILY MEDICINE

## 2019-09-14 PROCEDURE — 6370000000 HC RX 637 (ALT 250 FOR IP): Performed by: INTERNAL MEDICINE

## 2019-09-14 PROCEDURE — 2580000003 HC RX 258: Performed by: FAMILY MEDICINE

## 2019-09-14 PROCEDURE — 94640 AIRWAY INHALATION TREATMENT: CPT

## 2019-09-14 PROCEDURE — 6360000002 HC RX W HCPCS: Performed by: FAMILY MEDICINE

## 2019-09-14 RX ADMIN — IPRATROPIUM BROMIDE AND ALBUTEROL SULFATE 1 AMPULE: 2.5; .5 SOLUTION RESPIRATORY (INHALATION) at 06:44

## 2019-09-14 RX ADMIN — LOSARTAN POTASSIUM 100 MG: 50 TABLET, FILM COATED ORAL at 09:28

## 2019-09-14 RX ADMIN — ASPIRIN 81 MG: 81 TABLET, COATED ORAL at 09:27

## 2019-09-14 RX ADMIN — VITAMIN E CAP 400 UNIT 400 UNITS: 400 CAP at 09:28

## 2019-09-14 RX ADMIN — Medication 10 ML: at 09:28

## 2019-09-14 RX ADMIN — PREDNISONE 40 MG: 20 TABLET ORAL at 09:27

## 2019-09-14 RX ADMIN — CETIRIZINE HYDROCHLORIDE 10 MG: 10 TABLET, FILM COATED ORAL at 09:28

## 2019-09-14 RX ADMIN — HYDROCODONE BITARTRATE AND ACETAMINOPHEN 1 TABLET: 5; 325 TABLET ORAL at 09:29

## 2019-09-14 RX ADMIN — HYDROCHLOROTHIAZIDE 25 MG: 25 TABLET ORAL at 09:28

## 2019-09-14 RX ADMIN — ENOXAPARIN SODIUM 40 MG: 40 INJECTION SUBCUTANEOUS at 09:28

## 2019-09-14 RX ADMIN — CARVEDILOL 50 MG: 25 TABLET, FILM COATED ORAL at 09:27

## 2019-09-14 RX ADMIN — HYDROCORTISONE: 25 CREAM TOPICAL at 09:28

## 2019-09-14 RX ADMIN — PREDNISOLONE ACETATE 1 DROP: 10 SUSPENSION/ DROPS OPHTHALMIC at 09:27

## 2019-09-14 ASSESSMENT — PAIN SCALES - GENERAL
PAINLEVEL_OUTOF10: 0
PAINLEVEL_OUTOF10: 6

## 2019-09-14 NOTE — PROGRESS NOTES
[id number]    Patient seen ambulating in hallway independently with O2 tank.          [facility], [facility] [place]
CLINICAL PHARMACY NOTE: MEDS TO [address] Select Patient?: No  Total # of Prescriptions Filled: 1   The following medications were delivered to the patient:  · Prednisone 20 mg tablet  Total # of Interventions Completed: 3  Time Spent (min): 15    Additional Documentation:
Department of Family Practice  Adult Daily Progress Note      SUBJECTIVE  BENJAMÍN IS SEEN IN FOLLOW UP TODAY ON 4 TELEMETRY. HE HAD HIS ROOM CLEANED EARLIER TODAY AND FEELS THE CHEMICALS DID NOT AGREE WITH HIM. I SPOKE TO HIM ABOUT POSSIBILITY OF LTAC ADMISSION IF WE CANNOT GET HIM GOOD ENOUGH TO GO HOME WITHIN A REASONABLE TIME. HE DOES NOT TOLERATE QUICK STEROID TAPERS SO IT WILL TAKE A LITTLE LONGER. I RECALL ON HIS PREVIOUS ADMISSION HOW HE WAS NOT ABLE TO BE DISCHARGED BECAUSE HE DID NOT THINK HE WAS GOOD ENOUGH TO GO HOME.     OBJECTIVE    Physical  VITALS:  BP (!) 141/74   Pulse 65   Temp 97.8 °F (36.6 °C) (Oral)   Resp 18   Ht 5' 6\" (1.676 m)   Wt 171 lb 4 oz (77.7 kg)   SpO2 99%   BMI 27.64 kg/m²   CONSTITUTIONAL:  awake, alert, cooperative, no apparent distress, and appears stated age  LUNGS:  SOME increased work of breathing, FAIR air exchange, clear to auscultation bilaterally, no crackles or wheezing  CARDIOVASCULAR:  Normal apical impulse, regular rate and rhythm, normal S1 and S2, no S3 or S4, and no murmur noted  ABDOMEN:  No scars, normal bowel sounds, soft, non-distended, non-tender, no masses palpated, no hepatosplenomegally  Data  CBC with Differential:    Lab Results   Component Value Date    WBC 7.8 09/04/2019    RBC 4.30 09/04/2019    HGB 12.6 09/04/2019    HCT 40.9 09/04/2019     09/04/2019    MCV 95.1 09/04/2019    MCH 29.3 09/04/2019    MCHC 30.8 09/04/2019    RDW 14.0 09/04/2019    NRBC 1.0 09/22/2018    SEGSPCT 60 04/24/2013    LYMPHOPCT 3.8 09/24/2018    MONOPCT 7.1 09/24/2018    BASOPCT 0.1 09/24/2018    MONOSABS 1.23 09/24/2018    LYMPHSABS 0.66 09/24/2018    EOSABS 0.00 09/24/2018    BASOSABS 0.01 09/24/2018     BMP:    Lab Results   Component Value Date     09/05/2019    K 5.3 09/05/2019    K 4.3 09/21/2018    CL 93 09/05/2019    CO2 33 09/05/2019    BUN 40 09/05/2019    LABALBU 4.2 04/26/2019    LABALBU 4.6 07/14/2011    CREATININE 1.3 09/05/2019    CALCIUM
[facility] Quality Flow/Interdisciplinary Rounds Progress Note        Quality Flow Rounds held on September 6, 2019    Disciplines Attending:  Bedside Nurse, ,  and Nursing Unit [id number] was admitted on 9/4/2019  1:06 AM    Anticipated Discharge Date:  Expected Discharge Date: 09/07/19    Disposition:    Blake Score:  Blake Scale Score: 20    Readmission Risk              Risk of Unplanned Readmission:        20           Discussed patient goal for the day, patient clinical progression, and barriers to discharge.   The following Goal(s) of the Day/Commitment(s) have been identified:  Activity progression, Pulmonary Rehab      [facility]  September 6, 2019
P Quality Flow/Interdisciplinary Rounds Progress Note        Quality Flow Rounds held on September 4, 2019    Disciplines Attending:  Bedside Nurse, ,  and Nursing Unit [id number] was admitted on 9/4/2019  1:06 AM    Anticipated Discharge Date:  Expected Discharge Date: 09/07/19    Disposition:    Blake Score:  Blake Scale Score: 20    Readmission Risk              Risk of Unplanned Readmission:        13           Discussed patient goal for the day, patient clinical progression, and barriers to discharge.   The following Goal(s) of the Day/Commitment(s) have been identified:  Activity progression, wears 4L NC, HEP C+, HTN      [facility]  September 4, 2019
Physical Therapy  Physical Therapy  Daily Treatment Note  9/12/2019  [id number]                      Cory Harry   [id number]                              1950    Patient Active Problem List   Diagnosis    Hepatitis C    Testicular swelling    COPD exacerbation (HCC)    NSTEMI (non-ST elevated myocardial infarction) ([facility] [address]    CHF, acute on chronic (HCC)    Essential hypertension    Neck pain    Cervical disc herniation    Acute respiratory failure with hypoxia and hypercapnia (HCC)    Chest pain    Elbow effusion, right    Acute on chronic respiratory failure with hypoxia and hypercapnia (HCC)       Recommendation for discharge: Home with [address] [place] [address] prescriptions needed: none    [facility] Mobility Inpatient   How much difficulty turning over in bed?: None  How much difficulty sitting down on / standing up from a chair with arms?: None  How much difficulty moving from lying on back to sitting on side of bed?: None  How much help from another person moving to and from a bed to a chair?: A Little  How much help from another person needed to walk in hospital room?: A Little  How much help from another person for climbing 3-5 steps with a railing?: A Lot  AM-PAC Inpatient Mobility Raw Score : 20  AM-PAC Inpatient T-Scale Score : 47.67  Mobility Inpatient CMS 0-100% Score: 35.83  Mobility Inpatient CMS G-Code Modifier : CJ      Precautions:  Falls, 4L of continuous O2    S: Patient cleared by nursing for treatment. Patient is agreeable to treatment. Pain status: (measured on a visual analog scale with 0=no pain and 10=excruciating pain) 0/10. O: Pt was instructed in and performed the following:   Bed Mobility- Supine to sit-Independent     Scooting- Independent     Sit to supine-na   Transfers-sit to stand- Supervision     Gait:  Patient ambulated 2 x 75 feet no assistive device, managing O2 tank with Supervision. Steps:  na  Treatment: Pt.  Ambulated in decker as above and returned to bedside
Pulmonary (Dr. Phil Darby)    9/12/2019 8:14 AM  Subjective:   Admit Date: 9/4/2019  PCP: Clark Jerry DO  Interval History:  Medications, data and notes reviewed. Comfortable, while at rest, but very little activity makes him short of breath, But is slowly improving, he walked with physical therapy yesterday. Cough and mucus slowly improving. Data:   Scheduled Meds:   methylPREDNISolone  20 mg Intravenous Q12H    [START ON 9/13/2019] predniSONE  40 mg Oral Daily    ipratropium-albuterol  1 ampule Inhalation Q4H    sodium chloride flush  10 mL Intravenous 2 times per day    enoxaparin  40 mg Subcutaneous Daily    aspirin  81 mg Oral Daily    carvedilol  50 mg Oral BID WC    cetirizine  10 mg Oral Daily    hydrochlorothiazide  25 mg Oral Daily    hydrOXYzine  25 mg Oral Nightly    ketoconazole   Topical Daily    losartan  100 mg Oral Daily    prednisoLONE acetate  1 drop Both Eyes BID    vitamin E  400 Units Oral Daily    mometasone-formoterol  2 puff Inhalation BID    hydrocortisone   Topical BID     Continuous Infusions:  PRN Meds:sodium chloride flush, acetaminophen, albuterol, HYDROcodone-acetaminophen, promethazine-codeine, polyvinyl alcohol, triamcinolone    No intake/output data recorded. Intake/Output Summary (Last 24 hours) at 9/12/2019 0814  Last data filed at 9/11/2019 2213  Gross per 24 hour   Intake 480 ml   Output 400 ml   Net 80 ml     -----------------------------------------------------------------  RAD: Xr Chest Portable    Result Date: 9/4/2019  Location:200 Exam: XR CHEST PORTABLE Comparison: 4/29/2019 History: Chest pain, dyspnea Findings: Portable AP upright chest. Heart size is normal. Pulmonary vessels are nondistended. No focal pulmonary parenchymal consolidation. No acute cardiopulmonary disease.       Objective:   Vitals: BP (!) 145/80   Pulse 74   Temp 98 °F (36.7 °C) (Oral)   Resp 16   Ht 5' 6\" (1.676 m)   Wt 171 lb 4.8 oz (77.7 kg)   SpO2 97%   BMI
[facility] Quality Flow/Interdisciplinary Rounds Progress Note        Quality Flow Rounds held on September 11, 2019    Disciplines Attending:  Bedside Nurse, ,  and Nursing Unit [id number] was admitted on 9/4/2019  1:06 AM    Anticipated Discharge Date:  Expected Discharge Date: 09/07/19    Disposition:    Blake Score:  Blake Scale Score: 20    Readmission Risk              Risk of Unplanned Readmission:        21           Discussed patient goal for the day, patient clinical progression, and barriers to discharge.   The following Goal(s) of the Day/Commitment(s) have been identified:  Plan is Home with Chanelle Fountain PT/OT      [facility]  September 11, 2019
Flow Rate (L/min): 4 L/min  Neck: no adenopathy and no jugular venous distention  Lungs: diminished breath sounds bilaterally/ Rhonchi, no wheezing now. Heart: regular rate and rhythm, S1, S2 normal, no murmur, click, rub or gallop  Abdomen: Abdomen soft, non-tender. BS normal. No masses,  No organomegaly  Extremities: extremities normal, atraumatic, no cyanosis or edema      Assessment:      Acute on chronic respiratory failure  Severe COPD  History of nicotine addiction, one pack per day for 53 years no smoking for 4 years  Mild pulmonary hypertension  Severe deconditioning  History of marijuana abuse, improved? History of hepatitis B, deconditioning, anterior cervical spine lesion,, status post bilateral cataract extraction        Plan:   1. Continue pulmonary medications. He has been evaluated at tertiary center for possible lung transplantation. He is followed at [facility] in [place].   Will consult the wellness center-for pulmonary rehab to start ASAP  2. PT/increase activity, continue pred      Elbert Francisco MD
Subcutaneous Daily    aspirin  81 mg Oral Daily    carvedilol  50 mg Oral BID WC    cetirizine  10 mg Oral Daily    hydrochlorothiazide  25 mg Oral Daily    hydrOXYzine  25 mg Oral Nightly    ketoconazole   Topical Daily    losartan  100 mg Oral Daily    prednisoLONE acetate  1 drop Both Eyes BID    vitamin E  400 Units Oral Daily    mometasone-formoterol  2 puff Inhalation BID    hydrocortisone   Topical BID     Continuous Infusions:  PRN Meds:.sodium chloride flush, acetaminophen, albuterol, HYDROcodone-acetaminophen, promethazine-codeine, polyvinyl alcohol, triamcinolone    ASSESSMENT AND PLAN      Active Problems:    COPD exacerbation (HCC)    Essential hypertension    Acute on chronic respiratory failure with hypoxia and hypercapnia (HCC)  Resolved Problems:    * No resolved hospital problems. *      DISCHARGE TOMORROW. CONTINUE PREDNISONE FOR ABOUT TWO WEEKS.
sounds bilaterally/ Rhonchi, no wheezing now. Heart: regular rate and rhythm, S1, S2 normal, no murmur, click, rub or gallop  Abdomen: Abdomen soft, non-tender. BS normal. No masses,  No organomegaly  Extremities: extremities normal, atraumatic, no cyanosis or edema      Assessment:      Acute on chronic respiratory failure  Severe COPD  History of nicotine addiction, one pack per day for 53 years no smoking for 4 years  Mild pulmonary hypertension  Severe deconditioning  History of marijuana abuse, improved? History of hepatitis B, deconditioning, anterior cervical spine lesion,, status post bilateral cataract extraction        Plan:   1. Continue pulmonary medications. He has been evaluated at tertiary center for possible lung transplantation. He is followed at [facility] [employer]. Will consult the [facility] center-for pulmonary rehab to start ASAP  2. LTAC Select?  Aylin Myers MD
will be seen 1x daily, 1-7x per week, for 1-3 days for therapeutic exercise, functional retraining, endurance activities, balance exercises, family and patient education.      Equipment recommendation:  None  Ubaldo Davison PT, DPT License Number [id number]

## 2019-09-14 NOTE — PLAN OF CARE
Problem: Falls - Risk of:  Goal: Will remain free from falls  Description  Will remain free from falls  Outcome: Met This Shift  Goal: Absence of physical injury  Description  Absence of physical injury  Outcome: Met This Shift     Problem: Pain:  Goal: Pain level will decrease  Description  Pain level will decrease  Outcome: Met This Shift  Goal: Control of acute pain  Description  Control of acute pain  Outcome: Met This Shift  Goal: Control of chronic pain  Description  Control of chronic pain  Outcome: Met This Shift     Problem: OXYGENATION/RESPIRATORY FUNCTION  Goal: Patient will maintain patent airway  Outcome: Met This Shift  Goal: Patient will achieve/maintain normal respiratory rate/effort  Description  Respiratory rate and effort will be within normal limits for the patient  Outcome: Met This Shift

## 2019-10-17 ENCOUNTER — APPOINTMENT (OUTPATIENT)
Dept: GENERAL RADIOLOGY | Age: 69
End: 2019-10-17
Payer: COMMERCIAL

## 2019-10-17 ENCOUNTER — HOSPITAL ENCOUNTER (EMERGENCY)
Age: 69
Discharge: HOME | End: 2019-10-17
Attending: EMERGENCY MEDICINE
Payer: COMMERCIAL

## 2019-10-17 VITALS
WEIGHT: 170 LBS | TEMPERATURE: 98.2 F | HEIGHT: 66 IN | BODY MASS INDEX: 27.32 KG/M2 | DIASTOLIC BLOOD PRESSURE: 64 MMHG | OXYGEN SATURATION: 95 % | RESPIRATION RATE: 16 BRPM | SYSTOLIC BLOOD PRESSURE: 96 MMHG | HEART RATE: 94 BPM

## 2019-10-17 DIAGNOSIS — R07.9 CHEST PAIN, UNSPECIFIED TYPE: Primary | ICD-10-CM

## 2019-10-17 DIAGNOSIS — R04.0 EPISTAXIS: ICD-10-CM

## 2019-10-17 LAB
ALBUMIN SERPL-MCNC: 4.3 G/DL (ref 3.5–5.2)
ALP BLD-CCNC: 59 U/L (ref 40–129)
ALT SERPL-CCNC: 12 U/L (ref 0–40)
ANION GAP SERPL CALCULATED.3IONS-SCNC: 8 MMOL/L (ref 7–16)
APTT: 31.6 SEC (ref 24.5–35.1)
AST SERPL-CCNC: 28 U/L (ref 0–39)
B.E.: 4.6 MMOL/L (ref -3–3)
BASOPHILS ABSOLUTE: 0.02 E9/L (ref 0–0.2)
BASOPHILS RELATIVE PERCENT: 0.3 % (ref 0–2)
BILIRUB SERPL-MCNC: 0.7 MG/DL (ref 0–1.2)
BUN BLDV-MCNC: 17 MG/DL (ref 8–23)
CALCIUM SERPL-MCNC: 10 MG/DL (ref 8.6–10.2)
CHLORIDE BLD-SCNC: 96 MMOL/L (ref 98–107)
CO2: 36 MMOL/L (ref 22–29)
COHB: 0.3 % (ref 0–1.5)
CREAT SERPL-MCNC: 1.2 MG/DL (ref 0.7–1.2)
CRITICAL: ABNORMAL
DATE ANALYZED: ABNORMAL
DATE OF COLLECTION: ABNORMAL
EOSINOPHILS ABSOLUTE: 0.06 E9/L (ref 0.05–0.5)
EOSINOPHILS RELATIVE PERCENT: 1 % (ref 0–6)
GFR AFRICAN AMERICAN: >60
GFR NON-AFRICAN AMERICAN: >60 ML/MIN/1.73
GLUCOSE BLD-MCNC: 167 MG/DL (ref 74–99)
HCO3: 31.1 MMOL/L (ref 22–26)
HCT VFR BLD CALC: 38.1 % (ref 37–54)
HEMOGLOBIN: 11.6 G/DL (ref 12.5–16.5)
HHB: 1.9 % (ref 0–5)
IMMATURE GRANULOCYTES #: 0.03 E9/L
IMMATURE GRANULOCYTES %: 0.5 % (ref 0–5)
INR BLD: 1
LAB: ABNORMAL
LYMPHOCYTES ABSOLUTE: 1.25 E9/L (ref 1.5–4)
LYMPHOCYTES RELATIVE PERCENT: 20.1 % (ref 20–42)
Lab: ABNORMAL
MCH RBC QN AUTO: 29.4 PG (ref 26–35)
MCHC RBC AUTO-ENTMCNC: 30.4 % (ref 32–34.5)
MCV RBC AUTO: 96.7 FL (ref 80–99.9)
METHB: 0.3 % (ref 0–1.5)
MODE: ABNORMAL
MONOCYTES ABSOLUTE: 0.78 E9/L (ref 0.1–0.95)
MONOCYTES RELATIVE PERCENT: 12.6 % (ref 2–12)
NEUTROPHILS ABSOLUTE: 4.07 E9/L (ref 1.8–7.3)
NEUTROPHILS RELATIVE PERCENT: 65.5 % (ref 43–80)
O2 CONTENT: 16.9 ML/DL
O2 SATURATION: 98.1 % (ref 92–98.5)
O2HB: 97.5 % (ref 94–97)
OPERATOR ID: 797
PATIENT TEMP: 37 C
PCO2: 55.3 MMHG (ref 35–45)
PDW BLD-RTO: 13.9 FL (ref 11.5–15)
PH BLOOD GAS: 7.37 (ref 7.35–7.45)
PLATELET # BLD: 260 E9/L (ref 130–450)
PMV BLD AUTO: 10.1 FL (ref 7–12)
PO2: 123.3 MMHG (ref 60–100)
POTASSIUM SERPL-SCNC: 4.4 MMOL/L (ref 3.5–5)
PRO-BNP: 233 PG/ML (ref 0–125)
PROTHROMBIN TIME: 11.7 SEC (ref 9.3–12.4)
RBC # BLD: 3.94 E12/L (ref 3.8–5.8)
SODIUM BLD-SCNC: 140 MMOL/L (ref 132–146)
SOURCE, BLOOD GAS: ABNORMAL
THB: 12.2 G/DL (ref 11.5–16.5)
TIME ANALYZED: 207
TOTAL PROTEIN: 8 G/DL (ref 6.4–8.3)
TROPONIN: <0.01 NG/ML (ref 0–0.03)
TROPONIN: <0.01 NG/ML (ref 0–0.03)
WBC # BLD: 6.2 E9/L (ref 4.5–11.5)

## 2019-10-17 PROCEDURE — 85025 COMPLETE CBC W/AUTO DIFF WBC: CPT

## 2019-10-17 PROCEDURE — 6370000000 HC RX 637 (ALT 250 FOR IP): Performed by: EMERGENCY MEDICINE

## 2019-10-17 PROCEDURE — 99285 EMERGENCY DEPT VISIT HI MDM: CPT

## 2019-10-17 PROCEDURE — 93005 ELECTROCARDIOGRAM TRACING: CPT | Performed by: EMERGENCY MEDICINE

## 2019-10-17 PROCEDURE — 36415 COLL VENOUS BLD VENIPUNCTURE: CPT

## 2019-10-17 PROCEDURE — 80053 COMPREHEN METABOLIC PANEL: CPT

## 2019-10-17 PROCEDURE — 94644 CONT INHLJ TX 1ST HOUR: CPT

## 2019-10-17 PROCEDURE — 83880 ASSAY OF NATRIURETIC PEPTIDE: CPT

## 2019-10-17 PROCEDURE — 85610 PROTHROMBIN TIME: CPT

## 2019-10-17 PROCEDURE — 71045 X-RAY EXAM CHEST 1 VIEW: CPT

## 2019-10-17 PROCEDURE — 36600 WITHDRAWAL OF ARTERIAL BLOOD: CPT

## 2019-10-17 PROCEDURE — 82805 BLOOD GASES W/O2 SATURATION: CPT

## 2019-10-17 PROCEDURE — 85730 THROMBOPLASTIN TIME PARTIAL: CPT

## 2019-10-17 PROCEDURE — 84484 ASSAY OF TROPONIN QUANT: CPT

## 2019-10-17 RX ORDER — ASPIRIN 81 MG/1
324 TABLET, CHEWABLE ORAL ONCE
Status: COMPLETED | OUTPATIENT
Start: 2019-10-17 | End: 2019-10-17

## 2019-10-17 RX ORDER — NITROGLYCERIN 0.4 MG/1
0.4 TABLET SUBLINGUAL EVERY 5 MIN PRN
Status: DISCONTINUED | OUTPATIENT
Start: 2019-10-17 | End: 2019-10-17 | Stop reason: HOSPADM

## 2019-10-17 RX ORDER — IPRATROPIUM BROMIDE AND ALBUTEROL SULFATE 2.5; .5 MG/3ML; MG/3ML
3 SOLUTION RESPIRATORY (INHALATION) CONTINUOUS
Status: DISCONTINUED | OUTPATIENT
Start: 2019-10-17 | End: 2019-10-17

## 2019-10-17 RX ORDER — HYDROCODONE BITARTRATE AND ACETAMINOPHEN 5; 325 MG/1; MG/1
1 TABLET ORAL ONCE
Status: COMPLETED | OUTPATIENT
Start: 2019-10-17 | End: 2019-10-17

## 2019-10-17 RX ADMIN — HYDROCODONE BITARTRATE AND ACETAMINOPHEN 1 TABLET: 5; 325 TABLET ORAL at 04:17

## 2019-10-17 RX ADMIN — ASPIRIN 81 MG 324 MG: 81 TABLET ORAL at 02:15

## 2019-10-17 RX ADMIN — NITROGLYCERIN 0.4 MG: 0.4 TABLET SUBLINGUAL at 02:16

## 2019-10-17 RX ADMIN — IPRATROPIUM BROMIDE AND ALBUTEROL SULFATE 3 AMPULE: .5; 3 SOLUTION RESPIRATORY (INHALATION) at 01:40

## 2019-10-17 ASSESSMENT — ENCOUNTER SYMPTOMS
BACK PAIN: 0
EYE PAIN: 0
SORE THROAT: 0
WHEEZING: 0
VOMITING: 0
COUGH: 0
SINUS PRESSURE: 0
SHORTNESS OF BREATH: 1
NAUSEA: 0
DIARRHEA: 0
EYE DISCHARGE: 0
EYE REDNESS: 0
ABDOMINAL PAIN: 0

## 2019-10-17 ASSESSMENT — PAIN DESCRIPTION - PAIN TYPE
TYPE: ACUTE PAIN
TYPE: ACUTE PAIN

## 2019-10-17 ASSESSMENT — PAIN SCALES - GENERAL
PAINLEVEL_OUTOF10: 6
PAINLEVEL_OUTOF10: 7
PAINLEVEL_OUTOF10: 8

## 2019-10-17 ASSESSMENT — PAIN DESCRIPTION - PROGRESSION
CLINICAL_PROGRESSION: NOT CHANGED
CLINICAL_PROGRESSION: NOT CHANGED
CLINICAL_PROGRESSION: GRADUALLY WORSENING

## 2019-10-17 ASSESSMENT — PAIN DESCRIPTION - FREQUENCY
FREQUENCY: CONTINUOUS
FREQUENCY: INTERMITTENT
FREQUENCY: CONTINUOUS

## 2019-10-17 ASSESSMENT — PAIN DESCRIPTION - LOCATION
LOCATION: HEAD
LOCATION: CHEST

## 2019-10-17 ASSESSMENT — PAIN DESCRIPTION - ORIENTATION: ORIENTATION: MID

## 2019-10-17 ASSESSMENT — PAIN DESCRIPTION - ONSET
ONSET: PROGRESSIVE
ONSET: ON-GOING

## 2019-10-17 ASSESSMENT — PAIN DESCRIPTION - DESCRIPTORS: DESCRIPTORS: PRESSURE

## 2019-10-18 LAB
EKG ATRIAL RATE: 85 BPM
EKG P AXIS: 84 DEGREES
EKG P-R INTERVAL: 144 MS
EKG Q-T INTERVAL: 360 MS
EKG QRS DURATION: 68 MS
EKG QTC CALCULATION (BAZETT): 428 MS
EKG R AXIS: 15 DEGREES
EKG T AXIS: 61 DEGREES
EKG VENTRICULAR RATE: 85 BPM

## 2019-10-18 PROCEDURE — 93010 ELECTROCARDIOGRAM REPORT: CPT | Performed by: INTERNAL MEDICINE

## 2019-10-22 ENCOUNTER — HOSPITAL ENCOUNTER (OUTPATIENT)
Dept: ULTRASOUND IMAGING | Age: 69
Discharge: HOME | End: 2019-10-24
Payer: COMMERCIAL

## 2019-10-22 DIAGNOSIS — B18.2 CHRONIC HEPATITIS C WITHOUT HEPATIC COMA (HCC): ICD-10-CM

## 2019-10-22 PROCEDURE — 76981 USE PARENCHYMA: CPT

## 2019-10-22 PROCEDURE — 76705 ECHO EXAM OF ABDOMEN: CPT

## 2019-11-07 ENCOUNTER — HOSPITAL ENCOUNTER (OUTPATIENT)
Dept: CARDIAC REHAB | Age: 69
Setting detail: THERAPIES SERIES
Discharge: HOME | End: 2019-11-07
Payer: COMMERCIAL

## 2019-11-26 ENCOUNTER — HOSPITAL ENCOUNTER (OUTPATIENT)
Dept: CARDIAC REHAB | Age: 69
Setting detail: THERAPIES SERIES
Discharge: HOME | End: 2019-11-26
Payer: COMMERCIAL

## 2019-11-26 PROCEDURE — G0424 PULMONARY REHAB W EXER: HCPCS

## 2019-12-16 NOTE — DISCHARGE SUMMARY
Physician Discharge Summary     Patient ID:  Mckenzie Louis  [id number]  82 y.o.  1950    Admit date: 9/4/2019    Discharge date and time: 9/14/2019  1:50 PM     Admitting Physician: Joellen July, DO     Discharge Physician: Addison Bueno DO    Admission Diagnoses: Acute on chronic respiratory failure with hypoxia and hypercapnia ([address] [J96.21, J96.22]    Discharge Diagnoses: COPD WITH EXACERBATION    Admission Condition: poor    Discharged Condition: good    Indication for Admission: [address] [place] [address] Course: PATIENT WAS ADMITTED AND STARTED ON IV STEROIDS AND GIVEN AEROSOLS AND SUPPLEMENTAL OXYGEN. PULMONARY CONSULT WAS OBTAINED. STEROIDS WERE TAPERED VERY SLOWLY AWAITING RESPONSE FROM PATIENT. HE RECEIVED PHYSICAL THERAPY AND IMPROVED ENOUGH FOR DISCHARGE. Consults: pulmonary/intensive care    Significant Diagnostic Studies: labs: PER CHART    Outstanding Order Results     No orders found from 8/6/2019 to 9/5/2019. Treatments: steroids: solu-medrol and respiratory therapy: O2    Discharge Exam:  No exam performed today, DISCHARGE DPRIOR TO DAILY ROUNDS. Disposition: home    Patient Instructions:   [unfilled]  Activity: activity as tolerated  Diet: regular diet  Wound Care: none needed    Follow-up with PCP in 2 weeks.     Signed:  Joellen July  12/15/2019  7:19 PM

## 2020-03-05 ENCOUNTER — HOSPITAL ENCOUNTER (OUTPATIENT)
Age: 70
Discharge: HOME | End: 2020-03-05
Payer: COMMERCIAL

## 2020-03-05 LAB
ALBUMIN SERPL-MCNC: 4.4 G/DL (ref 3.5–5.2)
ALCOHOL URINE: NOT DETECTED MG/DL
ALP BLD-CCNC: 69 U/L (ref 40–129)
ALT SERPL-CCNC: 17 U/L (ref 0–40)
AMPHETAMINE SCREEN, URINE: NOT DETECTED
ANION GAP SERPL CALCULATED.3IONS-SCNC: 12 MMOL/L (ref 7–16)
APTT: 30.9 SEC (ref 24.5–35.1)
AST SERPL-CCNC: 36 U/L (ref 0–39)
BARBITURATE SCREEN URINE: NOT DETECTED
BASOPHILS ABSOLUTE: 0.04 E9/L (ref 0–0.2)
BASOPHILS RELATIVE PERCENT: 0.6 % (ref 0–2)
BENZODIAZEPINE SCREEN, URINE: NOT DETECTED
BILIRUB SERPL-MCNC: 0.7 MG/DL (ref 0–1.2)
BUN BLDV-MCNC: 19 MG/DL (ref 8–23)
CALCIUM SERPL-MCNC: 9.7 MG/DL (ref 8.6–10.2)
CANNABINOID SCREEN URINE: NOT DETECTED
CHLORIDE BLD-SCNC: 93 MMOL/L (ref 98–107)
CO2: 31 MMOL/L (ref 22–29)
COCAINE METABOLITE SCREEN URINE: NOT DETECTED
CREAT SERPL-MCNC: 1.2 MG/DL (ref 0.7–1.2)
EOSINOPHILS ABSOLUTE: 0.01 E9/L (ref 0.05–0.5)
EOSINOPHILS RELATIVE PERCENT: 0.1 % (ref 0–6)
FENTANYL SCREEN, URINE: NOT DETECTED
GFR AFRICAN AMERICAN: >60
GFR NON-AFRICAN AMERICAN: >60 ML/MIN/1.73
GLUCOSE BLD-MCNC: 110 MG/DL (ref 74–99)
HCT VFR BLD CALC: 40.6 % (ref 37–54)
HEMOGLOBIN: 13.1 G/DL (ref 12.5–16.5)
IMMATURE GRANULOCYTES #: 0.04 E9/L
IMMATURE GRANULOCYTES %: 0.6 % (ref 0–5)
INR BLD: 1
LYMPHOCYTES ABSOLUTE: 2.08 E9/L (ref 1.5–4)
LYMPHOCYTES RELATIVE PERCENT: 30.9 % (ref 20–42)
Lab: NORMAL
MCH RBC QN AUTO: 31 PG (ref 26–35)
MCHC RBC AUTO-ENTMCNC: 32.3 % (ref 32–34.5)
MCV RBC AUTO: 96 FL (ref 80–99.9)
METHADONE SCREEN, URINE: NOT DETECTED
MONOCYTES ABSOLUTE: 0.95 E9/L (ref 0.1–0.95)
MONOCYTES RELATIVE PERCENT: 14.1 % (ref 2–12)
NEUTROPHILS ABSOLUTE: 3.61 E9/L (ref 1.8–7.3)
NEUTROPHILS RELATIVE PERCENT: 53.7 % (ref 43–80)
OPIATE SCREEN URINE: NOT DETECTED
OXYCODONE URINE: NOT DETECTED
PDW BLD-RTO: 14.6 FL (ref 11.5–15)
PHENCYCLIDINE SCREEN URINE: NOT DETECTED
PLATELET # BLD: 321 E9/L (ref 130–450)
PMV BLD AUTO: 9.6 FL (ref 7–12)
POTASSIUM SERPL-SCNC: 4.4 MMOL/L (ref 3.5–5)
PROTHROMBIN TIME: 10.9 SEC (ref 9.3–12.4)
RBC # BLD: 4.23 E12/L (ref 3.8–5.8)
SODIUM BLD-SCNC: 136 MMOL/L (ref 132–146)
TOTAL PROTEIN: 8.2 G/DL (ref 6.4–8.3)
WBC # BLD: 6.7 E9/L (ref 4.5–11.5)

## 2020-03-05 PROCEDURE — 82105 ALPHA-FETOPROTEIN SERUM: CPT

## 2020-03-05 PROCEDURE — 85025 COMPLETE CBC W/AUTO DIFF WBC: CPT

## 2020-03-05 PROCEDURE — 36415 COLL VENOUS BLD VENIPUNCTURE: CPT

## 2020-03-05 PROCEDURE — 87902 NFCT AGT GNTYP ALYS HEP C: CPT

## 2020-03-05 PROCEDURE — 80307 DRUG TEST PRSMV CHEM ANLYZR: CPT

## 2020-03-05 PROCEDURE — 86706 HEP B SURFACE ANTIBODY: CPT

## 2020-03-05 PROCEDURE — 86703 HIV-1/HIV-2 1 RESULT ANTBDY: CPT

## 2020-03-05 PROCEDURE — 87340 HEPATITIS B SURFACE AG IA: CPT

## 2020-03-05 PROCEDURE — 86704 HEP B CORE ANTIBODY TOTAL: CPT

## 2020-03-05 PROCEDURE — 87522 HEPATITIS C REVRS TRNSCRPJ: CPT

## 2020-03-05 PROCEDURE — 81596 NFCT DS CHRNC HCV 6 ASSAYS: CPT

## 2020-03-05 PROCEDURE — 80053 COMPREHEN METABOLIC PANEL: CPT

## 2020-03-05 PROCEDURE — 85730 THROMBOPLASTIN TIME PARTIAL: CPT

## 2020-03-05 PROCEDURE — 85610 PROTHROMBIN TIME: CPT

## 2020-03-05 PROCEDURE — 86803 HEPATITIS C AB TEST: CPT

## 2020-03-06 LAB
AFP-TUMOR MARKER: 3 NG/ML (ref 0–9)
HBV SURFACE AB TITR SER: NORMAL {TITER}
HEPATITIS B SURFACE ANTIGEN INTERPRETATION: NORMAL
HEPATITIS C ANTIBODY INTERPRETATION: REACTIVE
HIV-1 AND HIV-2 ANTIBODIES: NORMAL

## 2020-03-08 LAB
HCV QNT BY NAAT IU/ML: ABNORMAL IU/ML
HCV QNT BY NAAT LOG IU/ML: 6.82 LOG IU/ML
HEPATITIS B CORE TOTAL ANTIBODY: REACTIVE
INTERPRETATION: DETECTED

## 2020-03-10 LAB
Lab: NORMAL
REPORT: NORMAL
THIS TEST SENT TO: NORMAL

## 2020-03-11 LAB — HEPATITIS C GENOTYPE: NORMAL

## 2020-10-09 ENCOUNTER — HOSPITAL ENCOUNTER (OUTPATIENT)
Dept: GENERAL RADIOLOGY | Age: 70
Discharge: HOME | End: 2020-10-11
Payer: COMMERCIAL

## 2020-10-09 ENCOUNTER — HOSPITAL ENCOUNTER (OUTPATIENT)
Age: 70
Discharge: HOME | End: 2020-10-11
Payer: COMMERCIAL

## 2020-10-09 ENCOUNTER — HOSPITAL ENCOUNTER (OUTPATIENT)
Age: 70
Discharge: HOME | End: 2020-10-09
Payer: COMMERCIAL

## 2020-10-09 LAB
ALBUMIN SERPL-MCNC: 4 G/DL (ref 3.5–5.2)
ALP BLD-CCNC: 60 U/L (ref 40–129)
ALT SERPL-CCNC: 9 U/L (ref 0–40)
ANION GAP SERPL CALCULATED.3IONS-SCNC: 11 MMOL/L (ref 7–16)
AST SERPL-CCNC: 32 U/L (ref 0–39)
BASOPHILS ABSOLUTE: 0.05 E9/L (ref 0–0.2)
BASOPHILS RELATIVE PERCENT: 0.7 % (ref 0–2)
BILIRUB SERPL-MCNC: 0.7 MG/DL (ref 0–1.2)
BUN BLDV-MCNC: 22 MG/DL (ref 8–23)
CALCIUM SERPL-MCNC: 9.6 MG/DL (ref 8.6–10.2)
CHLORIDE BLD-SCNC: 91 MMOL/L (ref 98–107)
CO2: 28 MMOL/L (ref 22–29)
CREAT SERPL-MCNC: 1.5 MG/DL (ref 0.7–1.2)
EOSINOPHILS ABSOLUTE: 0.09 E9/L (ref 0.05–0.5)
EOSINOPHILS RELATIVE PERCENT: 1.2 % (ref 0–6)
GFR AFRICAN AMERICAN: 56
GFR NON-AFRICAN AMERICAN: 56 ML/MIN/1.73
GLUCOSE BLD-MCNC: 99 MG/DL (ref 74–99)
HCT VFR BLD CALC: 33.5 % (ref 37–54)
HEMOGLOBIN: 11.1 G/DL (ref 12.5–16.5)
IMMATURE GRANULOCYTES #: 0.02 E9/L
IMMATURE GRANULOCYTES %: 0.3 % (ref 0–5)
LYMPHOCYTES ABSOLUTE: 1.59 E9/L (ref 1.5–4)
LYMPHOCYTES RELATIVE PERCENT: 21.8 % (ref 20–42)
MCH RBC QN AUTO: 32 PG (ref 26–35)
MCHC RBC AUTO-ENTMCNC: 33.1 % (ref 32–34.5)
MCV RBC AUTO: 96.5 FL (ref 80–99.9)
MONOCYTES ABSOLUTE: 1.04 E9/L (ref 0.1–0.95)
MONOCYTES RELATIVE PERCENT: 14.3 % (ref 2–12)
NEUTROPHILS ABSOLUTE: 4.5 E9/L (ref 1.8–7.3)
NEUTROPHILS RELATIVE PERCENT: 61.7 % (ref 43–80)
PDW BLD-RTO: 14.5 FL (ref 11.5–15)
PLATELET # BLD: 261 E9/L (ref 130–450)
PMV BLD AUTO: 9.4 FL (ref 7–12)
POTASSIUM SERPL-SCNC: 4.6 MMOL/L (ref 3.5–5)
RBC # BLD: 3.47 E12/L (ref 3.8–5.8)
SODIUM BLD-SCNC: 130 MMOL/L (ref 132–146)
TOTAL PROTEIN: 7.8 G/DL (ref 6.4–8.3)
WBC # BLD: 7.3 E9/L (ref 4.5–11.5)

## 2020-10-09 PROCEDURE — 71046 X-RAY EXAM CHEST 2 VIEWS: CPT

## 2020-10-09 PROCEDURE — 85025 COMPLETE CBC W/AUTO DIFF WBC: CPT

## 2020-10-09 PROCEDURE — 36415 COLL VENOUS BLD VENIPUNCTURE: CPT

## 2020-10-09 PROCEDURE — 80053 COMPREHEN METABOLIC PANEL: CPT

## 2020-10-09 PROCEDURE — 87522 HEPATITIS C REVRS TRNSCRPJ: CPT

## 2020-10-15 LAB
HCV QNT BY NAAT IU/ML: NOT DETECTED IU/ML
HCV QNT BY NAAT LOG IU/ML: NOT DETECTED LOG IU/ML
INTERPRETATION: NOT DETECTED

## 2020-11-19 ENCOUNTER — HOSPITAL ENCOUNTER (OUTPATIENT)
Age: 70
Discharge: HOME | End: 2020-11-19
Payer: COMMERCIAL

## 2020-11-19 ENCOUNTER — HOSPITAL ENCOUNTER (OUTPATIENT)
Dept: GENERAL RADIOLOGY | Age: 70
Discharge: HOME | End: 2020-11-21
Payer: COMMERCIAL

## 2020-11-19 PROCEDURE — 73560 X-RAY EXAM OF KNEE 1 OR 2: CPT

## 2020-11-23 ENCOUNTER — HOSPITAL ENCOUNTER (OUTPATIENT)
Age: 70
Discharge: HOME | End: 2020-11-23
Payer: COMMERCIAL

## 2020-11-23 PROCEDURE — 87338 HPYLORI STOOL AG IA: CPT

## 2020-11-26 LAB — H PYLORI ANTIGEN STOOL: NEGATIVE

## 2020-11-30 ENCOUNTER — HOSPITAL ENCOUNTER (OUTPATIENT)
Age: 70
Discharge: HOME | End: 2020-11-30
Payer: COMMERCIAL

## 2020-11-30 ENCOUNTER — HOSPITAL ENCOUNTER (OUTPATIENT)
Dept: ULTRASOUND IMAGING | Age: 70
Discharge: HOME | End: 2020-12-02
Payer: COMMERCIAL

## 2020-11-30 PROCEDURE — 76981 USE PARENCHYMA: CPT

## 2020-11-30 PROCEDURE — 82103 ALPHA-1-ANTITRYPSIN TOTAL: CPT

## 2020-11-30 PROCEDURE — 76705 ECHO EXAM OF ABDOMEN: CPT

## 2020-12-03 LAB — ALPHA-1 ANTITRYPSIN: 118 MG/DL (ref 90–200)

## 2021-02-12 ENCOUNTER — HOSPITAL ENCOUNTER (OUTPATIENT)
Age: 71
Discharge: HOME | End: 2021-02-14
Payer: COMMERCIAL

## 2021-02-12 ENCOUNTER — HOSPITAL ENCOUNTER (OUTPATIENT)
Dept: GENERAL RADIOLOGY | Age: 71
Discharge: HOME | End: 2021-02-14
Payer: COMMERCIAL

## 2021-02-12 ENCOUNTER — HOSPITAL ENCOUNTER (OUTPATIENT)
Age: 71
Discharge: HOME | End: 2021-02-12
Payer: COMMERCIAL

## 2021-02-12 DIAGNOSIS — R10.84 ABDOMINAL PAIN, GENERALIZED: ICD-10-CM

## 2021-02-12 DIAGNOSIS — R19.4 CHANGE IN BOWEL HABITS: ICD-10-CM

## 2021-02-12 DIAGNOSIS — R19.7 DIARRHEA, UNSPECIFIED TYPE: ICD-10-CM

## 2021-02-12 LAB
ALBUMIN SERPL-MCNC: 3.9 G/DL (ref 3.5–5.2)
ALP BLD-CCNC: 82 U/L (ref 40–129)
ALT SERPL-CCNC: 7 U/L (ref 0–40)
ANION GAP SERPL CALCULATED.3IONS-SCNC: 8 MMOL/L (ref 7–16)
APTT: 30.5 SEC (ref 24.5–35.1)
AST SERPL-CCNC: 36 U/L (ref 0–39)
BASOPHILS ABSOLUTE: 0.06 E9/L (ref 0–0.2)
BASOPHILS RELATIVE PERCENT: 0.8 % (ref 0–2)
BILIRUB SERPL-MCNC: 0.5 MG/DL (ref 0–1.2)
BUN BLDV-MCNC: 14 MG/DL (ref 8–23)
C-REACTIVE PROTEIN: 0.3 MG/DL (ref 0–0.4)
CALCIUM SERPL-MCNC: 9.9 MG/DL (ref 8.6–10.2)
CHLORIDE BLD-SCNC: 94 MMOL/L (ref 98–107)
CO2: 32 MMOL/L (ref 22–29)
CREAT SERPL-MCNC: 1.3 MG/DL (ref 0.7–1.2)
EOSINOPHILS ABSOLUTE: 0.56 E9/L (ref 0.05–0.5)
EOSINOPHILS RELATIVE PERCENT: 7 % (ref 0–6)
FERRITIN: 739 NG/ML
FOLATE: 11.7 NG/ML (ref 4.8–24.2)
GFR AFRICAN AMERICAN: >60
GFR NON-AFRICAN AMERICAN: >60 ML/MIN/1.73
GLUCOSE BLD-MCNC: 106 MG/DL (ref 74–99)
HCT VFR BLD CALC: 35.4 % (ref 37–54)
HEMOGLOBIN: 11.2 G/DL (ref 12.5–16.5)
IGA: 375 MG/DL (ref 70–400)
IMMATURE GRANULOCYTES #: 0.03 E9/L
IMMATURE GRANULOCYTES %: 0.4 % (ref 0–5)
INR BLD: 1
IRON SATURATION: 55 % (ref 20–55)
IRON: 168 MCG/DL (ref 59–158)
LYMPHOCYTES ABSOLUTE: 1.83 E9/L (ref 1.5–4)
LYMPHOCYTES RELATIVE PERCENT: 22.9 % (ref 20–42)
MCH RBC QN AUTO: 32.1 PG (ref 26–35)
MCHC RBC AUTO-ENTMCNC: 31.6 % (ref 32–34.5)
MCV RBC AUTO: 101.4 FL (ref 80–99.9)
MONOCYTES ABSOLUTE: 1.28 E9/L (ref 0.1–0.95)
MONOCYTES RELATIVE PERCENT: 16 % (ref 2–12)
NEUTROPHILS ABSOLUTE: 4.22 E9/L (ref 1.8–7.3)
NEUTROPHILS RELATIVE PERCENT: 52.9 % (ref 43–80)
PDW BLD-RTO: 15.4 FL (ref 11.5–15)
PLATELET # BLD: 317 E9/L (ref 130–450)
PMV BLD AUTO: 10.1 FL (ref 7–12)
POTASSIUM SERPL-SCNC: 4.9 MMOL/L (ref 3.5–5)
PROTHROMBIN TIME: 11.8 SEC (ref 9.3–12.4)
RBC # BLD: 3.49 E12/L (ref 3.8–5.8)
SEDIMENTATION RATE, ERYTHROCYTE: 82 MM/HR (ref 0–15)
SODIUM BLD-SCNC: 134 MMOL/L (ref 132–146)
TOTAL IRON BINDING CAPACITY: 303 MCG/DL (ref 250–450)
TOTAL PROTEIN: 8.1 G/DL (ref 6.4–8.3)
VITAMIN B-12: 402 PG/ML (ref 211–946)
WBC # BLD: 8 E9/L (ref 4.5–11.5)

## 2021-02-12 PROCEDURE — 82784 ASSAY IGA/IGD/IGG/IGM EACH: CPT

## 2021-02-12 PROCEDURE — 87522 HEPATITIS C REVRS TRNSCRPJ: CPT

## 2021-02-12 PROCEDURE — 74018 RADEX ABDOMEN 1 VIEW: CPT

## 2021-02-12 PROCEDURE — 86704 HEP B CORE ANTIBODY TOTAL: CPT

## 2021-02-12 PROCEDURE — 85651 RBC SED RATE NONAUTOMATED: CPT

## 2021-02-12 PROCEDURE — 82728 ASSAY OF FERRITIN: CPT

## 2021-02-12 PROCEDURE — 85025 COMPLETE CBC W/AUTO DIFF WBC: CPT

## 2021-02-12 PROCEDURE — 87340 HEPATITIS B SURFACE AG IA: CPT

## 2021-02-12 PROCEDURE — 83550 IRON BINDING TEST: CPT

## 2021-02-12 PROCEDURE — 85730 THROMBOPLASTIN TIME PARTIAL: CPT

## 2021-02-12 PROCEDURE — 80053 COMPREHEN METABOLIC PANEL: CPT

## 2021-02-12 PROCEDURE — 36415 COLL VENOUS BLD VENIPUNCTURE: CPT

## 2021-02-12 PROCEDURE — 87350 HEPATITIS BE AG IA: CPT

## 2021-02-12 PROCEDURE — 83516 IMMUNOASSAY NONANTIBODY: CPT

## 2021-02-12 PROCEDURE — 82105 ALPHA-FETOPROTEIN SERUM: CPT

## 2021-02-12 PROCEDURE — 86707 HEPATITIS BE ANTIBODY: CPT

## 2021-02-12 PROCEDURE — 85610 PROTHROMBIN TIME: CPT

## 2021-02-12 PROCEDURE — 82607 VITAMIN B-12: CPT

## 2021-02-12 PROCEDURE — 86140 C-REACTIVE PROTEIN: CPT

## 2021-02-12 PROCEDURE — 86706 HEP B SURFACE ANTIBODY: CPT

## 2021-02-12 PROCEDURE — 87517 HEPATITIS B DNA QUANT: CPT

## 2021-02-12 PROCEDURE — 82746 ASSAY OF FOLIC ACID SERUM: CPT

## 2021-02-12 PROCEDURE — 83540 ASSAY OF IRON: CPT

## 2021-02-15 LAB
AFP-TUMOR MARKER: 3 NG/ML (ref 0–9)
GLIADIN ANTIBODIES IGA: 10 UNITS (ref 0–19)
GLIADIN ANTIBODIES IGG: 6 UNITS (ref 0–19)
HBV SURFACE AB TITR SER: NORMAL {TITER}
HEPATITIS B SURFACE ANTIGEN INTERPRETATION: NORMAL
HEPATITIS BE ANTIBODY: NEGATIVE
HEPATITIS BE ANTIGEN: NEGATIVE
TISSUE TRANSGLUTAMINASE ANTIBODY: 12 U/ML (ref 0–5)
TISSUE TRANSGLUTAMINASE IGA: 3 U/ML (ref 0–3)

## 2021-02-16 LAB
HCV QNT BY NAAT IU/ML: NOT DETECTED IU/ML
HCV QNT BY NAAT LOG IU/ML: NOT DETECTED LOG IU/ML
HEPATITIS B CORE TOTAL ANTIBODY: REACTIVE
INTERPRETATION: NOT DETECTED

## 2021-02-17 LAB
HBV QNT LOG, IU/ML: NOT DETECTED LOG IU/ML
HBV QNT, IU/ML: NOT DETECTED IU/ML
INTERPRETATION: NOT DETECTED

## 2021-03-15 ENCOUNTER — HOSPITAL ENCOUNTER (OUTPATIENT)
Age: 71
Discharge: HOME | End: 2021-03-17
Payer: COMMERCIAL

## 2021-03-15 ENCOUNTER — HOSPITAL ENCOUNTER (OUTPATIENT)
Dept: GENERAL RADIOLOGY | Age: 71
Discharge: HOME | End: 2021-03-17
Payer: COMMERCIAL

## 2021-03-15 DIAGNOSIS — R91.8 INFILTRATE OF LOWER LOBE OF LEFT LUNG PRESENT ON IMAGING STUDY: ICD-10-CM

## 2021-03-15 PROCEDURE — 71046 X-RAY EXAM CHEST 2 VIEWS: CPT
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
## 2021-06-08 ENCOUNTER — HOSPITAL ENCOUNTER (OUTPATIENT)
Dept: PULMONOLOGY | Age: 71
Discharge: HOME | End: 2021-06-08
Payer: COMMERCIAL

## 2021-06-08 DIAGNOSIS — Z53.8 PROCEDURE/TEST/EXAM NOT INDICATED: Primary | ICD-10-CM

## 2021-06-08 DIAGNOSIS — J44.9 CHRONIC OBSTRUCTIVE PULMONARY DISEASE, UNSPECIFIED COPD TYPE (HCC): ICD-10-CM

## 2021-06-08 PROCEDURE — 94729 DIFFUSING CAPACITY: CPT

## 2021-06-08 PROCEDURE — 94375 RESPIRATORY FLOW VOLUME LOOP: CPT

## 2021-06-08 PROCEDURE — 94060 EVALUATION OF WHEEZING: CPT

## 2021-06-08 PROCEDURE — 94618 PULMONARY STRESS TESTING: CPT

## 2021-06-08 PROCEDURE — 94726 PLETHYSMOGRAPHY LUNG VOLUMES: CPT

## 2021-06-14 NOTE — PROCEDURES
[address]                               PULMONARY FUNCTION    PATIENT NAME: Dodie Perry                   :        1950  MED REC NO:   [id number]                            ROOM:  ACCOUNT NO:   [de-identified]                           ADMIT DATE: 2021  PROVIDER:     An Guevara MD    DATE OF PROCEDURE:  2021    ATTENDING:  An Guevara MD    PULMONOLOGIST:  An Guevara MD    CONCLUSION:  This PFT do reveal evidence of severe obstruction with no  change post bronchodilator. Lack of response to inhaled bronchodilators  does not preclude its use, it is clinically indicated. MVV is decreased  secondary to obstruction above. No evidence of hyperinflation. Severe  air trapping. The airway resistance is moderately increased with no  change post bronchodilators. Diffusion studies severely decreased and  this consistent with any condition that interferes with the alveolar  capillary base such as emphysema, pulmonary edema, pulmonary emboli,  interstitial disorder of the lung, etc.  Clinical correlation is needed. Flow volume loop consistent with severe obstruction. Pulse oximetry on  room air at rest was with a mean of 92%. While ambulating for 113 feet,  the mean saturation was 97%. There was evidence of good patient's  effort. No previous testing to compare. Thank you.         Raquel Palma MD    D: 2021 16:45:04       T: 2021 18:29:47     [email or web address]  Job#: [id number]     Doc#: [id number]    CC:

## 2022-03-17 ENCOUNTER — APPOINTMENT (OUTPATIENT)
Dept: GENERAL RADIOLOGY | Age: 72
End: 2022-03-17
Payer: COMMERCIAL

## 2022-03-17 ENCOUNTER — HOSPITAL ENCOUNTER (EMERGENCY)
Age: 72
Discharge: HOME | End: 2022-03-17
Attending: EMERGENCY MEDICINE
Payer: COMMERCIAL

## 2022-03-17 VITALS
RESPIRATION RATE: 20 BRPM | TEMPERATURE: 97.3 F | HEIGHT: 66 IN | OXYGEN SATURATION: 100 % | HEART RATE: 90 BPM | BODY MASS INDEX: 25.71 KG/M2 | DIASTOLIC BLOOD PRESSURE: 85 MMHG | WEIGHT: 160 LBS | SYSTOLIC BLOOD PRESSURE: 144 MMHG

## 2022-03-17 DIAGNOSIS — J44.1 COPD EXACERBATION (HCC): Primary | ICD-10-CM

## 2022-03-17 DIAGNOSIS — L03.114 CELLULITIS OF ARM, LEFT: ICD-10-CM

## 2022-03-17 PROCEDURE — 71046 X-RAY EXAM CHEST 2 VIEWS: CPT

## 2022-03-17 PROCEDURE — 6370000000 HC RX 637 (ALT 250 FOR IP): Performed by: EMERGENCY MEDICINE

## 2022-03-17 PROCEDURE — 99283 EMERGENCY DEPT VISIT LOW MDM: CPT

## 2022-03-17 RX ORDER — NAPROXEN 375 MG/1
375 TABLET ORAL 2 TIMES DAILY WITH MEALS
Qty: 28 TABLET | Refills: 0 | Status: SHIPPED | OUTPATIENT
Start: 2022-03-17 | End: 2022-09-25 | Stop reason: ALTCHOICE

## 2022-03-17 RX ORDER — DOXYCYCLINE HYCLATE 100 MG
100 TABLET ORAL 2 TIMES DAILY
Qty: 20 TABLET | Refills: 0 | Status: SHIPPED | OUTPATIENT
Start: 2022-03-17 | End: 2022-03-27

## 2022-03-17 RX ORDER — IPRATROPIUM BROMIDE AND ALBUTEROL SULFATE 2.5; .5 MG/3ML; MG/3ML
1 SOLUTION RESPIRATORY (INHALATION) ONCE
Status: COMPLETED | OUTPATIENT
Start: 2022-03-17 | End: 2022-03-17

## 2022-03-17 RX ADMIN — IPRATROPIUM BROMIDE AND ALBUTEROL SULFATE 1 AMPULE: 2.5; .5 SOLUTION RESPIRATORY (INHALATION) at 19:21

## 2022-03-17 ASSESSMENT — PAIN DESCRIPTION - ORIENTATION: ORIENTATION: LEFT

## 2022-03-17 ASSESSMENT — PAIN DESCRIPTION - PROGRESSION
CLINICAL_PROGRESSION: NOT CHANGED
CLINICAL_PROGRESSION: NOT CHANGED

## 2022-03-17 ASSESSMENT — ENCOUNTER SYMPTOMS
EYE PAIN: 0
COUGH: 0
SINUS PRESSURE: 0
WHEEZING: 0
EYE REDNESS: 0
EYE DISCHARGE: 0
SORE THROAT: 0
VOMITING: 0
DIARRHEA: 0
SHORTNESS OF BREATH: 1
BACK PAIN: 0
NAUSEA: 0
ABDOMINAL PAIN: 0

## 2022-03-17 ASSESSMENT — PAIN SCALES - GENERAL: PAINLEVEL_OUTOF10: 10

## 2022-03-17 ASSESSMENT — PAIN DESCRIPTION - LOCATION: LOCATION: ARM

## 2022-03-17 ASSESSMENT — PAIN DESCRIPTION - PAIN TYPE: TYPE: ACUTE PAIN

## 2022-03-17 ASSESSMENT — PAIN DESCRIPTION - FREQUENCY: FREQUENCY: CONTINUOUS

## 2022-03-17 ASSESSMENT — PAIN DESCRIPTION - DESCRIPTORS: DESCRIPTORS: THROBBING

## 2022-03-17 NOTE — ED PROVIDER NOTES
LUE infection from skin wounds; COPD is \"acting up. \"    The history is provided by the patient. Shortness of Breath  Severity:  Mild  Onset quality:  Gradual  Timing:  Intermittent  Progression:  Waxing and waning  Chronicity:  Chronic  Context: activity    Worsened by:  Weather changes  Ineffective treatments:  None tried  Associated symptoms: no abdominal pain, no chest pain, no cough, no ear pain, no fever, no headaches, no rash, no sore throat, no vomiting and no wheezing         Review of Systems   Constitutional: Negative for chills and fever. HENT: Negative for ear pain, sinus pressure and sore throat. Eyes: Negative for pain, discharge and redness. Respiratory: Positive for shortness of breath. Negative for cough and wheezing. Cardiovascular: Negative for chest pain. Gastrointestinal: Negative for abdominal pain, diarrhea, nausea and vomiting. Genitourinary: Negative for dysuria and frequency. Musculoskeletal: Negative for arthralgias and back pain. Skin: Positive for wound. Negative for rash. Neurological: Negative for weakness and headaches. Hematological: Negative for adenopathy. Psychiatric/Behavioral: Negative. All other systems reviewed and are negative. Physical Exam  Vitals and nursing note reviewed. Constitutional:       General: He is not in acute distress. Appearance: He is well-developed. He is not ill-appearing, toxic-appearing or diaphoretic. HENT:      Head: Normocephalic and atraumatic. Eyes:      Pupils: Pupils are equal, round, and reactive to light. Cardiovascular:      Rate and Rhythm: Normal rate and regular rhythm. Heart sounds: Normal heart sounds. No murmur heard. Pulmonary:      Effort: Pulmonary effort is normal.      Breath sounds: Wheezing present. Abdominal:      General: Bowel sounds are normal.      Palpations: Abdomen is soft. Tenderness: There is no abdominal tenderness. There is no guarding or rebound. Musculoskeletal:      Cervical back: Normal range of motion and neck supple. Skin:     General: Skin is warm and dry. Findings: Abrasion, bruising, erythema and wound present. Neurological:      Mental Status: He is alert and oriented to person, place, and time. Psychiatric:         Behavior: Behavior normal.         Thought Content: Thought content normal.         Judgment: Judgment normal.        --------------------------------------------- PAST HISTORY ---------------------------------------------  Past Medical History:  has a past medical history of Chest pain, Chronic diastolic CHF (congestive heart failure) ([facility] [address] COPD (chronic obstructive pulmonary disease) ([facility]), Hepatitis C, Hilar lymphadenopathy, History of echocardiogram, Hypertension, Irregular heart beat, and Oxygen dependent. Past Surgical History:  has a past surgical history that includes Colonoscopy; hernia repair; cervical fusion (03/09/2018); Cataract removal (Right, 05/2019); and Cataract removal (Left, 06/2019). Social History:  reports that he quit smoking about 7 years ago. His smoking use included cigarettes. He smoked 0.50 packs per day. He has never used smokeless tobacco. He reports current alcohol use of about 14.0 standard drinks of alcohol per week. He reports that he does not use drugs. Family History: family history includes Alcohol Abuse in his father; Diabetes in his father, sister, and sister; Heart Disease in his mother and sister; High Blood Pressure in his sister. The patients home medications have been reviewed. Allergies: Ace inhibitors and Lisinopril    -------------------------------------------------- RESULTS -------------------------------------------------  No results found for this visit on 03/17/22. XR CHEST (2 VW)   Final Result   No acute process.       Hyperinflation.             ------------------------- NURSING NOTES AND VITALS REVIEWED ---------------------------   The nursing notes within the ED encounter and vital signs as below have been reviewed. BP (!) 144/85   Pulse 90   Temp 97.3 °F (36.3 °C) (Temporal)   Resp 20   Ht 5' 6\" (1.676 m)   Wt 160 lb (72.6 kg)   SpO2 100%   BMI 25.82 kg/m²   Oxygen Saturation Interpretation: Normal      ------------------------------------------ PROGRESS NOTES ------------------------------------------   I have spoken with the patient and discussed todays results, in addition to providing specific details for the plan of care and counseling regarding the diagnosis and prognosis. Their questions are answered at this time and they are agreeable with the plan.      --------------------------------- ADDITIONAL PROVIDER NOTES ---------------------------------        This patient is stable for discharge. I have shared the specific conditions for return, as well as the importance of follow-up. IMPRESSION:     1. COPD exacerbation ([facility] [address]    2. Cellulitis of arm, left      Patient's Medications   New Prescriptions    DOXYCYCLINE HYCLATE (VIBRA-TABS) 100 MG TABLET    Take 1 tablet by mouth 2 times daily for 10 days    NAPROXEN (NAPROSYN) 375 MG TABLET    Take 1 tablet by mouth 2 times daily (with meals) for 14 days   Previous Medications    ASPIRIN 81 MG TABLET    Take 81 mg by mouth daily. BUDESONIDE-FORMOTEROL (SYMBICORT) 160-4.5 MCG/ACT AERO    Inhale 2 puffs into the lungs 2 times daily Do & bring    CARVEDILOL (COREG) 25 MG TABLET    Take 50 mg by mouth 2 times daily (with meals) Pt has not been taking medication as prescribed, only 1 tablet once daily    CETIRIZINE (ZYRTEC) 10 MG TABLET    Take 10 mg by mouth daily    HYDROCHLOROTHIAZIDE (HYDRODIURIL) 25 MG TABLET    Take 1 tablet by mouth daily    HYDROCORTISONE 2.5 % CREAM    Apply topically 2 times daily as needed Apply topically 2 times daily.      HYDROXYZINE (ATARAX) 25 MG TABLET    Take 25 mg by mouth nightly    KETOCONAZOLE (NIZORAL) 2 % CREAM    Apply topically daily Apply topically daily. LOSARTAN (COZAAR) 100 MG TABLET    Take 100 mg by mouth daily    OXYGEN    Inhale 4 L into the lungs     TRIAMCINOLONE (KENALOG) 0.1 % CREAM    Apply topically 2 times daily Apply topically 2 times daily. VITAMIN E 400 UNIT CAPSULE    Take 400 Units by mouth daily   Modified Medications    No medications on file   Discontinued Medications    CARBOXYMETHYLCELLULOSE 1 % OPHTHALMIC SOLUTION    Place 1 drop into both eyes daily    PROMETHAZINE-CODEINE (PHENERGAN WITH CODEINE) 6.25-10 MG/5ML SYRUP    Take 5 mLs by mouth every 6 hours as needed for Cough.          Procedures     CAR Laws DO  03/17/22 1920

## 2022-03-17 NOTE — ED NOTES
Wound cleansed with shyanne wick. Neosporin telfa and roland applied.      Kalin Hampton RN  03/17/22 1925

## 2022-05-12 ENCOUNTER — TELEPHONE (OUTPATIENT)
Dept: NEUROSURGERY | Age: 72
End: 2022-05-12

## 2022-05-12 ENCOUNTER — OFFICE VISIT (OUTPATIENT)
Dept: NEUROSURGERY | Age: 72
End: 2022-05-12
Payer: COMMERCIAL

## 2022-05-12 VITALS — HEIGHT: 66 IN | BODY MASS INDEX: 28.93 KG/M2 | WEIGHT: 180 LBS

## 2022-05-12 DIAGNOSIS — M54.50 CHRONIC MIDLINE LOW BACK PAIN WITHOUT SCIATICA: ICD-10-CM

## 2022-05-12 DIAGNOSIS — G89.29 CHRONIC MIDLINE LOW BACK PAIN WITHOUT SCIATICA: ICD-10-CM

## 2022-05-12 DIAGNOSIS — M54.2 NECK PAIN: Primary | ICD-10-CM

## 2022-05-12 DIAGNOSIS — M54.12 CERVICAL RADICULOPATHY: ICD-10-CM

## 2022-05-12 PROCEDURE — 4040F PNEUMOC VAC/ADMIN/RCVD: CPT | Performed by: STUDENT IN AN ORGANIZED HEALTH CARE EDUCATION/TRAINING PROGRAM

## 2022-05-12 PROCEDURE — G8417 CALC BMI ABV UP PARAM F/U: HCPCS | Performed by: STUDENT IN AN ORGANIZED HEALTH CARE EDUCATION/TRAINING PROGRAM

## 2022-05-12 PROCEDURE — 1036F TOBACCO NON-USER: CPT | Performed by: STUDENT IN AN ORGANIZED HEALTH CARE EDUCATION/TRAINING PROGRAM

## 2022-05-12 PROCEDURE — G8427 DOCREV CUR MEDS BY ELIG CLIN: HCPCS | Performed by: STUDENT IN AN ORGANIZED HEALTH CARE EDUCATION/TRAINING PROGRAM

## 2022-05-12 PROCEDURE — 99204 OFFICE O/P NEW MOD 45 MIN: CPT | Performed by: STUDENT IN AN ORGANIZED HEALTH CARE EDUCATION/TRAINING PROGRAM

## 2022-05-12 PROCEDURE — 3017F COLORECTAL CA SCREEN DOC REV: CPT | Performed by: STUDENT IN AN ORGANIZED HEALTH CARE EDUCATION/TRAINING PROGRAM

## 2022-05-12 PROCEDURE — 1123F ACP DISCUSS/DSCN MKR DOCD: CPT | Performed by: STUDENT IN AN ORGANIZED HEALTH CARE EDUCATION/TRAINING PROGRAM

## 2022-05-12 RX ORDER — ALBUTEROL SULFATE 2.5 MG/3ML
SOLUTION RESPIRATORY (INHALATION)
COMMUNITY
End: 2022-09-25 | Stop reason: ALTCHOICE

## 2022-05-12 RX ORDER — DOXYCYCLINE HYCLATE 100 MG/1
CAPSULE ORAL
COMMUNITY
End: 2022-09-25 | Stop reason: ALTCHOICE

## 2022-05-12 RX ORDER — ALBUTEROL SULFATE 90 UG/1
AEROSOL, METERED RESPIRATORY (INHALATION)
COMMUNITY

## 2022-05-12 RX ORDER — DESONIDE 0.5 MG/G
1 CREAM TOPICAL DAILY
COMMUNITY

## 2022-05-12 RX ORDER — ASPIRIN 81 MG/1
TABLET, COATED ORAL
COMMUNITY
Start: 2022-04-30 | End: 2022-09-25 | Stop reason: DRUGHIGH

## 2022-05-12 RX ORDER — ERGOCALCIFEROL (VITAMIN D2) 1250 MCG
CAPSULE ORAL
COMMUNITY
End: 2022-09-25 | Stop reason: ALTCHOICE

## 2022-05-12 RX ORDER — LEVOFLOXACIN 500 MG/1
TABLET, FILM COATED ORAL
COMMUNITY
End: 2022-06-13 | Stop reason: ALTCHOICE

## 2022-05-12 RX ORDER — LISINOPRIL 20 MG/1
TABLET ORAL
COMMUNITY
End: 2022-05-12

## 2022-05-12 RX ORDER — METOPROLOL SUCCINATE 100 MG/1
100 TABLET, EXTENDED RELEASE ORAL DAILY
COMMUNITY

## 2022-05-12 RX ORDER — PANTOPRAZOLE SODIUM 40 MG/1
TABLET, DELAYED RELEASE ORAL
COMMUNITY
End: 2022-06-13 | Stop reason: ALTCHOICE

## 2022-05-12 RX ORDER — TIOTROPIUM BROMIDE 18 UG/1
CAPSULE ORAL; RESPIRATORY (INHALATION)
COMMUNITY
End: 2022-09-25 | Stop reason: ALTCHOICE

## 2022-05-12 RX ORDER — FUROSEMIDE 20 MG/1
TABLET ORAL
COMMUNITY
End: 2022-09-25 | Stop reason: ALTCHOICE

## 2022-05-12 RX ORDER — ERYTHROMYCIN 5 MG/G
OINTMENT OPHTHALMIC
COMMUNITY
End: 2022-09-25 | Stop reason: ALTCHOICE

## 2022-05-12 RX ORDER — METRONIDAZOLE 250 MG/1
TABLET ORAL
COMMUNITY
End: 2022-06-13 | Stop reason: ALTCHOICE

## 2022-05-12 RX ORDER — HYDROCODONE BITARTRATE AND ACETAMINOPHEN 5; 325 MG/1; MG/1
TABLET ORAL
COMMUNITY
End: 2022-09-25 | Stop reason: ALTCHOICE

## 2022-05-12 ASSESSMENT — ENCOUNTER SYMPTOMS
TROUBLE SWALLOWING: 0
ABDOMINAL PAIN: 0
BACK PAIN: 1
SHORTNESS OF BREATH: 0
PHOTOPHOBIA: 0

## 2022-05-12 NOTE — PROGRESS NOTES
Subjective:      Patient ID: Radha Aviles is a 81 y.o. male with a PMH of chronic respiratory failure, CHF, COPD, HTN and previous C4-C7 ACDF in 2018 by Dr. Denny Aviles who presents with neck and back pain. Patient states these pains started a little over a year ago and have progressively gotten worse. Patient describes his neck pain as a constant pain that shoots down his left arm and fingers. He admits to associated numbness in his right finger tips. Denies any associated weakness. Patient states his back pain is worse than his neck pain. He describes this pain as \"metal rubbing together\" although states he does not believe he has any hardware in his back. States this pain goes into both his knees. He denies any associated numbness or weakness with this pain. He denies any bowel or bladder incontinence. He denies any alleviating or aggravating factors for either pain. He has tried Norco and Aleve for the pain which does provide some relief. He has not tried PT or WILBUR for either pain. He is a former smoker and takes ASA daily. Review of Systems   Constitutional: Negative for fever. HENT: Negative for trouble swallowing. Eyes: Negative for photophobia. Respiratory: Negative for shortness of breath. Cardiovascular: Negative for chest pain. Gastrointestinal: Negative for abdominal pain. Endocrine: Negative for heat intolerance. Genitourinary: Positive for urgency. Negative for flank pain. Musculoskeletal: Positive for back pain, neck pain and neck stiffness. Negative for myalgias. Skin: Negative for wound. Neurological: Positive for numbness. Negative for weakness and headaches. Psychiatric/Behavioral: Negative for confusion. Objective:   Physical Exam  HENT:      Head: Normocephalic. Eyes:      Pupils: Pupils are equal, round, and reactive to light. Cardiovascular:      Rate and Rhythm: Normal rate.    Pulmonary:      Effort: Pulmonary effort is normal.   Abdominal: General: There is no distension. Musculoskeletal:         General: Normal range of motion. Skin:     General: Skin is warm and dry. Neurological:      Mental Status: He is alert. Comments: A&Ox3  CN3-12 intact  Motor Strength full   Sensation intact to light touch except right fingertips which patient states is numb  Reflexes normal   (-)Hoffmans  Mild tenderness to palpation of Cervical Spine  Mild tenderness to palpation of Lumbar Spine   Psychiatric:         Thought Content: Thought content normal.         Assessment:      Austin Ruano is a 80 y/o male who presents with neck and low back pain. Neck pain radiates into left arm. Back pain goes into both knees. Back pain is worse than neck pain. Has not tried PT or WILBUR for this pain. Hx of previous cervical fusion.        Plan:      -Pain control and expectations discussed  -Obtain CT Myelogram Cervical and Lumbar spine to evaluate hardware and evaluate for stenosis  -OARRS report reviewed   -Return to Neurosurgery clinic after completion of imaging to discuss results and further treatment plan  -Call/return sooner if symptoms worsen or new issues arise in the interim         Dot Delgado PA-C

## 2022-06-10 NOTE — TELEPHONE ENCOUNTER
CT/MYELOGRAM CERVICAL AND LUMBAR   Date:6/14/2022  Facility:[facility]  Arrival Time:7:30AM    NPO after Midnight  Bring   Will be at facility [employer] ID, insurance cards, covid vaccine card and list of current medications. ASA/Ibuprofen/Blood Thinners: ASA, NAPROXEN to be held for 7days prior  Antidepressants hold for 3 days prior    Labs: PT/INR, Platelets  Covid test 5 days prior if not vaccinated      Pt informed of appt date, time, location and instructions. Pt acknowledged.

## 2022-06-13 ENCOUNTER — HOSPITAL ENCOUNTER (OUTPATIENT)
Dept: PREADMISSION TESTING | Age: 72
Discharge: HOME | End: 2022-06-13

## 2022-06-13 VITALS — HEIGHT: 66 IN | WEIGHT: 180 LBS | BODY MASS INDEX: 28.93 KG/M2

## 2022-06-13 NOTE — PROGRESS NOTES
[address] [facility]                                                                                                                    PRE OP INSTRUCTIONS FOR  Toy Fleischer        Date: 6/13/2022    Date of surgery: 6/14/22   Arrival Time: [facility] will call you between 5pm and 7pm with your final arrival time for surgery    1. Nothing by mouth (NPO) as instructed.____________________________________________________________________    2. Take the following medications with a small sip of water on the morning of Surgery:     3. Diabetics may take evening dose of insulin but none after midnight. If you feel symptomatic or low blood sugar morning of surgery drink 1-2 ounces of apple juice only. 4. Aspirin, Ibuprofen, Advil, Naproxen, Vitamin E and other Anti-inflammatory products should be stopped  before surgery  as directed by your physician. Take Tylenol only unless instructed otherwise by your surgeon. 5. Check with your Doctor regarding stopping Plavix, Coumadin, Lovenox, Eliquis, Effient, or other blood thinners. 6. Do not smoke,use illicit drugs and do not drink any alcoholic beverages 24 hours prior to surgery. 7. You may brush your teeth the morning of surgery. DO NOT SWALLOW WATER    8. You MUST make arrangements for a responsible adult to take you home after your surgery. You will not be allowed to leave alone or drive yourself home. It is strongly suggested someone stay with you the first 24 hrs. Your surgery will be cancelled if you do not have a ride home. 9. PEDIATRIC PATIENTS ONLY:  A parent/legal guardian must accompany a child scheduled for surgery and plan to stay at the hospital until the child is discharged. Please do not bring other children with you.     10. Please wear simple, loose fitting clothing to the hospital.  [place] not bring valuables (money, credit cards, checkbooks, etc.) Do not wear any makeup (including no eye makeup) or nail polish on your fingers or toes. 11. DO NOT wear any jewelry or piercings on day of surgery. All body piercing jewelry must be removed. 12. Shower the night before surgery with __x_Antibacterial soap /ABBEY WIPES________    13. TOTAL JOINT REPLACEMENT/HYSTERECTOMY PATIENTS ONLY---Remember to bring Blood Bank bracelet to the hospital on the day of surgery. 14. If you have a Living Will and Durable Power of  for Healthcare, please bring in a copy. 15. If appropriate bring crutches, inspirex, WALKER, CANE etc... 12. Notify your Surgeon if you develop any illness between now and surgery time, cough, cold, fever, sore throat, nausea, vomiting, etc.  Please notify your surgeon if you experience dizziness, shortness of breath or blurred vision between now & the time of your surgery. 17. If you have ___dentures, they will be removed before going to the OR; we will provide you a container. If you wear ___contact lenses or ___glasses, they will be removed; please bring a case for them. 18. To provide excellent care visitors will be limited to 2 in the room at any given time. 19. Please bring picture ID and insurance card. 20. During flu season no children under the age of 15 are permitted in the hospital for the safety of all patients. 21. Other        Please call AMBULATORY CARE if you have any further questions.    [facility]     [address]

## 2022-06-14 ENCOUNTER — HOSPITAL ENCOUNTER (OUTPATIENT)
Dept: INTERVENTIONAL RADIOLOGY/VASCULAR | Age: 72
Discharge: HOME | End: 2022-06-14
Payer: COMMERCIAL

## 2022-06-14 ENCOUNTER — HOSPITAL ENCOUNTER (OUTPATIENT)
Dept: CT IMAGING | Age: 72
Discharge: HOME | End: 2022-06-14
Payer: COMMERCIAL

## 2022-06-14 VITALS
BODY MASS INDEX: 28.93 KG/M2 | RESPIRATION RATE: 24 BRPM | OXYGEN SATURATION: 96 % | TEMPERATURE: 98 F | SYSTOLIC BLOOD PRESSURE: 134 MMHG | WEIGHT: 180 LBS | DIASTOLIC BLOOD PRESSURE: 86 MMHG | HEART RATE: 81 BPM | HEIGHT: 66 IN

## 2022-06-14 DIAGNOSIS — G89.29 CHRONIC MIDLINE LOW BACK PAIN WITHOUT SCIATICA: ICD-10-CM

## 2022-06-14 DIAGNOSIS — M54.2 NECK PAIN: ICD-10-CM

## 2022-06-14 DIAGNOSIS — M54.12 CERVICAL RADICULOPATHY: ICD-10-CM

## 2022-06-14 DIAGNOSIS — M54.50 CHRONIC MIDLINE LOW BACK PAIN WITHOUT SCIATICA: ICD-10-CM

## 2022-06-14 LAB
HCT VFR BLD CALC: 43.4 % (ref 37–54)
HEMOGLOBIN: 13.5 G/DL (ref 12.5–16.5)
INR BLD: 1
PLATELET # BLD: 231 E9/L (ref 130–450)
PROTHROMBIN TIME: 11.9 SEC (ref 9.3–12.4)

## 2022-06-14 PROCEDURE — 72132 CT LUMBAR SPINE W/DYE: CPT

## 2022-06-14 PROCEDURE — 62305 MYELOGRAPHY LUMBAR INJECTION: CPT

## 2022-06-14 PROCEDURE — 85610 PROTHROMBIN TIME: CPT

## 2022-06-14 PROCEDURE — 36415 COLL VENOUS BLD VENIPUNCTURE: CPT

## 2022-06-14 PROCEDURE — 6370000000 HC RX 637 (ALT 250 FOR IP): Performed by: RADIOLOGY

## 2022-06-14 PROCEDURE — 7100000010 HC PHASE II RECOVERY - FIRST 15 MIN

## 2022-06-14 PROCEDURE — 85027 COMPLETE CBC AUTOMATED: CPT

## 2022-06-14 PROCEDURE — 6360000004 HC RX CONTRAST MEDICATION: Performed by: RADIOLOGY

## 2022-06-14 PROCEDURE — 2580000003 HC RX 258: Performed by: RADIOLOGY

## 2022-06-14 PROCEDURE — 7100000011 HC PHASE II RECOVERY - ADDTL 15 MIN

## 2022-06-14 PROCEDURE — 72126 CT NECK SPINE W/DYE: CPT

## 2022-06-14 RX ORDER — LOSARTAN POTASSIUM 100 MG/1
100 TABLET ORAL DAILY
Status: DISCONTINUED | OUTPATIENT
Start: 2022-06-14 | End: 2022-06-15 | Stop reason: HOSPADM

## 2022-06-14 RX ORDER — METOPROLOL SUCCINATE 100 MG/1
100 TABLET, EXTENDED RELEASE ORAL DAILY
Status: DISCONTINUED | OUTPATIENT
Start: 2022-06-14 | End: 2022-06-15 | Stop reason: HOSPADM

## 2022-06-14 RX ORDER — CARVEDILOL 25 MG/1
50 TABLET ORAL 2 TIMES DAILY WITH MEALS
Status: DISCONTINUED | OUTPATIENT
Start: 2022-06-14 | End: 2022-06-15 | Stop reason: HOSPADM

## 2022-06-14 RX ORDER — SODIUM CHLORIDE 0.9 % (FLUSH) 0.9 %
5-40 SYRINGE (ML) INJECTION 2 TIMES DAILY
Status: DISCONTINUED | OUTPATIENT
Start: 2022-06-14 | End: 2022-06-15 | Stop reason: HOSPADM

## 2022-06-14 RX ADMIN — METOPROLOL SUCCINATE 100 MG: 100 TABLET, FILM COATED, EXTENDED RELEASE ORAL at 08:10

## 2022-06-14 RX ADMIN — CARVEDILOL 50 MG: 25 TABLET, FILM COATED ORAL at 08:09

## 2022-06-14 RX ADMIN — IOPAMIDOL 10 ML: 612 INJECTION, SOLUTION INTRATHECAL at 11:17

## 2022-06-14 RX ADMIN — LOSARTAN POTASSIUM 100 MG: 100 TABLET, FILM COATED ORAL at 08:10

## 2022-06-14 RX ADMIN — Medication 10 ML: at 13:15

## 2022-06-14 NOTE — PROGRESS NOTES
Patient came to specials procedures for cervical and lumbar myelogram.    Procedure explained to patient, questions were answered. Patient positioned prone on table, secured and prepped for procedure. Emotional support given. Increase patients oxygen to 5 liters for procedure, patient laying prone    1051 start procedure /72 78 17 100% on 5 liters nasal canula prone    1100 End procedure /99 74 17 100% on 5 liters nasal canula prone    Patient tolerated procedure. bandaid place on back. Patient transferred back on cart.  Patient place semi garcia per , lowered patient oxygen back to 4 liters patient % on 4 liters    Nurse to nurse called to [facility] spoke with Lea Loo RN    Patient taken to CT scan

## 2022-07-19 ENCOUNTER — OFFICE VISIT (OUTPATIENT)
Dept: NEUROSURGERY | Age: 72
End: 2022-07-19
Payer: COMMERCIAL

## 2022-07-19 VITALS
OXYGEN SATURATION: 96 % | SYSTOLIC BLOOD PRESSURE: 137 MMHG | TEMPERATURE: 97.9 F | HEIGHT: 66 IN | BODY MASS INDEX: 28.93 KG/M2 | HEART RATE: 74 BPM | WEIGHT: 180 LBS | RESPIRATION RATE: 24 BRPM | DIASTOLIC BLOOD PRESSURE: 74 MMHG

## 2022-07-19 DIAGNOSIS — M54.41 CHRONIC BILATERAL LOW BACK PAIN WITH BILATERAL SCIATICA: Primary | ICD-10-CM

## 2022-07-19 DIAGNOSIS — M48.061 SPINAL STENOSIS OF LUMBAR REGION, UNSPECIFIED WHETHER NEUROGENIC CLAUDICATION PRESENT: ICD-10-CM

## 2022-07-19 DIAGNOSIS — Z98.1 S/P CERVICAL SPINAL FUSION: ICD-10-CM

## 2022-07-19 DIAGNOSIS — M54.42 CHRONIC BILATERAL LOW BACK PAIN WITH BILATERAL SCIATICA: Primary | ICD-10-CM

## 2022-07-19 DIAGNOSIS — M54.2 NECK PAIN: ICD-10-CM

## 2022-07-19 DIAGNOSIS — M54.16 LUMBAR RADICULOPATHY: ICD-10-CM

## 2022-07-19 DIAGNOSIS — G89.29 CHRONIC BILATERAL LOW BACK PAIN WITH BILATERAL SCIATICA: Primary | ICD-10-CM

## 2022-07-19 PROCEDURE — G8417 CALC BMI ABV UP PARAM F/U: HCPCS | Performed by: STUDENT IN AN ORGANIZED HEALTH CARE EDUCATION/TRAINING PROGRAM

## 2022-07-19 PROCEDURE — 3017F COLORECTAL CA SCREEN DOC REV: CPT | Performed by: STUDENT IN AN ORGANIZED HEALTH CARE EDUCATION/TRAINING PROGRAM

## 2022-07-19 PROCEDURE — 99213 OFFICE O/P EST LOW 20 MIN: CPT | Performed by: STUDENT IN AN ORGANIZED HEALTH CARE EDUCATION/TRAINING PROGRAM

## 2022-07-19 PROCEDURE — 1036F TOBACCO NON-USER: CPT | Performed by: STUDENT IN AN ORGANIZED HEALTH CARE EDUCATION/TRAINING PROGRAM

## 2022-07-19 PROCEDURE — 1123F ACP DISCUSS/DSCN MKR DOCD: CPT | Performed by: STUDENT IN AN ORGANIZED HEALTH CARE EDUCATION/TRAINING PROGRAM

## 2022-07-19 PROCEDURE — G8427 DOCREV CUR MEDS BY ELIG CLIN: HCPCS | Performed by: STUDENT IN AN ORGANIZED HEALTH CARE EDUCATION/TRAINING PROGRAM

## 2022-07-19 NOTE — PROGRESS NOTES
Office Follow-up     This is a 81year old male who presents to the office for a office follow-up/ review CT Myelogram results. Subjective: Lyssa Flores is a 81 y.o.  male who has a past medical history of chronic respiratory failure, CHF, COPD, HTN and previous C4-C7 ACDF in 2018 by Dr. Sybil Escobar who presents for office follow up/review CT myelogram results. Since last visit patient states his neck pain has remain constant but his back pain continues to worsen. Patient describes his neck pain as a constant pain that shoots down his left arm and fingers. He admits to associated numbness in his right finger tips. Denies any associated weakness. Patient states his back pain continues to be worse than his neck pain. He describes this pain as \"metal rubbing together\". States this pain goes into both his knees. He denies any associated numbness or weakness with this pain. He denies any bowel or bladder incontinence. He denies any alleviating or aggravating factors for either pain. He has tried Norco and Aleve for the pain which does provide some relief. He has PT before with no relief. No recent WILBUR for either pain. He is a former smoker and takes ASA daily. Physical Exam  HENT:      Head: Normocephalic. Eyes:      Pupils: Pupils are equal, round, and reactive to light. Cardiovascular:      Rate and Rhythm: Normal rate. Pulmonary:      Effort: Pulmonary effort is normal.   Abdominal:      General: There is no distension. Skin:     General: Skin is warm and dry. Neurological:      Mental Status: He is alert. Comments: A&Ox3, presents in wheel chair  CN3-12 intact  Motor Strength full   Sensation intact to light touch except right fingertips which patient states is numb  Reflexes normal   (-)Efrain   Psychiatric:         Thought Content: Thought content normal.                    Imagin/15/2022 CT Myelogram Cervical Spine  Impression   1.  Degenerative and postsurgical changes including anterior fusion at C4-C7. Right-sided disc osteophyte complex at C4-C5 causing slight impression on the   right side of the spinal cord and severe right neural foraminal stenosis. Small right disc osteophyte complex at C5-C6 causing severe right neural   foraminal stenosis and slight impression on the right side of the thecal sac. 2. Broad-based disc osteophyte complex at C2-C3 and central disc protrusion   at C7-T1 causing moderate central canal stenosis with slight impression on   the ventral aspect of the spinal cord at both these levels. 3. Small left posterolateral disc protrusion at T1-T2 that contacts the left   ventral surface of the spinal cord without evidence of cord compression. 4. Moderate bilateral neural foraminal stenosis at C6-7 C7, greater on the   right. 6/14/2022 CT Lumbar Spine  Impression   1. Degenerative change with minimal grade 1 anterolisthesis at L4-L5. 2. Moderate to severe central canal stenosis at L3-L4 and L4-L5. Mild   central canal stenosis at L2-L3. Suspected tiny right posterolateral disc   extrusion at L4-L5 extending superiorly behind the right side of the L4   vertebral body. 3. Mild bilateral neural foraminal stenosis at L3-L4 and L4-L5. Assessment: This is a 81 y.o.  male presenting for a office follow-up/review CT Myelogram results. Patient with increasing pain in his back with radiation into both legs. Neck pain remains stable. Has tried PT in the past with no relief. No recent WILBUR. CT Myelograms as above. Plan:  -Pain control and expectations discussed  -Patient would like to pursue conservative measures at this time  -Pain clinic referral given  -OARRS report reviewed   -Follow-up in neurosurgery clinic if surgical interventions would like to be discussed  -Call or return to neurosurgery office sooner if symptoms worsen or if new issues arise in the interim.     Electronically signed by Romana Jurist, PA-C on 7/19/2022 at 6:34 PM

## 2022-08-18 ENCOUNTER — TELEPHONE (OUTPATIENT)
Dept: NEUROSURGERY | Age: 72
End: 2022-08-18

## 2022-08-18 NOTE — TELEPHONE ENCOUNTER
Called patient back, he continues to have numbness in his hands. Discussed results again and offered EMG testing. Patient will continue conservative measures at this time. Referral has already been placed for Pain clinic. He will call if he needs anything else.

## 2022-08-23 ENCOUNTER — TELEPHONE (OUTPATIENT)
Dept: PAIN MANAGEMENT | Age: 72
End: 2022-08-23

## 2022-08-24 ENCOUNTER — HOSPITAL ENCOUNTER (OUTPATIENT)
Dept: PULMONOLOGY | Age: 72
Discharge: HOME | End: 2022-08-24
Payer: COMMERCIAL

## 2022-08-24 DIAGNOSIS — J44.9 CHRONIC OBSTRUCTIVE PULMONARY DISEASE, UNSPECIFIED COPD TYPE (HCC): ICD-10-CM

## 2022-08-24 PROCEDURE — 94618 PULMONARY STRESS TESTING: CPT

## 2022-08-24 PROCEDURE — 94726 PLETHYSMOGRAPHY LUNG VOLUMES: CPT

## 2022-08-24 PROCEDURE — 94729 DIFFUSING CAPACITY: CPT

## 2022-08-24 PROCEDURE — 94060 EVALUATION OF WHEEZING: CPT

## 2022-08-25 NOTE — PROCEDURES
[address]                  [address] [place],  [facility]                               PULMONARY FUNCTION    PATIENT NAME: Toro Callejas                   :        1950  MED REC NO:   [id number]                            ROOM:  ACCOUNT NO:   [de-identified]                           ADMIT DATE: 2022  PROVIDER:     Heavenly Moss DO    DATE OF PROCEDURE:  2022    A 68-year-old  male, 66 inches, 180 pounds, COPD,  cigarette smoker of 50-pack years, quit 15 years ago. Pulmonary  function tests revealed airflow obstruction with a forced vital capacity  of 1.53 liters, 29% of predicted in FEV1, and 0.47 liters, 19% of  predicted with an FEV1-FVC ratio of 31% with 60% bronchodilator response  noted with mid and distal flow rates also improving, statistically  significant. Maximum voluntary ventilation is 18 liters per minute, 15%  of predicted. Static lung volumes showed slow vital capacity of 2.17 liters, 69% of  predicted. Inspiratory capacity of 1.49 liters, 29% of predicted. Expiratory reserve volume 0.70 liters, 65% of predicted. Thoracic gas  volume 5.05 liters, 154% of predicted. Residual volume of 4.35 liters,  194% of predicted. Total lung capacity is 6.53 liters, 104% of  predicted with an RV/TLC ratio of 105%. The diffusion capacity is 1.35  mL/min/mmHg which is 4% of predicted and corrected to 15 when adjusted  for alveolar ventilation. IMPRESSION:  GOLD IV COPD, very severe airflow obstruction, with a  bronchodilator response and evidence of severe hyperinflation and  dynamic air trapping. There is a profound loss in gas transfer so  severe one would recommend arterial blood gas to rule out hypercapnia and  hypoxemia issues. Clinical correlation needed.         Perlita Mathew DO    D: 2022 10:52:02       T: 2022 10:54:52     [email or web address]  Job#: [id number]     Doc#: [id number]    CC:

## 2022-08-26 PROCEDURE — 94060 EVALUATION OF WHEEZING: CPT | Performed by: INTERNAL MEDICINE

## 2022-08-26 PROCEDURE — 94729 DIFFUSING CAPACITY: CPT | Performed by: INTERNAL MEDICINE

## 2022-08-26 PROCEDURE — 94726 PLETHYSMOGRAPHY LUNG VOLUMES: CPT | Performed by: INTERNAL MEDICINE

## 2022-09-03 NOTE — PROCEDURES
[address]                  [address] [facility],  [place]                               PULMONARY FUNCTION    PATIENT NAME: Pablo Clarke                   :        1950  MED REC NO:   [id number]                            ROOM:  ACCOUNT NO:   [de-identified]                           ADMIT DATE: 2022  PROVIDER:     Kalpesh Coats MD    DATE OF PROCEDURE:  2022    ATTENDING:  Kalpesh Coats MD    PULMONOLOGIST:  Kalpesh Coats MD    In conclusion, this PFT revealed evidence of severe obstruction with no  improvement post bronchodilator.  _____ with mild improvement post  bronchodilator. MVV is decreased secondary to obstruction above. There  is evidence of mild hyperinflation, there is evidence of moderate to  severe air trapping. The airway resistance significantly increased with  no improvement post bronchodilator. Diffusion study is severely  decreased. The flow volume loop is also consistent with severe  obstruction. The patient had a 6-minute walk oximetry testing. The  patient could not complete the test.  Wears oxygen at home at baseline. Saturations on room air 84%, oxygen at 4 liters for testing.   The  patient became very short of breath even with oxygen, could not complete  the test.        Addie Spears MD    D: 2022 15:58:41       T: 2022 0:31:53     [email or web address]  Job#: [id number]     Doc#: [id number]    CC:  Kalpesh Coats MD

## 2022-09-25 ENCOUNTER — APPOINTMENT (OUTPATIENT)
Dept: GENERAL RADIOLOGY | Age: 72
DRG: 190 | End: 2022-09-25
Payer: COMMERCIAL

## 2022-09-25 ENCOUNTER — HOSPITAL ENCOUNTER (INPATIENT)
Age: 72
LOS: 7 days | Discharge: HOME | DRG: 190 | End: 2022-10-02
Attending: STUDENT IN AN ORGANIZED HEALTH CARE EDUCATION/TRAINING PROGRAM | Admitting: FAMILY MEDICINE
Payer: COMMERCIAL

## 2022-09-25 DIAGNOSIS — R07.9 CHEST PAIN, UNSPECIFIED TYPE: ICD-10-CM

## 2022-09-25 DIAGNOSIS — J44.1 COPD EXACERBATION (HCC): Primary | ICD-10-CM

## 2022-09-25 LAB
ALBUMIN SERPL-MCNC: 4.1 G/DL (ref 3.5–5.2)
ALP BLD-CCNC: 83 U/L (ref 40–129)
ALT SERPL-CCNC: <5 U/L (ref 0–40)
ANION GAP SERPL CALCULATED.3IONS-SCNC: 9 MMOL/L (ref 7–16)
AST SERPL-CCNC: 25 U/L (ref 0–39)
BASOPHILS ABSOLUTE: 0.08 E9/L (ref 0–0.2)
BASOPHILS RELATIVE PERCENT: 1.1 % (ref 0–2)
BILIRUB SERPL-MCNC: 0.6 MG/DL (ref 0–1.2)
BUN BLDV-MCNC: 15 MG/DL (ref 6–23)
CALCIUM SERPL-MCNC: 10 MG/DL (ref 8.6–10.2)
CHLORIDE BLD-SCNC: 89 MMOL/L (ref 98–107)
CO2: 35 MMOL/L (ref 22–29)
CREAT SERPL-MCNC: 1.2 MG/DL (ref 0.7–1.2)
EKG ATRIAL RATE: 101 BPM
EKG Q-T INTERVAL: 378 MS
EKG QRS DURATION: 86 MS
EKG QTC CALCULATION (BAZETT): 509 MS
EKG R AXIS: 50 DEGREES
EKG T AXIS: 75 DEGREES
EKG VENTRICULAR RATE: 109 BPM
EOSINOPHILS ABSOLUTE: 0.19 E9/L (ref 0.05–0.5)
EOSINOPHILS RELATIVE PERCENT: 2.6 % (ref 0–6)
GFR AFRICAN AMERICAN: >60
GFR NON-AFRICAN AMERICAN: >60 ML/MIN/1.73
GLUCOSE BLD-MCNC: 100 MG/DL (ref 74–99)
HCT VFR BLD CALC: 41.2 % (ref 37–54)
HEMOGLOBIN: 12.7 G/DL (ref 12.5–16.5)
IMMATURE GRANULOCYTES #: 0.03 E9/L
IMMATURE GRANULOCYTES %: 0.4 % (ref 0–5)
LYMPHOCYTES ABSOLUTE: 1.24 E9/L (ref 1.5–4)
LYMPHOCYTES RELATIVE PERCENT: 16.7 % (ref 20–42)
MCH RBC QN AUTO: 30.4 PG (ref 26–35)
MCHC RBC AUTO-ENTMCNC: 30.8 % (ref 32–34.5)
MCV RBC AUTO: 98.6 FL (ref 80–99.9)
MONOCYTES ABSOLUTE: 1.16 E9/L (ref 0.1–0.95)
MONOCYTES RELATIVE PERCENT: 15.6 % (ref 2–12)
NEUTROPHILS ABSOLUTE: 4.72 E9/L (ref 1.8–7.3)
NEUTROPHILS RELATIVE PERCENT: 63.6 % (ref 43–80)
PDW BLD-RTO: 13 FL (ref 11.5–15)
PLATELET # BLD: 313 E9/L (ref 130–450)
PMV BLD AUTO: 10 FL (ref 7–12)
POTASSIUM REFLEX MAGNESIUM: 4.8 MMOL/L (ref 3.5–5)
PRO-BNP: 738 PG/ML (ref 0–125)
RBC # BLD: 4.18 E12/L (ref 3.8–5.8)
SODIUM BLD-SCNC: 133 MMOL/L (ref 132–146)
TOTAL PROTEIN: 8.2 G/DL (ref 6.4–8.3)
TROPONIN, HIGH SENSITIVITY: 47 NG/L (ref 0–11)
TROPONIN, HIGH SENSITIVITY: 61 NG/L (ref 0–11)
WBC # BLD: 7.4 E9/L (ref 4.5–11.5)

## 2022-09-25 PROCEDURE — 84484 ASSAY OF TROPONIN QUANT: CPT

## 2022-09-25 PROCEDURE — 85025 COMPLETE CBC W/AUTO DIFF WBC: CPT

## 2022-09-25 PROCEDURE — 6360000002 HC RX W HCPCS: Performed by: FAMILY MEDICINE

## 2022-09-25 PROCEDURE — 6360000002 HC RX W HCPCS

## 2022-09-25 PROCEDURE — 1200000000 HC SEMI PRIVATE

## 2022-09-25 PROCEDURE — 6370000000 HC RX 637 (ALT 250 FOR IP)

## 2022-09-25 PROCEDURE — 6370000000 HC RX 637 (ALT 250 FOR IP): Performed by: FAMILY MEDICINE

## 2022-09-25 PROCEDURE — 6370000000 HC RX 637 (ALT 250 FOR IP): Performed by: INTERNAL MEDICINE

## 2022-09-25 PROCEDURE — 99285 EMERGENCY DEPT VISIT HI MDM: CPT

## 2022-09-25 PROCEDURE — 94640 AIRWAY INHALATION TREATMENT: CPT

## 2022-09-25 PROCEDURE — 83880 ASSAY OF NATRIURETIC PEPTIDE: CPT

## 2022-09-25 PROCEDURE — 71045 X-RAY EXAM CHEST 1 VIEW: CPT

## 2022-09-25 PROCEDURE — 80053 COMPREHEN METABOLIC PANEL: CPT

## 2022-09-25 PROCEDURE — 94660 CPAP INITIATION&MGMT: CPT

## 2022-09-25 PROCEDURE — 93005 ELECTROCARDIOGRAM TRACING: CPT

## 2022-09-25 PROCEDURE — 36415 COLL VENOUS BLD VENIPUNCTURE: CPT

## 2022-09-25 PROCEDURE — 94664 DEMO&/EVAL PT USE INHALER: CPT

## 2022-09-25 PROCEDURE — 96374 THER/PROPH/DIAG INJ IV PUSH: CPT

## 2022-09-25 RX ORDER — SODIUM CHLORIDE 9 MG/ML
INJECTION, SOLUTION INTRAVENOUS PRN
Status: DISCONTINUED | OUTPATIENT
Start: 2022-09-25 | End: 2022-10-02 | Stop reason: HOSPADM

## 2022-09-25 RX ORDER — METHYLPREDNISOLONE SODIUM SUCCINATE 125 MG/2ML
60 INJECTION, POWDER, LYOPHILIZED, FOR SOLUTION INTRAMUSCULAR; INTRAVENOUS EVERY 6 HOURS
Status: DISCONTINUED | OUTPATIENT
Start: 2022-09-25 | End: 2022-09-26

## 2022-09-25 RX ORDER — VITAMIN E 268 MG
400 CAPSULE ORAL DAILY
Status: DISCONTINUED | OUTPATIENT
Start: 2022-09-25 | End: 2022-10-02 | Stop reason: HOSPADM

## 2022-09-25 RX ORDER — ACETAMINOPHEN 650 MG/1
650 SUPPOSITORY RECTAL EVERY 6 HOURS PRN
Status: DISCONTINUED | OUTPATIENT
Start: 2022-09-25 | End: 2022-10-02 | Stop reason: HOSPADM

## 2022-09-25 RX ORDER — METHYLPREDNISOLONE SODIUM SUCCINATE 125 MG/2ML
60 INJECTION, POWDER, LYOPHILIZED, FOR SOLUTION INTRAMUSCULAR; INTRAVENOUS ONCE
Status: COMPLETED | OUTPATIENT
Start: 2022-09-25 | End: 2022-09-25

## 2022-09-25 RX ORDER — HYDROCHLOROTHIAZIDE 25 MG/1
25 TABLET ORAL DAILY
Status: DISCONTINUED | OUTPATIENT
Start: 2022-09-25 | End: 2022-10-02 | Stop reason: HOSPADM

## 2022-09-25 RX ORDER — BUDESONIDE 0.5 MG/2ML
0.5 INHALANT ORAL 2 TIMES DAILY
Status: DISCONTINUED | OUTPATIENT
Start: 2022-09-25 | End: 2022-10-02 | Stop reason: HOSPADM

## 2022-09-25 RX ORDER — IPRATROPIUM BROMIDE AND ALBUTEROL SULFATE 2.5; .5 MG/3ML; MG/3ML
1 SOLUTION RESPIRATORY (INHALATION) ONCE
Status: COMPLETED | OUTPATIENT
Start: 2022-09-25 | End: 2022-09-25

## 2022-09-25 RX ORDER — ALBUTEROL SULFATE 90 UG/1
2 AEROSOL, METERED RESPIRATORY (INHALATION) EVERY 6 HOURS PRN
Status: DISCONTINUED | OUTPATIENT
Start: 2022-09-25 | End: 2022-09-25

## 2022-09-25 RX ORDER — SODIUM CHLORIDE 0.9 % (FLUSH) 0.9 %
5-40 SYRINGE (ML) INJECTION EVERY 12 HOURS SCHEDULED
Status: DISCONTINUED | OUTPATIENT
Start: 2022-09-25 | End: 2022-10-02 | Stop reason: HOSPADM

## 2022-09-25 RX ORDER — LOSARTAN POTASSIUM 50 MG/1
100 TABLET ORAL DAILY
Status: DISCONTINUED | OUTPATIENT
Start: 2022-09-25 | End: 2022-10-02 | Stop reason: HOSPADM

## 2022-09-25 RX ORDER — ASPIRIN 81 MG/1
81 TABLET ORAL DAILY
COMMUNITY

## 2022-09-25 RX ORDER — NITROGLYCERIN 0.4 MG/1
0.4 TABLET SUBLINGUAL ONCE
Status: COMPLETED | OUTPATIENT
Start: 2022-09-25 | End: 2022-09-25

## 2022-09-25 RX ORDER — BUDESONIDE 0.5 MG/2ML
1 INHALANT ORAL DAILY
COMMUNITY

## 2022-09-25 RX ORDER — ASPIRIN 81 MG/1
81 TABLET ORAL DAILY
Status: DISCONTINUED | OUTPATIENT
Start: 2022-09-25 | End: 2022-10-02 | Stop reason: HOSPADM

## 2022-09-25 RX ORDER — ENOXAPARIN SODIUM 100 MG/ML
40 INJECTION SUBCUTANEOUS DAILY
Status: DISCONTINUED | OUTPATIENT
Start: 2022-09-26 | End: 2022-10-02 | Stop reason: HOSPADM

## 2022-09-25 RX ORDER — POLYETHYLENE GLYCOL 3350 17 G/17G
17 POWDER, FOR SOLUTION ORAL DAILY PRN
Status: DISCONTINUED | OUTPATIENT
Start: 2022-09-25 | End: 2022-10-02 | Stop reason: HOSPADM

## 2022-09-25 RX ORDER — DEXTROSE MONOHYDRATE 100 MG/ML
INJECTION, SOLUTION INTRAVENOUS CONTINUOUS PRN
Status: DISCONTINUED | OUTPATIENT
Start: 2022-09-25 | End: 2022-10-02 | Stop reason: HOSPADM

## 2022-09-25 RX ORDER — ERGOCALCIFEROL 1.25 MG/1
50000 CAPSULE ORAL WEEKLY
Status: DISCONTINUED | OUTPATIENT
Start: 2022-10-03 | End: 2022-09-25

## 2022-09-25 RX ORDER — HYDROCHLOROTHIAZIDE 25 MG/1
25 TABLET ORAL DAILY
COMMUNITY

## 2022-09-25 RX ORDER — ACETAMINOPHEN 325 MG/1
650 TABLET ORAL EVERY 6 HOURS PRN
Status: DISCONTINUED | OUTPATIENT
Start: 2022-09-25 | End: 2022-10-02 | Stop reason: HOSPADM

## 2022-09-25 RX ORDER — SODIUM CHLORIDE 0.9 % (FLUSH) 0.9 %
5-40 SYRINGE (ML) INJECTION PRN
Status: DISCONTINUED | OUTPATIENT
Start: 2022-09-25 | End: 2022-10-02 | Stop reason: HOSPADM

## 2022-09-25 RX ORDER — CARVEDILOL 25 MG/1
50 TABLET ORAL 2 TIMES DAILY WITH MEALS
Status: DISCONTINUED | OUTPATIENT
Start: 2022-09-25 | End: 2022-09-25

## 2022-09-25 RX ORDER — BUDESONIDE AND FORMOTEROL FUMARATE DIHYDRATE 160; 4.5 UG/1; UG/1
2 AEROSOL RESPIRATORY (INHALATION) 2 TIMES DAILY
Status: DISCONTINUED | OUTPATIENT
Start: 2022-09-25 | End: 2022-09-25

## 2022-09-25 RX ORDER — METOPROLOL SUCCINATE 25 MG/1
100 TABLET, EXTENDED RELEASE ORAL DAILY
Status: DISCONTINUED | OUTPATIENT
Start: 2022-09-25 | End: 2022-10-02 | Stop reason: HOSPADM

## 2022-09-25 RX ORDER — HYDROCODONE BITARTRATE AND ACETAMINOPHEN 5; 325 MG/1; MG/1
1 TABLET ORAL EVERY 12 HOURS PRN
Status: DISCONTINUED | OUTPATIENT
Start: 2022-09-25 | End: 2022-09-26

## 2022-09-25 RX ORDER — ARFORMOTEROL TARTRATE 15 UG/2ML
15 SOLUTION RESPIRATORY (INHALATION) 2 TIMES DAILY
Status: DISCONTINUED | OUTPATIENT
Start: 2022-09-25 | End: 2022-09-26

## 2022-09-25 RX ORDER — ALBUTEROL SULFATE 2.5 MG/3ML
2.5 SOLUTION RESPIRATORY (INHALATION) EVERY 6 HOURS PRN
Status: DISCONTINUED | OUTPATIENT
Start: 2022-09-25 | End: 2022-10-02 | Stop reason: HOSPADM

## 2022-09-25 RX ORDER — FUROSEMIDE 20 MG/1
20 TABLET ORAL DAILY
Status: DISCONTINUED | OUTPATIENT
Start: 2022-09-25 | End: 2022-09-25

## 2022-09-25 RX ADMIN — NITROGLYCERIN 0.4 MG: 0.4 TABLET SUBLINGUAL at 08:18

## 2022-09-25 RX ADMIN — IPRATROPIUM BROMIDE 0.5 MG: 0.5 SOLUTION RESPIRATORY (INHALATION) at 17:25

## 2022-09-25 RX ADMIN — IPRATROPIUM BROMIDE AND ALBUTEROL SULFATE 1 AMPULE: .5; 2.5 SOLUTION RESPIRATORY (INHALATION) at 12:10

## 2022-09-25 RX ADMIN — METOPROLOL SUCCINATE 100 MG: 25 TABLET, EXTENDED RELEASE ORAL at 17:00

## 2022-09-25 RX ADMIN — ARFORMOTEROL TARTRATE 15 MCG: 15 SOLUTION RESPIRATORY (INHALATION) at 17:25

## 2022-09-25 RX ADMIN — ASPIRIN 81 MG: 81 TABLET, COATED ORAL at 17:02

## 2022-09-25 RX ADMIN — IPRATROPIUM BROMIDE AND ALBUTEROL SULFATE 1 AMPULE: .5; 2.5 SOLUTION RESPIRATORY (INHALATION) at 16:35

## 2022-09-25 RX ADMIN — Medication 400 UNITS: at 17:57

## 2022-09-25 RX ADMIN — HYDROCHLOROTHIAZIDE 25 MG: 25 TABLET ORAL at 17:42

## 2022-09-25 RX ADMIN — HYDROCODONE BITARTRATE AND ACETAMINOPHEN 1 TABLET: 5; 325 TABLET ORAL at 16:58

## 2022-09-25 RX ADMIN — LOSARTAN POTASSIUM 100 MG: 50 TABLET, FILM COATED ORAL at 17:03

## 2022-09-25 RX ADMIN — METHYLPREDNISOLONE SODIUM SUCCINATE 60 MG: 125 INJECTION, POWDER, FOR SOLUTION INTRAMUSCULAR; INTRAVENOUS at 08:23

## 2022-09-25 RX ADMIN — METHYLPREDNISOLONE SODIUM SUCCINATE 60 MG: 125 INJECTION, POWDER, FOR SOLUTION INTRAMUSCULAR; INTRAVENOUS at 17:01

## 2022-09-25 ASSESSMENT — ENCOUNTER SYMPTOMS
CONSTIPATION: 0
DIARRHEA: 0
NAUSEA: 0
VOMITING: 0
SINUS PAIN: 0
ABDOMINAL PAIN: 0
COLOR CHANGE: 0
SHORTNESS OF BREATH: 1
EYE DISCHARGE: 0
SORE THROAT: 0
COUGH: 0

## 2022-09-25 ASSESSMENT — PAIN DESCRIPTION - PAIN TYPE: TYPE: ACUTE PAIN

## 2022-09-25 ASSESSMENT — PAIN - FUNCTIONAL ASSESSMENT: PAIN_FUNCTIONAL_ASSESSMENT: ACTIVITIES ARE NOT PREVENTED

## 2022-09-25 ASSESSMENT — PAIN DESCRIPTION - ORIENTATION: ORIENTATION: LEFT

## 2022-09-25 ASSESSMENT — PAIN DESCRIPTION - LOCATION
LOCATION: NECK
LOCATION: CHEST
LOCATION: NECK

## 2022-09-25 ASSESSMENT — PAIN SCALES - WONG BAKER: WONGBAKER_NUMERICALRESPONSE: 0

## 2022-09-25 ASSESSMENT — PAIN DESCRIPTION - ONSET: ONSET: ON-GOING

## 2022-09-25 ASSESSMENT — PAIN DESCRIPTION - FREQUENCY: FREQUENCY: CONTINUOUS

## 2022-09-25 ASSESSMENT — PAIN SCALES - GENERAL
PAINLEVEL_OUTOF10: 10

## 2022-09-25 ASSESSMENT — PAIN DESCRIPTION - DESCRIPTORS: DESCRIPTORS: ACHING

## 2022-09-25 NOTE — ED PROVIDER NOTES
Miguel Manning is a 82-year-old male with a history of COPD and CHF. Patient is a 81 y.o. male presents with a chief complaint of SOB and CP  This CP has been occurring for past several weeks, worsening over the past couple of days, and SOB just today. Patient states that it gets better with sitting up. Patient states that it gets worse with laying flat. Patient states that it is severe in severity. Patient states it was gradual in onset. He notes that for the past couple of weeks he had been having intermittent chest pain retrosternal however over the past week it has become more consistent, noting that it has been constant for the past day. He also reports increasing shortness of breath today. Patient reports that he feels too short of breath with any exertion and lying flat even on his home 4 L of oxygen. He denies any fevers, chills, lightheaded or dizziness, numbness or tingling, headaches, blurry or double vision, abdominal pain, nausea, vomiting, dysuria, hematuria, hematochezia, diarrhea, or any leg swelling. Review of Systems   Constitutional:  Negative for chills and fever. HENT:  Negative for congestion, sinus pain and sore throat. Eyes:  Negative for discharge and visual disturbance. Respiratory:  Positive for shortness of breath. Negative for cough. Cardiovascular:  Positive for chest pain. Negative for leg swelling. Gastrointestinal:  Negative for abdominal pain, constipation, diarrhea, nausea and vomiting. Endocrine: Negative for polyuria. Genitourinary:  Negative for difficulty urinating, dysuria, frequency and hematuria. Musculoskeletal:  Negative for arthralgias and joint swelling. Skin:  Negative for color change and rash. Neurological:  Negative for dizziness, weakness, light-headedness, numbness and headaches. All other systems reviewed and are negative. Physical Exam  Constitutional:       General: He is not in acute distress.      Appearance: Normal appearance. HENT:      Head: Normocephalic and atraumatic. Mouth/Throat:      Mouth: Mucous membranes are moist.      Pharynx: Oropharynx is clear. Eyes:      Extraocular Movements: Extraocular movements intact. Conjunctiva/sclera: Conjunctivae normal.      Pupils: Pupils are equal, round, and reactive to light. Cardiovascular:      Rate and Rhythm: Normal rate and regular rhythm. Pulses: Normal pulses. Heart sounds: Normal heart sounds. Pulmonary:      Effort: Accessory muscle usage present. Breath sounds: Transmitted upper airway sounds present. Abdominal:      General: Abdomen is flat. Palpations: Abdomen is soft. Musculoskeletal:         General: No swelling. Normal range of motion. Cervical back: Normal range of motion and neck supple. Skin:     General: Skin is warm and dry. Neurological:      General: No focal deficit present. Mental Status: He is alert and oriented to person, place, and time. Psychiatric:         Mood and Affect: Mood normal.         Behavior: Behavior normal.        Procedures     MDM  Number of Diagnoses or Management Options  Chest pain, unspecified type  COPD exacerbation ([facility] [address]  Diagnosis management comments: Sheba Briceño is a 19-year-old male presenting to the ED with complaints of chest pain and shortness of breath. CBC, CMP, were relatively within normal limits. Chest x-ray revealed COPD, but no evidence of failure or pneumonia. Troponin initially 61, on repeat 47. BNP of 738. Patient was ambulated, and oxygen saturations remained stable, however patient had increased work of breathing while ambulating. Patient was initially placed on BiPAP, however he removes the mask secondary to discomfort. He has been stable on 4 L nasal cannula. Plan to admit patient to medicine for further evaluation. He is agreeable to plan. Spoke with Dr. Jodee Amaya who agreed to admit patient.          ED Course as of 09/25/22 1806   [facility] Sep 25, 2022 0711 On repeat examination, patient is standing at sink washing hands noting he took his BiPAP off because he got too hot. Returned to 4L NC [CP]   [phone] with Dr. Jessa Up who agreed to admit patient. [CP]      ED Course User Index  [CP] Marianna Ellis DO      ED Course as of 09/25/22 1806   Sun Sep 25, 2022   1214 On repeat examination, patient is standing at sink washing hands noting he took his BiPAP off because he got too hot. Returned to 4L NC [CP]   [phone] with Dr. Jessa Up who agreed to admit patient. [CP]      ED Course User Index  [CP] Marianna Ellis DO       --------------------------------------------- PAST HISTORY ---------------------------------------------  Past Medical History:  has a past medical history of Chest pain, Chronic diastolic CHF (congestive heart failure) ([facility] [address] [facility] Hepatitis C, Hilar lymphadenopathy, History of echocardiogram, Hypertension, Irregular heart beat, and Oxygen dependent. Past Surgical History:  has a past surgical history that includes Colonoscopy; hernia repair; cervical fusion (03/09/2018); Cataract removal (Right, 05/2019); and Cataract removal (Left, 06/2019). Social History:  reports that he quit smoking about 7 years ago. His smoking use included cigarettes. He smoked an average of .5 packs per day. He has never used smokeless tobacco. He reports current alcohol use of about 4.0 standard drinks per week. He reports that he does not use drugs. Family History: family history includes Alcohol Abuse in his father; Diabetes in his father, sister, and sister; Heart Disease in his mother and sister; High Blood Pressure in his sister. The patients home medications have been reviewed.     Allergies: Ace inhibitors and Lisinopril    -------------------------------------------------- RESULTS -------------------------------------------------    LABS:  Results for orders placed or performed during the hospital encounter of 09/25/22   CBC with Auto Differential   Result Value Ref Range    WBC 7.4 4.5 - 11.5 E9/L    RBC 4.18 3.80 - 5.80 E12/L    Hemoglobin 12.7 12.5 - 16.5 g/dL    Hematocrit 41.2 37.0 - 54.0 %    MCV 98.6 80.0 - 99.9 fL    MCH 30.4 26.0 - 35.0 pg    MCHC 30.8 (L) 32.0 - 34.5 %    RDW 13.0 11.5 - 15.0 fL    Platelets 330 164 - 953 E9/L    MPV 10.0 7.0 - 12.0 fL    Neutrophils % 63.6 43.0 - 80.0 %    Immature Granulocytes % 0.4 0.0 - 5.0 %    Lymphocytes % 16.7 (L) 20.0 - 42.0 %    Monocytes % 15.6 (H) 2.0 - 12.0 %    Eosinophils % 2.6 0.0 - 6.0 %    Basophils % 1.1 0.0 - 2.0 %    Neutrophils Absolute 4.72 1.80 - 7.30 E9/L    Immature Granulocytes # 0.03 E9/L    Lymphocytes Absolute 1.24 (L) 1.50 - 4.00 E9/L    Monocytes Absolute 1.16 (H) 0.10 - 0.95 E9/L    Eosinophils Absolute 0.19 0.05 - 0.50 E9/L    Basophils Absolute 0.08 0.00 - 0.20 E9/L   Comprehensive Metabolic Panel w/ Reflex to MG   Result Value Ref Range    Sodium 133 132 - 146 mmol/L    Potassium reflex Magnesium 4.8 3.5 - 5.0 mmol/L    Chloride 89 (L) 98 - 107 mmol/L    CO2 35 (H) 22 - 29 mmol/L    Anion Gap 9 7 - 16 mmol/L    Glucose 100 (H) 74 - 99 mg/dL    BUN 15 6 - 23 mg/dL    Creatinine 1.2 0.7 - 1.2 mg/dL    GFR Non-African American >60 >=60 mL/min/1.73    GFR African American >60     Calcium 10.0 8.6 - 10.2 mg/dL    Total Protein 8.2 6.4 - 8.3 g/dL    Albumin 4.1 3.5 - 5.2 g/dL    Total Bilirubin 0.6 0.0 - 1.2 mg/dL    Alkaline Phosphatase 83 40 - 129 U/L    ALT <5 0 - 40 U/L    AST 25 0 - 39 U/L   Troponin   Result Value Ref Range    Troponin, High Sensitivity 61 (H) 0 - 11 ng/L   Brain Natriuretic Peptide   Result Value Ref Range    Pro- (H) 0 - 125 pg/mL   Troponin   Result Value Ref Range    Troponin, High Sensitivity 47 (H) 0 - 11 ng/L   EKG 12 Lead   Result Value Ref Range    Ventricular Rate 109 BPM    Atrial Rate 101 BPM    QRS Duration 86 ms    Q-T Interval 378 ms    QTc Calculation (Bazett) 509 ms    R Axis 50 degrees T Axis 75 degrees       RADIOLOGY:  XR CHEST PORTABLE   Final Result   COPD. There is no evidence of failure or pneumonia             EKG:  This EKG is signed and interpreted by me. Rate: 109  Rhythm: Atrial fibrillation and Rapid ventricular response  Interpretation: no acute changes and non-specific EKG  Comparison: changes compared to previous EKG; Afib replaced NSR      ------------------------- NURSING NOTES AND VITALS REVIEWED ---------------------------  Date / Time Roomed:  9/25/2022  7:39 AM  ED Bed Assignment:  17/17    The nursing notes within the ED encounter and vital signs as below have been reviewed.      Patient Vitals for the past 24 hrs:   BP Temp Temp src Pulse Resp SpO2   09/25/22 1726 -- -- -- 80 17 100 %   09/25/22 1725 -- -- -- 82 20 100 %   09/25/22 1700 (!) 157/120 -- -- 86 -- --   09/25/22 1635 -- -- -- 81 18 100 %   09/25/22 1323 (!) 156/89 -- -- 70 19 --   09/25/22 1253 (!) 154/79 -- -- 71 17 --   09/25/22 1223 (!) 160/93 -- -- 79 19 91 %   09/25/22 1210 -- -- -- -- -- 100 %   09/25/22 1205 -- -- -- 75 22 100 %   09/25/22 1202 (!) 149/94 -- -- 85 (!) 36 --   09/25/22 1156 (!) 159/96 -- -- 78 15 --   09/25/22 1152 (!) 140/81 -- -- 87 (!) 31 100 %   09/25/22 1057 120/64 -- -- 73 17 100 %   09/25/22 0953 (!) 144/103 -- -- 90 24 99 %   09/25/22 0947 -- -- -- (!) 104 15 --   09/25/22 0946 130/83 -- -- 85 17 --   09/25/22 0945 -- -- -- 85 18 --   09/25/22 0944 -- -- -- 92 17 --   09/25/22 0943 -- -- -- 89 17 --   09/25/22 0942 (!) 151/101 -- -- 91 24 --   09/25/22 0911 (!) 128/90 -- -- 83 22 100 %   09/25/22 0906 113/75 -- -- 77 25 100 %   09/25/22 0902 111/84 -- -- 82 17 90 %   09/25/22 0850 -- -- -- -- 20 --   09/25/22 0829 -- -- -- 84 23 99 %   09/25/22 0828 -- -- -- 95 19 100 %   09/25/22 0827 -- -- -- 95 15 100 %   09/25/22 0826 106/76 -- -- 92 19 100 %   09/25/22 0825 -- -- -- 88 (!) 31 100 %   09/25/22 0824 (!) 140/84 -- -- 87 18 100 %   09/25/22 0802 (!) 163/99 -- -- -- -- -- 09/25/22 0745 (!) 190/115 -- -- 84 -- (!) 88 %   09/25/22 0731 -- 98.3 °F (36.8 °C) Infrared 84 18 93 %       Oxygen Saturation Interpretation: Normal    ------------------------------------------ PROGRESS NOTES ------------------------------------------  Re-evaluation(s):  Time: 1200  Patients symptoms show no change  Repeat physical examination is not changed    Counseling:  I have spoken with the patient and discussed todays results, in addition to providing specific details for the plan of care and counseling regarding the diagnosis and prognosis. Their questions are answered at this time and they are agreeable with the plan of admission.    --------------------------------- ADDITIONAL PROVIDER NOTES ---------------------------------  Consultations:  Time: 1212. Spoke with Dr. Prema Brito. Discussed case. They will admit the patient. This patient's ED course included: a personal history and physicial examination, multiple bedside re-evaluations, and cardiac monitoring    This patient has remained hemodynamically stable during their ED course. Diagnosis:  1. COPD exacerbation (HCC)    2. Chest pain, unspecified type        Disposition:  Patient's disposition: Admit to med/surg floor  Patient's condition is stable. Patient was seen and evaluated by myself and my attending Salazar Macias MD. Assessment and Plan discussed with attending provider, please see attestation for final plan of care. This note was done using dictation software and there may be some grammatical errors associated with this.     Dorene Stoddard 59, DO  Resident  09/25/22 2194

## 2022-09-25 NOTE — ED NOTES
Pt struggled to get out of bed. Pt o2 on his normal 4 liters dropped to 83% while getting out of bed. Pt needed a few minutes to recover and then ambulated slowly down decker while maintaining 98% on 4 liters.  Dr Waldo Santizo notiifed     Maria Elena Thornton RN  09/25/22 3009

## 2022-09-26 LAB
ALBUMIN SERPL-MCNC: 3.7 G/DL (ref 3.5–5.2)
ALP BLD-CCNC: 76 U/L (ref 40–129)
ALT SERPL-CCNC: <5 U/L (ref 0–40)
AMPHETAMINE SCREEN, URINE: NOT DETECTED
ANION GAP SERPL CALCULATED.3IONS-SCNC: 8 MMOL/L (ref 7–16)
ANISOCYTOSIS: ABNORMAL
AST SERPL-CCNC: 22 U/L (ref 0–39)
BARBITURATE SCREEN URINE: NOT DETECTED
BASOPHILIC STIPPLING: ABNORMAL
BASOPHILS ABSOLUTE: 0 E9/L (ref 0–0.2)
BASOPHILS RELATIVE PERCENT: 0.2 % (ref 0–2)
BENZODIAZEPINE SCREEN, URINE: NOT DETECTED
BILIRUB SERPL-MCNC: 0.3 MG/DL (ref 0–1.2)
BUN BLDV-MCNC: 33 MG/DL (ref 6–23)
CALCIUM SERPL-MCNC: 9.8 MG/DL (ref 8.6–10.2)
CANNABINOID SCREEN URINE: POSITIVE
CHLORIDE BLD-SCNC: 88 MMOL/L (ref 98–107)
CO2: 33 MMOL/L (ref 22–29)
COCAINE METABOLITE SCREEN URINE: NOT DETECTED
CREAT SERPL-MCNC: 1.5 MG/DL (ref 0.7–1.2)
EOSINOPHILS ABSOLUTE: 0 E9/L (ref 0.05–0.5)
EOSINOPHILS RELATIVE PERCENT: 0 % (ref 0–6)
FENTANYL SCREEN, URINE: NOT DETECTED
GFR AFRICAN AMERICAN: 56
GFR NON-AFRICAN AMERICAN: 56 ML/MIN/1.73
GLUCOSE BLD-MCNC: 123 MG/DL (ref 74–99)
HCT VFR BLD CALC: 34.3 % (ref 37–54)
HEMOGLOBIN: 10.9 G/DL (ref 12.5–16.5)
HYPOCHROMIA: ABNORMAL
LYMPHOCYTES ABSOLUTE: 0.45 E9/L (ref 1.5–4)
LYMPHOCYTES RELATIVE PERCENT: 7 % (ref 20–42)
Lab: ABNORMAL
MAGNESIUM: 2.1 MG/DL (ref 1.6–2.6)
MCH RBC QN AUTO: 30.4 PG (ref 26–35)
MCHC RBC AUTO-ENTMCNC: 31.8 % (ref 32–34.5)
MCV RBC AUTO: 95.5 FL (ref 80–99.9)
METHADONE SCREEN, URINE: NOT DETECTED
MONOCYTES ABSOLUTE: 0.64 E9/L (ref 0.1–0.95)
MONOCYTES RELATIVE PERCENT: 10.4 % (ref 2–12)
NEUTROPHILS ABSOLUTE: 5.31 E9/L (ref 1.8–7.3)
NEUTROPHILS RELATIVE PERCENT: 82.6 % (ref 43–80)
OPIATE SCREEN URINE: POSITIVE
OXYCODONE URINE: NOT DETECTED
PDW BLD-RTO: 12.5 FL (ref 11.5–15)
PHENCYCLIDINE SCREEN URINE: NOT DETECTED
PHOSPHORUS: 3.4 MG/DL (ref 2.5–4.5)
PLATELET # BLD: 294 E9/L (ref 130–450)
PMV BLD AUTO: 10 FL (ref 7–12)
POIKILOCYTES: ABNORMAL
POLYCHROMASIA: ABNORMAL
POTASSIUM SERPL-SCNC: 5.2 MMOL/L (ref 3.5–5)
PROCALCITONIN: 0.1 NG/ML (ref 0–0.08)
RBC # BLD: 3.59 E12/L (ref 3.8–5.8)
SODIUM BLD-SCNC: 129 MMOL/L (ref 132–146)
T4 FREE: 1.05 NG/DL (ref 0.93–1.7)
TEAR DROP CELLS: ABNORMAL
TOTAL PROTEIN: 7.5 G/DL (ref 6.4–8.3)
TSH SERPL DL<=0.05 MIU/L-ACNC: 0.24 UIU/ML (ref 0.27–4.2)
WBC # BLD: 6.4 E9/L (ref 4.5–11.5)

## 2022-09-26 PROCEDURE — 6360000002 HC RX W HCPCS: Performed by: INTERNAL MEDICINE

## 2022-09-26 PROCEDURE — 6370000000 HC RX 637 (ALT 250 FOR IP): Performed by: FAMILY MEDICINE

## 2022-09-26 PROCEDURE — 36415 COLL VENOUS BLD VENIPUNCTURE: CPT

## 2022-09-26 PROCEDURE — 84443 ASSAY THYROID STIM HORMONE: CPT

## 2022-09-26 PROCEDURE — 84145 PROCALCITONIN (PCT): CPT

## 2022-09-26 PROCEDURE — 84439 ASSAY OF FREE THYROXINE: CPT

## 2022-09-26 PROCEDURE — 85025 COMPLETE CBC W/AUTO DIFF WBC: CPT

## 2022-09-26 PROCEDURE — 1200000000 HC SEMI PRIVATE

## 2022-09-26 PROCEDURE — 94640 AIRWAY INHALATION TREATMENT: CPT

## 2022-09-26 PROCEDURE — 6370000000 HC RX 637 (ALT 250 FOR IP): Performed by: INTERNAL MEDICINE

## 2022-09-26 PROCEDURE — 2580000003 HC RX 258: Performed by: INTERNAL MEDICINE

## 2022-09-26 PROCEDURE — 97161 PT EVAL LOW COMPLEX 20 MIN: CPT | Performed by: PHYSICAL THERAPIST

## 2022-09-26 PROCEDURE — 97165 OT EVAL LOW COMPLEX 30 MIN: CPT

## 2022-09-26 PROCEDURE — 84100 ASSAY OF PHOSPHORUS: CPT

## 2022-09-26 PROCEDURE — 80307 DRUG TEST PRSMV CHEM ANLYZR: CPT

## 2022-09-26 PROCEDURE — 83735 ASSAY OF MAGNESIUM: CPT

## 2022-09-26 PROCEDURE — 80053 COMPREHEN METABOLIC PANEL: CPT

## 2022-09-26 RX ORDER — METHYLPREDNISOLONE SODIUM SUCCINATE 40 MG/ML
40 INJECTION, POWDER, LYOPHILIZED, FOR SOLUTION INTRAMUSCULAR; INTRAVENOUS EVERY 12 HOURS
Status: DISCONTINUED | OUTPATIENT
Start: 2022-09-26 | End: 2022-09-28

## 2022-09-26 RX ORDER — HYDROCODONE BITARTRATE AND ACETAMINOPHEN 5; 325 MG/1; MG/1
1 TABLET ORAL EVERY 6 HOURS PRN
Status: DISCONTINUED | OUTPATIENT
Start: 2022-09-26 | End: 2022-10-02 | Stop reason: HOSPADM

## 2022-09-26 RX ADMIN — ALBUTEROL SULFATE 2.5 MG: 2.5 SOLUTION RESPIRATORY (INHALATION) at 06:29

## 2022-09-26 RX ADMIN — METOPROLOL SUCCINATE 100 MG: 25 TABLET, EXTENDED RELEASE ORAL at 09:14

## 2022-09-26 RX ADMIN — METHYLPREDNISOLONE SODIUM SUCCINATE 40 MG: 40 INJECTION, POWDER, FOR SOLUTION INTRAMUSCULAR; INTRAVENOUS at 23:58

## 2022-09-26 RX ADMIN — Medication 400 UNITS: at 09:25

## 2022-09-26 RX ADMIN — Medication 10 ML: at 23:59

## 2022-09-26 RX ADMIN — HYDROCODONE BITARTRATE AND ACETAMINOPHEN 1 TABLET: 5; 325 TABLET ORAL at 11:44

## 2022-09-26 RX ADMIN — LOSARTAN POTASSIUM 100 MG: 50 TABLET, FILM COATED ORAL at 09:15

## 2022-09-26 RX ADMIN — ALBUTEROL SULFATE 2.5 MG: 2.5 SOLUTION RESPIRATORY (INHALATION) at 12:46

## 2022-09-26 RX ADMIN — ENOXAPARIN SODIUM 40 MG: 100 INJECTION SUBCUTANEOUS at 09:15

## 2022-09-26 RX ADMIN — HYDROCHLOROTHIAZIDE 25 MG: 25 TABLET ORAL at 09:14

## 2022-09-26 RX ADMIN — HYDROCODONE BITARTRATE AND ACETAMINOPHEN 1 TABLET: 5; 325 TABLET ORAL at 20:12

## 2022-09-26 RX ADMIN — AZITHROMYCIN DIHYDRATE 500 MG: 500 INJECTION, POWDER, LYOPHILIZED, FOR SOLUTION INTRAVENOUS at 14:47

## 2022-09-26 RX ADMIN — HYDROCODONE BITARTRATE AND ACETAMINOPHEN 1 TABLET: 5; 325 TABLET ORAL at 04:15

## 2022-09-26 RX ADMIN — Medication 10 ML: at 11:46

## 2022-09-26 RX ADMIN — Medication 10 ML: at 00:26

## 2022-09-26 RX ADMIN — ASPIRIN 81 MG: 81 TABLET, COATED ORAL at 09:15

## 2022-09-26 ASSESSMENT — PAIN - FUNCTIONAL ASSESSMENT
PAIN_FUNCTIONAL_ASSESSMENT: PREVENTS OR INTERFERES SOME ACTIVE ACTIVITIES AND ADLS
PAIN_FUNCTIONAL_ASSESSMENT: ACTIVITIES ARE NOT PREVENTED

## 2022-09-26 ASSESSMENT — PAIN DESCRIPTION - ORIENTATION
ORIENTATION: MID
ORIENTATION: LOWER

## 2022-09-26 ASSESSMENT — PAIN DESCRIPTION - LOCATION
LOCATION: BACK
LOCATION: NECK
LOCATION: HEAD;NECK

## 2022-09-26 ASSESSMENT — PAIN SCALES - GENERAL
PAINLEVEL_OUTOF10: 5
PAINLEVEL_OUTOF10: 10

## 2022-09-26 ASSESSMENT — PAIN DESCRIPTION - DESCRIPTORS
DESCRIPTORS: ACHING;DISCOMFORT;SORE
DESCRIPTORS: ACHING

## 2022-09-26 ASSESSMENT — PAIN DESCRIPTION - PAIN TYPE
TYPE: CHRONIC PAIN
TYPE: CHRONIC PAIN

## 2022-09-26 ASSESSMENT — PAIN DESCRIPTION - ONSET: ONSET: ON-GOING

## 2022-09-26 ASSESSMENT — PAIN DESCRIPTION - FREQUENCY: FREQUENCY: CONTINUOUS

## 2022-09-26 NOTE — PLAN OF CARE
Problem: Pain  Goal: Verbalizes/displays adequate comfort level or baseline comfort level  9/26/2022 1303 by Maggie Loyd RN  Outcome: Progressing     Problem: Discharge Planning  Goal: Discharge to home or other facility with appropriate resources  9/26/2022 1303 by Maggie Loyd RN  Outcome: Progressing

## 2022-09-26 NOTE — PROGRESS NOTES
[facility]  [facility] [place]. [place]        Date:2022                                                  Patient Name: Andrés Aguilar    MRN: [id number]    : 1950    Room: [address] [place], [place] [address]-      Evaluating OT: Ashleigh Che OTR/L #[id number]     Referring Provider and Specific Provider Orders/Date:      22  OT eval and treat  Start:  22,   End:  22,   ONE TIME,   Standing Count:  1 Occurrences,   R        Last continued at transfer on Sun Sep 25, 2022 10:21 PM    John Galo DO      Placement Recommendation: Home with Home Health OT        Diagnosis:   1.  COPD exacerbation ([facility] [address]    2. Chest pain, unspecified type         Surgery: None      Pertinent Medical History:       Past Medical History:   Diagnosis Date    Chest pain 3-6-2015    lexiscan nuclear stress    Chronic diastolic CHF (congestive heart failure) ([facility] [address]     Echo 2016; EF 57%    COPD (chronic obstructive pulmonary disease) (HCC)     Hepatitis C     Hilar lymphadenopathy     CT 2016; 1.8 cm    History of echocardiogram 2019    EF 35%; Stage I diastolic dysfunction    Hypertension     Irregular heart beat     Oxygen dependent          Past Surgical History:   Procedure Laterality Date    CATARACT REMOVAL Right 2019    CATARACT REMOVAL Left 2019    CERVICAL FUSION  2018    ACF C4-C7    COLONOSCOPY      HERNIA REPAIR      tracee inguinal repair        Precautions:  Fall Risk, up with assistance, O2 dependent      Assessment of current deficits    [x] Functional mobility  [x]ADLs  [x] Strength               []Cognition    [x] Functional transfers   [x] IADLs         [x] Safety Awareness   [x]Endurance    [] Fine Coordination              [x] Balance      [] Vision/perception   []Sensation     []Gross Motor Coordination  [] ROM  [] Delirium                   [] Motor Control     OT PLAN OF CARE OT POC based on physician orders, patient diagnosis and results of clinical assessment    Frequency/Duration 1-3 days/wk for 2 weeks PRN     Specific OT Treatment Interventions to include:   * Instruction/training on adapted ADL techniques and AE recommendations to increase functional independence within precautions       * Training on energy conservation strategies, correct breathing pattern and techniques to improve independence/tolerance for self-care routine  * Functional transfer/mobility training/DME recommendations for increased independence, safety, and fall prevention  * Patient/Family education to increase follow through with safety techniques and functional independence  * Recommendation of environmental modifications for increased safety with functional transfers/mobility and ADLs  * Therapeutic exercise to improve motor endurance, ROM, and functional strength for ADLs/functional transfers  * Therapeutic activities to facilitate/challenge dynamic balance, stand tolerance for increased safety and independence with ADLs    Recommended Adaptive Equipment: TBD      Home Living: Lives alone, single family home, 1 story, No steps to enter. Bathroom set-up: tub/shower, shower chair, grab bars, standard commode with grab bars surrounding         Equipment owned: O2 dependent (4L)     Prior Level of Function: has an aide 3x/week for 4 hours/day to assist with ADLs  and with IADLs; ambulated independently. Driving: no     Pain Level: pt denied pain; Nursing notified.       Cognition: A&O: 4/4; Follows 3 step directions   Memory: intact   Sequencing: intact   Problem solving: intact    Judgement/safety: fair     [facility]   AM-PAC Daily Activity Inpatient   How much help for putting on and taking off regular lower body clothing?: A Little  How much help for Bathing?: A Lot  How much help for Toileting?: A Little  How much help for putting on and taking off regular upper body clothing?: A Little  How much help for taking care of personal grooming?: A Little  How much help for eating meals?: A Little  AM-PAC Inpatient Daily Activity Raw Score: 17  AM-PAC Inpatient ADL T-Scale Score : 37.26  ADL Inpatient CMS 0-100% Score: 50.11  ADL Inpatient CMS G-Code Modifier : CK     Functional Assessment:    Initial Eval Status  Date: 9/26/22   Treatment Status  Date: STGs = LTGs  Time frame: 10-14 days   Feeding Supervision     Independent    Grooming Stand by Assist   Standing at sink for hand wash; education on task modification for energy conservation     Moderate [place]    UB Dressing Minimal Assist    Moderate [place]    LB Dressing Minimal Assist     Moderate [place]    Bathing Moderate Assist     Moderate [place]    Toileting Stand by Assist   Standing at commode to void     Moderate [place]    Bed Mobility  Supine to sit: Supervision  Sit to supine: Supervision     Supine to sit: Independent   Sit to supine: Independent    Functional Transfers Sit to stand: Supervision   Stand to sit: Supervision     Independent    Functional Mobility Supervision without AD to/from bathroom. Verbal cues for safety managing O2 line.      Moderate [place]    Balance Sitting:     Static: good    Dynamic: good  Standing: fair / fair plus     Sitting:     Static: good    Dynamic: good  Standing: good    Activity Tolerance fair    Increase standing tolerance >3-5 minutes for improved engagement with functional transfers and indep in ADLs     Visual/  Perceptual Glasses: readers     Reports changes in vision since admission: no      NA      Hand Dominance: right      AROM (PROM) Strength Additional Info:  Goal:   RUE  WFL 4-/5 good  and wfl FMC/dexterity noted during ADL tasks   Improve overall RUE strength  for participation in functional tasks   LUE WFL 4-/5 good  and wfl FMC/dexterity noted during ADL tasks   Improve overall LUE strength  for participation in functional tasks     Hearing:  [facility]   Sensation: No c/o numbness or tingling  Tone:  WFL   Edema:      Vitals: 4L   HR at rest: 78 bpm HR with activity: 108 bpm HR at end of session: 90 bpm   SpO2 at rest: 100% SpO2 with activity: 99% SpO2 at end of session: 100%   BP at rest:  BP with activity:  BP at end of session:      Comments: RN cleared patient for OT. Upon arrival patient in supine. Therapist facilitated and instructed pt on adapted  techniques & compensatory strategies to improve safety and independence with basic ADLs, bed mobility, functional transfers and mobility to allow pt to achieve highest level of independence and safely. Pt demonstrated fair understanding of education & follow through. At end of session, patient was in supine with call light and phone within reach, all lines and tubes intact. Overall, patient demonstrated  decreased independence and safety during completion of ADL tasks. Pt would benefit from continued skilled OT to increase safety and independence with completion of ADL tasks and functional mobility for improved quality of life. Rehab Potential: Good for established goals. Patient / Family Goal: return home      Patient and/or family were instructed on functional diagnosis, prognosis/goals and OT plan of care. Demonstrated fair understanding.      Eval Complexity: Low    Time In: 9:42 AM   Time Out: 10:03 AM    Total Treatment Time: 0      Min Units   OT Eval Low 97165  X  1    OT Eval Medium 24885      OT Eval High 29694      OT Re-Eval [id number]            ADL/Self Care 44965     Therapeutic Activities 15544       Therapeutic Ex 73092       Orthotic Management 65214       Manual 14239     Neuro Re-Ed 46417       Non-Billable Time        Evaluation Time additionally includes thorough review of current medical information, gathering information on past medical history/social history and prior level of function, interpretation of standardized testing/informal observation of tasks, assessment of data and development of plan of care and goals.         Evaluating OT: Michelle Nair OTR/L #[id number]

## 2022-09-26 NOTE — PROGRESS NOTES
Pt education for med importance has given, pt is determined about not getting meds he hasnt been taking at home.

## 2022-09-26 NOTE — CONSULTS
Assessment and Plan  Patient is a 81 y.o. male with the following medical Problems:   Acute exacerbation of COPD  Acute on chronic hypoxic and hypercapnic respiratory failure  3. Nicotine dependence  4. History of marijuana abuse      Plan of care:  1. Solu-Medrol 40 mg twice daily  2. Azithromycin for acute exacerbation of COPD for 5 days  3. Schedule DuoNeb and budesonide  4. DVT prophylaxis  5. Incentive spirometer  6. Assess the need for home oxygen before discharge  7. Urine toxicology  8. Patient was strongly encouraged to quit smoking  9. Follow-up as outpatient with primary pulmonologist and consider low-dose CT scan for screening for lung cancer . This was discussed with the patient. History of Present Illness:    Patient is a 72-year-old man with above-mentioned medical problems who presented to [facility] on September 25, 2022 with increasing shortness of breath and cough. Patient has no fever, chills, rigors. Chest x-ray showed no infiltrate. White blood cells count is normal.  Patient continues to smoke and has history of smoking marijuana.     Past Medical History:  Past Medical History:   Diagnosis Date    Chest pain 3-6-2015    lexiscan nuclear stress    Chronic diastolic CHF (congestive heart failure) ([facility] [address]     Echo 1/18/2016; EF 57%    COPD (chronic obstructive pulmonary disease) (HCC)     Hepatitis C     Hilar lymphadenopathy     CT 1/2016; 1.8 cm    History of echocardiogram 01/02/2019    EF 71%; Stage I diastolic dysfunction    Hypertension     Irregular heart beat     Oxygen dependent         Family History:   Family History   Problem Relation Age of Onset    Heart Disease Mother     Alcohol Abuse Father     Diabetes Father     Diabetes Sister     Heart Disease Sister     Diabetes Sister     High Blood Pressure Sister        Allergies:         Ace inhibitors and Lisinopril    Social history:  Social History     Socioeconomic History    Marital status: Single Spouse name: Not on file    Number of children: Not on file    Years of education: Not on file    Highest education level: Not on file   Occupational History    Not on file   Tobacco Use    Smoking status: Former     Packs/day: 0.50     Types: Cigarettes     Quit date: 2015     Years since quittin.6    Smokeless tobacco: Never   Vaping Use    Vaping Use: Never used   Substance and Sexual Activity    Alcohol use:  Yes     Alcohol/week: 4.0 standard drinks     Types: 4 Cans of beer per week    Drug use: No    Sexual activity: Never   Other Topics Concern    Not on file   Social History Narrative    Not on file     Social Determinants of Health     Financial Resource Strain: Not on file   Food Insecurity: Not on file   Transportation Needs: Not on file   Physical Activity: Not on file   Stress: Not on file   Social Connections: Not on file   Intimate Partner Violence: Not on file   Housing Stability: Not on file       Current Medications:     Current Facility-Administered Medications:     HYDROcodone-acetaminophen ([address]) 5-325 MG per tablet 1 tablet, 1 tablet, Oral, Q6H PRN, Nicki Barthel, DO, 1 tablet at 22 1144    methylPREDNISolone sodium (SOLU-MEDROL) injection 60 mg, 60 mg, IntraVENous, Q6H, Ismail U Maryann, DO, 60 mg at 22 1701    vitamin E capsule 400 Units, 400 Units, Oral, Daily, Cathie Sand, DO, 400 Units at 22 0925    metoprolol succinate (TOPROL XL) extended release tablet 100 mg, 100 mg, Oral, Daily, Ismail U Maryann, DO, 100 mg at 22 0914    losartan (COZAAR) tablet 100 mg, 100 mg, Oral, Daily, Ismail U Maryann, DO, 100 mg at 22 0915    aspirin EC tablet 81 mg, 81 mg, Oral, Daily, Cathie Sand, DO, 81 mg at 22 0915    ipratropium (ATROVENT) 0.02 % nebulizer solution 0.5 mg, 0.5 mg, Nebulization, 4x daily, Ismail U Maryann, DO, 0.5 mg at 22 1725    albuterol (PROVENTIL) nebulizer solution 2.5 mg, 2.5 mg, Nebulization, Q6H PRN, Cathie Sand, DO, 2.5 mg at 09/26/22 1246    Arformoterol Tartrate (BROVANA) nebulizer solution 15 mcg, 15 mcg, Nebulization, BID, 15 mcg at 09/25/22 1725 **AND** budesonide (PULMICORT) nebulizer suspension 500 mcg, 0.5 mg, Nebulization, BID, Ismail U Maryann, DO    hydroCHLOROthiazide (HYDRODIURIL) tablet 25 mg, 25 mg, Oral, Daily, Ismail U Maryann, DO, 25 mg at 09/26/22 0914    glucose chewable tablet 16 g, 4 tablet, Oral, PRN, Ismail U Maryann, DO    dextrose bolus 10% 125 mL, 125 mL, IntraVENous, PRN **OR** dextrose bolus 10% 250 mL, 250 mL, IntraVENous, PRN, Ismail U Maryann, DO    glucagon (rDNA) injection 1 mg, 1 mg, SubCUTAneous, PRN, Ismail U Maryann, DO    dextrose 10 % infusion, , IntraVENous, Continuous PRN, Ismail U Maryann, DO    sodium chloride flush 0.9 % injection 5-40 mL, 5-40 mL, IntraVENous, 2 times per day, Chalmer Briseida, DO, 10 mL at 09/26/22 1146    sodium chloride flush 0.9 % injection 5-40 mL, 5-40 mL, IntraVENous, PRN, Ismail U Maryann, DO    0.9 % sodium chloride infusion, , IntraVENous, PRN, Chalmer Briseida, DO    enoxaparin (LOVENOX) injection 40 mg, 40 mg, SubCUTAneous, Daily, Ismail U Maryann, DO, 40 mg at 09/26/22 0915    polyethylene glycol (GLYCOLAX) packet 17 g, 17 g, Oral, Daily PRN, Chalmer Briseida, DO    acetaminophen (TYLENOL) tablet 650 mg, 650 mg, Oral, Q6H PRN **OR** acetaminophen (TYLENOL) suppository 650 mg, 650 mg, Rectal, Q6H PRN, Chalmer Briseida, DO      Review of Systems:   General: denies weight gain, denies loss of appetite, fever, chills, night sweats. HEENT: denies headaches, dizziness, head trauma, visual changes, eye pain, tinnitus, nosebleeds, hoarseness or throat pain    Respiratory: denies chest pain, dyspnea, cough and hemoptysis  Cardiovascular: denies orthopnea, paroxysmal nocturnal dyspnea, leg swelling, and previous heart attack. Gastrointestinal: denies pain, nausea vomiting, diarrhea, constipation, melena or bleeding.   Genitourinary: denies hematuria, frequency, urgency or dysuria  Neurology: denies syncope, seizures, paralysis, paraesthesia   Endocrine: denies polyuria, polydipsia, skin or hair changes, and heat or cold intolerance  Musculoskeletal: denies joint pain, swelling, arthritis or myalgia  Hematologic: denies bleeding, adenopathy and easy bruising  Skin: denies rashes and skin discoloration  Psychiatry: denies depression    Physical Exam:   Vital Signs:  /81   Pulse 87   Temp 97.8 °F (36.6 °C) (Oral)   Resp 16   Ht 5' 6\" (1.676 m)   Wt 175 lb (79.4 kg)   SpO2 99%   BMI 28.25 kg/m²     Input/Output: In: 56 [P.O.:480; I.V.:10]  Out: 600     Oxygen requirements: 4 NC      General appearance:  not in pain or distress, in no respiratory distress    HEENT: Atraumatic/normocephalic, EOMI, DUTCH, pharynx clear, moist mucosa, redness of the uvula appreciated,   Neck: Supple, no jugular venous distension, lymphadenopathy, thyromegaly or carotid bruits  Chest: Equal normal breath sounds, no wheezing, no crackles and no tenderness over ribs   Cardiovascular: Normal S1 , S2, regular rate and rhythm, no murmur, rub or gallop  Abdomen: Normal sounds present, soft, lax with no tenderness, no hepatosplenomegaly, and no masses  Extremities: No edema. Pulses are equally present. Skin: intact, no rashes   Neurologic: Alert and oriented x 3, No focal deficit     Investigations:  Labs, radiological imaging and cardiac work up were reviewed        STAFF PHYSICIAN NOTE OF PERSONAL INVOLVEMENT IN CARE  As the attending physician, I certify that I personally reviewed the patient's history and personally examined the patient to confirm the physical findings described above, and that I reviewed the relevant imaging studies and available reports. I also discussed the differential diagnosis and all of the proposed management plans with the patient and individuals accompanying the patient to this visit.   They had the opportunity to ask questions about the proposed management plans and to have those questions answered.       Electronically signed by Smitha Lyon MD on 9/26/2022 at 1:26 PM

## 2022-09-26 NOTE — CARE COORDINATION
CM note: transition of care planning. No discharge needs identified. Pt lives alone in a ranch home. Had DME of oxygen 4L and shower chair. Pt has services for [facility] aids 3x week for 4 hours/day. Pt has had HHC from [facility] in the past. Pt's PCP is Dr Jessa Up and his pharmacy is [place].

## 2022-09-26 NOTE — PROGRESS NOTES
Physical Therapy    Physical Therapy Initial Evaluation/Plan of Care    Room #:  9692/4633-16  Patient Name: Josiane Winslow  YOB: 1950  MRN: [id number]    Date of Service: 9/26/2022     Tentative placement recommendation: Home  Equipment recommendation: None      Evaluating Physical Therapist: Jono Morillo PT  #[id number]      Specific Provider Orders/Date/Referring Provider : 09/25/22 [email or web address]    PT eval and treat  Start:  09/25/22 1615,   End:  09/25/22 1615,   ONE TIME,   Standing Count:  1 Occurrences,   R          Roman Aguilar DO     Admitting Diagnosis:   COPD exacerbation ([address] [J44.1]  Chest pain, unspecified type [R07.9]    Admitted with    chest pain , shortness of breath with o2 4 Liters of o2 via nasal cannula   Surgery: none      Patient Active Problem List   Diagnosis    Hepatitis C    Testicular swelling    COPD exacerbation ([address]    NSTEMI (non-ST elevated myocardial infarction) ([address]    CHF, acute on chronic ([address]    Essential hypertension    Neck pain    Cervical disc herniation    Acute respiratory failure with hypoxia and hypercapnia (HCC)    Chest pain    Elbow effusion, right    Acute on chronic respiratory failure with hypoxia and hypercapnia ([address]        ASSESSMENT of Current Deficits Patient exhibits decreased endurance impairing functional mobility, transfers, gait , gait distance, and tolerance to activity are barriers to d/c and require skilled intervention during hospital stay to attain pre hospital level of function. Decreased strength, balance and endurance  increases patient's risk for fall.   Patient with difficulty oxygenating impairing endurance and functional independence  Patient is mildly unsteady with gait during session with no loss of balance , dizziness, however has shortness of breath         PHYSICAL THERAPY  PLAN OF CARE       Physical therapy plan of care is established based on physician order,  patient diagnosis and clinical assessment    Current Treatment Recommendations:    -Gait: Gait training and Standing activities to improve: base of support, weight shift, weight bearing    -Endurance: Utilize Supervised activities to increase level of endurance to allow for safe functional mobility including transfers and gait     PT long term treatment goals are located in below grid    Patient and or family understand(s) diagnosis, prognosis, and plan of care. Frequency of treatments: Patient will be seen  3-5 times/week. Prior Level of Function: Patient ambulated independently    Rehab Potential: good   for baseline    Past medical history:   Past Medical History:   Diagnosis Date    Chest pain 3-6-2015    lexiscan nuclear stress    Chronic diastolic CHF (congestive heart failure) ([facility] [address]     Echo 1/18/2016; EF 57%    COPD (chronic obstructive pulmonary disease) (HCC)     Hepatitis C     Hilar lymphadenopathy     CT 1/2016; 1.8 cm    History of echocardiogram 01/02/2019    EF 79%; Stage I diastolic dysfunction    Hypertension     Irregular heart beat     Oxygen dependent      Past Surgical History:   Procedure Laterality Date    CATARACT REMOVAL Right 05/2019    CATARACT REMOVAL Left 06/2019    CERVICAL FUSION  03/09/2018    ACF C4-C7    COLONOSCOPY      HERNIA REPAIR      tracee inguinal repair       SUBJECTIVE:    Precautions:  Activity as tolerated, alarm and O2 ,      Social history: Patient lives alone in a ranch home  with No steps  to enter   [address]  owned: Show chair,        [address] [facility]   How much difficulty turning over in bed?: None  How much difficulty sitting down on / standing up from a chair with arms?: None  How much difficulty moving from lying on back to sitting on side of bed?: None  How much help from another person moving to and from a bed to a chair?: None  How much help from another person needed to walk in hospital room?: None  How much help from another person for climbing 3-5 steps with a railing?: A Little  AM-[facility] Inpatient Mobility Raw Score : 23  AM-PAC Inpatient T-Scale Score : 56.93  Mobility Inpatient CMS 0-100% Score: 11.2  Mobility Inpatient CMS G-Code Modifier : CI    Nursing cleared patient for PT evaluation. The admitting diagnosis and active problem list as listed above have been reviewed prior to the initiation of this evaluation. OBJECTIVE;   Initial Evaluation  Date: 9/26/2022 Treatment Date:     Short Term/ Long Term   Goals   Was pt agreeable to Eval/treatment? Yes  To be met in 3 days   Pain level   4/10  right toe     Bed Mobility    Rolling: Independent    Supine to sit:  Independent    Sit to supine: Independent    Scooting: Independent        Transfers Sit to stand: Independent        Ambulation     125 feet using  no device with Independent  however shortness of breath on exertion     > 100 feet with no shortness of breath on exertion    ROM Within functional limits    Increase range of motion 10% of affected joints    Strength BUE:   4/5  RLE:  4/5  LLE:  4/5  Increase strength in affected mm groups by 1/3 grade   Balance Sitting EOB:  good    Dynamic Standing:  good minus  Sitting EOB:  good    Dynamic Standing: good       Patient is Alert & Oriented x person, place, time, and situation and follows directions    Sensation:  Patient  denies numbness/tingling   Edema:  no   Endurance: fair       Vitals:  4 liters nasal cannula   Blood Pressure at rest  Blood Pressure during session    Heart Rate at rest 87 Heart Rate during session 94   SPO2 at rest 100%  SPO2 during session 97%     Patient education  Patient educated on role of Physical Therapy, risks of immobility, safety and plan of care and  importance of mobility while in hospital      Patient response to education:   Pt verbalized understanding Pt demonstrated skill Pt requires further education in this area   Yes Partial Yes      Treatment:  Patient practiced and was instructed/facilitated in the following treatment: Patient   Sat edge of bed 10 minutes with Independent to increase dynamic sitting balance and activity tolerance. Therapeutic Exercises:  not performed       At end of session, patient sitting edge of bed with family/friend present call light and phone within reach,  all lines and tubes intact, nursing notified. Patient would benefit from continued skilled Physical Therapy to improve functional independence and quality of life. Patient's/ family goals   home    Time in  1140  Time out  1154    Total Treatment Time  0 minutes    Evaluation time includes thorough review of current medical information, gathering information on past medical history/social history and prior level of function, completion of standardized testing/informal observation of tasks, assessment of data, and development of Plan of care and goals.      CPT codes:  Low Complexity PT evaluation (81870)  No treatment billed    Gareth Bejarano PT

## 2022-09-26 NOTE — H&P
Department of Family Practice  Attending History and Physical        CHIEF COMPLAINT:  SHORTNESS OF BREATH    Reason for Admission:  EXACERBATION OF COPD    History Obtained From:  patient, electronic medical record, Quality of history:  good historian    HISTORY OF PRESENT ILLNESS:      The patient is a 81 y.o. male with significant past medical history of COPD who presents with SHORTNESS OF BREATH OVER THE PAST SEVERAL WEEKS BECOMING WORSE AND NOT RESPONDING TO HIS [address]. 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 Past Medical History:        Diagnosis Date    Chest pain 3-6-2015    lexiscan nuclear stress    Chronic diastolic CHF (congestive heart failure) (HCC)     Echo 1/18/2016; EF 57%    COPD (chronic obstructive pulmonary disease) (HCC)     Hepatitis C     Hilar lymphadenopathy     CT 1/2016; 1.8 cm    History of echocardiogram 01/02/2019    EF 61%; Stage I diastolic dysfunction    Hypertension     Irregular heart beat     Oxygen dependent      Past Surgical History:        Procedure Laterality Date    CATARACT REMOVAL Right 05/2019    CATARACT REMOVAL Left 06/2019    CERVICAL FUSION  03/09/2018    ACF C4-C7    COLONOSCOPY      HERNIA REPAIR      tracee inguinal repair     Immunizations:              Influenza:   Indicated for current flu vaccination season Oct. to Feb.            Pneumococcal Polysaccharide:  Indicated for current flu vaccination season Oct. to Feb.    Medications Prior to Admission:   Medications Prior to Admission: budesonide (PULMICORT) 0.5 MG/2ML nebulizer suspension, Take 1 ampule by nebulization daily  aspirin 81 MG EC tablet, Take 81 mg by mouth daily  hydroCHLOROthiazide (HYDRODIURIL) 25 MG tablet, Take 25 mg by mouth daily  tiotropium (SPIRIVA RESPIMAT) 2.5 MCG/ACT AERS inhaler, Inhale 2 puffs into the lungs daily  albuterol sulfate HFA (PROVENTIL;VENTOLIN;PROAIR) 108 (90 Base) MCG/ACT inhaler, desonide (DESOWEN) 0.05 % cream, Apply 1 each topically daily Apply to face  metoprolol succinate (TOPROL XL) 100 MG extended release tablet, Take 100 mg by mouth daily  budesonide-formoterol (SYMBICORT) 160-4.5 MCG/ACT AERO, Inhale 2 puffs into the lungs 2 times daily  cetirizine (ZYRTEC) 10 MG tablet, Take 10 mg by mouth daily  losartan (COZAAR) 100 MG tablet, Take 100 mg by mouth daily  ketoconazole (NIZORAL) 2 % cream, Apply topically daily as needed (itching)  hydrocortisone 2.5 % cream, Apply topically 2 times daily as needed (itching)  triamcinolone (KENALOG) 0.1 % cream, Apply topically 2 times daily Apply topically 2 times daily. OXYGEN, Inhale 4 L into the lungs   vitamin E 400 UNIT capsule, Take 400 Units by mouth daily    Allergies:  Ace inhibitors and Lisinopril    Social History:   TOBACCO:   reports that he quit smoking about 7 years ago. His smoking use included cigarettes. He smoked an average of .5 packs per day. He has never used smokeless tobacco.  ETOH:   reports current alcohol use of about 4.0 standard drinks per week. DRUGS:   reports no history of drug use.     Family History:       Problem Relation Age of Onset    Heart Disease Mother     Alcohol Abuse Father     Diabetes Father     Diabetes Sister     Heart Disease Sister     Diabetes Sister     High Blood Pressure Sister      REVIEW OF SYSTEMS:  CONSTITUTIONAL:  positive for  fatigue and malaise  HEENT:  negative  RESPIRATORY:  positive for  cough with sputum, dyspnea, and chest pain  CARDIOVASCULAR:  positive for  chest pain, dyspnea, fatigue  GASTROINTESTINAL:  negative  GENITOURINARY:  negative  INTEGUMENT/BREAST:  positive for rash, dryness, skin color change, and pruritus  PHYSICAL EXAM:    Vitals:  BP (!) 147/89   Pulse 78   Temp 98.6 °F (37 °C) (Oral)   Resp 18   Ht 5' 6\" (1.676 m)   Wt 175 lb (79.4 kg)   SpO2 99%   BMI 28.25 kg/m²     CONSTITUTIONAL:  awake, alert, cooperative, no apparent distress, and appears stated age  ENT:  Normocephalic, without obvious abnormality, atraumatic, sinuses nontender on palpation, external ears without lesions, oral pharynx with moist mucus membranes, tonsils without erythema or exudates, gums normal and good dentition.   BACK:  Symmetric, no curvature, spinous processes are non-tender on palpation, paraspinous muscles are non-tender on palpation, no costal vertebral tenderness  LUNGS:  No increased work of breathing, good air exchange, clear to auscultation bilaterally, no crackles or wheezing  CARDIOVASCULAR:  Normal apical impulse, regular rate and rhythm, normal S1 and S2, no S3 or S4, and no murmur noted  ABDOMEN:  No scars, normal bowel sounds, soft, non-distended, non-tender, no masses palpated, no hepatosplenomegally  GENITAL/URINARY:  ADULT MALE  SKIN:  VERY DRY ON ARMS, DRY ON FACE, FACE HYPERPIGMENTED WITH HYPERTRICHOSIS    DATA:  CBC with Differential:    Lab Results   Component Value Date/Time    WBC 7.4 09/25/2022 08:17 AM    RBC 4.18 09/25/2022 08:17 AM    HGB 12.7 09/25/2022 08:17 AM    HCT 41.2 09/25/2022 08:17 AM     09/25/2022 08:17 AM    MCV 98.6 09/25/2022 08:17 AM    MCH 30.4 09/25/2022 08:17 AM    MCHC 30.8 09/25/2022 08:17 AM    RDW 13.0 09/25/2022 08:17 AM    NRBC 1.0 09/22/2018 06:18 AM    SEGSPCT 60 04/24/2013 03:25 PM    LYMPHOPCT 16.7 09/25/2022 08:17 AM    MONOPCT 15.6 09/25/2022 08:17 AM    BASOPCT 1.1 09/25/2022 08:17 AM    MONOSABS 1.16 09/25/2022 08:17 AM    LYMPHSABS 1.24 09/25/2022 08:17 AM    EOSABS 0.19 09/25/2022 08:17 AM    BASOSABS 0.08 09/25/2022 08:17 AM     CMP:    Lab Results   Component Value Date/Time     09/25/2022 08:17 AM    K 4.8 09/25/2022 08:17 AM    CL 89 09/25/2022 08:17 AM    CO2 35 09/25/2022 08:17 AM    BUN 15 09/25/2022 08:17 AM    CREATININE 1.2 09/25/2022 08:17 AM    GFRAA >60 09/25/2022 08:17 AM    LABGLOM >60 09/25/2022 08:17 AM    GLUCOSE 100 09/25/2022 08:17 AM    GLUCOSE 101 07/14/2011 02:10 PM    PROT 8.2 09/25/2022 08:17 AM    LABALBU 4.1 09/25/2022 08:17 AM    LABALBU 4.6 07/14/2011 02:10 PM    CALCIUM 10.0 09/25/2022 08:17 AM    BILITOT 0.6 09/25/2022 08:17 AM    ALKPHOS 83 09/25/2022 08:17 AM    AST 25 09/25/2022 08:17 AM    ALT <5 09/25/2022 08:17 AM     Troponin:    Lab Results   Component Value Date/Time    TROPONINI <0.01 10/17/2019 07:30 AM     ASSESSMENT AND PLAN:      Principal Problem:    COPD exacerbation (HCC)  Plan: IV STEROIDS, SUPPLEMENTAL OXYGEN  Active Problems:    Chest pain  Plan: TELEMETRY MONITORING    Acute on chronic respiratory failure with hypoxia and hypercapnia (HCC)  Plan: SUPPLEMENTAL OXYGEN, IV STEROIDS, INHALERS    Essential hypertension  Plan: VITALS PER PROTOCOL      INPATIENT ADMISSION; PULMONARY CONSULT; INTERNAL MEDICINE CONSULT. DISCHARGE PLANS TO RETURN HOME.  ESTIMATED LENGTH OF STAY IS 7 DAYS.

## 2022-09-26 NOTE — PROGRESS NOTES
Patient refusing solumedrol because he does not take this medication at home. New medication education given, patient continues to refuse.

## 2022-09-27 LAB
ALBUMIN SERPL-MCNC: 4.2 G/DL (ref 3.5–5.2)
ALP BLD-CCNC: 84 U/L (ref 40–129)
ALT SERPL-CCNC: 7 U/L (ref 0–40)
ANION GAP SERPL CALCULATED.3IONS-SCNC: 5 MMOL/L (ref 7–16)
AST SERPL-CCNC: 22 U/L (ref 0–39)
BASOPHILS ABSOLUTE: 0 E9/L (ref 0–0.2)
BASOPHILS RELATIVE PERCENT: 0.2 % (ref 0–2)
BILIRUB SERPL-MCNC: 0.3 MG/DL (ref 0–1.2)
BUN BLDV-MCNC: 32 MG/DL (ref 6–23)
CALCIUM SERPL-MCNC: 9.9 MG/DL (ref 8.6–10.2)
CHLORIDE BLD-SCNC: 90 MMOL/L (ref 98–107)
CO2: 39 MMOL/L (ref 22–29)
CREAT SERPL-MCNC: 1.4 MG/DL (ref 0.7–1.2)
EOSINOPHILS ABSOLUTE: 0 E9/L (ref 0.05–0.5)
EOSINOPHILS RELATIVE PERCENT: 0.2 % (ref 0–6)
GFR AFRICAN AMERICAN: >60
GFR NON-AFRICAN AMERICAN: >60 ML/MIN/1.73
GLUCOSE BLD-MCNC: 129 MG/DL (ref 74–99)
HCT VFR BLD CALC: 38.5 % (ref 37–54)
HEMOGLOBIN: 11.6 G/DL (ref 12.5–16.5)
LYMPHOCYTES ABSOLUTE: 0.22 E9/L (ref 1.5–4)
LYMPHOCYTES RELATIVE PERCENT: 4.3 % (ref 20–42)
MCH RBC QN AUTO: 30 PG (ref 26–35)
MCHC RBC AUTO-ENTMCNC: 30.1 % (ref 32–34.5)
MCV RBC AUTO: 99.5 FL (ref 80–99.9)
MONOCYTES ABSOLUTE: 0.22 E9/L (ref 0.1–0.95)
MONOCYTES RELATIVE PERCENT: 3.5 % (ref 2–12)
NEUTROPHILS ABSOLUTE: 5.15 E9/L (ref 1.8–7.3)
NEUTROPHILS RELATIVE PERCENT: 92.2 % (ref 43–80)
NUCLEATED RED BLOOD CELLS: 1.7 /100 WBC
PDW BLD-RTO: 13 FL (ref 11.5–15)
PLATELET # BLD: 300 E9/L (ref 130–450)
PMV BLD AUTO: 10.1 FL (ref 7–12)
POIKILOCYTES: ABNORMAL
POTASSIUM SERPL-SCNC: 5.5 MMOL/L (ref 3.5–5)
RBC # BLD: 3.87 E12/L (ref 3.8–5.8)
SODIUM BLD-SCNC: 134 MMOL/L (ref 132–146)
STOMATOCYTES: ABNORMAL
TOTAL PROTEIN: 7.9 G/DL (ref 6.4–8.3)
WBC # BLD: 5.6 E9/L (ref 4.5–11.5)

## 2022-09-27 PROCEDURE — 6370000000 HC RX 637 (ALT 250 FOR IP): Performed by: INTERNAL MEDICINE

## 2022-09-27 PROCEDURE — 6370000000 HC RX 637 (ALT 250 FOR IP): Performed by: FAMILY MEDICINE

## 2022-09-27 PROCEDURE — 94640 AIRWAY INHALATION TREATMENT: CPT

## 2022-09-27 PROCEDURE — 6360000002 HC RX W HCPCS: Performed by: INTERNAL MEDICINE

## 2022-09-27 PROCEDURE — 36415 COLL VENOUS BLD VENIPUNCTURE: CPT

## 2022-09-27 PROCEDURE — 85025 COMPLETE CBC W/AUTO DIFF WBC: CPT

## 2022-09-27 PROCEDURE — 1200000000 HC SEMI PRIVATE

## 2022-09-27 PROCEDURE — 80053 COMPREHEN METABOLIC PANEL: CPT

## 2022-09-27 PROCEDURE — 2580000003 HC RX 258: Performed by: INTERNAL MEDICINE

## 2022-09-27 PROCEDURE — 2700000000 HC OXYGEN THERAPY PER DAY

## 2022-09-27 RX ORDER — TRIAMCINOLONE ACETONIDE 1 MG/G
CREAM TOPICAL 2 TIMES DAILY
Status: DISCONTINUED | OUTPATIENT
Start: 2022-09-28 | End: 2022-10-02 | Stop reason: HOSPADM

## 2022-09-27 RX ADMIN — METHYLPREDNISOLONE SODIUM SUCCINATE 40 MG: 40 INJECTION, POWDER, FOR SOLUTION INTRAMUSCULAR; INTRAVENOUS at 09:08

## 2022-09-27 RX ADMIN — METHYLPREDNISOLONE SODIUM SUCCINATE 40 MG: 40 INJECTION, POWDER, FOR SOLUTION INTRAMUSCULAR; INTRAVENOUS at 21:10

## 2022-09-27 RX ADMIN — LOSARTAN POTASSIUM 100 MG: 50 TABLET, FILM COATED ORAL at 09:08

## 2022-09-27 RX ADMIN — IPRATROPIUM BROMIDE 0.5 MG: 0.5 SOLUTION RESPIRATORY (INHALATION) at 13:45

## 2022-09-27 RX ADMIN — IPRATROPIUM BROMIDE 0.5 MG: 0.5 SOLUTION RESPIRATORY (INHALATION) at 06:40

## 2022-09-27 RX ADMIN — ASPIRIN 81 MG: 81 TABLET, COATED ORAL at 09:08

## 2022-09-27 RX ADMIN — HYDROCODONE BITARTRATE AND ACETAMINOPHEN 1 TABLET: 5; 325 TABLET ORAL at 17:03

## 2022-09-27 RX ADMIN — Medication 400 UNITS: at 09:08

## 2022-09-27 RX ADMIN — ALBUTEROL SULFATE 2.5 MG: 2.5 SOLUTION RESPIRATORY (INHALATION) at 16:37

## 2022-09-27 RX ADMIN — AZITHROMYCIN DIHYDRATE 500 MG: 500 INJECTION, POWDER, LYOPHILIZED, FOR SOLUTION INTRAVENOUS at 13:18

## 2022-09-27 RX ADMIN — METOPROLOL SUCCINATE 100 MG: 25 TABLET, EXTENDED RELEASE ORAL at 09:08

## 2022-09-27 RX ADMIN — Medication 10 ML: at 21:10

## 2022-09-27 RX ADMIN — HYDROCHLOROTHIAZIDE 25 MG: 25 TABLET ORAL at 09:08

## 2022-09-27 RX ADMIN — Medication 10 ML: at 09:09

## 2022-09-27 RX ADMIN — ENOXAPARIN SODIUM 40 MG: 100 INJECTION SUBCUTANEOUS at 09:09

## 2022-09-27 RX ADMIN — HYDROCODONE BITARTRATE AND ACETAMINOPHEN 1 TABLET: 5; 325 TABLET ORAL at 23:51

## 2022-09-27 RX ADMIN — BUDESONIDE 500 MCG: 0.5 SUSPENSION RESPIRATORY (INHALATION) at 06:40

## 2022-09-27 ASSESSMENT — PAIN SCALES - GENERAL
PAINLEVEL_OUTOF10: 6
PAINLEVEL_OUTOF10: 6

## 2022-09-27 ASSESSMENT — PAIN DESCRIPTION - ORIENTATION: ORIENTATION: MID

## 2022-09-27 ASSESSMENT — PAIN DESCRIPTION - DESCRIPTORS
DESCRIPTORS: ACHING;DISCOMFORT
DESCRIPTORS: ACHING

## 2022-09-27 ASSESSMENT — PAIN DESCRIPTION - LOCATION: LOCATION: BACK

## 2022-09-27 NOTE — PROGRESS NOTES
Department of Family Practice  Adult Daily Progress Note      JAVIER BUTTS IS SEEN IN FOLLOW UP TODAY ON 3 SURGICAL. HE WAS HAVING PROBLEMS WITH HIS ROOMMATE ON TELEMETRY. HE WAS ALSO REFUSING HIS IV STEROIDS. I HAD TO EXPLAIN TO HIM THAT IF WE DON'T GIVE HIM THE STEROIDS THERE IS NO OTHER WAY TO GET HIM BETTER SO HE CAN GO HOME.  NOT TAKING SOMETHING HERE BECAUSE HE DOES NOT TAKE IT AT HOME IS NOT A GOOD REASON. HE MAY AS WELL STAY HOME. HIS INHALED STEROIDS ARE FOR MAINTENANCE. ORAL AND IV STEROIDS ARE FOR WHEN HE HAS THESE FLARE-UPS. HE HAS ALSO BEEN ORDERED IV ZITHROMAX, AN ANTIBIOTIC. AFTER MY EXPLANATIONS HE AGREES TO BOTH.     OBJECTIVE    Physical  VITALS:  /60   Pulse 89   Temp 97.9 °F (36.6 °C) (Axillary)   Resp 18   Ht 5' 6\" (1.676 m)   Wt 175 lb (79.4 kg)   SpO2 95%   BMI 28.25 kg/m²   CONSTITUTIONAL:  awake, alert, cooperative, no apparent distress, and appears stated age  LUNGS:  No increased work of breathing, DIMINISHED air exchange, clear to auscultation bilaterally, no crackles or wheezing  CARDIOVASCULAR:  Normal apical impulse, regular rate and rhythm, normal S1 and S2, no S3 or S4, and no murmur noted  ABDOMEN:  No scars, normal bowel sounds, soft, non-distended, non-tender, no masses palpated, no hepatosplenomegally  SKIN:  FACE VERY DRY AND ROUGH AND HYPERPIGMENTED; ARMS ALSO DRY AND ASHY  Data  CBC with Differential:    Lab Results   Component Value Date/Time    WBC 6.4 09/26/2022 05:01 AM    RBC 3.59 09/26/2022 05:01 AM    HGB 10.9 09/26/2022 05:01 AM    HCT 34.3 09/26/2022 05:01 AM     09/26/2022 05:01 AM    MCV 95.5 09/26/2022 05:01 AM    MCH 30.4 09/26/2022 05:01 AM    MCHC 31.8 09/26/2022 05:01 AM    RDW 12.5 09/26/2022 05:01 AM    NRBC 1.0 09/22/2018 06:18 AM    SEGSPCT 60 04/24/2013 03:25 PM    LYMPHOPCT 7.0 09/26/2022 05:01 AM    MONOPCT 10.4 09/26/2022 05:01 AM    BASOPCT 0.2 09/26/2022 05:01 AM    MONOSABS 0.64 09/26/2022 05:01 AM    LYMPHSABS 0.45 09/26/2022 05:01 AM    EOSABS 0.00 09/26/2022 05:01 AM    BASOSABS 0.00 09/26/2022 05:01 AM     BMP:    Lab Results   Component Value Date/Time     09/26/2022 05:01 AM    K 5.2 09/26/2022 05:01 AM    K 4.8 09/25/2022 08:17 AM    CL 88 09/26/2022 05:01 AM    CO2 33 09/26/2022 05:01 AM    BUN 33 09/26/2022 05:01 AM    LABALBU 3.7 09/26/2022 05:01 AM    LABALBU 4.6 07/14/2011 02:10 PM    CREATININE 1.5 09/26/2022 05:01 AM    CALCIUM 9.8 09/26/2022 05:01 AM    GFRAA 56 09/26/2022 05:01 AM    LABGLOM 56 09/26/2022 05:01 AM    GLUCOSE 123 09/26/2022 05:01 AM    GLUCOSE 101 07/14/2011 02:10 PM     Current Medications  Scheduled Meds:   methylPREDNISolone  40 mg IntraVENous Q12H    azithromycin  500 mg IntraVENous Q24H    vitamin E  400 Units Oral Daily    metoprolol succinate  100 mg Oral Daily    losartan  100 mg Oral Daily    aspirin  81 mg Oral Daily    ipratropium  0.5 mg Nebulization 4x daily    budesonide  0.5 mg Nebulization BID    hydroCHLOROthiazide  25 mg Oral Daily    sodium chloride flush  5-40 mL IntraVENous 2 times per day    enoxaparin  40 mg SubCUTAneous Daily     Continuous Infusions:   dextrose      sodium chloride       PRN Meds:. HYDROcodone 5 mg - acetaminophen, albuterol, glucose, dextrose bolus **OR** dextrose bolus, glucagon (rDNA), dextrose, sodium chloride flush, sodium chloride, polyethylene glycol, acetaminophen **OR** acetaminophen    ASSESSMENT AND PLAN      Principal Problem:    COPD exacerbation (HCC)  Active Problems:    Chest pain    Acute on chronic respiratory failure with hypoxia and hypercapnia (HCC)    Essential hypertension  Resolved Problems:    * No resolved hospital problems. *      PATIENT WILL TAKE IV STEROIDS AND IV ANTIBIOTICS. CONTINUE PLAN OF CARE.

## 2022-09-27 NOTE — PLAN OF CARE
Problem: Pain  Goal: Verbalizes/displays adequate comfort level or baseline comfort level  9/27/2022 1936 by Rebekah Solorzano RN  Outcome: Progressing  9/27/2022 1819 by Kim Aj RN  Outcome: Progressing     Problem: Discharge Planning  Goal: Discharge to home or other facility with appropriate resources  9/27/2022 1936 by Rebekah Solorzano RN  Outcome: Progressing  9/27/2022 1819 by Kim Aj RN  Outcome: Progressing     Problem: Chronic Conditions and Co-morbidities  Goal: Patient's chronic conditions and co-morbidity symptoms are monitored and maintained or improved  9/27/2022 1936 by Rebekah Solorzano RN  Outcome: Progressing  9/27/2022 1819 by Kim Aj RN  Outcome: Progressing

## 2022-09-27 NOTE — PLAN OF CARE
Problem: Pain  Goal: Verbalizes/displays adequate comfort level or baseline comfort level  Outcome: Progressing     Problem: Discharge Planning  Goal: Discharge to home or other facility with appropriate resources  Outcome: Progressing     Problem: Chronic Conditions and Co-morbidities  Goal: Patient's chronic conditions and co-morbidity symptoms are monitored and maintained or improved  Outcome: Progressing

## 2022-09-27 NOTE — DISCHARGE INSTRUCTIONS
Your information:  Name: Holli November  : 1950    Your instructions:    Discharge home. Follow up with primary care provider as directed. Signs and symptoms to look out for:  Call 911 anytime you think you may need emergency care. For example, call if:    You have severe trouble breathing. You have severe chest pain, or chest pain is quickly getting worse. Call your doctor now or seek immediate medical care if:    You have new or worse trouble breathing. Your coughing or wheezing gets worse. You cough up dark brown or bloody mucus (sputum). You have a new or higher fever. Watch closely for changes in your health, and be sure to contact your doctor if:    You notice more mucus or a change in the color of your mucus. You need to use your antibiotic or steroid pills. You do not get better as expected. What to do after you leave the hospital:    Recommended diet: regular diet    Recommended activity: activity as tolerated    The following personal items were collected during your admission and were returned to you:    Belongings  Dental Appliances: None  Vision - Corrective Lenses: None  Hearing Aid: None  Clothing: Shirt, Pants, Slippers, At bedside  Jewelry: None  Body Piercings Removed: No  Electronic Devices: Cell Phone, At bedside  Weapons (Notify Protective Services/Security): None  Other Valuables: Money, Reynaldo Frances, At bedside  Home Medications: None  Valuables Given To: Patient  Provide Name(s) of Who Valuable(s) Were Given To: kevin  Responsible person(s) in the waiting room: Fátima Hopper  Patient approves for provider to speak to responsible person post operatively: No    Information obtained by:  By signing below, I understand that if any problems occur once I leave the hospital I am to contact Dr. Leola Wagner. I understand and acknowledge receipt of the instructions indicated above.

## 2022-09-27 NOTE — PLAN OF CARE
Patient's chart updated to reflect:      . - HF care plan, HF education points and HF discharge instructions.  -Orders: daily weights, I/O.  -PCP and/or Cardiologist appointment to be scheduled within 7 days of hospital discharge.  -History of HF, not primary admission Dx.   Patient admitted for treatment of Principal Problem:    COPD exacerbation ([facility]    Marta Rubio MSN, RN  Heart Failure Navigator

## 2022-09-28 LAB
ALBUMIN SERPL-MCNC: 4.2 G/DL (ref 3.5–5.2)
ALP BLD-CCNC: 79 U/L (ref 40–129)
ALT SERPL-CCNC: 6 U/L (ref 0–40)
ANION GAP SERPL CALCULATED.3IONS-SCNC: 4 MMOL/L (ref 7–16)
AST SERPL-CCNC: 20 U/L (ref 0–39)
BASOPHILS ABSOLUTE: 0 E9/L (ref 0–0.2)
BASOPHILS RELATIVE PERCENT: 0 % (ref 0–2)
BILIRUB SERPL-MCNC: 0.3 MG/DL (ref 0–1.2)
BUN BLDV-MCNC: 26 MG/DL (ref 6–23)
CALCIUM SERPL-MCNC: 10.4 MG/DL (ref 8.6–10.2)
CHLORIDE BLD-SCNC: 90 MMOL/L (ref 98–107)
CO2: 40 MMOL/L (ref 22–29)
CREAT SERPL-MCNC: 1.2 MG/DL (ref 0.7–1.2)
EOSINOPHILS ABSOLUTE: 0 E9/L (ref 0.05–0.5)
EOSINOPHILS RELATIVE PERCENT: 0 % (ref 0–6)
GFR AFRICAN AMERICAN: >60
GFR NON-AFRICAN AMERICAN: >60 ML/MIN/1.73
GLUCOSE BLD-MCNC: 136 MG/DL (ref 74–99)
HCT VFR BLD CALC: 38.2 % (ref 37–54)
HEMOGLOBIN: 11.7 G/DL (ref 12.5–16.5)
IMMATURE GRANULOCYTES #: 0.04 E9/L
IMMATURE GRANULOCYTES %: 0.4 % (ref 0–5)
LYMPHOCYTES ABSOLUTE: 0.64 E9/L (ref 1.5–4)
LYMPHOCYTES RELATIVE PERCENT: 6 % (ref 20–42)
MCH RBC QN AUTO: 31 PG (ref 26–35)
MCHC RBC AUTO-ENTMCNC: 30.6 % (ref 32–34.5)
MCV RBC AUTO: 101.3 FL (ref 80–99.9)
MONOCYTES ABSOLUTE: 0.5 E9/L (ref 0.1–0.95)
MONOCYTES RELATIVE PERCENT: 4.7 % (ref 2–12)
NEUTROPHILS ABSOLUTE: 9.51 E9/L (ref 1.8–7.3)
NEUTROPHILS RELATIVE PERCENT: 88.9 % (ref 43–80)
PDW BLD-RTO: 13.1 FL (ref 11.5–15)
PLATELET # BLD: 311 E9/L (ref 130–450)
PMV BLD AUTO: 10.1 FL (ref 7–12)
POTASSIUM SERPL-SCNC: 5 MMOL/L (ref 3.5–5)
RBC # BLD: 3.77 E12/L (ref 3.8–5.8)
SODIUM BLD-SCNC: 134 MMOL/L (ref 132–146)
TOTAL PROTEIN: 8 G/DL (ref 6.4–8.3)
WBC # BLD: 10.7 E9/L (ref 4.5–11.5)

## 2022-09-28 PROCEDURE — 6360000002 HC RX W HCPCS: Performed by: INTERNAL MEDICINE

## 2022-09-28 PROCEDURE — 6370000000 HC RX 637 (ALT 250 FOR IP): Performed by: INTERNAL MEDICINE

## 2022-09-28 PROCEDURE — 85025 COMPLETE CBC W/AUTO DIFF WBC: CPT

## 2022-09-28 PROCEDURE — 2580000003 HC RX 258: Performed by: INTERNAL MEDICINE

## 2022-09-28 PROCEDURE — 80053 COMPREHEN METABOLIC PANEL: CPT

## 2022-09-28 PROCEDURE — 6370000000 HC RX 637 (ALT 250 FOR IP): Performed by: FAMILY MEDICINE

## 2022-09-28 PROCEDURE — 1200000000 HC SEMI PRIVATE

## 2022-09-28 PROCEDURE — 2700000000 HC OXYGEN THERAPY PER DAY

## 2022-09-28 PROCEDURE — 97116 GAIT TRAINING THERAPY: CPT | Performed by: PHYSICAL THERAPIST

## 2022-09-28 PROCEDURE — 94640 AIRWAY INHALATION TREATMENT: CPT

## 2022-09-28 PROCEDURE — 36415 COLL VENOUS BLD VENIPUNCTURE: CPT

## 2022-09-28 PROCEDURE — 6360000002 HC RX W HCPCS: Performed by: FAMILY MEDICINE

## 2022-09-28 RX ORDER — PREDNISONE 20 MG/1
40 TABLET ORAL DAILY
Status: DISCONTINUED | OUTPATIENT
Start: 2022-09-28 | End: 2022-09-28

## 2022-09-28 RX ORDER — METHYLPREDNISOLONE SODIUM SUCCINATE 40 MG/ML
40 INJECTION, POWDER, LYOPHILIZED, FOR SOLUTION INTRAMUSCULAR; INTRAVENOUS EVERY 12 HOURS
Status: DISCONTINUED | OUTPATIENT
Start: 2022-09-28 | End: 2022-09-30

## 2022-09-28 RX ORDER — AZITHROMYCIN 250 MG/1
500 TABLET, FILM COATED ORAL DAILY
Status: COMPLETED | OUTPATIENT
Start: 2022-09-28 | End: 2022-09-30

## 2022-09-28 RX ADMIN — Medication 10 ML: at 10:07

## 2022-09-28 RX ADMIN — ALBUTEROL SULFATE 2.5 MG: 2.5 SOLUTION RESPIRATORY (INHALATION) at 06:06

## 2022-09-28 RX ADMIN — Medication 10 ML: at 20:16

## 2022-09-28 RX ADMIN — HYDROCORTISONE: 25 CREAM TOPICAL at 20:15

## 2022-09-28 RX ADMIN — HYDROCODONE BITARTRATE AND ACETAMINOPHEN 1 TABLET: 5; 325 TABLET ORAL at 20:15

## 2022-09-28 RX ADMIN — METOPROLOL SUCCINATE 100 MG: 25 TABLET, EXTENDED RELEASE ORAL at 09:59

## 2022-09-28 RX ADMIN — Medication 400 UNITS: at 10:00

## 2022-09-28 RX ADMIN — ASPIRIN 81 MG: 81 TABLET, COATED ORAL at 10:00

## 2022-09-28 RX ADMIN — METHYLPREDNISOLONE SODIUM SUCCINATE 40 MG: 40 INJECTION, POWDER, FOR SOLUTION INTRAMUSCULAR; INTRAVENOUS at 23:47

## 2022-09-28 RX ADMIN — PREDNISONE 40 MG: 20 TABLET ORAL at 11:41

## 2022-09-28 RX ADMIN — HYDROCHLOROTHIAZIDE 25 MG: 25 TABLET ORAL at 10:02

## 2022-09-28 RX ADMIN — TRIAMCINOLONE ACETONIDE: 1 CREAM TOPICAL at 10:03

## 2022-09-28 RX ADMIN — AZITHROMYCIN MONOHYDRATE 500 MG: 250 TABLET ORAL at 11:41

## 2022-09-28 RX ADMIN — LOSARTAN POTASSIUM 100 MG: 50 TABLET, FILM COATED ORAL at 09:58

## 2022-09-28 RX ADMIN — HYDROCORTISONE: 25 CREAM TOPICAL at 10:02

## 2022-09-28 RX ADMIN — ENOXAPARIN SODIUM 40 MG: 100 INJECTION SUBCUTANEOUS at 10:06

## 2022-09-28 RX ADMIN — TRIAMCINOLONE ACETONIDE: 1 CREAM TOPICAL at 20:16

## 2022-09-28 RX ADMIN — ALBUTEROL SULFATE 2.5 MG: 2.5 SOLUTION RESPIRATORY (INHALATION) at 18:33

## 2022-09-28 RX ADMIN — METHYLPREDNISOLONE SODIUM SUCCINATE 40 MG: 40 INJECTION, POWDER, FOR SOLUTION INTRAMUSCULAR; INTRAVENOUS at 09:59

## 2022-09-28 ASSESSMENT — PAIN SCALES - GENERAL
PAINLEVEL_OUTOF10: 3
PAINLEVEL_OUTOF10: 9

## 2022-09-28 ASSESSMENT — PAIN DESCRIPTION - ORIENTATION: ORIENTATION: MID

## 2022-09-28 ASSESSMENT — PAIN DESCRIPTION - LOCATION: LOCATION: BACK

## 2022-09-28 ASSESSMENT — PAIN DESCRIPTION - DESCRIPTORS: DESCRIPTORS: ACHING

## 2022-09-28 ASSESSMENT — PAIN - FUNCTIONAL ASSESSMENT: PAIN_FUNCTIONAL_ASSESSMENT: ACTIVITIES ARE NOT PREVENTED

## 2022-09-28 NOTE — CARE COORDINATION
CM note: Pt continues to deny any additional discharge needs. Has home oxygen at 4L. Also has aide services thru the waiver program 3x/wk for 4 hrs/day. States his sister will be his transportation home.

## 2022-09-28 NOTE — PROGRESS NOTES
Physical Therapy    Physical Therapy Treatment Note/Plan of Care    Room #:  9867/8907-88  Patient Name: Anaid Spangler  YOB: 1950  MRN: [id number]    Date of Service: 9/28/2022     Tentative placement recommendation: Home  Equipment recommendation: None      Evaluating Physical Therapist: Alhaji Leach PT  #[id number]      Specific Provider Orders/Date/Referring Provider : 09/25/22 1615    PT eval and treat  Start:  09/25/22 1615,   End:  09/25/22 1615,   ONE TIME,   Standing Count:  1 Occurrences,   R          Isabella Ortega DO     Admitting Diagnosis:   COPD exacerbation ([address] [J44.1]  Chest pain, unspecified type [R07.9]    Admitted with    chest pain , shortness of breath with o2 4 Liters of o2 via nasal cannula   Surgery: none      Patient Active Problem List   Diagnosis    Hepatitis C    Testicular swelling    COPD exacerbation ([address]    NSTEMI (non-ST elevated myocardial infarction) ([address]    CHF, acute on chronic ([address]    Essential hypertension    Neck pain    Cervical disc herniation    Acute respiratory failure with hypoxia and hypercapnia (HCC)    Chest pain    Elbow effusion, right    Acute on chronic respiratory failure with hypoxia and hypercapnia ([address]        ASSESSMENT of Current Deficits Patient exhibits decreased endurance impairing functional mobility, transfers, gait , gait distance, and tolerance to activity are barriers to d/c and require skilled intervention during hospital stay to attain pre hospital level of function. Decreased strength, balance and endurance  increases patient's risk for fall. Patient with difficulty oxygenating impairing endurance and functional independence  Patient is mildly unsteady with gait during session with no loss of balance , dizziness, however has mild shortness of breath. Pt had 1 LOB but was able to self-correct and needed VC for safety awareness for O2 line management.         PHYSICAL THERAPY  PLAN OF CARE       Physical therapy plan of care is established based on physician order,  patient diagnosis and clinical assessment    Current Treatment Recommendations:    -Gait: Gait training and Standing activities to improve: base of support, weight shift, weight bearing    -Endurance: Utilize Supervised activities to increase level of endurance to allow for safe functional mobility including transfers and gait     PT long term treatment goals are located in below grid    Patient and or family understand(s) diagnosis, prognosis, and plan of care. Frequency of treatments: Patient will be seen  3-5 times/week. Prior Level of Function: Patient ambulated independently    Rehab Potential: good   for baseline    Past medical history:   Past Medical History:   Diagnosis Date    Chest pain 3-6-2015    lexiscan nuclear stress    Chronic diastolic CHF (congestive heart failure) ([address]     Echo 1/18/2016; EF 57%    COPD (chronic obstructive pulmonary disease) (HCC)     Hepatitis C     Hilar lymphadenopathy     CT 1/2016; 1.8 cm    History of echocardiogram 01/02/2019    EF 51%; Stage I diastolic dysfunction    Hypertension     Irregular heart beat     Oxygen dependent      Past Surgical History:   Procedure Laterality Date    CATARACT REMOVAL Right 05/2019    CATARACT REMOVAL Left 06/2019    CERVICAL FUSION  03/09/2018    ACF C4-C7    COLONOSCOPY      HERNIA REPAIR      tracee inguinal repair       SUBJECTIVE:    Precautions:  Activity as tolerated, alarm and O2 ,      Social history: Patient lives alone in a ranch home  with No steps  to enter   [place] [id number] owned: Shower chair,        [address] [facility]   How much difficulty turning over in bed?: None  How much difficulty sitting down on / standing up from a chair with arms?: A Little  How much difficulty moving from lying on back to sitting on side of bed?: None  How much help from another person moving to and from a bed to a chair?: A Little  How much help from another person needed to walk in hospital room?: A Little  How much help from another person for climbing 3-5 steps with a railing?: A Little  AM-PAC Inpatient Mobility Raw Score : 20  AM-PAC Inpatient T-Scale Score : 47.67  Mobility Inpatient CMS 0-100% Score: 35.83  Mobility Inpatient CMS G-Code Modifier : 3273 [employer] cleared patient for PT treatment. OBJECTIVE;   Initial Evaluation  Date: 9/26/2022 Treatment Date:  9/28/2022     Short Term/ Long Term   Goals   Was pt agreeable to Eval/treatment? Yes Y To be met in 3 days   Pain level   4/10  right toe 5/10 R great toe    Bed Mobility    Rolling: Independent    Supine to sit: Independent    Sit to supine: Independent    Scooting: Independent    Rolling: Independent   Supine to sit:  Independent   Sit to supine: Independent   Scooting: Independent       Transfers Sit to stand: Independent   Sit to stand: Independent        Ambulation     125 feet using  no device with Independent  however shortness of breath on exertion     2 x 125 feet using  no device with Supervision    for balance, safety, and O2 line management    > 100 feet with no shortness of breath on exertion    ROM Within functional limits    Increase range of motion 10% of affected joints    Strength BUE:   4/5  RLE:  4/5  LLE:  4/5  Increase strength in affected mm groups by 1/3 grade   Balance Sitting EOB:  good    Dynamic Standing:  good minus Sitting EOB: good   Dynamic Standing: good -   Sitting EOB:  good    Dynamic Standing: good       Patient is Alert & Oriented x person, place, time, and situation and follows directions    Sensation:  Patient  denies numbness/tingling   Edema:  no   Endurance: fair       Vitals:  4 liters nasal cannula   Blood Pressure at rest  Blood Pressure during session    Heart Rate at rest  Heart Rate during session    SPO2 at rest 100%  SPO2 during session %     Patient education  Patient educated on role of Physical Therapy, risks of immobility, safety and plan of care and  importance of mobility while in hospital      Patient response to education:   Pt verbalized understanding Pt demonstrated skill Pt requires further education in this area   Yes Partial Yes      Treatment:  Patient practiced and was instructed/facilitated in the following treatment: Patient   Sat edge of bed 10 minutes with Independent to increase dynamic sitting balance and activity tolerance. Pt performed bed mobility, transfers, ambulation in room. Therapeutic Exercises:  not performed       At end of session, patient sitting edge of bed with     call light and phone within reach,  all lines and tubes intact, nursing notified. Patient would benefit from continued skilled Physical Therapy to improve functional independence and quality of life.          Patient's/ family goals   home    Time in  56  Time out  1652    Total Treatment Time  28 minutes    CPT codes:  Gait Training (31869) 28 minutes 2 unit(s)    Iglesia Castañeda, PT

## 2022-09-28 NOTE — ACP (ADVANCE CARE PLANNING)
Advance Care Planning   Healthcare Decision Maker:    Primary Decision Maker: Krista Williamson - Brother/Sister - [phone]    Click here to complete Healthcare Decision Makers including selection of the Healthcare Decision Maker Relationship (ie \"Primary\"). Today we documented Decision Maker(s) consistent with Legal Next of Kin hierarchy.

## 2022-09-28 NOTE — PROGRESS NOTES
Patient has refused to be seen by our team multiple times. He requested to follow-up with Dr. Veronica Kumar. Can be discharged from pulmonary point of view on 5 days of prednisone 40 mg daily and azithromycin 500 mg daily for 3 days.

## 2022-09-29 LAB
ALBUMIN SERPL-MCNC: 3.9 G/DL (ref 3.5–5.2)
ALP BLD-CCNC: 74 U/L (ref 40–129)
ALT SERPL-CCNC: 5 U/L (ref 0–40)
ANION GAP SERPL CALCULATED.3IONS-SCNC: 3 MMOL/L (ref 7–16)
AST SERPL-CCNC: 16 U/L (ref 0–39)
BASOPHILIC STIPPLING: ABNORMAL
BASOPHILS ABSOLUTE: 0.01 E9/L (ref 0–0.2)
BASOPHILS RELATIVE PERCENT: 0.1 % (ref 0–2)
BILIRUB SERPL-MCNC: 0.2 MG/DL (ref 0–1.2)
BUN BLDV-MCNC: 29 MG/DL (ref 6–23)
CALCIUM SERPL-MCNC: 10.1 MG/DL (ref 8.6–10.2)
CHLORIDE BLD-SCNC: 92 MMOL/L (ref 98–107)
CO2: 41 MMOL/L (ref 22–29)
CREAT SERPL-MCNC: 1.2 MG/DL (ref 0.7–1.2)
EOSINOPHILS ABSOLUTE: 0 E9/L (ref 0.05–0.5)
EOSINOPHILS RELATIVE PERCENT: 0 % (ref 0–6)
GFR AFRICAN AMERICAN: >60
GFR NON-AFRICAN AMERICAN: >60 ML/MIN/1.73
GLUCOSE BLD-MCNC: 151 MG/DL (ref 74–99)
HCT VFR BLD CALC: 36.9 % (ref 37–54)
HEMOGLOBIN: 10.9 G/DL (ref 12.5–16.5)
IMMATURE GRANULOCYTES #: 0.07 E9/L
IMMATURE GRANULOCYTES %: 0.7 % (ref 0–5)
LYMPHOCYTES ABSOLUTE: 0.51 E9/L (ref 1.5–4)
LYMPHOCYTES RELATIVE PERCENT: 5.4 % (ref 20–42)
MCH RBC QN AUTO: 29.9 PG (ref 26–35)
MCHC RBC AUTO-ENTMCNC: 29.5 % (ref 32–34.5)
MCV RBC AUTO: 101.1 FL (ref 80–99.9)
MONOCYTES ABSOLUTE: 0.35 E9/L (ref 0.1–0.95)
MONOCYTES RELATIVE PERCENT: 3.7 % (ref 2–12)
NEUTROPHILS ABSOLUTE: 8.58 E9/L (ref 1.8–7.3)
NEUTROPHILS RELATIVE PERCENT: 90.1 % (ref 43–80)
PDW BLD-RTO: 12.8 FL (ref 11.5–15)
PLATELET # BLD: 291 E9/L (ref 130–450)
PMV BLD AUTO: 10.1 FL (ref 7–12)
POIKILOCYTES: ABNORMAL
POTASSIUM SERPL-SCNC: 4.7 MMOL/L (ref 3.5–5)
RBC # BLD: 3.65 E12/L (ref 3.8–5.8)
SODIUM BLD-SCNC: 136 MMOL/L (ref 132–146)
STOMATOCYTES: ABNORMAL
TOTAL PROTEIN: 7.3 G/DL (ref 6.4–8.3)
WBC # BLD: 9.5 E9/L (ref 4.5–11.5)

## 2022-09-29 PROCEDURE — 85025 COMPLETE CBC W/AUTO DIFF WBC: CPT

## 2022-09-29 PROCEDURE — 1200000000 HC SEMI PRIVATE

## 2022-09-29 PROCEDURE — 6360000002 HC RX W HCPCS: Performed by: INTERNAL MEDICINE

## 2022-09-29 PROCEDURE — 80053 COMPREHEN METABOLIC PANEL: CPT

## 2022-09-29 PROCEDURE — 6370000000 HC RX 637 (ALT 250 FOR IP): Performed by: FAMILY MEDICINE

## 2022-09-29 PROCEDURE — 6370000000 HC RX 637 (ALT 250 FOR IP): Performed by: INTERNAL MEDICINE

## 2022-09-29 PROCEDURE — 6360000002 HC RX W HCPCS: Performed by: FAMILY MEDICINE

## 2022-09-29 PROCEDURE — 2700000000 HC OXYGEN THERAPY PER DAY

## 2022-09-29 PROCEDURE — 2580000003 HC RX 258: Performed by: INTERNAL MEDICINE

## 2022-09-29 PROCEDURE — 94640 AIRWAY INHALATION TREATMENT: CPT

## 2022-09-29 PROCEDURE — 36415 COLL VENOUS BLD VENIPUNCTURE: CPT

## 2022-09-29 RX ADMIN — ALBUTEROL SULFATE 2.5 MG: 2.5 SOLUTION RESPIRATORY (INHALATION) at 06:16

## 2022-09-29 RX ADMIN — METOPROLOL SUCCINATE 100 MG: 25 TABLET, EXTENDED RELEASE ORAL at 07:57

## 2022-09-29 RX ADMIN — HYDROCORTISONE: 25 CREAM TOPICAL at 21:00

## 2022-09-29 RX ADMIN — HYDROCORTISONE: 25 CREAM TOPICAL at 08:00

## 2022-09-29 RX ADMIN — ALBUTEROL SULFATE 2.5 MG: 2.5 SOLUTION RESPIRATORY (INHALATION) at 18:57

## 2022-09-29 RX ADMIN — HYDROCODONE BITARTRATE AND ACETAMINOPHEN 1 TABLET: 5; 325 TABLET ORAL at 19:27

## 2022-09-29 RX ADMIN — IPRATROPIUM BROMIDE 0.5 MG: 0.5 SOLUTION RESPIRATORY (INHALATION) at 18:57

## 2022-09-29 RX ADMIN — HYDROCHLOROTHIAZIDE 25 MG: 25 TABLET ORAL at 07:56

## 2022-09-29 RX ADMIN — ASPIRIN 81 MG: 81 TABLET, COATED ORAL at 07:57

## 2022-09-29 RX ADMIN — METHYLPREDNISOLONE SODIUM SUCCINATE 40 MG: 40 INJECTION, POWDER, FOR SOLUTION INTRAMUSCULAR; INTRAVENOUS at 23:28

## 2022-09-29 RX ADMIN — Medication 10 ML: at 08:02

## 2022-09-29 RX ADMIN — Medication 10 ML: at 21:00

## 2022-09-29 RX ADMIN — METHYLPREDNISOLONE SODIUM SUCCINATE 40 MG: 40 INJECTION, POWDER, FOR SOLUTION INTRAMUSCULAR; INTRAVENOUS at 11:44

## 2022-09-29 RX ADMIN — ENOXAPARIN SODIUM 40 MG: 100 INJECTION SUBCUTANEOUS at 08:02

## 2022-09-29 RX ADMIN — BUDESONIDE 500 MCG: 0.5 SUSPENSION RESPIRATORY (INHALATION) at 18:57

## 2022-09-29 RX ADMIN — TRIAMCINOLONE ACETONIDE: 1 CREAM TOPICAL at 21:00

## 2022-09-29 RX ADMIN — LOSARTAN POTASSIUM 100 MG: 50 TABLET, FILM COATED ORAL at 07:56

## 2022-09-29 RX ADMIN — HYDROCODONE BITARTRATE AND ACETAMINOPHEN 1 TABLET: 5; 325 TABLET ORAL at 07:20

## 2022-09-29 RX ADMIN — Medication 400 UNITS: at 07:56

## 2022-09-29 RX ADMIN — AZITHROMYCIN MONOHYDRATE 500 MG: 250 TABLET ORAL at 07:57

## 2022-09-29 RX ADMIN — TRIAMCINOLONE ACETONIDE: 1 CREAM TOPICAL at 08:00

## 2022-09-29 ASSESSMENT — PAIN SCALES - GENERAL
PAINLEVEL_OUTOF10: 3
PAINLEVEL_OUTOF10: 9
PAINLEVEL_OUTOF10: 3
PAINLEVEL_OUTOF10: 6

## 2022-09-29 ASSESSMENT — PAIN - FUNCTIONAL ASSESSMENT: PAIN_FUNCTIONAL_ASSESSMENT: PREVENTS OR INTERFERES SOME ACTIVE ACTIVITIES AND ADLS

## 2022-09-29 ASSESSMENT — PAIN DESCRIPTION - PAIN TYPE: TYPE: ACUTE PAIN

## 2022-09-29 ASSESSMENT — PAIN DESCRIPTION - LOCATION
LOCATION: NECK
LOCATION: CHEST

## 2022-09-29 ASSESSMENT — PAIN DESCRIPTION - DESCRIPTORS
DESCRIPTORS: ACHING
DESCRIPTORS: ACHING;DISCOMFORT;SORE

## 2022-09-29 NOTE — PROGRESS NOTES
Department of Family Practice  Adult Daily Progress Note      SUBJECTIVE  BENJAMÍN IS SEEN IN FOLLOW UP TODAY ON 3 SURGICAL. HE IS TOLERATING TREATMENT. PER DR. Nolan Arrow NOTE:  Patient has refused to be seen by our team multiple times. He requested to follow-up with Dr. April Licona. Can be discharged from pulmonary point of view on 5 days of prednisone 40 mg daily and azithromycin 500 mg daily for 3 days. PATIENT SAYS HE DID NOT SEE DR. Shari Sorto. HE SAYS HE IS NOT READY FOR DISCHARGE. WALKING TO THE BATHROOM STILL CAUSES SHORTNESS OF BREATH.   HE WILL BE SWITCHED BACK TO IV METHYLPREDNISOLONE.  NO NEED FOR HIM TO STAY WITHOUT IT.     OBJECTIVE    Physical  VITALS:  BP (!) 146/92   Pulse 79   Temp 98.3 °F (36.8 °C) (Oral)   Resp 20   Ht 5' 6\" (1.676 m)   Wt 170 lb 7 oz (77.3 kg)   SpO2 100%   BMI 27.51 kg/m²   CONSTITUTIONAL:  awake, alert, cooperative, no apparent distress, and appears stated age  LUNGS:  No increased work of breathing, DIMINISHED air exchange, clear to auscultation bilaterally, no crackles or wheezing  CARDIOVASCULAR:  Normal apical impulse, regular rate and rhythm, normal S1 and S2, no S3 or S4, and no murmur noted  ABDOMEN:  No scars, normal bowel sounds, soft, non-distended, non-tender, no masses palpated, no hepatosplenomegally  SKIN:  FACE VERY DRY AND ROUGH AND HYPERPIGMENTED; ARMS ALSO DRY AND ASHY  Data  CBC with Differential:    Lab Results   Component Value Date/Time    WBC 9.5 09/29/2022 05:31 AM    RBC 3.65 09/29/2022 05:31 AM    HGB 10.9 09/29/2022 05:31 AM    HCT 36.9 09/29/2022 05:31 AM     09/29/2022 05:31 AM    .1 09/29/2022 05:31 AM    MCH 29.9 09/29/2022 05:31 AM    MCHC 29.5 09/29/2022 05:31 AM    RDW 12.8 09/29/2022 05:31 AM    NRBC 1.7 09/27/2022 05:30 AM    SEGSPCT 60 04/24/2013 03:25 PM    LYMPHOPCT 5.4 09/29/2022 05:31 AM    MONOPCT 3.7 09/29/2022 05:31 AM    BASOPCT 0.1 09/29/2022 05:31 AM    MONOSABS 0.35 09/29/2022 05:31 AM    LYMPHSABS 0.51 09/29/2022 05:31 AM    EOSABS 0.00 09/29/2022 05:31 AM    BASOSABS 0.01 09/29/2022 05:31 AM     BMP:    Lab Results   Component Value Date/Time     09/29/2022 05:31 AM    K 4.7 09/29/2022 05:31 AM    K 4.8 09/25/2022 08:17 AM    CL 92 09/29/2022 05:31 AM    CO2 41 09/29/2022 05:31 AM    BUN 29 09/29/2022 05:31 AM    LABALBU 3.9 09/29/2022 05:31 AM    LABALBU 4.6 07/14/2011 02:10 PM    CREATININE 1.2 09/29/2022 05:31 AM    CALCIUM 10.1 09/29/2022 05:31 AM    GFRAA >60 09/29/2022 05:31 AM    LABGLOM >60 09/29/2022 05:31 AM    GLUCOSE 151 09/29/2022 05:31 AM    GLUCOSE 101 07/14/2011 02:10 PM     Current Medications  Scheduled Meds:   azithromycin  500 mg Oral Daily    methylPREDNISolone  40 mg IntraVENous Q12H    hydrocortisone   Topical BID    triamcinolone   Topical BID    vitamin E  400 Units Oral Daily    metoprolol succinate  100 mg Oral Daily    losartan  100 mg Oral Daily    aspirin  81 mg Oral Daily    ipratropium  0.5 mg Nebulization 4x daily    budesonide  0.5 mg Nebulization BID    hydroCHLOROthiazide  25 mg Oral Daily    sodium chloride flush  5-40 mL IntraVENous 2 times per day    enoxaparin  40 mg SubCUTAneous Daily     Continuous Infusions:   dextrose      sodium chloride       PRN Meds:. HYDROcodone 5 mg - acetaminophen, albuterol, glucose, dextrose bolus **OR** dextrose bolus, glucagon (rDNA), dextrose, sodium chloride flush, sodium chloride, polyethylene glycol, acetaminophen **OR** acetaminophen    ASSESSMENT AND PLAN      Principal Problem:    COPD exacerbation (HCC)  Active Problems:    Chest pain    Acute on chronic respiratory failure with hypoxia and hypercapnia (HCC)    Essential hypertension  Resolved Problems:    * No resolved hospital problems. *      CONTINUE PLAN OF CARE WITH IV METHYLPREDNISOLONE.

## 2022-09-30 LAB
ALBUMIN SERPL-MCNC: 4 G/DL (ref 3.5–5.2)
ALP BLD-CCNC: 73 U/L (ref 40–129)
ALT SERPL-CCNC: 6 U/L (ref 0–40)
ANION GAP SERPL CALCULATED.3IONS-SCNC: 5 MMOL/L (ref 7–16)
ANISOCYTOSIS: ABNORMAL
AST SERPL-CCNC: 18 U/L (ref 0–39)
BASOPHILIC STIPPLING: ABNORMAL
BASOPHILS ABSOLUTE: 0 E9/L (ref 0–0.2)
BASOPHILS RELATIVE PERCENT: 0 % (ref 0–2)
BILIRUB SERPL-MCNC: 0.3 MG/DL (ref 0–1.2)
BUN BLDV-MCNC: 26 MG/DL (ref 6–23)
CALCIUM SERPL-MCNC: 10.3 MG/DL (ref 8.6–10.2)
CHLORIDE BLD-SCNC: 92 MMOL/L (ref 98–107)
CO2: 39 MMOL/L (ref 22–29)
CREAT SERPL-MCNC: 1.3 MG/DL (ref 0.7–1.2)
EOSINOPHILS ABSOLUTE: 0 E9/L (ref 0.05–0.5)
EOSINOPHILS RELATIVE PERCENT: 0 % (ref 0–6)
GFR AFRICAN AMERICAN: >60
GFR NON-AFRICAN AMERICAN: >60 ML/MIN/1.73
GLUCOSE BLD-MCNC: 197 MG/DL (ref 74–99)
HCT VFR BLD CALC: 38.6 % (ref 37–54)
HEMOGLOBIN: 11.6 G/DL (ref 12.5–16.5)
HYPOCHROMIA: ABNORMAL
LYMPHOCYTES ABSOLUTE: 0.47 E9/L (ref 1.5–4)
LYMPHOCYTES RELATIVE PERCENT: 5.2 % (ref 20–42)
MCH RBC QN AUTO: 30.6 PG (ref 26–35)
MCHC RBC AUTO-ENTMCNC: 30.1 % (ref 32–34.5)
MCV RBC AUTO: 101.8 FL (ref 80–99.9)
MONOCYTES ABSOLUTE: 0.28 E9/L (ref 0.1–0.95)
MONOCYTES RELATIVE PERCENT: 2.6 % (ref 2–12)
NEUTROPHILS ABSOLUTE: 8.56 E9/L (ref 1.8–7.3)
NEUTROPHILS RELATIVE PERCENT: 92.2 % (ref 43–80)
OVALOCYTES: ABNORMAL
PDW BLD-RTO: 13 FL (ref 11.5–15)
PLATELET # BLD: 304 E9/L (ref 130–450)
PMV BLD AUTO: 9.9 FL (ref 7–12)
POIKILOCYTES: ABNORMAL
POLYCHROMASIA: ABNORMAL
POTASSIUM SERPL-SCNC: 4.5 MMOL/L (ref 3.5–5)
RBC # BLD: 3.79 E12/L (ref 3.8–5.8)
SCHISTOCYTES: ABNORMAL
SODIUM BLD-SCNC: 136 MMOL/L (ref 132–146)
STOMATOCYTES: ABNORMAL
TARGET CELLS: ABNORMAL
TEAR DROP CELLS: ABNORMAL
TOTAL PROTEIN: 7.5 G/DL (ref 6.4–8.3)
WBC # BLD: 9.3 E9/L (ref 4.5–11.5)

## 2022-09-30 PROCEDURE — 6360000002 HC RX W HCPCS: Performed by: FAMILY MEDICINE

## 2022-09-30 PROCEDURE — 36415 COLL VENOUS BLD VENIPUNCTURE: CPT

## 2022-09-30 PROCEDURE — 1200000000 HC SEMI PRIVATE

## 2022-09-30 PROCEDURE — 2700000000 HC OXYGEN THERAPY PER DAY

## 2022-09-30 PROCEDURE — 2580000003 HC RX 258: Performed by: INTERNAL MEDICINE

## 2022-09-30 PROCEDURE — 6360000002 HC RX W HCPCS: Performed by: INTERNAL MEDICINE

## 2022-09-30 PROCEDURE — 6370000000 HC RX 637 (ALT 250 FOR IP): Performed by: INTERNAL MEDICINE

## 2022-09-30 PROCEDURE — 6370000000 HC RX 637 (ALT 250 FOR IP): Performed by: FAMILY MEDICINE

## 2022-09-30 PROCEDURE — 85025 COMPLETE CBC W/AUTO DIFF WBC: CPT

## 2022-09-30 PROCEDURE — 94640 AIRWAY INHALATION TREATMENT: CPT

## 2022-09-30 PROCEDURE — 80053 COMPREHEN METABOLIC PANEL: CPT

## 2022-09-30 RX ADMIN — METOPROLOL SUCCINATE 100 MG: 25 TABLET, EXTENDED RELEASE ORAL at 08:36

## 2022-09-30 RX ADMIN — BUDESONIDE 500 MCG: 0.5 SUSPENSION RESPIRATORY (INHALATION) at 16:03

## 2022-09-30 RX ADMIN — HYDROCODONE BITARTRATE AND ACETAMINOPHEN 1 TABLET: 5; 325 TABLET ORAL at 21:15

## 2022-09-30 RX ADMIN — TRIAMCINOLONE ACETONIDE: 1 CREAM TOPICAL at 21:16

## 2022-09-30 RX ADMIN — METHYLPREDNISOLONE SODIUM SUCCINATE 40 MG: 40 INJECTION, POWDER, FOR SOLUTION INTRAMUSCULAR; INTRAVENOUS at 11:29

## 2022-09-30 RX ADMIN — ASPIRIN 81 MG: 81 TABLET, COATED ORAL at 08:36

## 2022-09-30 RX ADMIN — LOSARTAN POTASSIUM 100 MG: 50 TABLET, FILM COATED ORAL at 08:37

## 2022-09-30 RX ADMIN — Medication 400 UNITS: at 08:36

## 2022-09-30 RX ADMIN — BUDESONIDE 500 MCG: 0.5 SUSPENSION RESPIRATORY (INHALATION) at 06:03

## 2022-09-30 RX ADMIN — PREDNISONE 30 MG: 20 TABLET ORAL at 21:21

## 2022-09-30 RX ADMIN — IPRATROPIUM BROMIDE 0.5 MG: 0.5 SOLUTION RESPIRATORY (INHALATION) at 12:05

## 2022-09-30 RX ADMIN — HYDROCODONE BITARTRATE AND ACETAMINOPHEN 1 TABLET: 5; 325 TABLET ORAL at 06:47

## 2022-09-30 RX ADMIN — IPRATROPIUM BROMIDE 0.5 MG: 0.5 SOLUTION RESPIRATORY (INHALATION) at 06:03

## 2022-09-30 RX ADMIN — HYDROCORTISONE: 25 CREAM TOPICAL at 21:16

## 2022-09-30 RX ADMIN — ALBUTEROL SULFATE 2.5 MG: 2.5 SOLUTION RESPIRATORY (INHALATION) at 06:03

## 2022-09-30 RX ADMIN — ENOXAPARIN SODIUM 40 MG: 100 INJECTION SUBCUTANEOUS at 08:38

## 2022-09-30 RX ADMIN — Medication 10 ML: at 08:40

## 2022-09-30 RX ADMIN — IPRATROPIUM BROMIDE 0.5 MG: 0.5 SOLUTION RESPIRATORY (INHALATION) at 16:05

## 2022-09-30 RX ADMIN — ALBUTEROL SULFATE 2.5 MG: 2.5 SOLUTION RESPIRATORY (INHALATION) at 16:03

## 2022-09-30 RX ADMIN — AZITHROMYCIN MONOHYDRATE 500 MG: 250 TABLET ORAL at 08:36

## 2022-09-30 RX ADMIN — HYDROCHLOROTHIAZIDE 25 MG: 25 TABLET ORAL at 08:36

## 2022-09-30 ASSESSMENT — PAIN DESCRIPTION - LOCATION
LOCATION: NECK
LOCATION: NECK;BACK

## 2022-09-30 ASSESSMENT — PAIN SCALES - GENERAL
PAINLEVEL_OUTOF10: 8
PAINLEVEL_OUTOF10: 10

## 2022-09-30 ASSESSMENT — PAIN DESCRIPTION - DESCRIPTORS: DESCRIPTORS: ACHING;DISCOMFORT;TENDER

## 2022-09-30 NOTE — PROGRESS NOTES
Comprehensive Nutrition Assessment    Type and Reason for Visit:  Initial, RD Nutrition Re-Screen/LOS    Nutrition Recommendations/Plan:   Continue current diet & monitor     Malnutrition Assessment:  Malnutrition Status:  No malnutrition (09/30/22 1338)    Context:  Chronic Illness     Findings of the 6 clinical characteristics of malnutrition:  Energy Intake:  No significant decrease in energy intake  Weight Loss:  Unable to assess (d/tlack of wt hx in EMR)     Body Fat Loss:  No significant body fat loss     Muscle Mass Loss:  No significant muscle mass loss    Fluid Accumulation:  No significant fluid accumulation     Strength:  Not Performed    Nutrition Assessment:    Pt admits w/ CP& SOB Dx COPD exacerbation. PMH: CHF, COPD, Hepatitis C, HTN. Pt meal intake is good >75%, Will monitor    Nutrition Related Findings:    A/O x4, soft/nontender abd +BS, I/O -3.6L, no edema noted, Elevated renal labs, (H). Wound Type: None       Current Nutrition Intake & Therapies:    Average Meal Intake: %  Average Supplements Intake: None Ordered  ADULT DIET; Regular    Anthropometric Measures:  Height: 5' 6\" (167.6 cm) (9/30 bed)  Ideal Body Weight (IBW): 142 lbs (65 kg)    Admission Body Weight: 175 lb (79.4 kg) (1st accurate 9/28 bed)  Current Body Weight: 171 lb (77.6 kg), 120.4 % IBW. Current BMI (kg/m2): 27.6  Usual Body Weight:  (no wt hx in EMR)     Weight Adjustment For: No Adjustment    BMI Categories: Overweight (BMI 25.0-29. 9)    Estimated Daily Nutrient Needs:  Energy Requirements Based On: Formula  Weight Used for Energy Requirements: Current  Energy (kcal/day): 0356-5055  Weight Used for Protein Requirements: Ideal  Protein (g/day): 80-90 (1.2-1.4 gm/kg)  Method Used for Fluid Requirements: 1 ml/kcal  Fluid (ml/day): 1715-4600    Nutrition Diagnosis:   No nutrition diagnosis at this time     Nutrition Interventions:   Food and/or Nutrient Delivery: Continue Current Diet  Nutrition Education/Counseling: No recommendation at this time  Coordination of Nutrition Care: Continue to monitor while inpatient     Goals:     Goals: PO intake 75% or greater     Nutrition Monitoring and Evaluation:   Behavioral-Environmental Outcomes: None Identified  Food/Nutrient Intake Outcomes: Food and Nutrient Intake  Physical Signs/Symptoms Outcomes: Biochemical Data, GI Status, Fluid Status or Edema, Weight, Skin, Nutrition Focused Physical Findings    Discharge Planning:     Too soon to determine     Susanna Whiting RD  Contact: [phone]

## 2022-09-30 NOTE — CARE COORDINATION
CM note: IV Solumedrol continues and patient on oral zithromax. Pt continues to deny any additional discharge needs. Has home oxygen at 4L. Also has aide services thru the waiver program 3x/wk for 4 hrs/day. States his sister will be his transportation home.

## 2022-09-30 NOTE — PLAN OF CARE
Problem: Pain  Goal: Verbalizes/displays adequate comfort level or baseline comfort level  Outcome: Progressing     Problem: Discharge Planning  Goal: Discharge to home or other facility with appropriate resources  Outcome: Progressing     Problem: Chronic Conditions and Co-morbidities  Goal: Patient's chronic conditions and co-morbidity symptoms are monitored and maintained or improved  Outcome: Progressing     Problem: ABCDS Injury Assessment  Goal: Absence of physical injury  Outcome: Progressing

## 2022-09-30 NOTE — PROGRESS NOTES
Department of Family Practice  Adult Daily Progress Note      JAVIER BUTTS IS SEEN IN FOLLOW UP TODAY ON 3 SURGICAL. HE IS TOLERATING TREATMENT. HE IS STARTING TO BREATHE BETTER. HIS SKIN IS ALSO FEELING BETTER. NO PROBLEMS WITH ORAL AZITHROMYCIN.     OBJECTIVE    Physical  VITALS:  BP (!) 146/92   Pulse 79   Temp 98.3 °F (36.8 °C) (Oral)   Resp 20   Ht 5' 6\" (1.676 m)   Wt 170 lb 7 oz (77.3 kg)   SpO2 100%   BMI 27.51 kg/m²   CONSTITUTIONAL:  awake, alert, cooperative, no apparent distress, and appears stated age  LUNGS:  No increased work of breathing, DIMINISHED air exchange, clear to auscultation bilaterally, no crackles or wheezing  CARDIOVASCULAR:  Normal apical impulse, regular rate and rhythm, normal S1 and S2, no S3 or S4, and no murmur noted  ABDOMEN:  No scars, normal bowel sounds, soft, non-distended, non-tender, no masses palpated, no hepatosplenomegally  SKIN:  FACE VERY DRY AND ROUGH AND HYPERPIGMENTED; ARMS ALSO DRY AND ASHY  Data  CBC with Differential:    Lab Results   Component Value Date/Time    WBC 9.5 09/29/2022 05:31 AM    RBC 3.65 09/29/2022 05:31 AM    HGB 10.9 09/29/2022 05:31 AM    HCT 36.9 09/29/2022 05:31 AM     09/29/2022 05:31 AM    .1 09/29/2022 05:31 AM    MCH 29.9 09/29/2022 05:31 AM    MCHC 29.5 09/29/2022 05:31 AM    RDW 12.8 09/29/2022 05:31 AM    NRBC 1.7 09/27/2022 05:30 AM    SEGSPCT 60 04/24/2013 03:25 PM    LYMPHOPCT 5.4 09/29/2022 05:31 AM    MONOPCT 3.7 09/29/2022 05:31 AM    BASOPCT 0.1 09/29/2022 05:31 AM    MONOSABS 0.35 09/29/2022 05:31 AM    LYMPHSABS 0.51 09/29/2022 05:31 AM    EOSABS 0.00 09/29/2022 05:31 AM    BASOSABS 0.01 09/29/2022 05:31 AM     BMP:    Lab Results   Component Value Date/Time     09/29/2022 05:31 AM    K 4.7 09/29/2022 05:31 AM    K 4.8 09/25/2022 08:17 AM    CL 92 09/29/2022 05:31 AM    CO2 41 09/29/2022 05:31 AM    BUN 29 09/29/2022 05:31 AM    LABALBU 3.9 09/29/2022 05:31 AM    LABALBU 4.6 07/14/2011 02:10 PM    CREATININE 1.2 09/29/2022 05:31 AM    CALCIUM 10.1 09/29/2022 05:31 AM    GFRAA >60 09/29/2022 05:31 AM    LABGLOM >60 09/29/2022 05:31 AM    GLUCOSE 151 09/29/2022 05:31 AM    GLUCOSE 101 07/14/2011 02:10 PM     Current Medications  Scheduled Meds:   azithromycin  500 mg Oral Daily    methylPREDNISolone  40 mg IntraVENous Q12H    hydrocortisone   Topical BID    triamcinolone   Topical BID    vitamin E  400 Units Oral Daily    metoprolol succinate  100 mg Oral Daily    losartan  100 mg Oral Daily    aspirin  81 mg Oral Daily    ipratropium  0.5 mg Nebulization 4x daily    budesonide  0.5 mg Nebulization BID    hydroCHLOROthiazide  25 mg Oral Daily    sodium chloride flush  5-40 mL IntraVENous 2 times per day    enoxaparin  40 mg SubCUTAneous Daily     Continuous Infusions:   dextrose      sodium chloride       PRN Meds:. HYDROcodone 5 mg - acetaminophen, albuterol, glucose, dextrose bolus **OR** dextrose bolus, glucagon (rDNA), dextrose, sodium chloride flush, sodium chloride, polyethylene glycol, acetaminophen **OR** acetaminophen    ASSESSMENT AND PLAN      Principal Problem:    COPD exacerbation (HCC)  Active Problems:    Chest pain    Acute on chronic respiratory failure with hypoxia and hypercapnia (HCC)    Essential hypertension  Resolved Problems:    * No resolved hospital problems. *      CONTINUE PLAN OF CARE WITH IV METHYLPREDNISOLONE.

## 2022-10-01 LAB
ALBUMIN SERPL-MCNC: 3.8 G/DL (ref 3.5–5.2)
ALP BLD-CCNC: 72 U/L (ref 40–129)
ALT SERPL-CCNC: 7 U/L (ref 0–40)
ANION GAP SERPL CALCULATED.3IONS-SCNC: 5 MMOL/L (ref 7–16)
AST SERPL-CCNC: 17 U/L (ref 0–39)
BASOPHILS ABSOLUTE: 0.01 E9/L (ref 0–0.2)
BASOPHILS RELATIVE PERCENT: 0.1 % (ref 0–2)
BILIRUB SERPL-MCNC: 0.3 MG/DL (ref 0–1.2)
BUN BLDV-MCNC: 23 MG/DL (ref 6–23)
CALCIUM SERPL-MCNC: 9.9 MG/DL (ref 8.6–10.2)
CHLORIDE BLD-SCNC: 91 MMOL/L (ref 98–107)
CO2: 41 MMOL/L (ref 22–29)
CREAT SERPL-MCNC: 1.2 MG/DL (ref 0.7–1.2)
EOSINOPHILS ABSOLUTE: 0 E9/L (ref 0.05–0.5)
EOSINOPHILS RELATIVE PERCENT: 0 % (ref 0–6)
GFR AFRICAN AMERICAN: >60
GFR NON-AFRICAN AMERICAN: >60 ML/MIN/1.73
GLUCOSE BLD-MCNC: 143 MG/DL (ref 74–99)
HCT VFR BLD CALC: 36 % (ref 37–54)
HEMOGLOBIN: 10.8 G/DL (ref 12.5–16.5)
IMMATURE GRANULOCYTES #: 0.05 E9/L
IMMATURE GRANULOCYTES %: 0.5 % (ref 0–5)
LYMPHOCYTES ABSOLUTE: 0.72 E9/L (ref 1.5–4)
LYMPHOCYTES RELATIVE PERCENT: 7.2 % (ref 20–42)
MCH RBC QN AUTO: 30.5 PG (ref 26–35)
MCHC RBC AUTO-ENTMCNC: 30 % (ref 32–34.5)
MCV RBC AUTO: 101.7 FL (ref 80–99.9)
MONOCYTES ABSOLUTE: 0.81 E9/L (ref 0.1–0.95)
MONOCYTES RELATIVE PERCENT: 8.1 % (ref 2–12)
NEUTROPHILS ABSOLUTE: 8.44 E9/L (ref 1.8–7.3)
NEUTROPHILS RELATIVE PERCENT: 84.1 % (ref 43–80)
PDW BLD-RTO: 13 FL (ref 11.5–15)
PLATELET # BLD: 269 E9/L (ref 130–450)
PMV BLD AUTO: 10 FL (ref 7–12)
POTASSIUM SERPL-SCNC: 4.5 MMOL/L (ref 3.5–5)
RBC # BLD: 3.54 E12/L (ref 3.8–5.8)
SODIUM BLD-SCNC: 137 MMOL/L (ref 132–146)
TOTAL PROTEIN: 6.9 G/DL (ref 6.4–8.3)
WBC # BLD: 10 E9/L (ref 4.5–11.5)

## 2022-10-01 PROCEDURE — 36415 COLL VENOUS BLD VENIPUNCTURE: CPT

## 2022-10-01 PROCEDURE — 80053 COMPREHEN METABOLIC PANEL: CPT

## 2022-10-01 PROCEDURE — 2700000000 HC OXYGEN THERAPY PER DAY

## 2022-10-01 PROCEDURE — 85025 COMPLETE CBC W/AUTO DIFF WBC: CPT

## 2022-10-01 PROCEDURE — 1200000000 HC SEMI PRIVATE

## 2022-10-01 PROCEDURE — 6370000000 HC RX 637 (ALT 250 FOR IP): Performed by: INTERNAL MEDICINE

## 2022-10-01 PROCEDURE — 6360000002 HC RX W HCPCS: Performed by: INTERNAL MEDICINE

## 2022-10-01 PROCEDURE — 6370000000 HC RX 637 (ALT 250 FOR IP): Performed by: FAMILY MEDICINE

## 2022-10-01 PROCEDURE — 2580000003 HC RX 258: Performed by: INTERNAL MEDICINE

## 2022-10-01 PROCEDURE — 94640 AIRWAY INHALATION TREATMENT: CPT

## 2022-10-01 RX ADMIN — PREDNISONE 30 MG: 20 TABLET ORAL at 21:25

## 2022-10-01 RX ADMIN — HYDROCHLOROTHIAZIDE 25 MG: 25 TABLET ORAL at 09:58

## 2022-10-01 RX ADMIN — HYDROCORTISONE: 25 CREAM TOPICAL at 10:04

## 2022-10-01 RX ADMIN — IPRATROPIUM BROMIDE 0.5 MG: 0.5 SOLUTION RESPIRATORY (INHALATION) at 18:06

## 2022-10-01 RX ADMIN — ASPIRIN 81 MG: 81 TABLET, COATED ORAL at 09:57

## 2022-10-01 RX ADMIN — TRIAMCINOLONE ACETONIDE: 1 CREAM TOPICAL at 10:04

## 2022-10-01 RX ADMIN — TRIAMCINOLONE ACETONIDE: 1 CREAM TOPICAL at 21:27

## 2022-10-01 RX ADMIN — BUDESONIDE 500 MCG: 0.5 SUSPENSION RESPIRATORY (INHALATION) at 06:47

## 2022-10-01 RX ADMIN — Medication 10 ML: at 21:35

## 2022-10-01 RX ADMIN — HYDROCODONE BITARTRATE AND ACETAMINOPHEN 1 TABLET: 5; 325 TABLET ORAL at 22:37

## 2022-10-01 RX ADMIN — IPRATROPIUM BROMIDE 0.5 MG: 0.5 SOLUTION RESPIRATORY (INHALATION) at 06:46

## 2022-10-01 RX ADMIN — ENOXAPARIN SODIUM 40 MG: 100 INJECTION SUBCUTANEOUS at 09:57

## 2022-10-01 RX ADMIN — ALBUTEROL SULFATE 2.5 MG: 2.5 SOLUTION RESPIRATORY (INHALATION) at 18:06

## 2022-10-01 RX ADMIN — PREDNISONE 30 MG: 20 TABLET ORAL at 10:00

## 2022-10-01 RX ADMIN — Medication 400 UNITS: at 10:01

## 2022-10-01 RX ADMIN — HYDROCODONE BITARTRATE AND ACETAMINOPHEN 1 TABLET: 5; 325 TABLET ORAL at 09:58

## 2022-10-01 RX ADMIN — IPRATROPIUM BROMIDE 0.5 MG: 0.5 SOLUTION RESPIRATORY (INHALATION) at 10:21

## 2022-10-01 RX ADMIN — HYDROCORTISONE: 25 CREAM TOPICAL at 21:26

## 2022-10-01 RX ADMIN — BUDESONIDE 500 MCG: 0.5 SUSPENSION RESPIRATORY (INHALATION) at 18:06

## 2022-10-01 RX ADMIN — METOPROLOL SUCCINATE 100 MG: 25 TABLET, EXTENDED RELEASE ORAL at 10:00

## 2022-10-01 RX ADMIN — LOSARTAN POTASSIUM 100 MG: 50 TABLET, FILM COATED ORAL at 10:00

## 2022-10-01 ASSESSMENT — PAIN SCALES - GENERAL
PAINLEVEL_OUTOF10: 10
PAINLEVEL_OUTOF10: 10
PAINLEVEL_OUTOF10: 7
PAINLEVEL_OUTOF10: 7

## 2022-10-01 ASSESSMENT — PAIN DESCRIPTION - LOCATION
LOCATION: BACK

## 2022-10-01 ASSESSMENT — PAIN DESCRIPTION - ONSET: ONSET: ON-GOING

## 2022-10-01 ASSESSMENT — PAIN DESCRIPTION - FREQUENCY
FREQUENCY: INTERMITTENT
FREQUENCY: CONTINUOUS

## 2022-10-01 ASSESSMENT — PAIN DESCRIPTION - ORIENTATION
ORIENTATION: LOWER

## 2022-10-01 ASSESSMENT — PAIN DESCRIPTION - DESCRIPTORS
DESCRIPTORS: ACHING;DISCOMFORT;SORE;TENDER
DESCRIPTORS: ACHING;DISCOMFORT;SORE
DESCRIPTORS: ACHING;SORE;TENDER;DISCOMFORT

## 2022-10-01 ASSESSMENT — PAIN DESCRIPTION - PAIN TYPE
TYPE: CHRONIC PAIN
TYPE: CHRONIC PAIN

## 2022-10-01 NOTE — PROGRESS NOTES
Department of Family Practice  Adult Daily Progress Note      SUBJECTIVE  BENJAMÍN IS SEEN IN FOLLOW UP TODAY ON 3 SURGICAL. HE IS TOLERATING TREATMENT. HE IS BREATHING BETTER. HIS SKIN IS ALSO FEELING BETTER. ORAL AZITHROMYCIN IS DONE TODAY. WILL START ORAL STEROIDS TOMORROW IN PREPARATION FOR DISCHARGE ON Sunday.      OBJECTIVE    Physical  VITALS:  BP (!) 147/90   Pulse 73   Temp 98.6 °F (37 °C)   Resp 16   Ht 5' 6\" (1.676 m) Comment: 9/30 bed  Wt 171 lb (77.6 kg)   SpO2 100%   BMI 27.60 kg/m²   CONSTITUTIONAL:  awake, alert, cooperative, no apparent distress, and appears stated age  LUNGS:  No increased work of breathing, BETTER air exchange, clear to auscultation bilaterally, no crackles or wheezing  CARDIOVASCULAR:  Normal apical impulse, regular rate and rhythm, normal S1 and S2, no S3 or S4, and no murmur noted  ABDOMEN:  No scars, normal bowel sounds, soft, non-distended, non-tender, no masses palpated, no hepatosplenomegally  SKIN:  FACE VERY DRY AND ROUGH AND HYPERPIGMENTED; ARMS ALSO DRY AND ASHY  Data  CBC with Differential:    Lab Results   Component Value Date/Time    WBC 9.3 09/30/2022 05:00 AM    RBC 3.79 09/30/2022 05:00 AM    HGB 11.6 09/30/2022 05:00 AM    HCT 38.6 09/30/2022 05:00 AM     09/30/2022 05:00 AM    .8 09/30/2022 05:00 AM    MCH 30.6 09/30/2022 05:00 AM    MCHC 30.1 09/30/2022 05:00 AM    RDW 13.0 09/30/2022 05:00 AM    NRBC 1.7 09/27/2022 05:30 AM    SEGSPCT 60 04/24/2013 03:25 PM    LYMPHOPCT 5.2 09/30/2022 05:00 AM    MONOPCT 2.6 09/30/2022 05:00 AM    BASOPCT 0.0 09/30/2022 05:00 AM    MONOSABS 0.28 09/30/2022 05:00 AM    LYMPHSABS 0.47 09/30/2022 05:00 AM    EOSABS 0.00 09/30/2022 05:00 AM    BASOSABS 0.00 09/30/2022 05:00 AM     BMP:    Lab Results   Component Value Date/Time     09/30/2022 05:00 AM    K 4.5 09/30/2022 05:00 AM    K 4.8 09/25/2022 08:17 AM    CL 92 09/30/2022 05:00 AM    CO2 39 09/30/2022 05:00 AM    BUN 26 09/30/2022 05:00 AM LABALBU 4.0 09/30/2022 05:00 AM    LABALBU 4.6 07/14/2011 02:10 PM    CREATININE 1.3 09/30/2022 05:00 AM    CALCIUM 10.3 09/30/2022 05:00 AM    GFRAA >60 09/30/2022 05:00 AM    LABGLOM >60 09/30/2022 05:00 AM    GLUCOSE 197 09/30/2022 05:00 AM    GLUCOSE 101 07/14/2011 02:10 PM     Current Medications  Scheduled Meds:   predniSONE  30 mg Oral BID    hydrocortisone   Topical BID    triamcinolone   Topical BID    vitamin E  400 Units Oral Daily    metoprolol succinate  100 mg Oral Daily    losartan  100 mg Oral Daily    aspirin  81 mg Oral Daily    ipratropium  0.5 mg Nebulization 4x daily    budesonide  0.5 mg Nebulization BID    hydroCHLOROthiazide  25 mg Oral Daily    sodium chloride flush  5-40 mL IntraVENous 2 times per day    enoxaparin  40 mg SubCUTAneous Daily     Continuous Infusions:   dextrose      sodium chloride       PRN Meds:. HYDROcodone 5 mg - acetaminophen, albuterol, glucose, dextrose bolus **OR** dextrose bolus, glucagon (rDNA), dextrose, sodium chloride flush, sodium chloride, polyethylene glycol, acetaminophen **OR** acetaminophen    ASSESSMENT AND PLAN      Principal Problem:    COPD exacerbation (HCC)  Active Problems:    Chest pain    Acute on chronic respiratory failure with hypoxia and hypercapnia (HCC)    Essential hypertension  Resolved Problems:    * No resolved hospital problems. *      CONTINUE PLAN OF CARE WITH ORAL PREDNISONE TO START TOMORROW. TENTATIVE DISCHARGE Sunday.

## 2022-10-02 VITALS
SYSTOLIC BLOOD PRESSURE: 168 MMHG | HEIGHT: 66 IN | OXYGEN SATURATION: 100 % | BODY MASS INDEX: 27.48 KG/M2 | RESPIRATION RATE: 17 BRPM | HEART RATE: 69 BPM | DIASTOLIC BLOOD PRESSURE: 111 MMHG | WEIGHT: 171 LBS | TEMPERATURE: 98 F

## 2022-10-02 LAB
ALBUMIN SERPL-MCNC: 3.9 G/DL (ref 3.5–5.2)
ALP BLD-CCNC: 87 U/L (ref 40–129)
ALT SERPL-CCNC: 9 U/L (ref 0–40)
ANION GAP SERPL CALCULATED.3IONS-SCNC: 6 MMOL/L (ref 7–16)
AST SERPL-CCNC: 18 U/L (ref 0–39)
BASOPHILS ABSOLUTE: 0.01 E9/L (ref 0–0.2)
BASOPHILS RELATIVE PERCENT: 0.1 % (ref 0–2)
BILIRUB SERPL-MCNC: 0.2 MG/DL (ref 0–1.2)
BUN BLDV-MCNC: 23 MG/DL (ref 6–23)
CALCIUM SERPL-MCNC: 10.1 MG/DL (ref 8.6–10.2)
CHLORIDE BLD-SCNC: 91 MMOL/L (ref 98–107)
CO2: 37 MMOL/L (ref 22–29)
CREAT SERPL-MCNC: 1.1 MG/DL (ref 0.7–1.2)
EOSINOPHILS ABSOLUTE: 0 E9/L (ref 0.05–0.5)
EOSINOPHILS RELATIVE PERCENT: 0 % (ref 0–6)
GFR AFRICAN AMERICAN: >60
GFR NON-AFRICAN AMERICAN: >60 ML/MIN/1.73
GLUCOSE BLD-MCNC: 129 MG/DL (ref 74–99)
HCT VFR BLD CALC: 38.6 % (ref 37–54)
HEMOGLOBIN: 11.5 G/DL (ref 12.5–16.5)
IMMATURE GRANULOCYTES #: 0.05 E9/L
IMMATURE GRANULOCYTES %: 0.5 % (ref 0–5)
LYMPHOCYTES ABSOLUTE: 0.99 E9/L (ref 1.5–4)
LYMPHOCYTES RELATIVE PERCENT: 9 % (ref 20–42)
MCH RBC QN AUTO: 30.6 PG (ref 26–35)
MCHC RBC AUTO-ENTMCNC: 29.8 % (ref 32–34.5)
MCV RBC AUTO: 102.7 FL (ref 80–99.9)
MONOCYTES ABSOLUTE: 1.22 E9/L (ref 0.1–0.95)
MONOCYTES RELATIVE PERCENT: 11.1 % (ref 2–12)
NEUTROPHILS ABSOLUTE: 8.68 E9/L (ref 1.8–7.3)
NEUTROPHILS RELATIVE PERCENT: 79.3 % (ref 43–80)
PDW BLD-RTO: 13 FL (ref 11.5–15)
PLATELET # BLD: 208 E9/L (ref 130–450)
PMV BLD AUTO: 10.6 FL (ref 7–12)
POTASSIUM SERPL-SCNC: 4.5 MMOL/L (ref 3.5–5)
RBC # BLD: 3.76 E12/L (ref 3.8–5.8)
SODIUM BLD-SCNC: 134 MMOL/L (ref 132–146)
TOTAL PROTEIN: 6.9 G/DL (ref 6.4–8.3)
WBC # BLD: 11 E9/L (ref 4.5–11.5)

## 2022-10-02 PROCEDURE — 6360000002 HC RX W HCPCS: Performed by: INTERNAL MEDICINE

## 2022-10-02 PROCEDURE — 85025 COMPLETE CBC W/AUTO DIFF WBC: CPT

## 2022-10-02 PROCEDURE — 6370000000 HC RX 637 (ALT 250 FOR IP): Performed by: INTERNAL MEDICINE

## 2022-10-02 PROCEDURE — 36415 COLL VENOUS BLD VENIPUNCTURE: CPT

## 2022-10-02 PROCEDURE — 80053 COMPREHEN METABOLIC PANEL: CPT

## 2022-10-02 PROCEDURE — 6370000000 HC RX 637 (ALT 250 FOR IP): Performed by: FAMILY MEDICINE

## 2022-10-02 PROCEDURE — 94640 AIRWAY INHALATION TREATMENT: CPT

## 2022-10-02 PROCEDURE — 2700000000 HC OXYGEN THERAPY PER DAY

## 2022-10-02 RX ORDER — POLYETHYLENE GLYCOL 3350 17 G/17G
17 POWDER, FOR SOLUTION ORAL DAILY PRN
Qty: 527 G | Refills: 1 | Status: SHIPPED | OUTPATIENT
Start: 2022-10-02 | End: 2022-11-01

## 2022-10-02 RX ORDER — PREDNISONE 10 MG/1
30 TABLET ORAL 2 TIMES DAILY
Qty: 60 TABLET | Refills: 0 | Status: SHIPPED | OUTPATIENT
Start: 2022-10-02 | End: 2022-10-12

## 2022-10-02 RX ADMIN — LOSARTAN POTASSIUM 100 MG: 50 TABLET, FILM COATED ORAL at 08:20

## 2022-10-02 RX ADMIN — IPRATROPIUM BROMIDE 0.5 MG: 0.5 SOLUTION RESPIRATORY (INHALATION) at 09:54

## 2022-10-02 RX ADMIN — METOPROLOL SUCCINATE 100 MG: 25 TABLET, EXTENDED RELEASE ORAL at 08:21

## 2022-10-02 RX ADMIN — TRIAMCINOLONE ACETONIDE: 1 CREAM TOPICAL at 08:23

## 2022-10-02 RX ADMIN — BUDESONIDE 500 MCG: 0.5 SUSPENSION RESPIRATORY (INHALATION) at 09:54

## 2022-10-02 RX ADMIN — HYDROCHLOROTHIAZIDE 25 MG: 25 TABLET ORAL at 08:20

## 2022-10-02 RX ADMIN — HYDROCODONE BITARTRATE AND ACETAMINOPHEN 1 TABLET: 5; 325 TABLET ORAL at 08:22

## 2022-10-02 RX ADMIN — ASPIRIN 81 MG: 81 TABLET, COATED ORAL at 08:19

## 2022-10-02 RX ADMIN — Medication 400 UNITS: at 08:22

## 2022-10-02 RX ADMIN — HYDROCORTISONE: 25 CREAM TOPICAL at 08:23

## 2022-10-02 RX ADMIN — ENOXAPARIN SODIUM 40 MG: 100 INJECTION SUBCUTANEOUS at 08:19

## 2022-10-02 RX ADMIN — ALBUTEROL SULFATE 2.5 MG: 2.5 SOLUTION RESPIRATORY (INHALATION) at 09:54

## 2022-10-02 RX ADMIN — PREDNISONE 30 MG: 20 TABLET ORAL at 08:21

## 2022-10-02 ASSESSMENT — PAIN SCALES - GENERAL
PAINLEVEL_OUTOF10: 0
PAINLEVEL_OUTOF10: 10

## 2022-10-02 ASSESSMENT — PAIN DESCRIPTION - ORIENTATION: ORIENTATION: LOWER

## 2022-10-02 ASSESSMENT — PAIN DESCRIPTION - DESCRIPTORS: DESCRIPTORS: ACHING;DISCOMFORT;SORE;TENDER

## 2022-10-02 ASSESSMENT — PAIN - FUNCTIONAL ASSESSMENT: PAIN_FUNCTIONAL_ASSESSMENT: PREVENTS OR INTERFERES SOME ACTIVE ACTIVITIES AND ADLS

## 2022-10-02 ASSESSMENT — PAIN DESCRIPTION - LOCATION: LOCATION: BACK

## 2022-10-02 NOTE — PROGRESS NOTES
Department of Family Practice  Adult Daily Progress Note      JAVIER BUTTS IS SEEN IN FOLLOW UP TODAY ON 3 SURGICAL. HE IS TOLERATING TREATMENT. HE IS BREATHING BETTER. HIS SKIN IS ALSO FEELING BETTER. ORAL AZITHROMYCIN IS DONE. ORAL STEROID WAS STARTED TODAY. HE WILL GO HOME ON THEM. WILL DISCHARGE TOMORROW.       OBJECTIVE    Physical  VITALS:  BP (!) 148/80 Comment: nurse jeffery notified  Pulse 73   Temp 98.6 °F (37 °C) (Oral)   Resp 18   Ht 5' 6\" (1.676 m) Comment: 9/30 bed  Wt 171 lb (77.6 kg)   SpO2 100%   BMI 27.60 kg/m²   CONSTITUTIONAL:  awake, alert, cooperative, no apparent distress, and appears stated age  LUNGS:  No increased work of breathing, BETTER air exchange, clear to auscultation bilaterally, no crackles or wheezing  CARDIOVASCULAR:  Normal apical impulse, regular rate and rhythm, normal S1 and S2, no S3 or S4, and no murmur noted  ABDOMEN:  No scars, normal bowel sounds, soft, non-distended, non-tender, no masses palpated, no hepatosplenomegally  SKIN:  FACE VERY DRY AND ROUGH AND HYPERPIGMENTED; ARMS ALSO DRY AND ASHY  Data  CBC with Differential:    Lab Results   Component Value Date/Time    WBC 10.0 10/01/2022 05:47 AM    RBC 3.54 10/01/2022 05:47 AM    HGB 10.8 10/01/2022 05:47 AM    HCT 36.0 10/01/2022 05:47 AM     10/01/2022 05:47 AM    .7 10/01/2022 05:47 AM    MCH 30.5 10/01/2022 05:47 AM    MCHC 30.0 10/01/2022 05:47 AM    RDW 13.0 10/01/2022 05:47 AM    NRBC 1.7 09/27/2022 05:30 AM    SEGSPCT 60 04/24/2013 03:25 PM    LYMPHOPCT 7.2 10/01/2022 05:47 AM    MONOPCT 8.1 10/01/2022 05:47 AM    BASOPCT 0.1 10/01/2022 05:47 AM    MONOSABS 0.81 10/01/2022 05:47 AM    LYMPHSABS 0.72 10/01/2022 05:47 AM    EOSABS 0.00 10/01/2022 05:47 AM    BASOSABS 0.01 10/01/2022 05:47 AM     BMP:    Lab Results   Component Value Date/Time     10/01/2022 05:47 AM    K 4.5 10/01/2022 05:47 AM    K 4.8 09/25/2022 08:17 AM    CL 91 10/01/2022 05:47 AM    CO2 41 10/01/2022 05:47 AM    BUN 23 10/01/2022 05:47 AM    LABALBU 3.8 10/01/2022 05:47 AM    LABALBU 4.6 07/14/2011 02:10 PM    CREATININE 1.2 10/01/2022 05:47 AM    CALCIUM 9.9 10/01/2022 05:47 AM    GFRAA >60 10/01/2022 05:47 AM    LABGLOM >60 10/01/2022 05:47 AM    GLUCOSE 143 10/01/2022 05:47 AM    GLUCOSE 101 07/14/2011 02:10 PM     Current Medications  Scheduled Meds:   predniSONE  30 mg Oral BID    hydrocortisone   Topical BID    triamcinolone   Topical BID    vitamin E  400 Units Oral Daily    metoprolol succinate  100 mg Oral Daily    losartan  100 mg Oral Daily    aspirin  81 mg Oral Daily    ipratropium  0.5 mg Nebulization 4x daily    budesonide  0.5 mg Nebulization BID    hydroCHLOROthiazide  25 mg Oral Daily    sodium chloride flush  5-40 mL IntraVENous 2 times per day    enoxaparin  40 mg SubCUTAneous Daily     Continuous Infusions:   dextrose      sodium chloride       PRN Meds:. HYDROcodone 5 mg - acetaminophen, albuterol, glucose, dextrose bolus **OR** dextrose bolus, glucagon (rDNA), dextrose, sodium chloride flush, sodium chloride, polyethylene glycol, acetaminophen **OR** acetaminophen    ASSESSMENT AND PLAN      Principal Problem:    COPD exacerbation (HCC)  Active Problems:    Chest pain    Acute on chronic respiratory failure with hypoxia and hypercapnia (HCC)    Essential hypertension  Resolved Problems:    * No resolved hospital problems. *      CONTINUE PLAN OF CARE WITH ORAL PREDNISONE FOR HOME GOING. DISCHARGE TOMORROW.

## 2022-10-02 NOTE — PLAN OF CARE
Problem: Pain  Goal: Verbalizes/displays adequate comfort level or baseline comfort level  10/2/2022 0403 by Tha Watkins RN  Outcome: Progressing     Problem: Discharge Planning  Goal: Discharge to home or other facility with appropriate resources  10/2/2022 0403 by Tha Watkins RN  Outcome: Progressing     Problem: Chronic Conditions and Co-morbidities  Goal: Patient's chronic conditions and co-morbidity symptoms are monitored and maintained or improved  10/2/2022 0403 by Tha Watkins RN  Outcome: Progressing     Problem: ABCDS Injury Assessment  Goal: Absence of physical injury  10/2/2022 0403 by Tha Watkins RN  Outcome: Progressing     Problem: Safety - Adult  Goal: Free from fall injury  10/2/2022 0403 by Tha Watkins RN  Outcome: Progressing

## 2022-10-02 NOTE — PROGRESS NOTES
Refused POCT blood sugars. Requests staff to bathe him due to he gets too short of breath. Pt voiced he had some trouble with this and discussed feelings. Nurse conveyed we will try to meet his needs regarding personal hygiene.

## 2022-10-14 ENCOUNTER — OFFICE VISIT (OUTPATIENT)
Dept: PAIN MANAGEMENT | Age: 72
End: 2022-10-14
Payer: COMMERCIAL

## 2022-10-14 VITALS
SYSTOLIC BLOOD PRESSURE: 128 MMHG | HEART RATE: 71 BPM | HEIGHT: 66 IN | BODY MASS INDEX: 28.61 KG/M2 | WEIGHT: 178 LBS | DIASTOLIC BLOOD PRESSURE: 80 MMHG | TEMPERATURE: 97.5 F | OXYGEN SATURATION: 95 %

## 2022-10-14 DIAGNOSIS — M47.816 LUMBAR SPONDYLOSIS: ICD-10-CM

## 2022-10-14 DIAGNOSIS — G89.4 CHRONIC PAIN SYNDROME: Primary | ICD-10-CM

## 2022-10-14 DIAGNOSIS — M48.02 CERVICAL STENOSIS OF SPINE: ICD-10-CM

## 2022-10-14 DIAGNOSIS — M54.12 CERVICAL RADICULOPATHY: ICD-10-CM

## 2022-10-14 DIAGNOSIS — M47.812 CERVICAL SPONDYLOSIS: ICD-10-CM

## 2022-10-14 DIAGNOSIS — M51.9 LUMBAR DISC DISORDER: ICD-10-CM

## 2022-10-14 DIAGNOSIS — M48.061 SPINAL STENOSIS OF LUMBAR REGION WITHOUT NEUROGENIC CLAUDICATION: ICD-10-CM

## 2022-10-14 DIAGNOSIS — M50.90 CERVICAL DISC DISORDER: ICD-10-CM

## 2022-10-14 DIAGNOSIS — M47.816 LUMBAR FACET ARTHROPATHY: ICD-10-CM

## 2022-10-14 DIAGNOSIS — M47.812 CERVICAL FACET JOINT SYNDROME: ICD-10-CM

## 2022-10-14 PROCEDURE — 99213 OFFICE O/P EST LOW 20 MIN: CPT | Performed by: PAIN MEDICINE

## 2022-10-14 PROCEDURE — G8417 CALC BMI ABV UP PARAM F/U: HCPCS | Performed by: PAIN MEDICINE

## 2022-10-14 PROCEDURE — 99204 OFFICE O/P NEW MOD 45 MIN: CPT | Performed by: PAIN MEDICINE

## 2022-10-14 PROCEDURE — G8484 FLU IMMUNIZE NO ADMIN: HCPCS | Performed by: PAIN MEDICINE

## 2022-10-14 PROCEDURE — 1111F DSCHRG MED/CURRENT MED MERGE: CPT | Performed by: PAIN MEDICINE

## 2022-10-14 PROCEDURE — 1036F TOBACCO NON-USER: CPT | Performed by: PAIN MEDICINE

## 2022-10-14 PROCEDURE — 3017F COLORECTAL CA SCREEN DOC REV: CPT | Performed by: PAIN MEDICINE

## 2022-10-14 PROCEDURE — G8427 DOCREV CUR MEDS BY ELIG CLIN: HCPCS | Performed by: PAIN MEDICINE

## 2022-10-14 PROCEDURE — 1123F ACP DISCUSS/DSCN MKR DOCD: CPT | Performed by: PAIN MEDICINE

## 2022-10-14 RX ORDER — SODIUM CHLORIDE 0.9 % (FLUSH) 0.9 %
5-40 SYRINGE (ML) INJECTION EVERY 12 HOURS SCHEDULED
OUTPATIENT
Start: 2022-10-14

## 2022-10-14 RX ORDER — SODIUM CHLORIDE 0.9 % (FLUSH) 0.9 %
5-40 SYRINGE (ML) INJECTION PRN
OUTPATIENT
Start: 2022-10-14

## 2022-10-14 RX ORDER — GABAPENTIN 300 MG/1
300 CAPSULE ORAL 2 TIMES DAILY
Qty: 60 CAPSULE | Refills: 0 | Status: SHIPPED | OUTPATIENT
Start: 2022-10-14 | End: 2022-11-13

## 2022-10-14 RX ORDER — SODIUM CHLORIDE 9 MG/ML
INJECTION, SOLUTION INTRAVENOUS PRN
OUTPATIENT
Start: 2022-10-14

## 2022-10-14 NOTE — PROGRESS NOTES
[address]        [address] [facility], [address] [facility]      [phone]          Consult Note      Patient:  SUNNY Orr 1950    Date of Service:  10/14/22    Requesting Physician:  Ed Suazo    Reason for Consult:      Patient presents with complaints of neck and low back pain that started a long time ago and has been progressively getting worse. Pain is described as sharp/stabbing/constant. Pain is aggravated by nothing, pain is relieved pain medications. HISTORY OF PRESENT ILLNESS:      Pain does radiate to the upper and lower extremities. He  has numbness of the right little and ring finger and does not have bladder or bowel dysfunction. He has been on anticoagulation medications to include ASA. The patient  has not been on herbal supplements. The patient is not diabetic. Imaging:  CT lumbar spine MRI   1. Degenerative change with minimal grade 1 anterolisthesis at L4-L5. 2. Moderate to severe central canal stenosis at L3-L4 and L4-L5. Mild   central canal stenosis at L2-L3. Suspected tiny right posterolateral disc   extrusion at L4-L5 extending superiorly behind the right side of the L4   vertebral body. 3. Mild bilateral neural foraminal stenosis at L3-L4 and L4-L5. CT cervical spine     1. Degenerative and postsurgical changes including anterior fusion at C4-C7. Right-sided disc osteophyte complex at C4-C5 causing slight impression on the   right side of the spinal cord and severe right neural foraminal stenosis. Small right disc osteophyte complex at C5-C6 causing severe right neural   foraminal stenosis and slight impression on the right side of the thecal sac. 2. Broad-based disc osteophyte complex at C2-C3 and central disc protrusion   at C7-T1 causing moderate central canal stenosis with slight impression on   the ventral aspect of the spinal cord at both these levels.    3. Small left posterolateral disc protrusion at T1-T2 that contacts the left   ventral surface of the spinal cord without evidence of cord compression. 4. Moderate bilateral neural foraminal stenosis at C6-7 C7, greater on the   right. Previous treatments: Physical Therapy, Surgery C4-7 anterior fusion, and medications. .      Opioid Agreement:  Renewal date:N/A    Past Medical History:   Diagnosis Date    Chest pain 3-6-2015    lexiscan nuclear stress    Chronic diastolic CHF (congestive heart failure) ([address]     Echo 1/18/2016; EF 57%    COPD (chronic obstructive pulmonary disease) (HCC)     Hepatitis C     Hilar lymphadenopathy     CT 1/2016; 1.8 cm    History of echocardiogram 01/02/2019    EF 27%; Stage I diastolic dysfunction    Hypertension     Irregular heart beat     Oxygen dependent      Past Surgical History:   Procedure Laterality Date    CATARACT REMOVAL Right 05/2019    CATARACT REMOVAL Left 06/2019    CERVICAL FUSION  03/09/2018    ACF C4-C7    COLONOSCOPY      HERNIA REPAIR      tracee inguinal repair     Prior to Admission medications    Medication Sig Start Date End Date Taking?  Authorizing Provider   PREDNISONE PO Take by mouth   Yes Historical Provider, MD   polyethylene glycol (GLYCOLAX) 17 g packet Take 17 g by mouth daily as needed for Constipation 10/2/22 11/1/22 Yes Lilliam Minor,    ipratropium (ATROVENT) 0.02 % nebulizer solution Take 2.5 mLs by nebulization 4 times daily 10/2/22  Yes Lilliam Minor DO   budesonide (PULMICORT) 0.5 MG/2ML nebulizer suspension Take 1 ampule by nebulization daily   Yes Historical Provider, MD   aspirin 81 MG EC tablet Take 81 mg by mouth daily   Yes Historical Provider, MD   hydroCHLOROthiazide (HYDRODIURIL) 25 MG tablet Take 25 mg by mouth daily   Yes Historical Provider, MD   tiotropium (SPIRIVA RESPIMAT) 2.5 MCG/ACT AERS inhaler Inhale 2 puffs into the lungs daily   Yes Historical Provider, MD   albuterol sulfate HFA (PROVENTIL;VENTOLIN;PROAIR) 108 (90 Base) MCG/ACT inhaler    Yes Historical Provider, MD   desonide (DESOWEN) 0.05 % cream Apply 1 each topically daily Apply to face   Yes Historical Provider, MD   metoprolol succinate (TOPROL XL) 100 MG extended release tablet Take 100 mg by mouth daily   Yes Historical Provider, MD   budesonide-formoterol (SYMBICORT) 160-4.5 MCG/ACT AERO Inhale 2 puffs into the lungs 2 times daily   Yes Historical Provider, MD   cetirizine (ZYRTEC) 10 MG tablet Take 10 mg by mouth daily   Yes Historical Provider, MD   losartan (COZAAR) 100 MG tablet Take 100 mg by mouth daily   Yes Historical Provider, MD   ketoconazole (NIZORAL) 2 % cream Apply topically daily as needed (itching)   Yes Historical Provider, MD   hydrocortisone 2.5 % cream Apply topically 2 times daily as needed (itching)   Yes Historical Provider, MD   triamcinolone (KENALOG) 0.1 % cream Apply topically 2 times daily Apply topically 2 times daily. Yes Historical Provider, MD   OXYGEN Inhale 4 L into the lungs    Yes Historical Provider, MD   vitamin E 400 UNIT capsule Take 400 Units by mouth daily   Yes Historical Provider, MD     Allergies   Allergen Reactions    Ace Inhibitors Shortness Of Breath, Swelling and Angioedema    Lisinopril Other (See Comments)     Angioedema     Social History     Socioeconomic History    Marital status: Single     Spouse name: Not on file    Number of children: Not on file    Years of education: Not on file    Highest education level: Not on file   Occupational History    Not on file   Tobacco Use    Smoking status: Former     Packs/day: 0.50     Types: Cigarettes     Quit date: 2015     Years since quittin.7    Smokeless tobacco: Never   Vaping Use    Vaping Use: Never used   Substance and Sexual Activity    Alcohol use:  Yes     Alcohol/week: 4.0 standard drinks     Types: 4 Cans of beer per week    Drug use: No    Sexual activity: Never   Other Topics Concern    Not on file   Social History Narrative    Not on file     Social Determinants of Health     Financial Resource Strain: Not on file   Food Insecurity: Not on file   Transportation Needs: Not on file   Physical Activity: Not on file   Stress: Not on file   Social Connections: Not on file   Intimate Partner Violence: Not on file   Housing Stability: Not on file     Family History   Problem Relation Age of Onset    Heart Disease Mother     Alcohol Abuse Father     Diabetes Father     Diabetes Sister     Heart Disease Sister     Diabetes Sister     High Blood Pressure Sister      REVIEW OF SYSTEMS:     Patient specifically denies fever/chills, chest pain, shortness of breath, new bowel or bladder complaints. All other review of systems was negative. PHYSICAL EXAMINATION:      /80   Pulse 71   Temp 97.5 °F (36.4 °C) (Infrared)   Ht 5' 6\" (1.676 m)   Wt 178 lb (80.7 kg)   SpO2 95%   BMI 28.73 kg/m²     General:      General appearance:   pleasant and well-hydrated. , in moderate discomfort and A & O x3  Build:Normal Weight    HEENT:    Head:normocephalic and atraumatic  Sclera: icterus absent,     Lungs:    Breathing:Breathing Pattern: regular, no distress on O2    Abdomen:    Shape:non-distended and normal  Tenderness:none    Cervical spine:    Inspection:anterior fusion scar  Palpation:tenderness paravertebral muscles, facet loading, left, right, positive, tenderness, and non-tender paraspinals. Range of motion:abnormal moderately flexion, extension rotation bilateral and is  painful. Lumbar spine:    Spine inspection:normal   CVA tenderness:No   Palpation:tenderness paravertebral muscles, facet loading, left, right, positive, and tenderness. Range of motion:abnormal moderately Lateral bending, flexion, extension rotation bilateral and is  painful.     Musculoskeletal:    Trigger points in Paraveteral:absent bilaterally  Recinos's:negative right, negative left   SI joint tenderness:negative right, negative left              JOHN test:not done right, not done left  Piriformis tenderness:negative right, negative left  Trochanteric bursa tenderness:negative right, negative left  SLR:negative right, negative left, sitting     Extremities:    Tremors:None bilaterally upper and lower  Range of motion:Generally normal shoulders, pain with internal rotation of hips negative. Intact:Yes  Edema:Normal    Neurological:    Sensory:normal to light touch bilateral upper and lower extremities. Motor:              Right Bicep5/5           Left Bicep5/5              Right Triceps5/5       Left Triceps5/5          Right Deltoid5/5     Left Deltoid5/5                  Right Quadriceps5/5          Left Quadriceps5/5           Right Gastrocnemius5/5    Left Gastrocnemius5/5  Right Ant Tibialis5/5  Left Ant Tibialis5/5  Reflexes:    Right Brachioradialis reflex2+  Left Brachioradialis reflex2+  Right Biceps reflex0  Left Biceps reflex2+  Right Triceps reflex2+  Left Triceps reflex2+  Right Quadriceps reflex2+  Left Quadriceps reflex2+  Right Achilles reflex2+  Left Achilles reflex2+  Gait:normal    Dermatology:    Skin:no unusual rashes and no skin lesions    Impression:  Chronic neck and low back pain with radiation to bilateral upper and lower extremities with h/o C4-7 fusion. Lumbar spine CT 2022 multilevel central canal stenosis worst at L3-4 and L4-5 with multilevel facet arthritis. Cervical spine CT central disc protrusion at C7-T1 causing moderate canal stenosis. Plan:  Reviewed cervical and lumbar spine CT and discussed with the patient. Reviewed neurosurgery notes. Will schedule patient for lumbar epidural steroid injection L3-4. Patient would also benefit from GALINA C7-T1(right C8 radiculopathy). Will start patient on gabapentin 300 mg BID. Cancel urine screen. [facility]RS report reviewed 10/2022 consistent. Patient encouraged to stay active.   Treatment plan discussed with the patient including medications and procedure side effects     We discussed with the patient that combining opioids, benzodiazepines, alcohol, illicit drugs or sleep aids increases the risk of respiratory depression including death. We discussed that these medications may cause drowsiness, sedation or dizziness and have counseled the patient not to drive or operate machinery. We have discussed that these medications will be prescribed only by one provider. We have discussed with the patient about age related risk factors and have thoroughly discussed the importance of taking these medications as prescribed. The patient verbalizes understanding. ccrefrhondaing agueda Ahuja M.D.

## 2022-10-14 NOTE — PROGRESS NOTES
Do you currently have any of the following:    Fever: No  Headache:  No  Cough: No  Shortness of breath: No  Exposed to anyone with these symptoms: No                                                                                                                Antolin Benavides presents to the [address] on 10/14/2022. Elaine Lindsey is complaining of pain in neck and back. The pain is constant. The pain is described as sharp and nagging. Pain is rated on his best day at a 10, on his worst day at a 10, and on average at a 10 on the VAS scale. He has not taken any pain medication. Elaine Lindsey does not have issues with constipation. Any procedures since your last visit: No.    He is  on NSAIDS and  is  on anticoagulation medications to include ASA and is managed by Claudio Melgar DO  . Pacemaker or defibrillator: No.    Medication Contract and Consent for Opioid Use Documents Filed        No documents found                       There were no vitals taken for this visit. No LMP for male patient.

## 2022-10-28 ENCOUNTER — TELEPHONE (OUTPATIENT)
Dept: PAIN MANAGEMENT | Age: 72
End: 2022-10-28

## 2022-10-28 PROBLEM — M54.16 LUMBAR RADICULOPATHY: Status: ACTIVE | Noted: 2022-10-28

## 2022-10-28 NOTE — TELEPHONE ENCOUNTER
Call to Andrés Aguilar that procedure was approved for 11/7/2022 and that [facility] should call him a few days before for the pre op call and after 3:00 PM the business day before with the arrival time. Instructed Jose Antonio to hold ibuprofen for 24 hours, naprosyn for 4 days and any aspirin containing products or fish oil for 7 days. Instructed to call office back if any questions. Jose Antonio verbalized understanding.     Electronically signed by Gary Sanchez RN on 10/28/2022 at 1:34 PM

## 2022-10-31 NOTE — H&P
Physician Discharge Summary     Patient ID:  Mike Aparicio  [id number]  80 y.o.  1950    Admit date: 9/25/2022    Discharge date and time: 10/2/2022 12:27 PM     Admitting Physician: Arianna Leigh DO     Discharge Physician: Shea Jacob DO    Admission Diagnoses: COPD exacerbation ([address] [J44.1]  Chest pain, unspecified type [R07.9]    Discharge Diagnoses: EXACERBATION OF COPD    Admission Condition: poor    Discharged Condition: good    Indication for Admission: STABILIZE [address] Course: The patient is a 81 y.o. male with significant past medical history of COPD who presents with SHORTNESS OF BREATH OVER THE PAST SEVERAL WEEKS BECOMING WORSE AND NOT RESPONDING TO HIS USUAL HOME MEDICATIONS. 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   09/26/22   BENJAMÍN IS SEEN IN FOLLOW UP TODAY ON 3 SURGICAL. HE WAS HAVING PROBLEMS WITH HIS ROOMMATE ON TELEMETRY. HE WAS ALSO REFUSING HIS IV STEROIDS. I HAD TO EXPLAIN TO HIM THAT IF WE DON'T GIVE HIM THE STEROIDS THERE IS NO OTHER WAY TO GET HIM BETTER SO HE CAN GO HOME.  NOT TAKING SOMETHING HERE BECAUSE HE DOES NOT TAKE IT AT HOME IS NOT A GOOD REASON. HE MAY AS WELL STAY HOME. HIS INHALED STEROIDS ARE FOR MAINTENANCE. ORAL AND IV STEROIDS ARE FOR WHEN HE HAS THESE FLARE-UPS. HE HAS ALSO BEEN ORDERED IV ZITHROMAX, AN ANTIBIOTIC. AFTER MY EXPLANATIONS HE AGREES TO BOTH.   09/27/2022   BENJAMÍN IS SEEN IN FOLLOW UP TODAY ON 3 SURGICAL. HE IS TOLERATING TREATMENT. DR. Edyta Schneider WAS CONSULTED BUT  [facility] SAW HIM TODAY BUT HE SAYS THAT HE SAW DR. Edyta Schneider THIS MORNING. IN ANY CASE, HE IS NOW ORDERED METHYLPREDNISOLONE AT 40 MG IV BID.    09/28/2022   BENJAMÍN IS SEEN IN FOLLOW UP TODAY ON 3 SURGICAL. HE IS TOLERATING TREATMENT. PER DR. Nuzhat Mcfadden NOTE:  Patient has refused to be seen by our team multiple times.   He requested to follow-up with Dr. Veronica Kumar. Can be discharged from pulmonary point of view on 5 days of prednisone 40 mg daily and azithromycin 500 mg daily for 3 days. PATIENT SAYS HE DID NOT SEE DR. Everette Segovia. HE SAYS HE IS NOT READY FOR DISCHARGE. WALKING TO THE BATHROOM STILL CAUSES SHORTNESS OF BREATH. HE WILL BE SWITCHED BACK TO IV METHYLPREDNISOLONE.  NO NEED FOR HIM TO STAY WITHOUT IT.    09/29/2022   BENJAMÍN IS SEEN IN FOLLOW UP TODAY ON 3 SURGICAL. HE IS TOLERATING TREATMENT. HE IS STARTING TO BREATHE BETTER. HIS SKIN IS ALSO FEELING BETTER. NO PROBLEMS WITH ORAL AZITHROMYCIN.   09/30/2022   BENJAMÍN IS SEEN IN FOLLOW UP TODAY ON 3 SURGICAL. HE IS TOLERATING TREATMENT. HE IS BREATHING BETTER. HIS SKIN IS ALSO FEELING BETTER. ORAL AZITHROMYCIN IS DONE TODAY. WILL START ORAL STEROIDS TOMORROW IN PREPARATION FOR DISCHARGE ON Sunday. 10/01/2022   BENJAMÍN IS SEEN IN FOLLOW UP TODAY ON 3 SURGICAL. HE IS TOLERATING TREATMENT. HE IS BREATHING BETTER. HIS SKIN IS ALSO FEELING BETTER. ORAL AZITHROMYCIN IS DONE. ORAL STEROID WAS STARTED TODAY. HE WILL GO HOME ON THEM. WILL DISCHARGE TOMORROW. 10/02/2022   DISCHARGED HOME. Consults: pulmonary/intensive care    Significant Diagnostic Studies: NONE    Outstanding Order Results       No orders found from 8/27/2022 to 9/26/2022. Treatments: IV hydration, antibiotics: azithromycin, steroids: solu-medrol, and respiratory therapy: O2 and albuterol/atropine nebulizer    Discharge Exam:  No exam performed today,  DISCHARGED PRIOR TO DAILY ROUNDS . Disposition: home    Patient Instructions:   [unfilled]  Activity: activity as tolerated  Diet: regular diet  Wound Care: none needed    Follow-up with PCP   in 2 weeks. Signed:  Claudio Melgar DO  10/31/2022  11:09 AM     THIS IS COMPLETED DISCHARGE SUMMARY INCORRECTLY LABELED. fair plus/at RW

## 2022-11-02 NOTE — DISCHARGE SUMMARY
Farooq Liu DO   Physician   Family Medicine   H&P       Signed   Date of Service:  10/2/2022 12:27 PM                 Signed                                                                                                                                                                                                                                                                                                                                                                                                                                                                                                                            Physician Discharge Summary      Patient ID:  Karla Sherwood  [id number]  81 y.o.  1950     Admit date: 9/25/2022     Discharge date and time: 10/2/2022 12:27 PM      Admitting Physician: Farooq Liu DO      Discharge Physician: Fili Powers DO     Admission Diagnoses: COPD exacerbation [address] [address] [J44.1]  Chest pain, unspecified type [R07.9]     Discharge Diagnoses: EXACERBATION OF COPD     Admission Condition: poor     Discharged Condition: good     Indication for Admission: STABILIZE [address] Course: The patient is a 81 y.o. male with significant past medical history of COPD who presents with SHORTNESS OF BREATH OVER THE PAST SEVERAL WEEKS BECOMING WORSE AND NOT RESPONDING TO HIS USUAL HOME MEDICATIONS. 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   09/26/22   BENJAMÍN IS SEEN IN FOLLOW UP TODAY ON 3 SURGICAL. HE WAS HAVING PROBLEMS WITH HIS ROOMMATE ON TELEMETRY. HE WAS ALSO REFUSING HIS IV STEROIDS. I HAD TO EXPLAIN TO HIM THAT IF WE DON'T GIVE HIM THE STEROIDS THERE IS NO OTHER WAY TO GET HIM BETTER SO HE CAN GO HOME.  NOT TAKING SOMETHING HERE BECAUSE HE DOES NOT TAKE IT AT HOME IS NOT A GOOD REASON. HE MAY AS WELL STAY HOME. HIS INHALED STEROIDS ARE FOR MAINTENANCE.   ORAL AND IV STEROIDS ARE FOR WHEN HE HAS THESE FLARE-UPS. HE HAS ALSO BEEN ORDERED IV ZITHROMAX, AN ANTIBIOTIC. AFTER MY EXPLANATIONS HE AGREES TO BOTH.   09/27/2022   BENJAMÍN IS SEEN IN FOLLOW UP TODAY ON 3 SURGICAL. HE IS TOLERATING TREATMENT. DR. oTmy Gipson WAS CONSULTED BUT  [facility] SAW HIM TODAY BUT HE SAYS THAT HE SAW DR. Tomy Gipson THIS MORNING. IN ANY CASE, HE IS NOW ORDERED METHYLPREDNISOLONE AT 40 MG IV BID.    09/28/2022   BENJAMÍN IS SEEN IN FOLLOW UP TODAY ON 3 SURGICAL. HE IS TOLERATING TREATMENT. PER DR. Quoc Kowalski NOTE:  Patient has refused to be seen by our team multiple times. He requested to follow-up with Dr. Koki Chamberlain. Can be discharged from pulmonary point of view on 5 days of prednisone 40 mg daily and azithromycin 500 mg daily for 3 days. PATIENT SAYS HE DID NOT SEE DR. Colette Ivy. HE SAYS HE IS NOT READY FOR DISCHARGE. WALKING TO THE BATHROOM STILL CAUSES SHORTNESS OF BREATH. HE WILL BE SWITCHED BACK TO IV METHYLPREDNISOLONE.  NO NEED FOR HIM TO STAY WITHOUT IT.    09/29/2022   BENJAMÍN IS SEEN IN FOLLOW UP TODAY ON 3 SURGICAL. HE IS TOLERATING TREATMENT. HE IS STARTING TO BREATHE BETTER. HIS SKIN IS ALSO FEELING BETTER. NO PROBLEMS WITH ORAL AZITHROMYCIN.   09/30/2022   BENJAMÍN IS SEEN IN FOLLOW UP TODAY ON 3 SURGICAL. HE IS TOLERATING TREATMENT. HE IS BREATHING BETTER. HIS SKIN IS ALSO FEELING BETTER. ORAL AZITHROMYCIN IS DONE TODAY. WILL START ORAL STEROIDS TOMORROW IN PREPARATION FOR DISCHARGE ON Sunday. 10/01/2022   BENJAMÍN IS SEEN IN FOLLOW UP TODAY ON 3 SURGICAL. HE IS TOLERATING TREATMENT. HE IS BREATHING BETTER. HIS SKIN IS ALSO FEELING BETTER. ORAL AZITHROMYCIN IS DONE. ORAL STEROID WAS STARTED TODAY. HE WILL GO HOME ON THEM. WILL DISCHARGE TOMORROW. 10/02/2022   DISCHARGED HOME.          Consults: pulmonary/intensive care     Significant Diagnostic Studies: NONE     Outstanding Order Results         No orders found from 8/27/2022 to 9/26/2022. Treatments: IV hydration, antibiotics: azithromycin, steroids: solu-medrol, and respiratory therapy: O2 and albuterol/atropine nebulizer     Discharge Exam:  No exam performed today,  DISCHARGED PRIOR TO DAILY ROUNDS . Disposition: home     Patient Instructions:   [unfilled]  Activity: activity as tolerated  Diet: regular diet  Wound Care: none needed     Follow-up with PCP   in 2 weeks.      Signed:  Elvia Foley DO  10/31/2022  11:09 AM

## 2022-12-30 ENCOUNTER — APPOINTMENT (OUTPATIENT)
Dept: CT IMAGING | Age: 72
DRG: 190 | End: 2022-12-30
Payer: COMMERCIAL

## 2022-12-30 ENCOUNTER — HOSPITAL ENCOUNTER (INPATIENT)
Age: 72
LOS: 9 days | Discharge: HOME HEALTH | DRG: 190 | End: 2023-01-09
Attending: EMERGENCY MEDICINE | Admitting: FAMILY MEDICINE
Payer: COMMERCIAL

## 2022-12-30 DIAGNOSIS — R10.13 EPIGASTRIC PAIN: ICD-10-CM

## 2022-12-30 DIAGNOSIS — J44.1 COPD EXACERBATION (HCC): Primary | ICD-10-CM

## 2022-12-30 LAB
ANION GAP SERPL CALCULATED.3IONS-SCNC: 10 MMOL/L (ref 7–16)
APTT: 34.5 SEC (ref 24.5–35.1)
BASOPHILS ABSOLUTE: 0.06 E9/L (ref 0–0.2)
BASOPHILS RELATIVE PERCENT: 0.6 % (ref 0–2)
BETA-HYDROXYBUTYRATE: 1.21 MMOL/L (ref 0.02–0.27)
BUN BLDV-MCNC: 22 MG/DL (ref 6–23)
CALCIUM SERPL-MCNC: 10.6 MG/DL (ref 8.6–10.2)
CHLORIDE BLD-SCNC: 89 MMOL/L (ref 98–107)
CO2: 40 MMOL/L (ref 22–29)
CREAT SERPL-MCNC: 1.2 MG/DL (ref 0.7–1.2)
EOSINOPHILS ABSOLUTE: 0.08 E9/L (ref 0.05–0.5)
EOSINOPHILS RELATIVE PERCENT: 0.9 % (ref 0–6)
GFR SERPL CREATININE-BSD FRML MDRD: >60 ML/MIN/1.73
GLUCOSE BLD-MCNC: 97 MG/DL (ref 74–99)
HCT VFR BLD CALC: 41.2 % (ref 37–54)
HEMOGLOBIN: 12.8 G/DL (ref 12.5–16.5)
IMMATURE GRANULOCYTES #: 0.02 E9/L
IMMATURE GRANULOCYTES %: 0.2 % (ref 0–5)
INFLUENZA A BY PCR: NOT DETECTED
INFLUENZA B BY PCR: NOT DETECTED
INR BLD: 1.1
LACTIC ACID, SEPSIS: 1.3 MMOL/L (ref 0.5–1.9)
LIPASE: 15 U/L (ref 13–60)
LYMPHOCYTES ABSOLUTE: 1.08 E9/L (ref 1.5–4)
LYMPHOCYTES RELATIVE PERCENT: 11.5 % (ref 20–42)
MAGNESIUM: 2.2 MG/DL (ref 1.6–2.6)
MCH RBC QN AUTO: 31.4 PG (ref 26–35)
MCHC RBC AUTO-ENTMCNC: 31.1 % (ref 32–34.5)
MCV RBC AUTO: 101.2 FL (ref 80–99.9)
MONOCYTES ABSOLUTE: 1.03 E9/L (ref 0.1–0.95)
MONOCYTES RELATIVE PERCENT: 11 % (ref 2–12)
NEUTROPHILS ABSOLUTE: 7.1 E9/L (ref 1.8–7.3)
NEUTROPHILS RELATIVE PERCENT: 75.8 % (ref 43–80)
PDW BLD-RTO: 13.4 FL (ref 11.5–15)
PH VENOUS: 7.31 (ref 7.35–7.45)
PLATELET # BLD: 225 E9/L (ref 130–450)
PMV BLD AUTO: 10.2 FL (ref 7–12)
POTASSIUM SERPL-SCNC: 4.7 MMOL/L (ref 3.5–5)
PROTHROMBIN TIME: 12.1 SEC (ref 9.3–12.4)
RBC # BLD: 4.07 E12/L (ref 3.8–5.8)
SARS-COV-2, NAAT: NOT DETECTED
SODIUM BLD-SCNC: 139 MMOL/L (ref 132–146)
TROPONIN, HIGH SENSITIVITY: 62 NG/L (ref 0–11)
WBC # BLD: 9.4 E9/L (ref 4.5–11.5)

## 2022-12-30 PROCEDURE — 74177 CT ABD & PELVIS W/CONTRAST: CPT

## 2022-12-30 PROCEDURE — 80048 BASIC METABOLIC PNL TOTAL CA: CPT

## 2022-12-30 PROCEDURE — 87040 BLOOD CULTURE FOR BACTERIA: CPT

## 2022-12-30 PROCEDURE — 87502 INFLUENZA DNA AMP PROBE: CPT

## 2022-12-30 PROCEDURE — 82800 BLOOD PH: CPT

## 2022-12-30 PROCEDURE — 82010 KETONE BODYS QUAN: CPT

## 2022-12-30 PROCEDURE — 93005 ELECTROCARDIOGRAM TRACING: CPT | Performed by: EMERGENCY MEDICINE

## 2022-12-30 PROCEDURE — 87635 SARS-COV-2 COVID-19 AMP PRB: CPT

## 2022-12-30 PROCEDURE — 83605 ASSAY OF LACTIC ACID: CPT

## 2022-12-30 PROCEDURE — 36415 COLL VENOUS BLD VENIPUNCTURE: CPT

## 2022-12-30 PROCEDURE — 85610 PROTHROMBIN TIME: CPT

## 2022-12-30 PROCEDURE — 84484 ASSAY OF TROPONIN QUANT: CPT

## 2022-12-30 PROCEDURE — 99285 EMERGENCY DEPT VISIT HI MDM: CPT

## 2022-12-30 PROCEDURE — 85730 THROMBOPLASTIN TIME PARTIAL: CPT

## 2022-12-30 PROCEDURE — 83735 ASSAY OF MAGNESIUM: CPT

## 2022-12-30 PROCEDURE — 83690 ASSAY OF LIPASE: CPT

## 2022-12-30 PROCEDURE — 85025 COMPLETE CBC W/AUTO DIFF WBC: CPT

## 2022-12-30 PROCEDURE — 6360000004 HC RX CONTRAST MEDICATION: Performed by: RADIOLOGY

## 2022-12-30 PROCEDURE — 71275 CT ANGIOGRAPHY CHEST: CPT

## 2022-12-30 RX ADMIN — IOPAMIDOL 75 ML: 755 INJECTION, SOLUTION INTRAVENOUS at 22:50

## 2022-12-30 ASSESSMENT — ENCOUNTER SYMPTOMS
CHEST TIGHTNESS: 0
SHORTNESS OF BREATH: 1
EYE PAIN: 0
EYE REDNESS: 0
SINUS PRESSURE: 0
NAUSEA: 0
EYE DISCHARGE: 0
BACK PAIN: 0
ABDOMINAL PAIN: 1
WHEEZING: 0
COUGH: 1
DIARRHEA: 0
SORE THROAT: 0
VOMITING: 0

## 2022-12-30 ASSESSMENT — PAIN DESCRIPTION - DESCRIPTORS: DESCRIPTORS: SPASM;SQUEEZING

## 2022-12-30 ASSESSMENT — PAIN DESCRIPTION - LOCATION: LOCATION: CHEST

## 2022-12-30 ASSESSMENT — PAIN SCALES - GENERAL: PAINLEVEL_OUTOF10: 8

## 2022-12-30 ASSESSMENT — LIFESTYLE VARIABLES: HOW OFTEN DO YOU HAVE A DRINK CONTAINING ALCOHOL: 2-4 TIMES A MONTH

## 2022-12-30 ASSESSMENT — PAIN DESCRIPTION - ORIENTATION: ORIENTATION: LEFT;LOWER

## 2022-12-30 ASSESSMENT — PAIN - FUNCTIONAL ASSESSMENT: PAIN_FUNCTIONAL_ASSESSMENT: 0-10

## 2022-12-31 LAB
EKG ATRIAL RATE: 115 BPM
EKG P AXIS: 77 DEGREES
EKG P-R INTERVAL: 152 MS
EKG Q-T INTERVAL: 342 MS
EKG QRS DURATION: 78 MS
EKG QTC CALCULATION (BAZETT): 473 MS
EKG R AXIS: 53 DEGREES
EKG T AXIS: 63 DEGREES
EKG VENTRICULAR RATE: 115 BPM

## 2022-12-31 PROCEDURE — 6370000000 HC RX 637 (ALT 250 FOR IP): Performed by: FAMILY MEDICINE

## 2022-12-31 PROCEDURE — 6360000002 HC RX W HCPCS: Performed by: FAMILY MEDICINE

## 2022-12-31 PROCEDURE — 6360000002 HC RX W HCPCS: Performed by: EMERGENCY MEDICINE

## 2022-12-31 PROCEDURE — 94644 CONT INHLJ TX 1ST HOUR: CPT

## 2022-12-31 PROCEDURE — 87040 BLOOD CULTURE FOR BACTERIA: CPT

## 2022-12-31 PROCEDURE — 36415 COLL VENOUS BLD VENIPUNCTURE: CPT

## 2022-12-31 PROCEDURE — 1200000000 HC SEMI PRIVATE

## 2022-12-31 PROCEDURE — 93010 ELECTROCARDIOGRAM REPORT: CPT | Performed by: INTERNAL MEDICINE

## 2022-12-31 PROCEDURE — 94664 DEMO&/EVAL PT USE INHALER: CPT

## 2022-12-31 PROCEDURE — 6370000000 HC RX 637 (ALT 250 FOR IP): Performed by: EMERGENCY MEDICINE

## 2022-12-31 PROCEDURE — 2580000003 HC RX 258: Performed by: EMERGENCY MEDICINE

## 2022-12-31 PROCEDURE — 94640 AIRWAY INHALATION TREATMENT: CPT

## 2022-12-31 RX ORDER — 0.9 % SODIUM CHLORIDE 0.9 %
1000 INTRAVENOUS SOLUTION INTRAVENOUS ONCE
Status: COMPLETED | OUTPATIENT
Start: 2022-12-31 | End: 2022-12-31

## 2022-12-31 RX ORDER — VITAMIN E 268 MG
400 CAPSULE ORAL DAILY
Status: DISCONTINUED | OUTPATIENT
Start: 2022-12-31 | End: 2023-01-09 | Stop reason: HOSPADM

## 2022-12-31 RX ORDER — ALBUTEROL SULFATE 2.5 MG/3ML
2.5 SOLUTION RESPIRATORY (INHALATION) EVERY 6 HOURS PRN
Status: DISCONTINUED | OUTPATIENT
Start: 2022-12-31 | End: 2023-01-09 | Stop reason: HOSPADM

## 2022-12-31 RX ORDER — ALBUTEROL SULFATE 90 UG/1
2 AEROSOL, METERED RESPIRATORY (INHALATION) EVERY 6 HOURS PRN
Status: DISCONTINUED | OUTPATIENT
Start: 2022-12-31 | End: 2022-12-31

## 2022-12-31 RX ORDER — LOSARTAN POTASSIUM 50 MG/1
100 TABLET ORAL DAILY
Status: DISCONTINUED | OUTPATIENT
Start: 2022-12-31 | End: 2023-01-09 | Stop reason: HOSPADM

## 2022-12-31 RX ORDER — BUDESONIDE 0.5 MG/2ML
500 INHALANT ORAL DAILY
Status: DISCONTINUED | OUTPATIENT
Start: 2022-12-31 | End: 2022-12-31

## 2022-12-31 RX ORDER — ASPIRIN 81 MG/1
81 TABLET ORAL DAILY
Status: DISCONTINUED | OUTPATIENT
Start: 2022-12-31 | End: 2023-01-09 | Stop reason: HOSPADM

## 2022-12-31 RX ORDER — CETIRIZINE HYDROCHLORIDE 10 MG/1
10 TABLET ORAL DAILY
Status: DISCONTINUED | OUTPATIENT
Start: 2022-12-31 | End: 2023-01-09 | Stop reason: HOSPADM

## 2022-12-31 RX ORDER — HYDROCHLOROTHIAZIDE 25 MG/1
25 TABLET ORAL DAILY
Status: DISCONTINUED | OUTPATIENT
Start: 2022-12-31 | End: 2023-01-06

## 2022-12-31 RX ORDER — METHYLPREDNISOLONE SODIUM SUCCINATE 125 MG/2ML
125 INJECTION, POWDER, LYOPHILIZED, FOR SOLUTION INTRAMUSCULAR; INTRAVENOUS ONCE
Status: COMPLETED | OUTPATIENT
Start: 2022-12-31 | End: 2022-12-31

## 2022-12-31 RX ORDER — METOPROLOL SUCCINATE 100 MG/1
100 TABLET, EXTENDED RELEASE ORAL DAILY
Status: DISCONTINUED | OUTPATIENT
Start: 2022-12-31 | End: 2023-01-06

## 2022-12-31 RX ORDER — ARFORMOTEROL TARTRATE 15 UG/2ML
15 SOLUTION RESPIRATORY (INHALATION) 2 TIMES DAILY
Status: DISCONTINUED | OUTPATIENT
Start: 2022-12-31 | End: 2023-01-09 | Stop reason: HOSPADM

## 2022-12-31 RX ORDER — BUDESONIDE 0.5 MG/2ML
0.5 INHALANT ORAL 2 TIMES DAILY
Status: DISCONTINUED | OUTPATIENT
Start: 2022-12-31 | End: 2023-01-09 | Stop reason: HOSPADM

## 2022-12-31 RX ORDER — METHYLPREDNISOLONE SODIUM SUCCINATE 125 MG/2ML
80 INJECTION, POWDER, LYOPHILIZED, FOR SOLUTION INTRAMUSCULAR; INTRAVENOUS EVERY 8 HOURS
Status: DISCONTINUED | OUTPATIENT
Start: 2022-12-31 | End: 2023-01-02

## 2022-12-31 RX ORDER — GABAPENTIN 300 MG/1
300 CAPSULE ORAL 2 TIMES DAILY
Status: DISCONTINUED | OUTPATIENT
Start: 2022-12-31 | End: 2023-01-01

## 2022-12-31 RX ORDER — KETOCONAZOLE 20 MG/G
CREAM TOPICAL DAILY PRN
Status: DISCONTINUED | OUTPATIENT
Start: 2022-12-31 | End: 2023-01-09 | Stop reason: HOSPADM

## 2022-12-31 RX ORDER — TRIAMCINOLONE ACETONIDE 1 MG/G
CREAM TOPICAL 2 TIMES DAILY
Status: DISCONTINUED | OUTPATIENT
Start: 2022-12-31 | End: 2023-01-09 | Stop reason: HOSPADM

## 2022-12-31 RX ORDER — BUDESONIDE AND FORMOTEROL FUMARATE DIHYDRATE 160; 4.5 UG/1; UG/1
2 AEROSOL RESPIRATORY (INHALATION) 2 TIMES DAILY
Status: DISCONTINUED | OUTPATIENT
Start: 2022-12-31 | End: 2022-12-31

## 2022-12-31 RX ORDER — IPRATROPIUM BROMIDE AND ALBUTEROL SULFATE 2.5; .5 MG/3ML; MG/3ML
2 SOLUTION RESPIRATORY (INHALATION) ONCE
Status: COMPLETED | OUTPATIENT
Start: 2022-12-31 | End: 2022-12-31

## 2022-12-31 RX ADMIN — METHYLPREDNISOLONE SODIUM SUCCINATE 125 MG: 125 INJECTION, POWDER, FOR SOLUTION INTRAMUSCULAR; INTRAVENOUS at 00:16

## 2022-12-31 RX ADMIN — ASPIRIN 81 MG: 81 TABLET, COATED ORAL at 16:02

## 2022-12-31 RX ADMIN — TRIAMCINOLONE ACETONIDE: 1 CREAM TOPICAL at 20:56

## 2022-12-31 RX ADMIN — IPRATROPIUM BROMIDE 0.5 MG: 0.5 SOLUTION RESPIRATORY (INHALATION) at 18:38

## 2022-12-31 RX ADMIN — BUDESONIDE 500 MCG: 0.5 SUSPENSION RESPIRATORY (INHALATION) at 18:38

## 2022-12-31 RX ADMIN — SODIUM CHLORIDE 1000 ML: 9 INJECTION, SOLUTION INTRAVENOUS at 01:03

## 2022-12-31 RX ADMIN — LOSARTAN POTASSIUM 100 MG: 50 TABLET, FILM COATED ORAL at 16:01

## 2022-12-31 RX ADMIN — CETIRIZINE HYDROCHLORIDE 10 MG: 10 TABLET, FILM COATED ORAL at 16:01

## 2022-12-31 RX ADMIN — LIDOCAINE HYDROCHLORIDE: 20 SOLUTION ORAL; TOPICAL at 00:16

## 2022-12-31 RX ADMIN — IPRATROPIUM BROMIDE AND ALBUTEROL SULFATE 2 AMPULE: .5; 3 SOLUTION RESPIRATORY (INHALATION) at 01:02

## 2022-12-31 RX ADMIN — METOPROLOL SUCCINATE 100 MG: 100 TABLET, EXTENDED RELEASE ORAL at 16:01

## 2022-12-31 RX ADMIN — METHYLPREDNISOLONE SODIUM SUCCINATE 80 MG: 125 INJECTION, POWDER, FOR SOLUTION INTRAMUSCULAR; INTRAVENOUS at 16:01

## 2022-12-31 RX ADMIN — ARFORMOTEROL TARTRATE 15 MCG: 15 SOLUTION RESPIRATORY (INHALATION) at 18:38

## 2022-12-31 RX ADMIN — HYDROCHLOROTHIAZIDE 25 MG: 25 TABLET ORAL at 16:02

## 2022-12-31 RX ADMIN — METHYLPREDNISOLONE SODIUM SUCCINATE 80 MG: 125 INJECTION, POWDER, FOR SOLUTION INTRAMUSCULAR; INTRAVENOUS at 20:56

## 2022-12-31 ASSESSMENT — PAIN DESCRIPTION - LOCATION: LOCATION: HEAD;NECK;CHEST

## 2022-12-31 ASSESSMENT — PAIN DESCRIPTION - ORIENTATION: ORIENTATION: LEFT;LOWER

## 2022-12-31 ASSESSMENT — PAIN SCALES - GENERAL: PAINLEVEL_OUTOF10: 10

## 2022-12-31 NOTE — ED PROVIDER NOTES
Chief complaint:  Short of breath    HPI history provided by the patient  The patient comes in complaining of several weeks of increasing shortness of breath worse with exertion as well as some general anterior chest pain nonspecific but is been there for weeks, also having issues eating, early satiety with some epigastric pain, has been following with GI but nobody is told him why it. Has a history of COPD and is oxygen dependent at home. No fevers, sweats or chills and no new runny nose or nasal congestion. No leg pain or swelling. No back pain or lower abdominal pain. No lightheadedness or syncope. No palpitations. No treatment prior to arrival.    Review of Systems   Constitutional:  Positive for activity change and appetite change. Negative for chills, diaphoresis, fatigue and fever. HENT:  Negative for congestion, sinus pressure and sore throat. Eyes:  Negative for pain, discharge and redness. Respiratory:  Positive for cough and shortness of breath. Negative for chest tightness and wheezing. Cardiovascular:  Positive for chest pain. Negative for palpitations and leg swelling. Gastrointestinal:  Positive for abdominal pain. Negative for diarrhea, nausea and vomiting. Genitourinary:  Negative for dysuria, flank pain, frequency, hematuria and urgency. Musculoskeletal:  Negative for arthralgias, back pain, gait problem, joint swelling, myalgias, neck pain and neck stiffness. Skin:  Negative for rash and wound. Neurological:  Negative for dizziness, seizures, syncope, weakness, light-headedness, numbness and headaches. All other systems reviewed and are negative. Physical Exam  Vitals and nursing note reviewed. Constitutional:       General: He is awake. He is not in acute distress. Appearance: He is well-developed. He is not ill-appearing, toxic-appearing or diaphoretic. HENT:      Head: Normocephalic and atraumatic.       Comments: Airway patent, no trismus or stridor, no sign of acute head or face injury. Eyes:      General: No scleral icterus. Extraocular Movements: Extraocular movements intact. Conjunctiva/sclera: Conjunctivae normal.      Pupils: Pupils are equal, round, and reactive to light. Neck:      Trachea: Trachea normal.   Cardiovascular:      Rate and Rhythm: Normal rate and regular rhythm. Heart sounds: Normal heart sounds. No murmur heard. Pulmonary:      Effort: Pulmonary effort is normal. Tachypnea present. No respiratory distress. Breath sounds: Normal breath sounds. Decreased air movement present. No stridor or transmitted upper airway sounds. No decreased breath sounds, wheezing, rhonchi or rales. Comments: Diffusely diminished breath sounds hardly any air movement bilaterally. No respiratory distress, minimal tachypnea. Chest:      Chest wall: No tenderness. Abdominal:      General: Bowel sounds are normal. There is no distension. Palpations: Abdomen is soft. Tenderness: There is abdominal tenderness in the epigastric area. There is no right CVA tenderness, left CVA tenderness, guarding or rebound. Comments: Minimal epigastric tenderness on palpation with no gross distention and no other abdominal tenderness noted, no pulsatile mass. No jaundice or icterus. Musculoskeletal:         General: No swelling, tenderness, deformity or signs of injury. Cervical back: Full passive range of motion without pain, normal range of motion and neck supple. No signs of trauma or rigidity. No spinous process tenderness or muscular tenderness. Normal range of motion. Right lower leg: No edema. Left lower leg: No edema. Comments: Arms and legs are neurovascular intact with no pretibial edema or calf pain. Skin:     General: Skin is warm and dry. Coloration: Skin is not cyanotic, jaundiced, mottled or pale. Findings: No bruising, erythema or rash. Neurological:      General: No focal deficit present. Mental Status: He is alert and oriented to person, place, and time. GCS: GCS eye subscore is 4. GCS verbal subscore is 5. GCS motor subscore is 6. Cranial Nerves: Cranial nerves 2-12 are intact. No cranial nerve deficit. Sensory: Sensation is intact. Motor: Motor function is intact. Coordination: Coordination is intact. Coordination normal.   Psychiatric:         Behavior: Behavior is cooperative. Procedures     MDM  Patient with increasing shortness of breath for weeks, also describing GERD or epigastric abdominal discomfort and eating issues. No specific chest pain, general chest tightness. No fevers or URI symptoms. No flank pain, no leg pain or swelling. He is mildly mild respiratory distress, tachycardia, tachypneic, diffusely diminished breath sounds. Patient given steroids here IV Solu-Medrol, breathing treatments and IV fluids. Differential includes COPD exacerbation,. Influenza, COVID, pneumonia, PE, gastritis, gastroesophageal reflux disease, bowel obstruction, nonspecific abdominal pain, constipation, pancreatitis, pneumonia, pneumothorax, CHF. Work-up includes COVID test, influenza test, CBC, basic metabolic panel, lipase, lactic acid, magnesium, beta hydroxybutyrate, troponin, venous pH, CT abdomen pelvis, CTA chest.  CTA chest shows no acute cardiopulmonary abnormality or pulmonary embolism, CT abdomen pelvis shows no acute process. Troponin 62, venous pH 7.31 with a 1.21 beta hydroxybutyrate, blood sugar 97, anion gap normal at 10, not likely DKA, CO2 on chemistries is 40 although that is his baseline with other electrolytes being normal.  CBC unremarkable with normal white count at 9.4, COVID and influenza negative, lipase only 15 not likely to be pancreatitis and is lactic acid is 1.3. Patient likely with gastroesophageal reflux disease or gastritis and significant COPD with exacerbation.   Patient will benefit from admission, and his PCP is contacted and will meet the patient    ED Course as of 12/31/22 0103   Sat Dec 31, 2022   0102 Patient sitting in the bed, generally unchanged, still tachypneic, mildly tachycardic with diffusely diminished almost nonexistent breath sounds bilaterally. Updated on work-up results. Fluids and breathing treatments ordered. [NC]      ED Course User Index  [NC] Arnol Michele DO          EKG Interpretation    Interpreted by emergency department physician    Rhythm: sinus tachycardia  Rate: 115  Axis: normal  Ectopy: none  Conduction: normal  ST Segments: no acute change  T Waves: no acute change  Q Waves: none    Clinical Impression: sinus tachycardia    Arnol Michele DO    ED Course as of 12/31/22 0103   Sat Dec 31, 2022   0102 Patient sitting in the bed, generally unchanged, still tachypneic, mildly tachycardic with diffusely diminished almost nonexistent breath sounds bilaterally. Updated on work-up results. Fluids and breathing treatments ordered. [NC]      ED Course User Index  [NC] Arnol Michele DO       --------------------------------------------- PAST HISTORY ---------------------------------------------  Past Medical History:  has a past medical history of Chest pain, Chronic diastolic CHF (congestive heart failure) ([facility] [address] COPD (chronic obstructive pulmonary disease) ([facility] [address] Hepatitis C, Hilar lymphadenopathy, History of echocardiogram, Hypertension, Irregular heart beat, Lumbar spondylosis, and Oxygen dependent. Past Surgical History:  has a past surgical history that includes Colonoscopy; hernia repair; cervical fusion (03/09/2018); Cataract removal (Right, 05/2019); and Cataract removal (Left, 06/2019). Social History:  reports that he quit smoking about 7 years ago. His smoking use included cigarettes. He smoked an average of .5 packs per day. He has never used smokeless tobacco. He reports current alcohol use of about 4.0 standard drinks per week.  He reports that he does not use drugs. Family History: family history includes Alcohol Abuse in his father; Diabetes in his father, sister, and sister; Heart Disease in his mother and sister; High Blood Pressure in his sister. The patients home medications have been reviewed.     Allergies: Ace inhibitors and Lisinopril    -------------------------------------------------- RESULTS -------------------------------------------------    LABS:  Results for orders placed or performed during the hospital encounter of 12/30/22   Rapid influenza A/B antigens    Specimen: Nares   Result Value Ref Range    Influenza A by PCR Not Detected Not Detected    Influenza B by PCR Not Detected Not Detected   COVID-19, Rapid    Specimen: Nasopharyngeal Swab   Result Value Ref Range    SARS-CoV-2, NAAT Not Detected Not Detected   CBC with Auto Differential   Result Value Ref Range    WBC 9.4 4.5 - 11.5 E9/L    RBC 4.07 3.80 - 5.80 E12/L    Hemoglobin 12.8 12.5 - 16.5 g/dL    Hematocrit 41.2 37.0 - 54.0 %    .2 (H) 80.0 - 99.9 fL    MCH 31.4 26.0 - 35.0 pg    MCHC 31.1 (L) 32.0 - 34.5 %    RDW 13.4 11.5 - 15.0 fL    Platelets 733 630 - 991 E9/L    MPV 10.2 7.0 - 12.0 fL    Neutrophils % 75.8 43.0 - 80.0 %    Immature Granulocytes % 0.2 0.0 - 5.0 %    Lymphocytes % 11.5 (L) 20.0 - 42.0 %    Monocytes % 11.0 2.0 - 12.0 %    Eosinophils % 0.9 0.0 - 6.0 %    Basophils % 0.6 0.0 - 2.0 %    Neutrophils Absolute 7.10 1.80 - 7.30 E9/L    Immature Granulocytes # 0.02 E9/L    Lymphocytes Absolute 1.08 (L) 1.50 - 4.00 E9/L    Monocytes Absolute 1.03 (H) 0.10 - 0.95 E9/L    Eosinophils Absolute 0.08 0.05 - 0.50 E9/L    Basophils Absolute 0.06 0.00 - 0.20 G8/V   Basic Metabolic Panel   Result Value Ref Range    Sodium 139 132 - 146 mmol/L    Potassium 4.7 3.5 - 5.0 mmol/L    Chloride 89 (L) 98 - 107 mmol/L    CO2 40 (H) 22 - 29 mmol/L    Anion Gap 10 7 - 16 mmol/L    Glucose 97 74 - 99 mg/dL    BUN 22 6 - 23 mg/dL    Creatinine 1.2 0.7 - 1.2 mg/dL    Est, [place] Rate >60 >=60 mL/min/1.73    Calcium 10.6 (H) 8.6 - 10.2 mg/dL   Protime-INR   Result Value Ref Range    Protime 12.1 9.3 - 12.4 sec    INR 1.1    APTT   Result Value Ref Range    aPTT 34.5 24.5 - 35.1 sec   Magnesium   Result Value Ref Range    Magnesium 2.2 1.6 - 2.6 mg/dL   Lactate, Sepsis   Result Value Ref Range    Lactic Acid, Sepsis 1.3 0.5 - 1.9 mmol/L   Lipase   Result Value Ref Range    Lipase 15 13 - 60 U/L   Beta-Hydroxybutyrate   Result Value Ref Range    Beta-Hydroxybutyrate 1.21 (H) 0.02 - 0.27 mmol/L   pH, venous   Result Value Ref Range    pH, [place] 7.31 (L) 7.35 - 7.45   Troponin   Result Value Ref Range    Troponin, High Sensitivity 62 (H) 0 - 11 ng/L       RADIOLOGY:  CT ABDOMEN PELVIS W IV CONTRAST Additional Contrast? None   Final Result   1. No acute disease. 2. Normal appendix right lower quadrant. RECOMMENDATIONS:   Careful clinical correlation and follow up recommended. CTA CHEST W CONTRAST   Final Result   No evidence of pulmonary embolism or acute pulmonary abnormality. RECOMMENDATIONS:   Careful clinical correlation and follow up recommended. ------------------------- NURSING NOTES AND VITALS REVIEWED ---------------------------  Date / Time Roomed:  12/30/2022  7:46 PM  ED Bed Assignment:  26/26    The nursing notes within the ED encounter and vital signs as below have been reviewed.      Patient Vitals for the past 24 hrs:   BP Temp Temp src Pulse Resp SpO2 Weight   12/31/22 0029 (!) 185/100 98.1 °F (36.7 °C) Oral (!) 114 -- 100 % --   12/30/22 1743 (!) 134/96 -- -- (!) 108 24 100 % --   12/30/22 1740 -- -- -- -- -- -- 180 lb (81.6 kg)   12/30/22 1707 -- 97 °F (36.1 °C) Infrared (!) 112 24 99 % --       Oxygen Saturation Interpretation: Normal    ------------------------------------------ PROGRESS NOTES ----------------------------    Counseling:  I have spoken with the patient and discussed todays results, in addition to providing specific details for the plan of care and counseling regarding the diagnosis and prognosis. Their questions are answered at this time and they are agreeable with the plan of admission.    --------------------------------- ADDITIONAL PROVIDER NOTES ---------------------------------  Consultations:  Time: 0105. Spoke with Dr. Ji Lewis. Discussed case. They will admit the patient. This patient's ED course included: a personal history and physicial examination, re-evaluation prior to disposition, multiple bedside re-evaluations, IV medications, cardiac monitoring, and continuous pulse oximetry    This patient has remained hemodynamically stable during their ED course. Diagnosis:  1. COPD exacerbation ([address]        Disposition:  Patient's disposition: Admit to telemetry  Patient's condition is stable.          [address] [facility], DO  12/31/22 [place] [address], DO  12/31/22 9610

## 2022-12-31 NOTE — PLAN OF CARE
Problem: Pain  Goal: Verbalizes/displays adequate comfort level or baseline comfort level  12/31/2022 1343 by Sapna Hernandez RN  Outcome: Progressing  12/31/2022 1342 by Sapna Hernandez RN  Outcome: Progressing     Problem: Discharge Planning  Goal: Discharge to home or other facility with appropriate resources  Outcome: Progressing

## 2023-01-01 LAB
ALBUMIN SERPL-MCNC: 4.1 G/DL (ref 3.5–5.2)
ALP BLD-CCNC: 58 U/L (ref 40–129)
ALT SERPL-CCNC: 6 U/L (ref 0–40)
ANION GAP SERPL CALCULATED.3IONS-SCNC: 6 MMOL/L (ref 7–16)
AST SERPL-CCNC: 20 U/L (ref 0–39)
BASOPHILS ABSOLUTE: 0 E9/L (ref 0–0.2)
BASOPHILS RELATIVE PERCENT: 0 % (ref 0–2)
BILIRUB SERPL-MCNC: 0.4 MG/DL (ref 0–1.2)
BUN BLDV-MCNC: 26 MG/DL (ref 6–23)
CALCIUM SERPL-MCNC: 9.6 MG/DL (ref 8.6–10.2)
CHLORIDE BLD-SCNC: 92 MMOL/L (ref 98–107)
CO2: 39 MMOL/L (ref 22–29)
CREAT SERPL-MCNC: 1.2 MG/DL (ref 0.7–1.2)
EOSINOPHILS ABSOLUTE: 0 E9/L (ref 0.05–0.5)
EOSINOPHILS RELATIVE PERCENT: 0 % (ref 0–6)
GFR SERPL CREATININE-BSD FRML MDRD: >60 ML/MIN/1.73
GLUCOSE BLD-MCNC: 167 MG/DL (ref 74–99)
HCT VFR BLD CALC: 39.3 % (ref 37–54)
HEMOGLOBIN: 11.6 G/DL (ref 12.5–16.5)
IMMATURE GRANULOCYTES #: 0.02 E9/L
IMMATURE GRANULOCYTES %: 0.3 % (ref 0–5)
LYMPHOCYTES ABSOLUTE: 0.27 E9/L (ref 1.5–4)
LYMPHOCYTES RELATIVE PERCENT: 3.5 % (ref 20–42)
MCH RBC QN AUTO: 30.3 PG (ref 26–35)
MCHC RBC AUTO-ENTMCNC: 29.5 % (ref 32–34.5)
MCV RBC AUTO: 102.6 FL (ref 80–99.9)
MONOCYTES ABSOLUTE: 0.16 E9/L (ref 0.1–0.95)
MONOCYTES RELATIVE PERCENT: 2 % (ref 2–12)
NEUTROPHILS ABSOLUTE: 7.36 E9/L (ref 1.8–7.3)
NEUTROPHILS RELATIVE PERCENT: 94.2 % (ref 43–80)
PDW BLD-RTO: 13 FL (ref 11.5–15)
PLATELET # BLD: 194 E9/L (ref 130–450)
PMV BLD AUTO: 11.6 FL (ref 7–12)
POIKILOCYTES: ABNORMAL
POTASSIUM SERPL-SCNC: 5 MMOL/L (ref 3.5–5)
RBC # BLD: 3.83 E12/L (ref 3.8–5.8)
SODIUM BLD-SCNC: 137 MMOL/L (ref 132–146)
STOMATOCYTES: ABNORMAL
TOTAL PROTEIN: 7.4 G/DL (ref 6.4–8.3)
WBC # BLD: 7.8 E9/L (ref 4.5–11.5)

## 2023-01-01 PROCEDURE — 94640 AIRWAY INHALATION TREATMENT: CPT

## 2023-01-01 PROCEDURE — 80053 COMPREHEN METABOLIC PANEL: CPT

## 2023-01-01 PROCEDURE — 6370000000 HC RX 637 (ALT 250 FOR IP): Performed by: HOSPITALIST

## 2023-01-01 PROCEDURE — 85025 COMPLETE CBC W/AUTO DIFF WBC: CPT

## 2023-01-01 PROCEDURE — 2700000000 HC OXYGEN THERAPY PER DAY

## 2023-01-01 PROCEDURE — 2580000003 HC RX 258: Performed by: HOSPITALIST

## 2023-01-01 PROCEDURE — 36415 COLL VENOUS BLD VENIPUNCTURE: CPT

## 2023-01-01 PROCEDURE — 6370000000 HC RX 637 (ALT 250 FOR IP): Performed by: FAMILY MEDICINE

## 2023-01-01 PROCEDURE — 1200000000 HC SEMI PRIVATE

## 2023-01-01 PROCEDURE — 6360000002 HC RX W HCPCS: Performed by: HOSPITALIST

## 2023-01-01 PROCEDURE — C9113 INJ PANTOPRAZOLE SODIUM, VIA: HCPCS | Performed by: HOSPITALIST

## 2023-01-01 PROCEDURE — 6360000002 HC RX W HCPCS: Performed by: FAMILY MEDICINE

## 2023-01-01 PROCEDURE — A4216 STERILE WATER/SALINE, 10 ML: HCPCS | Performed by: HOSPITALIST

## 2023-01-01 RX ORDER — HYDROCODONE BITARTRATE AND ACETAMINOPHEN 10; 325 MG/1; MG/1
1 TABLET ORAL EVERY 6 HOURS PRN
Status: DISCONTINUED | OUTPATIENT
Start: 2023-01-01 | End: 2023-01-09 | Stop reason: HOSPADM

## 2023-01-01 RX ORDER — SUCRALFATE 1 G/1
1 TABLET ORAL
Status: DISCONTINUED | OUTPATIENT
Start: 2023-01-01 | End: 2023-01-09 | Stop reason: HOSPADM

## 2023-01-01 RX ORDER — GABAPENTIN 300 MG/1
300 CAPSULE ORAL NIGHTLY
Status: DISCONTINUED | OUTPATIENT
Start: 2023-01-02 | End: 2023-01-09 | Stop reason: HOSPADM

## 2023-01-01 RX ADMIN — LOSARTAN POTASSIUM 100 MG: 50 TABLET, FILM COATED ORAL at 09:43

## 2023-01-01 RX ADMIN — IPRATROPIUM BROMIDE 0.5 MG: 0.5 SOLUTION RESPIRATORY (INHALATION) at 10:44

## 2023-01-01 RX ADMIN — METOPROLOL SUCCINATE 100 MG: 100 TABLET, EXTENDED RELEASE ORAL at 09:43

## 2023-01-01 RX ADMIN — HYDROCODONE BITARTRATE AND ACETAMINOPHEN 1 TABLET: 10; 325 TABLET ORAL at 21:20

## 2023-01-01 RX ADMIN — SUCRALFATE 1 G: 1 TABLET ORAL at 21:20

## 2023-01-01 RX ADMIN — IPRATROPIUM BROMIDE 0.5 MG: 0.5 SOLUTION RESPIRATORY (INHALATION) at 18:32

## 2023-01-01 RX ADMIN — HYDROCHLOROTHIAZIDE 25 MG: 25 TABLET ORAL at 09:43

## 2023-01-01 RX ADMIN — Medication 400 UNITS: at 10:10

## 2023-01-01 RX ADMIN — IPRATROPIUM BROMIDE 0.5 MG: 0.5 SOLUTION RESPIRATORY (INHALATION) at 06:57

## 2023-01-01 RX ADMIN — ASPIRIN 81 MG: 81 TABLET, COATED ORAL at 09:43

## 2023-01-01 RX ADMIN — ALBUTEROL SULFATE 2.5 MG: 2.5 SOLUTION RESPIRATORY (INHALATION) at 18:32

## 2023-01-01 RX ADMIN — SUCRALFATE 1 G: 1 TABLET ORAL at 12:39

## 2023-01-01 RX ADMIN — IPRATROPIUM BROMIDE 0.5 MG: 0.5 SOLUTION RESPIRATORY (INHALATION) at 14:47

## 2023-01-01 RX ADMIN — BUDESONIDE 500 MCG: 0.5 SUSPENSION RESPIRATORY (INHALATION) at 06:57

## 2023-01-01 RX ADMIN — SUCRALFATE 1 G: 1 TABLET ORAL at 17:49

## 2023-01-01 RX ADMIN — ARFORMOTEROL TARTRATE 15 MCG: 15 SOLUTION RESPIRATORY (INHALATION) at 06:57

## 2023-01-01 RX ADMIN — HYDROCORTISONE: 25 CREAM TOPICAL at 21:23

## 2023-01-01 RX ADMIN — METHYLPREDNISOLONE SODIUM SUCCINATE 80 MG: 125 INJECTION, POWDER, FOR SOLUTION INTRAMUSCULAR; INTRAVENOUS at 12:16

## 2023-01-01 RX ADMIN — ALBUTEROL SULFATE 2.5 MG: 2.5 SOLUTION RESPIRATORY (INHALATION) at 06:57

## 2023-01-01 RX ADMIN — BUDESONIDE 500 MCG: 0.5 SUSPENSION RESPIRATORY (INHALATION) at 18:32

## 2023-01-01 RX ADMIN — ALBUTEROL SULFATE 2.5 MG: 2.5 SOLUTION RESPIRATORY (INHALATION) at 14:46

## 2023-01-01 RX ADMIN — HYDROCODONE BITARTRATE AND ACETAMINOPHEN 1 TABLET: 10; 325 TABLET ORAL at 12:40

## 2023-01-01 RX ADMIN — SODIUM CHLORIDE 40 MG: 9 INJECTION, SOLUTION INTRAMUSCULAR; INTRAVENOUS; SUBCUTANEOUS at 12:39

## 2023-01-01 RX ADMIN — CETIRIZINE HYDROCHLORIDE 10 MG: 10 TABLET, FILM COATED ORAL at 09:43

## 2023-01-01 RX ADMIN — ARFORMOTEROL TARTRATE 15 MCG: 15 SOLUTION RESPIRATORY (INHALATION) at 18:32

## 2023-01-01 RX ADMIN — ALBUTEROL SULFATE 2.5 MG: 2.5 SOLUTION RESPIRATORY (INHALATION) at 10:44

## 2023-01-01 RX ADMIN — TRIAMCINOLONE ACETONIDE: 1 CREAM TOPICAL at 21:22

## 2023-01-01 RX ADMIN — METHYLPREDNISOLONE SODIUM SUCCINATE 80 MG: 125 INJECTION, POWDER, FOR SOLUTION INTRAMUSCULAR; INTRAVENOUS at 04:57

## 2023-01-01 RX ADMIN — METHYLPREDNISOLONE SODIUM SUCCINATE 80 MG: 125 INJECTION, POWDER, FOR SOLUTION INTRAMUSCULAR; INTRAVENOUS at 21:19

## 2023-01-01 ASSESSMENT — PAIN DESCRIPTION - LOCATION
LOCATION: NECK;GENERALIZED
LOCATION: ABDOMEN;BACK;NECK

## 2023-01-01 ASSESSMENT — PAIN - FUNCTIONAL ASSESSMENT: PAIN_FUNCTIONAL_ASSESSMENT: ACTIVITIES ARE NOT PREVENTED

## 2023-01-01 ASSESSMENT — PAIN DESCRIPTION - ORIENTATION
ORIENTATION: RIGHT;LEFT
ORIENTATION: UPPER

## 2023-01-01 ASSESSMENT — PAIN SCALES - GENERAL
PAINLEVEL_OUTOF10: 10
PAINLEVEL_OUTOF10: 10

## 2023-01-01 ASSESSMENT — PAIN DESCRIPTION - DESCRIPTORS
DESCRIPTORS: ACHING
DESCRIPTORS: SHARP;DISCOMFORT;BURNING

## 2023-01-01 NOTE — CONSULTS
Dolly Pacheco M.D. [facility]  Dr. Sergio Gregorio M.D.,  Dr. Jessika Kim M.D.,  Dr. Liliam Laura D.O.,  Dr. Izabela Valdes D.O. ,  Dr. Bobo Parmar M.D.,      Karla Sherwood  81 y.o.  male      Re: Epigastric discomfort/pain  Requesting physician: Dr. Saravia Jony  Date:12:15 PM 1/1/2023        HPI: 81-year-old male patient presenting to the hospital yesterday with chief complaint of shortness of breath and being admitted for treatment of COPD exacerbation. Patient complains of epigastric and lower chest pain for at least 3 to 4 weeks with some dysphagia/odynophagia. He denies any significant nausea vomiting. He reports previous colonoscopy by Dr. Almaz Cho sometime ago however he is unsure of the exact date. Patient reports poor oral intake but cannot quantify weight loss. Laboratory work reveals anemia which appears chronic with hemoglobin of 11.6 hematocrit of 39.3. No iron studies available for review previously without iron deficiency. Chemistry panel reveals increased BUN of 26 with creatinine of 1.2. Liver profile is unremarkable with nonelevated bilirubin, nonelevated transaminase and nonelevated alkaline phosphatase. CT scan of the abdomen and pelvis obtained on 30 December is reported to show no acute disease per radiology report.     Information sources:   -Patient  -medical record  -health care team    PMHx:  Past Medical History:   Diagnosis Date    Chest pain 3-6-2015    lexiscan nuclear stress    Chronic diastolic CHF (congestive heart failure) ([address]     Echo 1/18/2016; EF 57%    COPD (chronic obstructive pulmonary disease) (HCC)     Hepatitis C     Hilar lymphadenopathy     CT 1/2016; 1.8 cm    History of echocardiogram 01/02/2019    EF 23%; Stage I diastolic dysfunction    Hypertension     Irregular heart beat     Lumbar spondylosis 10/14/2022    Oxygen dependent        PSHx:  Past Surgical History:   Procedure Laterality Date    CATARACT REMOVAL Right 05/2019 CATARACT REMOVAL Left 06/2019    CERVICAL FUSION  03/09/2018    ACF C4-C7    COLONOSCOPY      HERNIA REPAIR      tracee inguinal repair       Meds:  Current Facility-Administered Medications   Medication Dose Route Frequency Provider Last Rate Last Admin    aspirin EC tablet 81 mg  81 mg Oral Daily Shruthi Rend, DO   81 mg at 01/01/23 0943    cetirizine (ZYRTEC) tablet 10 mg  10 mg Oral Daily Shruthi Rend, DO   10 mg at 01/01/23 0897    hydrocortisone 2.5 % cream   Topical Daily Shruthi Rend, DO        gabapentin (NEURONTIN) capsule 300 mg  300 mg Oral BID Shruthi Rend, DO        hydroCHLOROthiazide (HYDRODIURIL) tablet 25 mg  25 mg Oral Daily Shruthi Rend, DO   25 mg at 01/01/23 0145    hydrocortisone 2.5 % cream   Topical BID PRN Shruthi Rend, DO        ipratropium (ATROVENT) 0.02 % nebulizer solution 0.5 mg  0.5 mg Nebulization 4x daily Shruthi Rend, DO   0.5 mg at 01/01/23 1044    ketoconazole (NIZORAL) 2 % cream   Topical Daily PRN Shruthi Rend, DO        losartan (COZAAR) tablet 100 mg  100 mg Oral Daily Shruthi Rend, DO   100 mg at 01/01/23 0943    metoprolol succinate (TOPROL XL) extended release tablet 100 mg  100 mg Oral Daily Shruthi Rend, DO   100 mg at 01/01/23 0943    triamcinolone (KENALOG) 0.1 % cream   Topical BID Shruthi Rend, DO   Given at 12/31/22 2056    vitamin E capsule 400 Units  400 Units Oral Daily Shruthi Rend, DO   400 Units at 01/01/23 1010    methylPREDNISolone sodium (SOLU-MEDROL) injection 80 mg  80 mg IntraVENous Q8H Shruthi Rend, DO   80 mg at 01/01/23 1216    albuterol (PROVENTIL) nebulizer solution 2.5 mg  2.5 mg Nebulization Q6H PRN Shruthi Rend, DO   2.5 mg at 01/01/23 1044    Arformoterol Tartrate (BROVANA) nebulizer solution 15 mcg  15 mcg Nebulization BID Shruthi Rend, DO   15 mcg at 01/01/23 3770    And    budesonide (PULMICORT) nebulizer suspension 500 mcg  0.5 mg Nebulization BID Shruthi DO Justo   500 mcg at 01/01/23 5004 SocHx:  Social History     Socioeconomic History    Marital status: Single     Spouse name: Not on file    Number of children: Not on file    Years of education: Not on file    Highest education level: Not on file   Occupational History    Not on file   Tobacco Use    Smoking status: Former     Packs/day: 0.50     Types: Cigarettes     Quit date: 2015     Years since quittin.9    Smokeless tobacco: Never   Vaping Use    Vaping Use: Never used   Substance and Sexual Activity    Alcohol use: Yes     Alcohol/week: 4.0 standard drinks     Types: 4 Cans of beer per week    Drug use: No    Sexual activity: Never   Other Topics Concern    Not on file   Social History Narrative    Not on file     Social Determinants of Health     Financial Resource Strain: Not on file   Food Insecurity: Not on file   Transportation Needs: Not on file   Physical Activity: Not on file   Stress: Not on file   Social Connections: Not on file   Intimate Partner Violence: Not on file   Housing Stability: Not on file       FamHx:  Family History   Problem Relation Age of Onset    Heart Disease Mother     Alcohol Abuse Father     Diabetes Father     Diabetes Sister     Heart Disease Sister     Diabetes Sister     High Blood Pressure Sister        Allergy:  Allergies   Allergen Reactions    Ace Inhibitors Shortness Of Breath, Swelling and Angioedema    Lisinopril Other (See Comments)     Angioedema         ROS: As described in the HPI and in addition is negative upon detailed review of systems or unobtainable unless otherwise stated in this dictation. PE:  BP (!) 162/86   Pulse 65   Temp 97.9 °F (36.6 °C) (Oral)   Resp 18   Ht 5' 6\" (1.676 m)   Wt 180 lb (81.6 kg)   SpO2 94%   BMI 29.05 kg/m²     Gen.: NAD/-American male. AAOx3  Head: Atraumatic/normocephalic  Eyes: EOMI/anicteric sclera  ENT: Moist oral mucosa/no discharge nose ears  Neck: Supple with trachea midline  Chest: CTA B  Cor: Regular  Abd.: Soft/obese. Mildly tender in the epigastrium.   No guarding  Extr.:  No peripheral edema  Muscles: Decreased tone and bulk, consistent with age and condition  Skin: Warm and dry      DATA:     Lab Results   Component Value Date/Time    WBC 7.8 01/01/2023 07:22 AM    RBC 3.83 01/01/2023 07:22 AM    HGB 11.6 01/01/2023 07:22 AM    HCT 39.3 01/01/2023 07:22 AM    .6 01/01/2023 07:22 AM    MCH 30.3 01/01/2023 07:22 AM    MCHC 29.5 01/01/2023 07:22 AM    RDW 13.0 01/01/2023 07:22 AM     01/01/2023 07:22 AM    MPV 11.6 01/01/2023 07:22 AM     Lab Results   Component Value Date/Time     01/01/2023 07:22 AM    K 5.0 01/01/2023 07:22 AM    K 4.8 09/25/2022 08:17 AM    CL 92 01/01/2023 07:22 AM    CO2 39 01/01/2023 07:22 AM    BUN 26 01/01/2023 07:22 AM    CREATININE 1.2 01/01/2023 07:22 AM    CALCIUM 9.6 01/01/2023 07:22 AM    PROT 7.4 01/01/2023 07:22 AM    LABALBU 4.1 01/01/2023 07:22 AM    LABALBU 4.6 07/14/2011 02:10 PM    BILITOT 0.4 01/01/2023 07:22 AM    ALKPHOS 58 01/01/2023 07:22 AM    AST 20 01/01/2023 07:22 AM    ALT 6 01/01/2023 07:22 AM     Lab Results   Component Value Date/Time    LIPASE 15 12/30/2022 08:15 PM     No results found for: AMYLASE      ASSESSMENT/PLAN:  Patient Active Problem List   Diagnosis    Hepatitis C    Testicular swelling    COPD exacerbation (HCC)    NSTEMI (non-ST elevated myocardial infarction) ([facility]    CHF, acute on chronic ([facility]    Essential hypertension    Neck pain    Cervical disc herniation    Acute respiratory failure with hypoxia and hypercapnia (HCC)    Chest pain    Elbow effusion, right    Acute on chronic respiratory failure with hypoxia and hypercapnia (HCC)    Chronic pain syndrome [G89.4 (ICD-10-CM)]    Lumbar spondylosis    Spinal stenosis of lumbar region without neurogenic claudication    Lumbar facet arthropathy    Lumbar disc disorder    Cervical spondylosis    Cervical facet joint syndrome    Cervical stenosis of spine    Cervical disc disorder    Cervical radiculopathy    Lumbar radiculopathy     1. Epigastric pain  -Likely GERD  -Start twice a day PPI and Carafate  -Consider inpatient endoscopy if patient fails conservative treatment    2. Anemia  -Chronic anemia without evidence of overt bleed  -Obtain iron studies and FOBT  -EGD as above  -Outpatient colonoscopy    3. COPD exacerbation  -Per admitting/pertinent consultants    Thank you for the opportunity to see this patient in consultation    Adolfo Crowley MD  1/1/2023  12:15 PM    NOTE:  This report was transcribed using voice recognition software. Every effort was made to ensure accuracy; however, inadvertent computerized transcription errors may be present. Agree with above. Pt with with SOB/COPD. CT reviewed and blood work. Plan for symptomatic treatment per above. Consider EGD and/or esophagram pending clinical course. Our service will follow.     IVONNE [email or web address].  1/1/23

## 2023-01-01 NOTE — PROGRESS NOTES
Department of Family Practice  Adult Daily Progress Note      JAVIER BUTTS IS SEEN IN FOLLOW UP TODAY ON 4 TELEMETRY. HE IS TOLERATING TREATMENT. HE HAS BEEN SEEN BY GI SERVICES AND THEY ORDERED A PPI BID AND CARAFATE. HE FEELS THE CARAFATE IS HELPING ALREADY. HE HAS NOT TAKEN THE GABAPENTIN. HE DOES NOT SEEM TO REALIZE HOW EFFECTIVE IT CAN BE.  HE THINKS ROQUE HAS TO GET INJECTIONS IN ORDER TO TAKE THE GABAPENTIN. HE FEELS ONLY SURGERY WILL HELP HIM AND HE HAS TO TRY CONSERVATIVE MEASURES BEFORE INSURANCE WILL APPROVE SURGERY. HE THEN SAYS THAT IF HE GOES THROUGH THE CONSERVATIVE MEASURES THE INSURANCE MAY STILL DENY HIM THE SURGERY, SO WHAT'S THE POINT? I TOLD HIM IT MAY BE WORTH IT TO TAKE THE GABAPENTIN UP TO A THERAPEUTIC DOSE THAT MAY BRING THE PAIN DOWN TO A TOLERABLE LEVEL AND IMPROVE QUALITY OF LIFE. HE IS AGREEABLE. STILL WAITING ON PULMONOLOGY CONSULT.     OBJECTIVE    Physical  VITALS:  BP (!) 162/86   Pulse 65   Temp 97.9 °F (36.6 °C) (Oral)   Resp 18   Ht 5' 6\" (1.676 m)   Wt 180 lb (81.6 kg)   SpO2 94%   BMI 29.05 kg/m²   CONSTITUTIONAL:  awake, alert, cooperative, no apparent distress, and appears stated age  BACK:  Symmetric, no curvature, spinous processes are non-tender on palpation, paraspinous muscles are non-tender on palpation, no costal vertebral tenderness  LUNGS:  LESS increased work of breathing, POOR air exchange, clear to auscultation bilaterally, no crackles or wheezing  CARDIOVASCULAR:  Normal apical impulse, regular rate and rhythm, normal S1 and S2, no S3 or S4, and no murmur noted  ABDOMEN:  No scars, normal bowel sounds, soft, non-distended, non-tender, no masses palpated, no hepatosplenomegally  Data  CBC with Differential:    Lab Results   Component Value Date/Time    WBC 7.8 01/01/2023 07:22 AM    RBC 3.83 01/01/2023 07:22 AM    HGB 11.6 01/01/2023 07:22 AM    HCT 39.3 01/01/2023 07:22 AM     01/01/2023 07:22 AM    .6 01/01/2023 07:22 AM    MCH 30.3 01/01/2023 07:22 AM    MCHC 29.5 01/01/2023 07:22 AM    RDW 13.0 01/01/2023 07:22 AM    NRBC 1.7 09/27/2022 05:30 AM    SEGSPCT 60 04/24/2013 03:25 PM    LYMPHOPCT 3.5 01/01/2023 07:22 AM    MONOPCT 2.0 01/01/2023 07:22 AM    BASOPCT 0.0 01/01/2023 07:22 AM    MONOSABS 0.16 01/01/2023 07:22 AM    LYMPHSABS 0.27 01/01/2023 07:22 AM    EOSABS 0.00 01/01/2023 07:22 AM    BASOSABS 0.00 01/01/2023 07:22 AM     BMP:    Lab Results   Component Value Date/Time     01/01/2023 07:22 AM    K 5.0 01/01/2023 07:22 AM    K 4.8 09/25/2022 08:17 AM    CL 92 01/01/2023 07:22 AM    CO2 39 01/01/2023 07:22 AM    BUN 26 01/01/2023 07:22 AM    LABALBU 4.1 01/01/2023 07:22 AM    LABALBU 4.6 07/14/2011 02:10 PM    CREATININE 1.2 01/01/2023 07:22 AM    CALCIUM 9.6 01/01/2023 07:22 AM    GFRAA >60 10/02/2022 05:33 AM    LABGLOM >60 01/01/2023 07:22 AM    GLUCOSE 167 01/01/2023 07:22 AM    GLUCOSE 101 07/14/2011 02:10 PM     Current Medications  Scheduled Meds:   pantoprazole (PROTONIX) 40 mg injection  40 mg IntraVENous Q12H    sucralfate  1 g Oral 4x Daily AC & HS    aspirin  81 mg Oral Daily    cetirizine  10 mg Oral Daily    hydrocortisone   Topical Daily    gabapentin  300 mg Oral BID    hydroCHLOROthiazide  25 mg Oral Daily    ipratropium  0.5 mg Nebulization 4x daily    losartan  100 mg Oral Daily    metoprolol succinate  100 mg Oral Daily    triamcinolone   Topical BID    vitamin E  400 Units Oral Daily    methylPREDNISolone  80 mg IntraVENous Q8H    Arformoterol Tartrate  15 mcg Nebulization BID    And    budesonide  0.5 mg Nebulization BID     Continuous Infusions:  PRN Meds:. HYDROcodone-acetaminophen, hydrocortisone, ketoconazole, albuterol    ASSESSMENT AND PLAN      Principal Problem:    COPD exacerbation ([address]  Resolved Problems:    * No resolved hospital problems. *      RESUME GABAPENTIN. CONSIDER STEROID TAPER TOMORROW.

## 2023-01-01 NOTE — H&P
Department of Family Practice  Attending History and Physical        CHIEF COMPLAINT:  SHORTNESS OF BREATH    Reason for Admission:  EXACERBATION OF COPD    History Obtained From:  patient, Quality of history:  good historian    HISTORY OF PRESENT ILLNESS:      The patient is a 81 y.o. male with significant past medical history of COPD who presents with [address] [place] [address] ED. HE ALSO HAS SOME EPIGASTRIC DISCOMFORT/PAIN MAKING IT DIFFICULT TO BREATHE AND EAT. Past Medical History:        Diagnosis Date    Chest pain 3-6-2015    lexiscan nuclear stress    Chronic diastolic CHF (congestive heart failure) ([facility])     Echo 1/18/2016; EF 57%    COPD (chronic obstructive pulmonary disease) ([facility])     Hepatitis C     Hilar lymphadenopathy     CT 1/2016; 1.8 cm    History of echocardiogram 01/02/2019    EF 08%; Stage I diastolic dysfunction    Hypertension     Irregular heart beat     Lumbar spondylosis 10/14/2022    Oxygen dependent      Past Surgical History:        Procedure Laterality Date    CATARACT REMOVAL Right 05/2019    CATARACT REMOVAL Left 06/2019    CERVICAL FUSION  03/09/2018    ACF C4-C7    COLONOSCOPY      HERNIA REPAIR      tracee inguinal repair     Immunizations:              Influenza: Indicated for current flu vaccination season Oct. to Feb.            Pneumococcal Polysaccharide:  Indicated for current flu vaccination season Oct. to Feb.    Medications Prior to Admission:   Medications Prior to Admission: PREDNISONE PO, Take by mouth  gabapentin (NEURONTIN) 300 MG capsule, Take 1 capsule by mouth 2 times daily for 30 days.  Intended supply: 90 days  ipratropium (ATROVENT) 0.02 % nebulizer solution, Take 2.5 mLs by nebulization 4 times daily  budesonide (PULMICORT) 0.5 MG/2ML nebulizer suspension, Take 1 ampule by nebulization daily  aspirin 81 MG EC tablet, Take 81 mg by mouth daily  hydroCHLOROthiazide (HYDRODIURIL) 25 MG tablet, Take 25 mg by mouth daily  tiotropium (SPIRIVA RESPIMAT) 2.5 MCG/ACT AERS inhaler, Inhale 2 puffs into the lungs daily  albuterol sulfate HFA (PROVENTIL;VENTOLIN;PROAIR) 108 (90 Base) MCG/ACT inhaler,   desonide (DESOWEN) 0.05 % cream, Apply 1 each topically daily Apply to face  metoprolol succinate (TOPROL XL) 100 MG extended release tablet, Take 100 mg by mouth daily  budesonide-formoterol (SYMBICORT) 160-4.5 MCG/ACT AERO, Inhale 2 puffs into the lungs 2 times daily  cetirizine (ZYRTEC) 10 MG tablet, Take 10 mg by mouth daily  losartan (COZAAR) 100 MG tablet, Take 100 mg by mouth daily  ketoconazole (NIZORAL) 2 % cream, Apply topically daily as needed (itching)  hydrocortisone 2.5 % cream, Apply topically 2 times daily as needed (itching)  triamcinolone (KENALOG) 0.1 % cream, Apply topically 2 times daily Apply topically 2 times daily. OXYGEN, Inhale 4 L into the lungs   vitamin E 400 UNIT capsule, Take 400 Units by mouth daily    Allergies:  Ace inhibitors and Lisinopril    Social History:   TOBACCO:   reports that he quit smoking about 7 years ago. His smoking use included cigarettes. He smoked an average of .5 packs per day. He has never used smokeless tobacco.  ETOH:   reports current alcohol use of about 4.0 standard drinks per week. DRUGS:   reports no history of drug use.   Patient currently lives alone    Family History:       Problem Relation Age of Onset    Heart Disease Mother     Alcohol Abuse Father     Diabetes Father     Diabetes Sister     Heart Disease Sister     Diabetes Sister     High Blood Pressure Sister      REVIEW OF SYSTEMS:  CONSTITUTIONAL:  positive for  fatigue, malaise, and anorexia  RESPIRATORY:  positive for  dry cough, dyspnea, and wheezing  CARDIOVASCULAR:  negative  GASTROINTESTINAL:  positive for abdominal pain and abdominal distention  GENITOURINARY:  positive for frequency  ENDOCRINE:  negative for heat intolerance and cold intolerance  PHYSICAL EXAM:    Vitals:  BP (!) 187/94   Pulse (!) 105   Temp 96.8 °F (36 °C) (Temporal)   Resp 18   Ht 5' 6\" (1.676 m)   Wt 180 lb (81.6 kg)   SpO2 100%   BMI 29.05 kg/m²     CONSTITUTIONAL:  awake, alert, cooperative, no apparent distress, and appears stated age  ENT:  Normocephalic, without obvious abnormality, atraumatic, sinuses nontender on palpation, external ears without lesions, oral pharynx with moist mucus membranes, tonsils without erythema or exudates, gums normal and good dentition.   BACK:  Symmetric, no curvature, spinous processes are non-tender on palpation, paraspinous muscles are non-tender on palpation, no costal vertebral tenderness  LUNGS:  Moderate respiratory distress, decreased air exchange, and crackles diffuse, wheeze diffuse, diminished breath sounds throughout lungs  CARDIOVASCULAR:  Normal apical impulse, regular rate and rhythm, normal S1 and S2, no S3 or S4, and no murmur noted  ABDOMEN:  No scars, normal bowel sounds, soft, non-distended, non-tender, no masses palpated, no hepatosplenomegally  SKIN:  no bruising or bleeding, normal skin color, texture, turgor, no redness, warmth, or swelling, and no rashes    DATA:  CBC with Differential:    Lab Results   Component Value Date/Time    WBC 9.4 12/30/2022 08:15 PM    RBC 4.07 12/30/2022 08:15 PM    HGB 12.8 12/30/2022 08:15 PM    HCT 41.2 12/30/2022 08:15 PM     12/30/2022 08:15 PM    .2 12/30/2022 08:15 PM    MCH 31.4 12/30/2022 08:15 PM    MCHC 31.1 12/30/2022 08:15 PM    RDW 13.4 12/30/2022 08:15 PM    NRBC 1.7 09/27/2022 05:30 AM    SEGSPCT 60 04/24/2013 03:25 PM    LYMPHOPCT 11.5 12/30/2022 08:15 PM    MONOPCT 11.0 12/30/2022 08:15 PM    BASOPCT 0.6 12/30/2022 08:15 PM    MONOSABS 1.03 12/30/2022 08:15 PM    LYMPHSABS 1.08 12/30/2022 08:15 PM    EOSABS 0.08 12/30/2022 08:15 PM    BASOSABS 0.06 12/30/2022 08:15 PM     CMP:    Lab Results   Component Value Date/Time     12/30/2022 08:15 PM    K 4.7 12/30/2022 08:15 PM    K 4.8 09/25/2022 08:17 AM    CL 89 12/30/2022 08:15 PM    CO2 40 12/30/2022 08:15 PM    BUN 22 12/30/2022 08:15 PM    CREATININE 1.2 12/30/2022 08:15 PM    GFRAA >60 10/02/2022 05:33 AM    LABGLOM >60 12/30/2022 08:15 PM    GLUCOSE 97 12/30/2022 08:15 PM    GLUCOSE 101 07/14/2011 02:10 PM    PROT 6.9 10/02/2022 05:33 AM    LABALBU 3.9 10/02/2022 05:33 AM    LABALBU 4.6 07/14/2011 02:10 PM    CALCIUM 10.6 12/30/2022 08:15 PM    BILITOT 0.2 10/02/2022 05:33 AM    ALKPHOS 87 10/02/2022 05:33 AM    AST 18 10/02/2022 05:33 AM    ALT 9 10/02/2022 05:33 AM     Troponin:    Lab Results   Component Value Date/Time    TROPONINI <0.01 10/17/2019 07:30 AM     U/A:    Lab Results   Component Value Date/Time    COLORU Yellow 03/01/2018 01:00 PM    PROTEINU Negative 03/01/2018 01:00 PM    PHUR 6.5 03/01/2018 01:00 PM    WBCUA 2-5 05/03/2017 04:18 PM    WBCUA NONE 07/14/2011 02:10 PM    RBCUA 1-3 05/03/2017 04:18 PM    RBCUA 1-3 01/13/2014 03:15 PM    TRICHOMONAS NONE 04/24/2013 03:25 PM    BACTERIA NONE 05/03/2017 04:18 PM    CLARITYU Clear 03/01/2018 01:00 PM    SPECGRAV 1.020 03/01/2018 01:00 PM    LEUKOCYTESUR Negative 03/01/2018 01:00 PM    UROBILINOGEN 0.2 03/01/2018 01:00 PM    BILIRUBINUR Negative 03/01/2018 01:00 PM    BILIRUBINUR NEGATIVE 07/14/2011 02:10 PM    BLOODU Negative 03/01/2018 01:00 PM    GLUCOSEU Negative 03/01/2018 01:00 PM    GLUCOSEU NEGATIVE 07/14/2011 02:10 PM     ASSESSMENT AND PLAN:      Principal Problem:    COPD exacerbation ([facility]  Plan: TELEMETRY, SUPPLEMENTAL OXYGEN, IV STEROIDS, AEROSOLS, PT, OT, CONSIDER PULMONARY REHAB. INPATIENT ADMISSION, PULMONARY CONSULT, GI CONSULT FOR ABDOMINAL SYMPTOMS. DISCHARGE  PLANS UNKNOWN; ESTIMATED LENGTH OF STAY IS 7 DAYS.

## 2023-01-02 LAB
ALBUMIN SERPL-MCNC: 4.2 G/DL (ref 3.5–5.2)
ALP BLD-CCNC: 80 U/L (ref 40–129)
ALT SERPL-CCNC: 7 U/L (ref 0–40)
ANION GAP SERPL CALCULATED.3IONS-SCNC: 7 MMOL/L (ref 7–16)
AST SERPL-CCNC: 28 U/L (ref 0–39)
BASOPHILS ABSOLUTE: 0 E9/L (ref 0–0.2)
BASOPHILS RELATIVE PERCENT: 0 % (ref 0–2)
BILIRUB SERPL-MCNC: 0.2 MG/DL (ref 0–1.2)
BUN BLDV-MCNC: 36 MG/DL (ref 6–23)
CALCIUM SERPL-MCNC: 9.4 MG/DL (ref 8.6–10.2)
CHLORIDE BLD-SCNC: 88 MMOL/L (ref 98–107)
CO2: 38 MMOL/L (ref 22–29)
CREAT SERPL-MCNC: 1.4 MG/DL (ref 0.7–1.2)
EOSINOPHILS ABSOLUTE: 0 E9/L (ref 0.05–0.5)
EOSINOPHILS RELATIVE PERCENT: 0 % (ref 0–6)
FERRITIN: 323 NG/ML
FOLATE: 14.1 NG/ML (ref 4.8–24.2)
GFR SERPL CREATININE-BSD FRML MDRD: 54 ML/MIN/1.73
GLUCOSE BLD-MCNC: 204 MG/DL (ref 74–99)
HCT VFR BLD CALC: 39.4 % (ref 37–54)
HEMOGLOBIN: 11.6 G/DL (ref 12.5–16.5)
IMMATURE GRANULOCYTES #: 0.6 E9/L
IMMATURE GRANULOCYTES %: 0.6 % (ref 0–5)
IRON SATURATION: 21 % (ref 20–55)
IRON: 84 MCG/DL (ref 59–158)
LYMPHOCYTES ABSOLUTE: 0 E9/L (ref 1.5–4)
LYMPHOCYTES RELATIVE PERCENT: 0 % (ref 20–42)
MCH RBC QN AUTO: 30.9 PG (ref 26–35)
MCHC RBC AUTO-ENTMCNC: 29.4 % (ref 32–34.5)
MCV RBC AUTO: 104.8 FL (ref 80–99.9)
MONOCYTES ABSOLUTE: 0 E9/L (ref 0.1–0.95)
MONOCYTES RELATIVE PERCENT: 0 % (ref 2–12)
NEUTROPHILS ABSOLUTE: 0 E9/L (ref 1.8–7.3)
NEUTROPHILS RELATIVE PERCENT: 0 % (ref 43–80)
PDW BLD-RTO: 13 FL (ref 11.5–15)
PLATELET # BLD: 291 E9/L (ref 130–450)
PMV BLD AUTO: 11.1 FL (ref 7–12)
POTASSIUM SERPL-SCNC: 5.5 MMOL/L (ref 3.5–5)
RBC # BLD: 3.76 E12/L (ref 3.8–5.8)
RBC # BLD: NORMAL 10*6/UL
SODIUM BLD-SCNC: 133 MMOL/L (ref 132–146)
TOTAL IRON BINDING CAPACITY: 393 MCG/DL (ref 250–450)
TOTAL PROTEIN: 7.7 G/DL (ref 6.4–8.3)
VITAMIN B-12: 451 PG/ML (ref 211–946)
WBC # BLD: 13.1 E9/L (ref 4.5–11.5)

## 2023-01-02 PROCEDURE — 82746 ASSAY OF FOLIC ACID SERUM: CPT

## 2023-01-02 PROCEDURE — C9113 INJ PANTOPRAZOLE SODIUM, VIA: HCPCS | Performed by: HOSPITALIST

## 2023-01-02 PROCEDURE — 6370000000 HC RX 637 (ALT 250 FOR IP): Performed by: FAMILY MEDICINE

## 2023-01-02 PROCEDURE — 2580000003 HC RX 258: Performed by: HOSPITALIST

## 2023-01-02 PROCEDURE — 2580000003 HC RX 258: Performed by: FAMILY MEDICINE

## 2023-01-02 PROCEDURE — 80053 COMPREHEN METABOLIC PANEL: CPT

## 2023-01-02 PROCEDURE — 6360000002 HC RX W HCPCS: Performed by: FAMILY MEDICINE

## 2023-01-02 PROCEDURE — 83550 IRON BINDING TEST: CPT

## 2023-01-02 PROCEDURE — 97161 PT EVAL LOW COMPLEX 20 MIN: CPT

## 2023-01-02 PROCEDURE — A4216 STERILE WATER/SALINE, 10 ML: HCPCS | Performed by: HOSPITALIST

## 2023-01-02 PROCEDURE — 82728 ASSAY OF FERRITIN: CPT

## 2023-01-02 PROCEDURE — 36415 COLL VENOUS BLD VENIPUNCTURE: CPT

## 2023-01-02 PROCEDURE — 83540 ASSAY OF IRON: CPT

## 2023-01-02 PROCEDURE — 1200000000 HC SEMI PRIVATE

## 2023-01-02 PROCEDURE — 85025 COMPLETE CBC W/AUTO DIFF WBC: CPT

## 2023-01-02 PROCEDURE — 6360000002 HC RX W HCPCS: Performed by: HOSPITALIST

## 2023-01-02 PROCEDURE — 83036 HEMOGLOBIN GLYCOSYLATED A1C: CPT

## 2023-01-02 PROCEDURE — 6370000000 HC RX 637 (ALT 250 FOR IP): Performed by: HOSPITALIST

## 2023-01-02 PROCEDURE — 2700000000 HC OXYGEN THERAPY PER DAY

## 2023-01-02 PROCEDURE — 82607 VITAMIN B-12: CPT

## 2023-01-02 PROCEDURE — 94640 AIRWAY INHALATION TREATMENT: CPT

## 2023-01-02 RX ORDER — DEXTROSE MONOHYDRATE 100 MG/ML
INJECTION, SOLUTION INTRAVENOUS CONTINUOUS PRN
Status: DISCONTINUED | OUTPATIENT
Start: 2023-01-02 | End: 2023-01-09 | Stop reason: HOSPADM

## 2023-01-02 RX ORDER — METHYLPREDNISOLONE SODIUM SUCCINATE 125 MG/2ML
60 INJECTION, POWDER, LYOPHILIZED, FOR SOLUTION INTRAMUSCULAR; INTRAVENOUS EVERY 8 HOURS
Status: DISCONTINUED | OUTPATIENT
Start: 2023-01-02 | End: 2023-01-04

## 2023-01-02 RX ORDER — SODIUM CHLORIDE 9 MG/ML
INJECTION, SOLUTION INTRAVENOUS CONTINUOUS
Status: DISCONTINUED | OUTPATIENT
Start: 2023-01-02 | End: 2023-01-04

## 2023-01-02 RX ADMIN — ASPIRIN 81 MG: 81 TABLET, COATED ORAL at 08:34

## 2023-01-02 RX ADMIN — BUDESONIDE 500 MCG: 0.5 SUSPENSION RESPIRATORY (INHALATION) at 06:51

## 2023-01-02 RX ADMIN — TRIAMCINOLONE ACETONIDE: 1 CREAM TOPICAL at 20:54

## 2023-01-02 RX ADMIN — HYDROCHLOROTHIAZIDE 25 MG: 25 TABLET ORAL at 08:33

## 2023-01-02 RX ADMIN — IPRATROPIUM BROMIDE 0.5 MG: 0.5 SOLUTION RESPIRATORY (INHALATION) at 19:11

## 2023-01-02 RX ADMIN — SODIUM CHLORIDE: 9 INJECTION, SOLUTION INTRAVENOUS at 20:45

## 2023-01-02 RX ADMIN — LOSARTAN POTASSIUM 100 MG: 50 TABLET, FILM COATED ORAL at 08:34

## 2023-01-02 RX ADMIN — METOPROLOL SUCCINATE 100 MG: 100 TABLET, EXTENDED RELEASE ORAL at 08:34

## 2023-01-02 RX ADMIN — SODIUM CHLORIDE 40 MG: 9 INJECTION, SOLUTION INTRAMUSCULAR; INTRAVENOUS; SUBCUTANEOUS at 00:22

## 2023-01-02 RX ADMIN — BUDESONIDE 500 MCG: 0.5 SUSPENSION RESPIRATORY (INHALATION) at 19:11

## 2023-01-02 RX ADMIN — METHYLPREDNISOLONE SODIUM SUCCINATE 60 MG: 125 INJECTION, POWDER, FOR SOLUTION INTRAMUSCULAR; INTRAVENOUS at 20:41

## 2023-01-02 RX ADMIN — HYDROCORTISONE: 25 CREAM TOPICAL at 21:52

## 2023-01-02 RX ADMIN — IPRATROPIUM BROMIDE 0.5 MG: 0.5 SOLUTION RESPIRATORY (INHALATION) at 06:51

## 2023-01-02 RX ADMIN — IPRATROPIUM BROMIDE 0.5 MG: 0.5 SOLUTION RESPIRATORY (INHALATION) at 10:02

## 2023-01-02 RX ADMIN — SODIUM CHLORIDE 40 MG: 9 INJECTION, SOLUTION INTRAMUSCULAR; INTRAVENOUS; SUBCUTANEOUS at 12:56

## 2023-01-02 RX ADMIN — Medication 400 UNITS: at 12:58

## 2023-01-02 RX ADMIN — SUCRALFATE 1 G: 1 TABLET ORAL at 16:56

## 2023-01-02 RX ADMIN — SUCRALFATE 1 G: 1 TABLET ORAL at 06:14

## 2023-01-02 RX ADMIN — SUCRALFATE 1 G: 1 TABLET ORAL at 12:56

## 2023-01-02 RX ADMIN — GABAPENTIN 300 MG: 300 CAPSULE ORAL at 20:41

## 2023-01-02 RX ADMIN — ARFORMOTEROL TARTRATE 15 MCG: 15 SOLUTION RESPIRATORY (INHALATION) at 06:51

## 2023-01-02 RX ADMIN — CETIRIZINE HYDROCHLORIDE 10 MG: 10 TABLET, FILM COATED ORAL at 08:34

## 2023-01-02 RX ADMIN — SUCRALFATE 1 G: 1 TABLET ORAL at 20:41

## 2023-01-02 RX ADMIN — HYDROCODONE BITARTRATE AND ACETAMINOPHEN 1 TABLET: 10; 325 TABLET ORAL at 16:56

## 2023-01-02 RX ADMIN — ARFORMOTEROL TARTRATE 15 MCG: 15 SOLUTION RESPIRATORY (INHALATION) at 19:11

## 2023-01-02 RX ADMIN — METHYLPREDNISOLONE SODIUM SUCCINATE 80 MG: 125 INJECTION, POWDER, FOR SOLUTION INTRAMUSCULAR; INTRAVENOUS at 06:15

## 2023-01-02 RX ADMIN — HYDROCODONE BITARTRATE AND ACETAMINOPHEN 1 TABLET: 10; 325 TABLET ORAL at 06:16

## 2023-01-02 RX ADMIN — IPRATROPIUM BROMIDE 0.5 MG: 0.5 SOLUTION RESPIRATORY (INHALATION) at 13:55

## 2023-01-02 RX ADMIN — METHYLPREDNISOLONE SODIUM SUCCINATE 80 MG: 125 INJECTION, POWDER, FOR SOLUTION INTRAMUSCULAR; INTRAVENOUS at 12:56

## 2023-01-02 ASSESSMENT — PAIN SCALES - GENERAL
PAINLEVEL_OUTOF10: 8
PAINLEVEL_OUTOF10: 9

## 2023-01-02 ASSESSMENT — PAIN DESCRIPTION - ORIENTATION
ORIENTATION: RIGHT;LEFT;LOWER;UPPER
ORIENTATION: RIGHT;LEFT

## 2023-01-02 ASSESSMENT — PAIN DESCRIPTION - DESCRIPTORS: DESCRIPTORS: ACHING;SORE

## 2023-01-02 ASSESSMENT — PAIN DESCRIPTION - LOCATION
LOCATION: HEAD;NECK;BACK
LOCATION: GENERALIZED

## 2023-01-02 ASSESSMENT — PAIN - FUNCTIONAL ASSESSMENT: PAIN_FUNCTIONAL_ASSESSMENT: ACTIVITIES ARE NOT PREVENTED

## 2023-01-02 NOTE — PROGRESS NOTES
Department of Family Practice  Adult Daily Progress Note      JAVIER BUTTS IS SEEN IN FOLLOW UP TODAY ON 4 TELEMETRY. HE IS TOLERATING TREATMENT. HE HAS BEEN SEEN BY GI SERVICES AND THEY ORDERED A PPI BID AND CARAFATE. HE FEELS THE CARAFATE IS HELPING ALREADY. HE IS TO HAVE AN EGD TOMORROW. HE HAS REQUESTED IT BE DONE NOW INSTEAD OF AS AN OUTPATIENT. I SUSPECT IT IS DUE TO TRANSPORTATION DIFFICULTIES. HIS GABAPENTIN STARTS TONIGHT  MG. STILL WAITING ON PULMONOLOGY CONSULT. STEROID TAPER DOWN TO 60 MG Q8.   BLOOD GLUCOSE HIGH. SYSTEM DEMANDING GLUCOSE CHECKS. POTASSIUM AT 5.5. WILL START LOW FLOW SALINE.      OBJECTIVE    Physical  VITALS:  /77   Pulse 81   Temp 97.9 °F (36.6 °C) (Oral)   Resp 18   Ht 5' 6\" (1.676 m)   Wt 180 lb (81.6 kg)   SpO2 100%   BMI 29.05 kg/m²   CONSTITUTIONAL:  awake, alert, cooperative, no apparent distress, and appears stated age  BACK:  Symmetric, no curvature, spinous processes are non-tender on palpation, paraspinous muscles are non-tender on palpation, no costal vertebral tenderness  LUNGS:  LESS increased work of breathing, POOR air exchange, clear to auscultation bilaterally, no crackles or wheezing  CARDIOVASCULAR:  Normal apical impulse, regular rate and rhythm, normal S1 and S2, no S3 or S4, and no murmur noted  ABDOMEN:  No scars, normal bowel sounds, soft, non-distended, non-tender, no masses palpated, no hepatosplenomegally  Data  CBC with Differential:    Lab Results   Component Value Date/Time    WBC 13.1 01/02/2023 08:06 AM    RBC 3.76 01/02/2023 08:06 AM    HGB 11.6 01/02/2023 08:06 AM    HCT 39.4 01/02/2023 08:06 AM     01/02/2023 08:06 AM    .8 01/02/2023 08:06 AM    MCH 30.9 01/02/2023 08:06 AM    MCHC 29.4 01/02/2023 08:06 AM    RDW 13.0 01/02/2023 08:06 AM    NRBC 1.7 09/27/2022 05:30 AM    SEGSPCT 60 04/24/2013 03:25 PM    LYMPHOPCT 0.0 01/02/2023 08:06 AM    MONOPCT 0.0 01/02/2023 08:06 AM    BASOPCT 0.0 01/02/2023 08:06 AM    MONOSABS 0.00 01/02/2023 08:06 AM    LYMPHSABS 0.00 01/02/2023 08:06 AM    EOSABS 0.00 01/02/2023 08:06 AM    BASOSABS 0.00 01/02/2023 08:06 AM     BMP:    Lab Results   Component Value Date/Time     01/02/2023 08:06 AM    K 5.5 01/02/2023 08:06 AM    K 4.8 09/25/2022 08:17 AM    CL 88 01/02/2023 08:06 AM    CO2 38 01/02/2023 08:06 AM    BUN 36 01/02/2023 08:06 AM    LABALBU 4.2 01/02/2023 08:06 AM    LABALBU 4.6 07/14/2011 02:10 PM    CREATININE 1.4 01/02/2023 08:06 AM    CALCIUM 9.4 01/02/2023 08:06 AM    GFRAA >60 10/02/2022 05:33 AM    LABGLOM 54 01/02/2023 08:06 AM    GLUCOSE 204 01/02/2023 08:06 AM    GLUCOSE 101 07/14/2011 02:10 PM     Current Medications  Scheduled Meds:   methylPREDNISolone  60 mg IntraVENous Q8H    pantoprazole (PROTONIX) 40 mg injection  40 mg IntraVENous Q12H    sucralfate  1 g Oral 4x Daily AC & HS    gabapentin  300 mg Oral Nightly    aspirin  81 mg Oral Daily    cetirizine  10 mg Oral Daily    hydrocortisone   Topical Daily    hydroCHLOROthiazide  25 mg Oral Daily    ipratropium  0.5 mg Nebulization 4x daily    losartan  100 mg Oral Daily    metoprolol succinate  100 mg Oral Daily    triamcinolone   Topical BID    vitamin E  400 Units Oral Daily    Arformoterol Tartrate  15 mcg Nebulization BID    And    budesonide  0.5 mg Nebulization BID     Continuous Infusions:   dextrose       PRN Meds:.glucose, dextrose bolus **OR** dextrose bolus, glucagon (rDNA), dextrose, HYDROcodone-acetaminophen, hydrocortisone, ketoconazole, albuterol    ASSESSMENT AND PLAN      Principal Problem:    COPD exacerbation ([facility] [address]  Resolved Problems:    * No resolved hospital problems. *      CONTINUE PLAN OF CARE. FOR EGD TOMORROW.

## 2023-01-02 NOTE — PROGRESS NOTES
Physical Therapy Initial Evaluation/Plan of Care    Room #:  3334/5794-30  Patient Name: Anaid Spangler  YOB: 1950  MRN: [id number]    Date of Service: 1/2/2023     Tentative placement recommendation: Home Health Physical Therapy   Equipment recommendation: To be determined      Evaluating Physical Therapist: Awais Watson #[id number]      Specific Provider Orders/Date/Referring Provider :   12/31/22 2015    PT eval and treat  Start:  12/31/22 2015,   End:  12/31/22 2015,   ONE TIME,   Standing Count:  1 Occurrences,   R         Alessio Yousif DO     Admitting Diagnosis:   COPD exacerbation ([facility] [address] [J44.1]      Surgery: none      Patient Active Problem List   Diagnosis    Hepatitis C    Testicular swelling    COPD exacerbation ([facility] [address]    NSTEMI (non-ST elevated myocardial infarction) ([facility] [address]    CHF, acute on chronic [facility])    Essential hypertension    Neck pain    Cervical disc herniation    Acute respiratory failure with hypoxia and hypercapnia (HCC)    Chest pain    Elbow effusion, right    Acute on chronic respiratory failure with hypoxia and hypercapnia (HCC)    Chronic pain syndrome [G89.4 (ICD-10-CM)]    Lumbar spondylosis    Spinal stenosis of lumbar region without neurogenic claudication    Lumbar facet arthropathy    Lumbar disc disorder    Cervical spondylosis    Cervical facet joint syndrome    Cervical stenosis of spine    Cervical disc disorder    Cervical radiculopathy    Lumbar radiculopathy        ASSESSMENT of Current Deficits Patient exhibits decreased balance and endurance impairing gait distance and tolerance to activity. Pt fatigues and become short of breath with activity. Pt reports he was getting home health therapy and would like to have that again. Pt also reports going to pulmonary therapy. The patient will benefit from continued skilled therapy to increase strength and improve balance for safe functional mobility, to decrease risk of falls, and to meet goals at discharge. PHYSICAL THERAPY  PLAN OF CARE       Physical therapy plan of care is established based on physician order,  patient diagnosis and clinical assessment    Current Treatment Recommendations:    -Bed Mobility: Lower extremity exercises , Upper extremity exercises , and Trunk control activities   -Sitting Balance: Incorporate reaching activities to activate trunk muscles , Hands on support to maintain midline , and Facilitate active trunk muscle engagement   -Standing Balance: Perform strengthening exercises in standing to promote motor control with or without upper extremity support  and Instruct patient on adequate base of support to maintain balance  -Transfers: Provide instruction on proper hand and foot position for adequate transfer of weight onto lower extremities and use of gait device if needed and Cues for hand placement, technique and safety. Provide stabilization to prevent fall   -Gait: Gait training and Standing activities to improve: base of support, weight shift, weight bearing    -Endurance: Utilize Supervised activities to increase level of endurance to allow for safe functional mobility including transfers and gait     PT long term treatment goals are located in below grid    Patient and or family understand(s) diagnosis, prognosis, and plan of care. Frequency of treatments: Patient will be seen  daily.          Prior Level of Function: Patient ambulated independently   Rehab Potential: good  for baseline    Past medical history:   Past Medical History:   Diagnosis Date    Chest pain 3-6-2015    lexiscan nuclear stress    Chronic diastolic CHF (congestive heart failure) ([facility] [address]     Echo 1/18/2016; EF 57%    COPD (chronic obstructive pulmonary disease) (HCC)     Hepatitis C     Hilar lymphadenopathy     CT 1/2016; 1.8 cm    History of echocardiogram 01/02/2019    EF 32%; Stage I diastolic dysfunction    Hypertension     Irregular heart beat     Lumbar spondylosis 10/14/2022    Oxygen dependent Past Surgical History:   Procedure Laterality Date    CATARACT REMOVAL Right 05/2019    CATARACT REMOVAL Left 06/2019    CERVICAL FUSION  03/09/2018    ACF C4-C7    COLONOSCOPY      HERNIA REPAIR      tracee inguinal repair       SUBJECTIVE:    Precautions: , falls , 4L O2 at baseline, shortness of breath     Social history: Patient  lives alone in a ranch home  with No steps  to enter   Tub shower with grab bars    Equipment owned: Shower chair and O2(4L),      [facility] Mobility Inpatient   How much difficulty turning over in bed?: None  How much difficulty sitting down on / standing up from a chair with arms?: A Little  How much difficulty moving from lying on back to sitting on side of bed?: A Little  How much help from another person moving to and from a bed to a chair?: A Little  How much help from another person needed to walk in hospital room?: A Little  How much help from another person for climbing 3-5 steps with a railing?: A Little  AM-PAC Inpatient Mobility Raw Score : 19  AM-PAC Inpatient T-Scale Score : 45.44  Mobility Inpatient CMS 0-100% Score: 41.77  Mobility Inpatient CMS G-Code Modifier : CK    Nursing cleared patient for PT evaluation. The admitting diagnosis and active problem list as listed above have been reviewed prior to the initiation of this evaluation. OBJECTIVE;   Initial Evaluation  Date: 1/2/2023   Treatment Date:     Short Term/ Long Term   Goals   Was pt agreeable to Eval/treatment? Yes  To be met in 4 days   Pain level   0/10       Bed Mobility    Rolling: Supervision     Supine to sit: Supervision     Sit to supine: Supervision     Scooting: Supervision     Rolling: Independent    Supine to sit:  Independent    Sit to supine: Independent    Scooting: Independent     Transfers Sit to stand: Supervision    Sit to stand: Independent    Ambulation     2x40 feet using  no device with Minimal assist of 1   for balance, upright, safety, and O2 line management and cues for pursed lip breathing    100 feet using  no device with Independent    Stair negotiation: ascended and descended   Not assessed          ROM Within functional limits    Increase range of motion 10% of affected joints    Strength BUE:  refer to OT eval  RLE:  4+/5  LLE:  4+/5  Increase strength in affected mm groups by 1/3 grade   Balance Sitting EOB:  good -  Dynamic Standing:  fair +  Sitting EOB:  good   Dynamic Standing: good      Patient is Alert & Oriented x person, place, time, and situation and follows directions    Sensation:  Patient  reports tingling right hand     Edema:  no   Endurance: fair      Vitals:  4 liters nasal cannula   Blood Pressure at rest  Blood Pressure during session    Heart Rate at rest 69 Heart Rate during session    SPO2 at rest 98%  SPO2 during session %     Patient education  Patient educated on role of Physical Therapy, risks of immobility, safety and plan of care,  importance of mobility while in hospital , purse lip breathing, ankle pumps, quad set and glut set for edema control, blood clot prevention, and safety      Patient response to education:   Pt verbalized understanding Pt demonstrated skill Pt requires further education in this area   Yes Partial Yes      Treatment:  Patient practiced and was instructed/facilitated in the following treatment: Patient assisted to EOB. Sat edge of bed 15 minutes with Supervision  to increase dynamic sitting balance and activity tolerance. Pt stood ambulated as above x2 reps with rest break in between due to shortness of breath. Therapeutic Exercises:   educate don exercises but did not respond       At end of session, patient in bed with  call light and phone within reach,  all lines and tubes intact, nursing notified. Patient would benefit from continued skilled Physical Therapy to improve functional independence and quality of life.          Patient's/ family goals   home    Time in  1121  Time out  1141    Total Treatment Time  0 minutes    Evaluation time includes thorough review of current medical information, gathering information on past medical history/social history and prior level of function, completion of standardized testing/informal observation of tasks, assessment of data, and development of Plan of care and goals.      CPT codes:  Low Complexity PT evaluation (94689)  No treatment    Charles Shipley, PT

## 2023-01-03 ENCOUNTER — ANESTHESIA (OUTPATIENT)
Dept: ENDOSCOPY | Age: 73
End: 2023-01-03
Payer: COMMERCIAL

## 2023-01-03 ENCOUNTER — ANESTHESIA EVENT (OUTPATIENT)
Dept: ENDOSCOPY | Age: 73
End: 2023-01-03
Payer: COMMERCIAL

## 2023-01-03 LAB
ALBUMIN SERPL-MCNC: 3.7 G/DL (ref 3.5–5.2)
ALP BLD-CCNC: 71 U/L (ref 40–129)
ALT SERPL-CCNC: 6 U/L (ref 0–40)
ANION GAP SERPL CALCULATED.3IONS-SCNC: 4 MMOL/L (ref 7–16)
AST SERPL-CCNC: 13 U/L (ref 0–39)
BASOPHILS ABSOLUTE: 0.01 E9/L (ref 0–0.2)
BASOPHILS RELATIVE PERCENT: 0.1 % (ref 0–2)
BILIRUB SERPL-MCNC: <0.2 MG/DL (ref 0–1.2)
BUN BLDV-MCNC: 33 MG/DL (ref 6–23)
CALCIUM SERPL-MCNC: 9.2 MG/DL (ref 8.6–10.2)
CHLORIDE BLD-SCNC: 91 MMOL/L (ref 98–107)
CO2: 40 MMOL/L (ref 22–29)
CREAT SERPL-MCNC: 1.5 MG/DL (ref 0.7–1.2)
EOSINOPHILS ABSOLUTE: 0 E9/L (ref 0.05–0.5)
EOSINOPHILS RELATIVE PERCENT: 0 % (ref 0–6)
GFR SERPL CREATININE-BSD FRML MDRD: 50 ML/MIN/1.73
GLUCOSE BLD-MCNC: 159 MG/DL (ref 74–99)
HBA1C MFR BLD: 5.8 % (ref 4–5.6)
HCT VFR BLD CALC: 38.8 % (ref 37–54)
HEMOGLOBIN: 11.2 G/DL (ref 12.5–16.5)
HYPOCHROMIA: ABNORMAL
IMMATURE GRANULOCYTES #: 0.12 E9/L
IMMATURE GRANULOCYTES %: 1 % (ref 0–5)
LYMPHOCYTES ABSOLUTE: 0.22 E9/L (ref 1.5–4)
LYMPHOCYTES RELATIVE PERCENT: 1.9 % (ref 20–42)
MCH RBC QN AUTO: 30.3 PG (ref 26–35)
MCHC RBC AUTO-ENTMCNC: 28.9 % (ref 32–34.5)
MCV RBC AUTO: 104.9 FL (ref 80–99.9)
MONOCYTES ABSOLUTE: 0.65 E9/L (ref 0.1–0.95)
MONOCYTES RELATIVE PERCENT: 5.7 % (ref 2–12)
NEUTROPHILS ABSOLUTE: 10.45 E9/L (ref 1.8–7.3)
NEUTROPHILS RELATIVE PERCENT: 91.3 % (ref 43–80)
PDW BLD-RTO: 12.9 FL (ref 11.5–15)
PLATELET # BLD: 237 E9/L (ref 130–450)
PMV BLD AUTO: 10.9 FL (ref 7–12)
POLYCHROMASIA: ABNORMAL
POTASSIUM SERPL-SCNC: 4.9 MMOL/L (ref 3.5–5)
RBC # BLD: 3.7 E12/L (ref 3.8–5.8)
SODIUM BLD-SCNC: 135 MMOL/L (ref 132–146)
TOTAL PROTEIN: 6.5 G/DL (ref 6.4–8.3)
WBC # BLD: 11.5 E9/L (ref 4.5–11.5)

## 2023-01-03 PROCEDURE — 1200000000 HC SEMI PRIVATE

## 2023-01-03 PROCEDURE — 85025 COMPLETE CBC W/AUTO DIFF WBC: CPT

## 2023-01-03 PROCEDURE — 6360000002 HC RX W HCPCS: Performed by: FAMILY MEDICINE

## 2023-01-03 PROCEDURE — 3700000000 HC ANESTHESIA ATTENDED CARE: Performed by: INTERNAL MEDICINE

## 2023-01-03 PROCEDURE — 97530 THERAPEUTIC ACTIVITIES: CPT

## 2023-01-03 PROCEDURE — 7100000010 HC PHASE II RECOVERY - FIRST 15 MIN: Performed by: INTERNAL MEDICINE

## 2023-01-03 PROCEDURE — 3609012400 HC EGD TRANSORAL BIOPSY SINGLE/MULTIPLE: Performed by: INTERNAL MEDICINE

## 2023-01-03 PROCEDURE — 6370000000 HC RX 637 (ALT 250 FOR IP): Performed by: FAMILY MEDICINE

## 2023-01-03 PROCEDURE — 7100000011 HC PHASE II RECOVERY - ADDTL 15 MIN: Performed by: INTERNAL MEDICINE

## 2023-01-03 PROCEDURE — A4216 STERILE WATER/SALINE, 10 ML: HCPCS | Performed by: HOSPITALIST

## 2023-01-03 PROCEDURE — 2709999900 HC NON-CHARGEABLE SUPPLY: Performed by: INTERNAL MEDICINE

## 2023-01-03 PROCEDURE — 2580000003 HC RX 258: Performed by: HOSPITALIST

## 2023-01-03 PROCEDURE — 6360000002 HC RX W HCPCS: Performed by: HOSPITALIST

## 2023-01-03 PROCEDURE — 2500000003 HC RX 250 WO HCPCS: Performed by: NURSE ANESTHETIST, CERTIFIED REGISTERED

## 2023-01-03 PROCEDURE — 6370000000 HC RX 637 (ALT 250 FOR IP): Performed by: HOSPITALIST

## 2023-01-03 PROCEDURE — 36415 COLL VENOUS BLD VENIPUNCTURE: CPT

## 2023-01-03 PROCEDURE — C9113 INJ PANTOPRAZOLE SODIUM, VIA: HCPCS | Performed by: HOSPITALIST

## 2023-01-03 PROCEDURE — 2700000000 HC OXYGEN THERAPY PER DAY

## 2023-01-03 PROCEDURE — 80053 COMPREHEN METABOLIC PANEL: CPT

## 2023-01-03 PROCEDURE — 94640 AIRWAY INHALATION TREATMENT: CPT

## 2023-01-03 PROCEDURE — 6360000002 HC RX W HCPCS: Performed by: NURSE ANESTHETIST, CERTIFIED REGISTERED

## 2023-01-03 PROCEDURE — 2580000003 HC RX 258: Performed by: FAMILY MEDICINE

## 2023-01-03 PROCEDURE — 97165 OT EVAL LOW COMPLEX 30 MIN: CPT

## 2023-01-03 PROCEDURE — 0DB68ZX EXCISION OF STOMACH, VIA NATURAL OR ARTIFICIAL OPENING ENDOSCOPIC, DIAGNOSTIC: ICD-10-PCS | Performed by: INTERNAL MEDICINE

## 2023-01-03 PROCEDURE — 3700000001 HC ADD 15 MINUTES (ANESTHESIA): Performed by: INTERNAL MEDICINE

## 2023-01-03 PROCEDURE — 88305 TISSUE EXAM BY PATHOLOGIST: CPT

## 2023-01-03 PROCEDURE — 88342 IMHCHEM/IMCYTCHM 1ST ANTB: CPT

## 2023-01-03 RX ORDER — PROPOFOL 10 MG/ML
INJECTION, EMULSION INTRAVENOUS PRN
Status: DISCONTINUED | OUTPATIENT
Start: 2023-01-03 | End: 2023-01-03 | Stop reason: SDUPTHER

## 2023-01-03 RX ORDER — DIPHENHYDRAMINE HYDROCHLORIDE 50 MG/ML
12.5 INJECTION INTRAMUSCULAR; INTRAVENOUS
Status: DISCONTINUED | OUTPATIENT
Start: 2023-01-03 | End: 2023-01-03 | Stop reason: HOSPADM

## 2023-01-03 RX ORDER — SODIUM CHLORIDE 0.9 % (FLUSH) 0.9 %
5-40 SYRINGE (ML) INJECTION PRN
Status: DISCONTINUED | OUTPATIENT
Start: 2023-01-03 | End: 2023-01-03 | Stop reason: HOSPADM

## 2023-01-03 RX ORDER — MEPERIDINE HYDROCHLORIDE 25 MG/ML
12.5 INJECTION INTRAMUSCULAR; INTRAVENOUS; SUBCUTANEOUS ONCE
Status: DISCONTINUED | OUTPATIENT
Start: 2023-01-03 | End: 2023-01-03 | Stop reason: HOSPADM

## 2023-01-03 RX ORDER — FENTANYL CITRATE 50 UG/ML
25 INJECTION, SOLUTION INTRAMUSCULAR; INTRAVENOUS EVERY 5 MIN PRN
Status: DISCONTINUED | OUTPATIENT
Start: 2023-01-03 | End: 2023-01-03 | Stop reason: HOSPADM

## 2023-01-03 RX ORDER — HYDRALAZINE HYDROCHLORIDE 20 MG/ML
5 INJECTION INTRAMUSCULAR; INTRAVENOUS
Status: DISCONTINUED | OUTPATIENT
Start: 2023-01-03 | End: 2023-01-03 | Stop reason: HOSPADM

## 2023-01-03 RX ORDER — SODIUM CHLORIDE 0.9 % (FLUSH) 0.9 %
5-40 SYRINGE (ML) INJECTION EVERY 12 HOURS SCHEDULED
Status: DISCONTINUED | OUTPATIENT
Start: 2023-01-03 | End: 2023-01-03 | Stop reason: HOSPADM

## 2023-01-03 RX ORDER — PROCHLORPERAZINE EDISYLATE 5 MG/ML
5 INJECTION INTRAMUSCULAR; INTRAVENOUS
Status: DISCONTINUED | OUTPATIENT
Start: 2023-01-03 | End: 2023-01-03 | Stop reason: HOSPADM

## 2023-01-03 RX ORDER — LABETALOL HYDROCHLORIDE 5 MG/ML
5 INJECTION, SOLUTION INTRAVENOUS
Status: DISCONTINUED | OUTPATIENT
Start: 2023-01-03 | End: 2023-01-03 | Stop reason: HOSPADM

## 2023-01-03 RX ORDER — LIDOCAINE HYDROCHLORIDE 20 MG/ML
INJECTION, SOLUTION INFILTRATION; PERINEURAL PRN
Status: DISCONTINUED | OUTPATIENT
Start: 2023-01-03 | End: 2023-01-03 | Stop reason: SDUPTHER

## 2023-01-03 RX ORDER — SODIUM CHLORIDE 9 MG/ML
INJECTION, SOLUTION INTRAVENOUS PRN
Status: DISCONTINUED | OUTPATIENT
Start: 2023-01-03 | End: 2023-01-03 | Stop reason: HOSPADM

## 2023-01-03 RX ADMIN — LIDOCAINE HYDROCHLORIDE 40 MG: 20 INJECTION, SOLUTION INFILTRATION; PERINEURAL at 16:47

## 2023-01-03 RX ADMIN — METHYLPREDNISOLONE SODIUM SUCCINATE 60 MG: 125 INJECTION, POWDER, FOR SOLUTION INTRAMUSCULAR; INTRAVENOUS at 10:52

## 2023-01-03 RX ADMIN — IPRATROPIUM BROMIDE 0.5 MG: 0.5 SOLUTION RESPIRATORY (INHALATION) at 14:51

## 2023-01-03 RX ADMIN — HYDROCODONE BITARTRATE AND ACETAMINOPHEN 1 TABLET: 10; 325 TABLET ORAL at 10:51

## 2023-01-03 RX ADMIN — SODIUM CHLORIDE: 9 INJECTION, SOLUTION INTRAVENOUS at 20:11

## 2023-01-03 RX ADMIN — HYDROCODONE BITARTRATE AND ACETAMINOPHEN 1 TABLET: 10; 325 TABLET ORAL at 04:25

## 2023-01-03 RX ADMIN — GABAPENTIN 300 MG: 300 CAPSULE ORAL at 20:11

## 2023-01-03 RX ADMIN — SUCRALFATE 1 G: 1 TABLET ORAL at 06:32

## 2023-01-03 RX ADMIN — PROPOFOL 60 MG: 10 INJECTION, EMULSION INTRAVENOUS at 16:47

## 2023-01-03 RX ADMIN — TRIAMCINOLONE ACETONIDE: 1 CREAM TOPICAL at 20:17

## 2023-01-03 RX ADMIN — METHYLPREDNISOLONE SODIUM SUCCINATE 60 MG: 125 INJECTION, POWDER, FOR SOLUTION INTRAMUSCULAR; INTRAVENOUS at 20:11

## 2023-01-03 RX ADMIN — BUDESONIDE 500 MCG: 0.5 SUSPENSION RESPIRATORY (INHALATION) at 06:51

## 2023-01-03 RX ADMIN — SUCRALFATE 1 G: 1 TABLET ORAL at 20:11

## 2023-01-03 RX ADMIN — HYDROCORTISONE: 25 CREAM TOPICAL at 20:12

## 2023-01-03 RX ADMIN — SUCRALFATE 1 G: 1 TABLET ORAL at 18:11

## 2023-01-03 RX ADMIN — IPRATROPIUM BROMIDE 0.5 MG: 0.5 SOLUTION RESPIRATORY (INHALATION) at 09:57

## 2023-01-03 RX ADMIN — ARFORMOTEROL TARTRATE 15 MCG: 15 SOLUTION RESPIRATORY (INHALATION) at 18:46

## 2023-01-03 RX ADMIN — HYDROCODONE BITARTRATE AND ACETAMINOPHEN 1 TABLET: 10; 325 TABLET ORAL at 18:10

## 2023-01-03 RX ADMIN — SODIUM CHLORIDE 40 MG: 9 INJECTION, SOLUTION INTRAMUSCULAR; INTRAVENOUS; SUBCUTANEOUS at 00:02

## 2023-01-03 RX ADMIN — IPRATROPIUM BROMIDE 0.5 MG: 0.5 SOLUTION RESPIRATORY (INHALATION) at 18:45

## 2023-01-03 RX ADMIN — ARFORMOTEROL TARTRATE 15 MCG: 15 SOLUTION RESPIRATORY (INHALATION) at 06:51

## 2023-01-03 RX ADMIN — BUDESONIDE 500 MCG: 0.5 SUSPENSION RESPIRATORY (INHALATION) at 18:46

## 2023-01-03 RX ADMIN — METHYLPREDNISOLONE SODIUM SUCCINATE 60 MG: 125 INJECTION, POWDER, FOR SOLUTION INTRAMUSCULAR; INTRAVENOUS at 04:25

## 2023-01-03 RX ADMIN — PROPOFOL 10 MG: 10 INJECTION, EMULSION INTRAVENOUS at 16:48

## 2023-01-03 RX ADMIN — SODIUM CHLORIDE 40 MG: 9 INJECTION, SOLUTION INTRAMUSCULAR; INTRAVENOUS; SUBCUTANEOUS at 10:52

## 2023-01-03 RX ADMIN — IPRATROPIUM BROMIDE 0.5 MG: 0.5 SOLUTION RESPIRATORY (INHALATION) at 06:51

## 2023-01-03 ASSESSMENT — ENCOUNTER SYMPTOMS
SHORTNESS OF BREATH: 1
DYSPNEA ACTIVITY LEVEL: NO INTERVAL CHANGE

## 2023-01-03 ASSESSMENT — PAIN SCALES - GENERAL
PAINLEVEL_OUTOF10: 6
PAINLEVEL_OUTOF10: 3
PAINLEVEL_OUTOF10: 8

## 2023-01-03 ASSESSMENT — LIFESTYLE VARIABLES: SMOKING_STATUS: 0

## 2023-01-03 ASSESSMENT — PAIN - FUNCTIONAL ASSESSMENT: PAIN_FUNCTIONAL_ASSESSMENT: ACTIVITIES ARE NOT PREVENTED

## 2023-01-03 ASSESSMENT — PAIN DESCRIPTION - DESCRIPTORS: DESCRIPTORS: ACHING;DISCOMFORT

## 2023-01-03 ASSESSMENT — PAIN DESCRIPTION - FREQUENCY: FREQUENCY: INTERMITTENT

## 2023-01-03 ASSESSMENT — PAIN DESCRIPTION - LOCATION
LOCATION: GENERALIZED
LOCATION: BACK

## 2023-01-03 ASSESSMENT — PAIN DESCRIPTION - ONSET: ONSET: GRADUAL

## 2023-01-03 ASSESSMENT — COPD QUESTIONNAIRES: CAT_SEVERITY: NO INTERVAL CHANGE

## 2023-01-03 ASSESSMENT — PAIN DESCRIPTION - PAIN TYPE: TYPE: CHRONIC PAIN

## 2023-01-03 NOTE — CARE COORDINATION
1/3/2023 1408 CM note: Negative covid 12/30/23. Met with patient for transition of care needs. Pt resides alone in a ranch home, no steps. He has a shower chair, nebulizer and home [email or web address] from [place]. Pt has aide services through waiver 7 days/week, 4hrs/day. PCP is Dr Tricia Morillo and uses [facility]. He plans to return home at d/c and states his family will provide transportation. Pt for EGD today.  Sari TAYLOR

## 2023-01-03 NOTE — OP NOTE
Operative Note      Patient: Jennifer Sebastian  YOB: 1950  MRN: [id number]    Date of Procedure: 1/3/2023    Pre-Op Diagnosis: Epigastric pain [R10.13]    Post-Op Diagnosis:  gastritis       Procedure(s):  EGD ESOPHAGOGASTRODUODENOSCOPY WITH BIOPSY, BLEEDING CONTROL POSSIBLE DILATION    Surgeon(s):  Blanche Dias MD    Assistant:   Surgical Assistant: Misti Perry RN    Anesthesia: Monitor Anesthesia Care    Estimated Blood Loss (mL): none    Complications: None    Specimens:   ID Type Source Tests Collected by Time Destination   A : Gastric bx Gastric Gastric SURGICAL PATHOLOGY Blanche Dias MD 1/3/2023 1650        Implants:  * No implants in log *      Drains: * No LDAs found *    Findings: see below    Detailed Description of Procedure: EGD w biopsies  Indication     Sedation  MAC    Endoscope was advanced easily through mouth to second portion of duodenum      Oropharynx views are limited but grossly normal.    Esophagus:   Mucosa is normal.  GEJ at 40 cm. Stomach:   Antrum with gastritis biopsied for H. Pylori    Gastric body is normal.    Retroflexed views show normal fundus and cardia. Duodenum: Bulb is normal.    Second portion of duodenum is normal.    IMPRESSION AND PLAN: No explanation for epigastric pain. Consider current management, resume diet. Follow up as outpatient in office , call [phone] to schedule for appointment.       Electronically signed by Blanche Dias MD on 1/3/2023 at 4:51 PM

## 2023-01-03 NOTE — PROGRESS NOTES
Physical Therapy Treatment Note/Plan of Care    Room #:  2661/0816-74  Patient Name: Marychuy Eid  YOB: 1950  MRN: [id number]    Date of Service: 1/3/2023     Tentative placement recommendation: Home Health Physical Therapy   Equipment recommendation: To be determined      Evaluating Physical Therapist: Awais Alonzo #[id number]      Specific Provider Orders/Date/Referring Provider :   12/31/22 2015    PT eval and treat  Start:  12/31/22 2015,   End:  12/31/22 2015,   ONE TIME,   Standing Count:  1 Occurrences,   R         Min Calle DO     Admitting Diagnosis:   COPD exacerbation ([facility] [address] [J44.1]      Surgery: none      Patient Active Problem List   Diagnosis    Hepatitis C    Testicular swelling    COPD exacerbation ([facility] [address]    NSTEMI (non-ST elevated myocardial infarction) ([facility] [address]    CHF, acute on chronic [facility])    Essential hypertension    Neck pain    Cervical disc herniation    Acute respiratory failure with hypoxia and hypercapnia (HCC)    Chest pain    Elbow effusion, right    Acute on chronic respiratory failure with hypoxia and hypercapnia (HCC)    Chronic pain syndrome [G89.4 (ICD-10-CM)]    Lumbar spondylosis    Spinal stenosis of lumbar region without neurogenic claudication    Lumbar facet arthropathy    Lumbar disc disorder    Cervical spondylosis    Cervical facet joint syndrome    Cervical stenosis of spine    Cervical disc disorder    Cervical radiculopathy    Lumbar radiculopathy        ASSESSMENT of Current Deficits Patient exhibits decreased balance and endurance impairing gait distance and tolerance to activity. Pt fatigues and become short of breath with activity. Multiple seated rest breaks given and educated pursed lip breathing. Patient mildly unsteady on his feet ambulating with an IV pole for support, no loss of balance.  The patient will benefit from continued skilled therapy to increase strength and improve balance for safe functional mobility, to decrease risk of falls, and to meet goals at discharge. PHYSICAL THERAPY  PLAN OF CARE       Physical therapy plan of care is established based on physician order,  patient diagnosis and clinical assessment    Current Treatment Recommendations:    -Bed Mobility: Lower extremity exercises , Upper extremity exercises , and Trunk control activities   -Sitting Balance: Incorporate reaching activities to activate trunk muscles , Hands on support to maintain midline , and Facilitate active trunk muscle engagement   -Standing Balance: Perform strengthening exercises in standing to promote motor control with or without upper extremity support  and Instruct patient on adequate base of support to maintain balance  -Transfers: Provide instruction on proper hand and foot position for adequate transfer of weight onto lower extremities and use of gait device if needed and Cues for hand placement, technique and safety. Provide stabilization to prevent fall   -Gait: Gait training and Standing activities to improve: base of support, weight shift, weight bearing    -Endurance: Utilize Supervised activities to increase level of endurance to allow for safe functional mobility including transfers and gait     PT long term treatment goals are located in below grid    Patient and or family understand(s) diagnosis, prognosis, and plan of care. Frequency of treatments: Patient will be seen  daily.          Prior Level of Function: Patient ambulated independently   Rehab Potential: good  for baseline    Past medical history:   Past Medical History:   Diagnosis Date    Chest pain 3-6-2015    lexiscan nuclear stress    Chronic diastolic CHF (congestive heart failure) ([facility] [address]     Echo 1/18/2016; EF 57%    COPD (chronic obstructive pulmonary disease) (HCC)     Hepatitis C     Hilar lymphadenopathy     CT 1/2016; 1.8 cm    History of echocardiogram 01/02/2019    EF 66%; Stage I diastolic dysfunction    Hypertension     Irregular heart beat     Lumbar spondylosis 10/14/2022    Oxygen dependent      Past Surgical History:   Procedure Laterality Date    CATARACT REMOVAL Right 05/2019    CATARACT REMOVAL Left 06/2019    CERVICAL FUSION  03/09/2018    ACF C4-C7    COLONOSCOPY      HERNIA REPAIR      tracee inguinal repair       SUBJECTIVE:    Precautions: , falls , 4L O2 at baseline, shortness of breath     Social history: Patient  lives alone in a ranch home  with No steps  to enter   Tub shower with grab bars    Equipment owned: Shower chair and O2(4L),      [facility] Mobility Inpatient   How much difficulty turning over in bed?: A Little  How much difficulty sitting down on / standing up from a chair with arms?: A Little  How much difficulty moving from lying on back to sitting on side of bed?: A Little  How much help from another person moving to and from a bed to a chair?: A Little  How much help from another person needed to walk in hospital room?: A Little  How much help from another person for climbing 3-5 steps with a railing?: A Little  AM-PAC Inpatient Mobility Raw Score : 18  AM-PAC Inpatient T-Scale Score : 43.63  Mobility Inpatient CMS 0-100% Score: 46.58  Mobility Inpatient CMS G-Code Modifier : CK    Nursing cleared patient for PT treatment. Patient states that he has a rescue inhaler on him, nursing was notified. OBJECTIVE;   Initial Evaluation  Date: 1/3/2023   Treatment Date:    1/3/2023   Short Term/ Long Term   Goals   Was pt agreeable to Eval/treatment? Yes Yes  To be met in 4 days   Pain level   0/10   0/10    Bed Mobility    Rolling: Supervision     Supine to sit: Supervision     Sit to supine: Supervision     Scooting: Supervision    Rolling: Supervision    Supine to sit: Supervision    Sit to supine: Supervision    Scooting: Supervision     Rolling: Independent    Supine to sit:  Independent    Sit to supine: Independent    Scooting: Independent     Transfers Sit to stand: Supervision   Sit to stand: Supervision     Sit to stand: Independent    Ambulation     2x40 feet using  no device with Minimal assist of 1   for balance, upright, safety, and O2 line management  and cues for pursed lip breathing 2x20 feet using  IV pole with Minimal assist of 1   for balance, safety, and O2 line management   And cues for pursed lip breathing.      100 feet using  no device with Independent    Stair negotiation: ascended and descended   Not assessed          ROM Within functional limits    Increase range of motion 10% of affected joints    Strength BUE:  refer to OT eval  RLE:  4+/5  LLE:  4+/5  Increase strength in affected mm groups by 1/3 grade   Balance Sitting EOB:  good -  Dynamic Standing:  fair + Sitting EOB: good minus   Dynamic Standing: fair plus     Sitting EOB:  good   Dynamic Standing: good      Patient is Alert & Oriented x person, place, time, and situation and follows directions    Sensation:  Patient  reports tingling right hand     Edema:  no   Endurance: fair      Vitals:  4 liters nasal cannula   Blood Pressure at rest  Blood Pressure during session    Heart Rate at rest  Heart Rate during session    SPO2 at rest %  SPO2 during session %     Patient education  Patient educated on role of Physical Therapy, risks of immobility, safety and plan of care,  importance of mobility while in hospital , purse lip breathing, ankle pumps, quad set and glut set for edema control, blood clot prevention, and safety      Patient response to education:   Pt verbalized understanding Pt demonstrated skill Pt requires further education in this area   Yes Partial Yes      Treatment:  Patient practiced and was instructed/facilitated in the following treatment: Patient transferred to EOB. Sat edge of bed 10 minutes with Supervision  to increase dynamic sitting balance and activity tolerance. Stood to urinate in urinal. Pt stood again, ambulated as above with rest break cues for pursed lip breathing. Seated exercise. Stood x 1 for fixing sheets and  transferred back to supine. Therapeutic Exercises:  ankle pumps, long arc quad, and seated marching x 8 reps. At end of session, patient in bed with  call light and phone within reach,  all lines and tubes intact, nursing notified. Patient would benefit from continued skilled Physical Therapy to improve functional independence and quality of life. Patient's/ family goals   home    Time in  310  Time out  330    Total Treatment Time 20 minutes      CPT codes.      Therapeutic activities (54050)   15 minutes  1 unit(s)  Therapeutic exercises (14046)   5 minutes  0 unit(s)    Eulalio Caba PTA PDD#[id number]

## 2023-01-03 NOTE — ANESTHESIA POSTPROCEDURE EVALUATION
Department of Anesthesiology  Postprocedure Note    Patient: Cecilia Eli  MRN: [id number]  YOB: 1950  Date of evaluation: 1/3/2023      Procedure Summary     Date: 01/03/23 Room / Location: [address] [place] [address] 01 / [address]    Anesthesia Start: 1642 Anesthesia Stop: 1657    Procedure: EGD BIOPSY Diagnosis:       Epigastric pain      (Epigastric pain [R10.13])    Surgeons: Radha Winn MD Responsible Provider: Delmy Tate MD    Anesthesia Type: MAC ASA Status: 4          Anesthesia Type: No value filed.     Wilma Phase I:      Wilma Phase II: Wilma Score: 9      Anesthesia Post Evaluation    Patient location during evaluation: PACU  Patient participation: complete - patient participated  Level of consciousness: awake  Pain score: 0  Airway patency: patent  Nausea & Vomiting: no nausea and no vomiting  Complications: no  Cardiovascular status: hemodynamically stable  Respiratory status: acceptable  Hydration status: euvolemic

## 2023-01-03 NOTE — ANESTHESIA PRE PROCEDURE
Department of Anesthesiology  Preprocedure Note       Name:  Natalya Kennedy   Age:  81 y.o.  :  1950                                          MRN:  [id number]         Date:  1/3/2023      Surgeon: Dolores De Los Santos):  Carli Rae MD    Procedure: Procedure(s):  EGD ESOPHAGOGASTRODUODENOSCOPY WITH BIOPSY, BLEEDING CONTROL POSSIBLE DILATION    Medications prior to admission:   Prior to Admission medications    Medication Sig Start Date End Date Taking? Authorizing Provider   PREDNISONE PO Take by mouth    Historical Provider, MD   gabapentin (NEURONTIN) 300 MG capsule Take 1 capsule by mouth 2 times daily for 30 days.  Intended supply: 90 days 10/14/22 11/13/22  Dorina Machado MD   ipratropium (ATROVENT) 0.02 % nebulizer solution Take 2.5 mLs by nebulization 4 times daily 10/2/22   Jocelyn Carrasco DO   budesonide (PULMICORT) 0.5 MG/2ML nebulizer suspension Take 1 ampule by nebulization daily    Historical Provider, MD   aspirin 81 MG EC tablet Take 81 mg by mouth daily    Historical Provider, MD   hydroCHLOROthiazide (HYDRODIURIL) 25 MG tablet Take 25 mg by mouth daily    Historical Provider, MD   tiotropium (SPIRIVA RESPIMAT) 2.5 MCG/ACT AERS inhaler Inhale 2 puffs into the lungs daily    Historical Provider, MD   albuterol sulfate HFA (PROVENTIL;VENTOLIN;PROAIR) 108 (90 Base) MCG/ACT inhaler     Historical Provider, MD   desonide (DESOWEN) 0.05 % cream Apply 1 each topically daily Apply to face    Historical Provider, MD   metoprolol succinate (TOPROL XL) 100 MG extended release tablet Take 100 mg by mouth daily    Historical Provider, MD   budesonide-formoterol (SYMBICORT) 160-4.5 MCG/ACT AERO Inhale 2 puffs into the lungs 2 times daily    Historical Provider, MD   cetirizine (ZYRTEC) 10 MG tablet Take 10 mg by mouth daily    Historical Provider, MD   losartan (COZAAR) 100 MG tablet Take 100 mg by mouth daily    Historical Provider, MD   ketoconazole (NIZORAL) 2 % cream Apply topically daily as needed (itching) Historical Provider, MD   hydrocortisone 2.5 % cream Apply topically 2 times daily as needed (itching)    Historical Provider, MD   triamcinolone (KENALOG) 0.1 % cream Apply topically 2 times daily Apply topically 2 times daily.     Historical Provider, MD   OXYGEN Inhale 4 L into the lungs     Historical Provider, MD   vitamin E 400 UNIT capsule Take 400 Units by mouth daily    Historical Provider, MD       Current medications:    Current Facility-Administered Medications   Medication Dose Route Frequency Provider Last Rate Last Admin    glucose chewable tablet 16 g  4 tablet Oral PRN Sharan Berry, DO        dextrose bolus 10% 125 mL  125 mL IntraVENous PRN Sharan Berry, DO        Or    dextrose bolus 10% 250 mL  250 mL IntraVENous PRN Sharan Berry, DO        glucagon (rDNA) injection 1 mg  1 mg SubCUTAneous PRN Sharan Berry, DO        dextrose 10 % infusion   IntraVENous Continuous PRN Sharan Berry, DO        methylPREDNISolone sodium (SOLU-MEDROL) injection 60 mg  60 mg IntraVENous Q8H Sharan Berry, DO   60 mg at 01/03/23 1052    0.9 % sodium chloride infusion   IntraVENous Continuous Sharan Berry, DO 50 mL/hr at 01/02/23 2045 New Bag at 01/02/23 2045    pantoprazole (PROTONIX) 40 mg in sodium chloride (PF) 0.9 % 10 mL injection  40 mg IntraVENous Q12H Jeovanny Pedraza MD   40 mg at 01/03/23 1052    sucralfate (CARAFATE) tablet 1 g  1 g Oral 4x Daily AC & HS Jeovanny Pedraza MD   1 g at 01/03/23 1670    HYDROcodone-acetaminophen (NORCO)  MG per tablet 1 tablet  1 tablet Oral Q6H PRN Sharan Berry, DO   1 tablet at 01/03/23 1051    gabapentin (NEURONTIN) capsule 300 mg  300 mg Oral Nightly Sharan Berry, DO   300 mg at 01/02/23 2041    aspirin EC tablet 81 mg  81 mg Oral Daily Sharan Berry, DO   81 mg at 01/02/23 0834    cetirizine (ZYRTEC) tablet 10 mg  10 mg Oral Daily Sharan Berry, DO   10 mg at 01/02/23 0834    hydrocortisone 2.5 % cream   Topical Daily [place], DO   Given at 01/02/23 2152    hydroCHLOROthiazide (HYDRODIURIL) tablet 25 mg  25 mg Oral Daily [place], DO   25 mg at 01/02/23 6033    hydrocortisone 2.5 % cream   Topical BID PRN [place], DO        ipratropium (ATROVENT) 0.02 % nebulizer solution 0.5 mg  0.5 mg Nebulization 4x daily [place], DO   0.5 mg at 01/03/23 0957    ketoconazole (NIZORAL) 2 % cream   Topical Daily PRN [place], DO        losartan (COZAAR) tablet 100 mg  100 mg Oral Daily [place], DO   100 mg at 01/02/23 6937    metoprolol succinate (TOPROL XL) extended release tablet 100 mg  100 mg Oral Daily [place], DO   100 mg at 01/02/23 0982    triamcinolone (KENALOG) 0.1 % cream   Topical BID [place], DO   Given at 01/02/23 2054    vitamin E capsule 400 Units  400 Units Oral Daily [place], DO   400 Units at 01/02/23 1258    albuterol (PROVENTIL) nebulizer solution 2.5 mg  2.5 mg Nebulization Q6H PRN [place], DO   2.5 mg at 01/01/23 1832    Arformoterol Tartrate (BROVANA) nebulizer solution 15 mcg  15 mcg Nebulization BID [place], DO   15 mcg at 01/03/23 3488    And    budesonide (PULMICORT) nebulizer suspension 500 mcg  0.5 mg Nebulization BID [place], DO   500 mcg at 01/03/23 3866       Allergies:     Allergies   Allergen Reactions    Ace Inhibitors Shortness Of Breath, Swelling and Angioedema    Lisinopril Other (See Comments)     Angioedema       Problem List:    Patient Active Problem List   Diagnosis Code    Hepatitis C B19.20    Testicular swelling N50.89    COPD exacerbation ([facility]) J44.1    NSTEMI (non-ST elevated myocardial infarction) ([facility]) I21.4    CHF, acute on chronic ([facility]) I50.9    Essential hypertension I10    Neck pain M54.2    Cervical disc herniation M50.20    Acute respiratory failure with hypoxia and hypercapnia ([facility]) J96.01, J96.02    Chest pain R07.9    Elbow effusion, right M25.421    Acute on chronic respiratory failure with hypoxia and hypercapnia (HCC) J96.21, J96.22    Chronic pain syndrome [G89.4 (ICD-10-CM)] G89.4    Lumbar spondylosis M47.816    Spinal stenosis of lumbar region without neurogenic claudication M48.061    Lumbar facet arthropathy M47.816    Lumbar disc disorder M51.9    Cervical spondylosis M47.812    Cervical facet joint syndrome M47.812    Cervical stenosis of spine M48.02    Cervical disc disorder M50.90    Cervical radiculopathy M54.12    Lumbar radiculopathy M54.16       Past Medical History:        Diagnosis Date    Chest pain 3-6-2015    lexiscan nuclear stress    Chronic diastolic CHF (congestive heart failure) ([facility] [address]     Echo 2016; EF 57%    COPD (chronic obstructive pulmonary disease) (HCC)     Hepatitis C     Hilar lymphadenopathy     CT 2016; 1.8 cm    History of echocardiogram 2019    EF 78%; Stage I diastolic dysfunction    Hypertension     Irregular heart beat     Lumbar spondylosis 10/14/2022    Oxygen dependent        Past Surgical History:        Procedure Laterality Date    CATARACT REMOVAL Right 2019    CATARACT REMOVAL Left 2019    CERVICAL FUSION  2018    ACF C4-C7    COLONOSCOPY      HERNIA REPAIR      tracee inguinal repair       Social History:    Social History     Tobacco Use    Smoking status: Former     Packs/day: 0.50     Types: Cigarettes     Quit date: 2015     Years since quittin.9    Smokeless tobacco: Never   Substance Use Topics    Alcohol use:  Yes     Alcohol/week: 4.0 standard drinks     Types: 4 Cans of beer per week                                Counseling given: Not Answered      Vital Signs (Current):   Vitals:    23 1911 23 2030 23 0803 23 0957   BP:  (!) 152/72 130/69    Pulse:  91 56 68   Resp:  18 17 15   Temp:  98.6 °F (37 °C) 97.8 °F (36.6 °C)    TempSrc:  Oral Oral    SpO2: 100% 98% 96% 100%   Weight:       Height: BP Readings from Last 3 Encounters:   01/03/23 130/69   10/14/22 128/80   10/02/22 (!) 168/111       NPO Status:                                                                                 BMI:   Wt Readings from Last 3 Encounters:   12/31/22 180 lb (81.6 kg)   10/14/22 178 lb (80.7 kg)   09/30/22 171 lb (77.6 kg)     Body mass index is 29.05 kg/m². CBC:   Lab Results   Component Value Date/Time    WBC 11.5 01/03/2023 09:12 AM    RBC 3.70 01/03/2023 09:12 AM    HGB 11.2 01/03/2023 09:12 AM    HCT 38.8 01/03/2023 09:12 AM    .9 01/03/2023 09:12 AM    RDW 12.9 01/03/2023 09:12 AM     01/03/2023 09:12 AM       CMP:   Lab Results   Component Value Date/Time     01/03/2023 09:12 AM    K 4.9 01/03/2023 09:12 AM    K 4.8 09/25/2022 08:17 AM    CL 91 01/03/2023 09:12 AM    CO2 40 01/03/2023 09:12 AM    BUN 33 01/03/2023 09:12 AM    CREATININE 1.5 01/03/2023 09:12 AM    GFRAA >60 10/02/2022 05:33 AM    LABGLOM 50 01/03/2023 09:12 AM    GLUCOSE 159 01/03/2023 09:12 AM    GLUCOSE 101 07/14/2011 02:10 PM    PROT 6.5 01/03/2023 09:12 AM    CALCIUM 9.2 01/03/2023 09:12 AM    BILITOT <0.2 01/03/2023 09:12 AM    ALKPHOS 71 01/03/2023 09:12 AM    AST 13 01/03/2023 09:12 AM    ALT 6 01/03/2023 09:12 AM       POC Tests: No results for input(s): POCGLU, POCNA, POCK, POCCL, POCBUN, POCHEMO, POCHCT in the last 72 hours.     Coags:   Lab Results   Component Value Date/Time    PROTIME 12.1 12/30/2022 08:15 PM    PROTIME 13.8 07/14/2011 02:10 PM    INR 1.1 12/30/2022 08:15 PM    APTT 34.5 12/30/2022 08:15 PM       HCG (If Applicable): No results found for: PREGTESTUR, PREGSERUM, HCG, HCGQUANT     ABGs:   Lab Results   Component Value Date/Time    PO2ART 88.5 09/22/2018 12:18 AM    QOK0GPR 70.8 09/22/2018 12:18 AM    FSS2OSK 37.4 09/22/2018 12:18 AM        Type & Screen (If Applicable):  No results found for: LABABO, LABRH    Drug/Infectious Status (If Applicable):  Lab Results   Component Value Date/Time HEPCAB REACTIVE 04/24/2013 03:25 PM       COVID-19 Screening (If Applicable):   Lab Results   Component Value Date/Time    COVID19 Not Detected 12/30/2022 08:31 PM           Anesthesia Evaluation  Patient summary reviewed and Nursing notes reviewed no history of anesthetic complications:   Airway: Mallampati: III  TM distance: >3 FB   Neck ROM: full  Mouth opening: > = 3 FB   Dental:          Pulmonary:   (+) COPD (Oxygen dependent): no interval change and severe,  shortness of breath: no interval change,  decreased breath sounds: bilateral     (-) not a current smoker (Quit 2015)                          ROS comment: Seasonal allergies  Acute on chronic respiratory failure with hypoxia and hypercapnia   Cardiovascular:  Exercise tolerance: poor (<4 METS),   (+) hypertension: mild and no interval change, past MI (NSTEMI): no interval change, dysrhythmias:, CHF (Acute on chronic ): diastolic, LEAL: no interval change, murmur: Grade 1,       ECG reviewed  Rhythm: regular  Rate: normal  Echocardiogram reviewed         Beta Blocker:  Dose within 24 Hrs      ROS comment: EKG:  Sinus tachycardia  Otherwise normal ECG  When compared with ECG of 25-SEP-2022 07:47,  Previous ECG has undetermined rhythm, needs review    ECHO:   Ejection fraction is visually estimated at 52%. Overall ejection fraction is normal .   There is doppler evidence of stage I diastolic dysfunction. Moderately dilated right ventricle. Physiologic and/or trace tricuspid regurgitation. There is trace pulmonary hypertension.    Mildly dilated aortic root     Neuro/Psych:   (+) neuromuscular disease:,              ROS comment: Chronic pain syndrome  Lumbar spondylosis  Spinal stenosis of lumbar region without neurogenic claudication  Lumbar facet arthropathy  Lumbar disc disorder  Cervical spondylosis  Cervical facet joint syndrome  Cervical stenosis of spine  Cervical disc disorder  Cervical radiculopathy  Lumbar radiculopathy GI/Hepatic/Renal:   (+) hepatitis: C, liver disease:, renal disease (Creatinine 1.5 & GFR 50): CRI,           Endo/Other:    (+) blood dyscrasia (Hbg 11.2 g/dL): anemia, arthritis (S/P cervical fusion): OA., . Pt had no PAT visit       Abdominal:         (-) obese       Vascular: negative vascular ROS. Other Findings:           Anesthesia Plan      MAC     ASA 4     (Epigastric pain, GERD, and mild anemia)  Induction: intravenous. Anesthetic plan and risks discussed with patient. Plan discussed with CRNA.                     Yareli Hunt DO   1/3/2023

## 2023-01-03 NOTE — ACP (ADVANCE CARE PLANNING)
Advance Care Planning   Healthcare Decision Maker:    Primary Decision Maker: Francie Curling - Brother/Sister - [phone]

## 2023-01-04 LAB
AMPHETAMINE SCREEN, URINE: NOT DETECTED
BARBITURATE SCREEN URINE: NOT DETECTED
BENZODIAZEPINE SCREEN, URINE: NOT DETECTED
CANNABINOID SCREEN URINE: NOT DETECTED
COCAINE METABOLITE SCREEN URINE: NOT DETECTED
FENTANYL SCREEN, URINE: NOT DETECTED
Lab: ABNORMAL
METHADONE SCREEN, URINE: NOT DETECTED
OPIATE SCREEN URINE: POSITIVE
OXYCODONE URINE: NOT DETECTED
PHENCYCLIDINE SCREEN URINE: NOT DETECTED

## 2023-01-04 PROCEDURE — 2700000000 HC OXYGEN THERAPY PER DAY

## 2023-01-04 PROCEDURE — 6360000002 HC RX W HCPCS: Performed by: FAMILY MEDICINE

## 2023-01-04 PROCEDURE — 94640 AIRWAY INHALATION TREATMENT: CPT

## 2023-01-04 PROCEDURE — 80307 DRUG TEST PRSMV CHEM ANLYZR: CPT

## 2023-01-04 PROCEDURE — 2580000003 HC RX 258: Performed by: FAMILY MEDICINE

## 2023-01-04 PROCEDURE — 2580000003 HC RX 258: Performed by: HOSPITALIST

## 2023-01-04 PROCEDURE — 6360000002 HC RX W HCPCS: Performed by: HOSPITALIST

## 2023-01-04 PROCEDURE — A4216 STERILE WATER/SALINE, 10 ML: HCPCS | Performed by: HOSPITALIST

## 2023-01-04 PROCEDURE — 6370000000 HC RX 637 (ALT 250 FOR IP): Performed by: HOSPITALIST

## 2023-01-04 PROCEDURE — 6370000000 HC RX 637 (ALT 250 FOR IP): Performed by: INTERNAL MEDICINE

## 2023-01-04 PROCEDURE — 1200000000 HC SEMI PRIVATE

## 2023-01-04 PROCEDURE — C9113 INJ PANTOPRAZOLE SODIUM, VIA: HCPCS | Performed by: HOSPITALIST

## 2023-01-04 PROCEDURE — 6370000000 HC RX 637 (ALT 250 FOR IP): Performed by: FAMILY MEDICINE

## 2023-01-04 RX ORDER — AZITHROMYCIN 250 MG/1
500 TABLET, FILM COATED ORAL EVERY 24 HOURS
Status: COMPLETED | OUTPATIENT
Start: 2023-01-04 | End: 2023-01-08

## 2023-01-04 RX ORDER — PREDNISONE 20 MG/1
40 TABLET ORAL DAILY
Status: DISCONTINUED | OUTPATIENT
Start: 2023-01-04 | End: 2023-01-08

## 2023-01-04 RX ORDER — PANTOPRAZOLE SODIUM 40 MG/1
40 TABLET, DELAYED RELEASE ORAL
Status: DISCONTINUED | OUTPATIENT
Start: 2023-01-04 | End: 2023-01-09 | Stop reason: HOSPADM

## 2023-01-04 RX ADMIN — TRIAMCINOLONE ACETONIDE: 1 CREAM TOPICAL at 08:53

## 2023-01-04 RX ADMIN — SUCRALFATE 1 G: 1 TABLET ORAL at 16:50

## 2023-01-04 RX ADMIN — GABAPENTIN 300 MG: 300 CAPSULE ORAL at 22:55

## 2023-01-04 RX ADMIN — IPRATROPIUM BROMIDE 0.5 MG: 0.5 SOLUTION RESPIRATORY (INHALATION) at 09:29

## 2023-01-04 RX ADMIN — TRIAMCINOLONE ACETONIDE: 1 CREAM TOPICAL at 22:57

## 2023-01-04 RX ADMIN — SODIUM CHLORIDE: 9 INJECTION, SOLUTION INTRAVENOUS at 08:55

## 2023-01-04 RX ADMIN — HYDROCHLOROTHIAZIDE 25 MG: 25 TABLET ORAL at 08:50

## 2023-01-04 RX ADMIN — ASPIRIN 81 MG: 81 TABLET, COATED ORAL at 08:50

## 2023-01-04 RX ADMIN — SUCRALFATE 1 G: 1 TABLET ORAL at 22:55

## 2023-01-04 RX ADMIN — HYDROCODONE BITARTRATE AND ACETAMINOPHEN 1 TABLET: 10; 325 TABLET ORAL at 16:54

## 2023-01-04 RX ADMIN — BUDESONIDE 500 MCG: 0.5 SUSPENSION RESPIRATORY (INHALATION) at 18:55

## 2023-01-04 RX ADMIN — METHYLPREDNISOLONE SODIUM SUCCINATE 60 MG: 125 INJECTION, POWDER, FOR SOLUTION INTRAMUSCULAR; INTRAVENOUS at 04:34

## 2023-01-04 RX ADMIN — ARFORMOTEROL TARTRATE 15 MCG: 15 SOLUTION RESPIRATORY (INHALATION) at 09:28

## 2023-01-04 RX ADMIN — HYDROCODONE BITARTRATE AND ACETAMINOPHEN 1 TABLET: 10; 325 TABLET ORAL at 08:57

## 2023-01-04 RX ADMIN — SUCRALFATE 1 G: 1 TABLET ORAL at 10:58

## 2023-01-04 RX ADMIN — Medication 400 UNITS: at 08:50

## 2023-01-04 RX ADMIN — PANTOPRAZOLE SODIUM 40 MG: 40 TABLET, DELAYED RELEASE ORAL at 16:49

## 2023-01-04 RX ADMIN — LOSARTAN POTASSIUM 100 MG: 50 TABLET, FILM COATED ORAL at 08:50

## 2023-01-04 RX ADMIN — PREDNISONE 40 MG: 20 TABLET ORAL at 16:49

## 2023-01-04 RX ADMIN — HYDROCORTISONE: 25 CREAM TOPICAL at 22:56

## 2023-01-04 RX ADMIN — METHYLPREDNISOLONE SODIUM SUCCINATE 60 MG: 125 INJECTION, POWDER, FOR SOLUTION INTRAMUSCULAR; INTRAVENOUS at 12:33

## 2023-01-04 RX ADMIN — METOPROLOL SUCCINATE 100 MG: 100 TABLET, EXTENDED RELEASE ORAL at 08:50

## 2023-01-04 RX ADMIN — ARFORMOTEROL TARTRATE 15 MCG: 15 SOLUTION RESPIRATORY (INHALATION) at 18:55

## 2023-01-04 RX ADMIN — CETIRIZINE HYDROCHLORIDE 10 MG: 10 TABLET, FILM COATED ORAL at 08:50

## 2023-01-04 RX ADMIN — AZITHROMYCIN MONOHYDRATE 500 MG: 250 TABLET ORAL at 16:49

## 2023-01-04 RX ADMIN — HYDROCODONE BITARTRATE AND ACETAMINOPHEN 1 TABLET: 10; 325 TABLET ORAL at 00:26

## 2023-01-04 RX ADMIN — SUCRALFATE 1 G: 1 TABLET ORAL at 05:21

## 2023-01-04 RX ADMIN — IPRATROPIUM BROMIDE 0.5 MG: 0.5 SOLUTION RESPIRATORY (INHALATION) at 18:55

## 2023-01-04 RX ADMIN — BUDESONIDE 500 MCG: 0.5 SUSPENSION RESPIRATORY (INHALATION) at 09:28

## 2023-01-04 RX ADMIN — SODIUM CHLORIDE 40 MG: 9 INJECTION, SOLUTION INTRAMUSCULAR; INTRAVENOUS; SUBCUTANEOUS at 00:28

## 2023-01-04 RX ADMIN — HYDROCODONE BITARTRATE AND ACETAMINOPHEN 1 TABLET: 10; 325 TABLET ORAL at 22:55

## 2023-01-04 ASSESSMENT — PAIN SCALES - GENERAL
PAINLEVEL_OUTOF10: 9
PAINLEVEL_OUTOF10: 8

## 2023-01-04 ASSESSMENT — PAIN DESCRIPTION - LOCATION
LOCATION: BACK
LOCATION: BACK;CHEST
LOCATION: GENERALIZED
LOCATION: GENERALIZED

## 2023-01-04 ASSESSMENT — PAIN DESCRIPTION - DESCRIPTORS
DESCRIPTORS: THROBBING;ACHING;TENDER
DESCRIPTORS: ACHING
DESCRIPTORS: ACHING

## 2023-01-04 ASSESSMENT — PAIN - FUNCTIONAL ASSESSMENT: PAIN_FUNCTIONAL_ASSESSMENT: PREVENTS OR INTERFERES SOME ACTIVE ACTIVITIES AND ADLS

## 2023-01-04 NOTE — PROGRESS NOTES
Pt is a run on PAT tonight. /84 HR 65 resp 16 SPO2 100 at 4L. Alert and oriented w/ no complaints of chest pain.

## 2023-01-04 NOTE — CARE COORDINATION
1/4/2023 1208 CM note: Negative covid 12/30/23. Pt resides alone in a ranch home, no steps. He has a nebulizer and home [email or web address] [place] from [place]. Pt has aide services through waiver 7 days/week, 4hrs/day. PT recommendations for HHC discussed with patient , pt agreeable and prefers [address]. Referral placed to liaison Julius Mcclelland to review. He plans to return home at d/c and states his family will provide transportation. Sari TAYLOR      The Plan for Transition of Care is related to the following treatment goals: HHC    The Patient  was provided with a choice of provider and agrees   with the discharge plan. [x] Yes [] No    Freedom of choice list was provided with basic dialogue that supports the patient's individualized plan of care/goals, treatment preferences and shares the quality data associated with the providers.  [] Yes [x] No (pt declined list)

## 2023-01-04 NOTE — PROGRESS NOTES
Assessment and Plan  Assessment and Plan  Patient is a 72 y.o. male with the following medical Problems:   Acute exacerbation of COPD  Acute on chronic hypoxic and hypercapnic respiratory failure  3.  Nicotine dependence  4.  History of marijuana abuse        Plan of care:  1.  Change Solu-Medrol to prednisone 40 mg daily and wean over 2 weeks.  2.  Azithromycin for acute exacerbation of COPD for 5 days  3.  Schedule DuoNeb and budesonide  4.  DVT prophylaxis  5.  Incentive spirometer  6.  Assess home oxygen need before discharge.  7.  Urine toxicology  8.  Patient was strongly encouraged to quit smoking  9.  Follow-up as outpatient with primary pulmonologist and consider low-dose CT scan for screening for lung cancer yearly basis.  This was discussed with the patient.  10.  Patient can be discharged from pulmonary point of view.    History of Present Illness:   Patient is a 72-year-old man with above-mentioned medical problem who presented with progressive shortness of breath for the last few weeks.  He was admitted on December 31, 2022 and was started on IV Solu-Medrol.  He underwent an endoscopy which showed gastritis.  CTA on admission ruled out pneumonia and pulmonary embolism.  Patient is currently on 4 L nasal cannula saturating 100%.    Past Medical History:  Past Medical History:   Diagnosis Date    Chest pain 3-6-2015    lexiscan nuclear stress    Chronic diastolic CHF (congestive heart failure) (HCC)     Echo 1/18/2016; EF 57%    COPD (chronic obstructive pulmonary disease) (HCC)     Hepatitis C     Hilar lymphadenopathy     CT 1/2016; 1.8 cm    History of echocardiogram 01/02/2019    EF 52%; Stage I diastolic dysfunction    Hypertension     Irregular heart beat     Lumbar spondylosis 10/14/2022    Oxygen dependent       Past Surgical History:   Procedure Laterality Date    CATARACT REMOVAL Right 05/2019    CATARACT REMOVAL Left 06/2019    CERVICAL FUSION  03/09/2018    ACF C4-C7    COLONOSCOPY       HERNIA REPAIR      tracee inguinal repair    UPPER GASTROINTESTINAL ENDOSCOPY N/A 1/3/2023    EGD BIOPSY performed by Tabitha Erickson MD at [facility] ENDOSCOPY       Family History:   Family History   Problem Relation Age of Onset    Heart Disease Mother     Alcohol Abuse Father     Diabetes Father     Diabetes Sister     Heart Disease Sister     Diabetes Sister     High Blood Pressure Sister        Allergies:         Ace inhibitors and Lisinopril    Social history:  Social History     Socioeconomic History    Marital status: Single     Spouse name: Not on file    Number of children: Not on file    Years of education: Not on file    Highest education level: Not on file   Occupational History    Not on file   Tobacco Use    Smoking status: Former     Packs/day: 0.50     Types: Cigarettes     Quit date: 2015     Years since quittin.9    Smokeless tobacco: Never   Vaping Use    Vaping Use: Never used   Substance and Sexual Activity    Alcohol use:  Yes     Alcohol/week: 4.0 standard drinks     Types: 4 Cans of beer per week    Drug use: No    Sexual activity: Never   Other Topics Concern    Not on file   Social History Narrative    Not on file     Social Determinants of Health     Financial Resource Strain: Not on file   Food Insecurity: Not on file   Transportation Needs: Not on file   Physical Activity: Not on file   Stress: Not on file   Social Connections: Not on file   Intimate Partner Violence: Not on file   Housing Stability: Not on file       Current Medications:     Current Facility-Administered Medications:     pantoprazole (PROTONIX) tablet 40 mg, 40 mg, Oral, BID AC, Drew Cohn MD    glucose chewable tablet 16 g, 4 tablet, Oral, PRN, Joellen Pool, DO    dextrose bolus 10% 125 mL, 125 mL, IntraVENous, PRN **OR** dextrose bolus 10% 250 mL, 250 mL, IntraVENous, PRN, Joellen Pool, DO    glucagon (rDNA) injection 1 mg, 1 mg, SubCUTAneous, PRN, Joellen Pool, DO    dextrose 10 % infusion, , IntraVENous, Continuous PRN, Burleigh Flatten, DO    methylPREDNISolone sodium (SOLU-MEDROL) injection 60 mg, 60 mg, IntraVENous, Q8H, Burleigh Flatten, DO, 60 mg at 01/04/23 1233    0.9 % sodium chloride infusion, , IntraVENous, Continuous, Burleigh Flatten, DO, Last Rate: 50 mL/hr at 01/04/23 0855, New Bag at 01/04/23 0855    sucralfate (CARAFATE) tablet 1 g, 1 g, Oral, 4x Daily AC & HS, Stvee Parsons MD, 1 g at 01/04/23 1058    HYDROcodone-acetaminophen (NORCO)  MG per tablet 1 tablet, 1 tablet, Oral, Q6H PRN, Burleigh Flatten, DO, 1 tablet at 01/04/23 0857    gabapentin (NEURONTIN) capsule 300 mg, 300 mg, Oral, Nightly, Burleigh Flatten, DO, 300 mg at 01/03/23 2011    aspirin EC tablet 81 mg, 81 mg, Oral, Daily, Burleigh Flatten, DO, 81 mg at 01/04/23 0850    cetirizine (ZYRTEC) tablet 10 mg, 10 mg, Oral, Daily, Burleigh Flatten, DO, 10 mg at 01/04/23 0850    hydrocortisone 2.5 % cream, , Topical, Daily, Burleigh Flatten, DO, Given at 01/03/23 2012    hydroCHLOROthiazide (HYDRODIURIL) tablet 25 mg, 25 mg, Oral, Daily, Burleigh Flatten, DO, 25 mg at 01/04/23 0850    hydrocortisone 2.5 % cream, , Topical, BID PRN, Burleigh Flatten, DO    ipratropium (ATROVENT) 0.02 % nebulizer solution 0.5 mg, 0.5 mg, Nebulization, 4x daily, Burleigh Flatten, DO, 0.5 mg at 01/04/23 7894    ketoconazole (NIZORAL) 2 % cream, , Topical, Daily PRN, Burleigh Flatten, DO    losartan (COZAAR) tablet 100 mg, 100 mg, Oral, Daily, Burleigh Flatten, DO, 100 mg at 01/04/23 0850    metoprolol succinate (TOPROL XL) extended release tablet 100 mg, 100 mg, Oral, Daily, [place] Flatten, DO, 100 mg at 01/04/23 0850    triamcinolone (KENALOG) 0.1 % cream, , Topical, BID, Kb Grullon DO, Given at 01/04/23 1995    vitamin E capsule 400 Units, 400 Units, Oral, Daily, Kb Grullon, DO, 400 Units at 01/04/23 0850    albuterol (PROVENTIL) nebulizer solution 2.5 mg, 2.5 mg, Nebulization, Q6H PRN, Kb Grullon DO, 2.5 mg at 01/01/23 7577 Arformoterol Tartrate (BROVANA) nebulizer solution 15 mcg, 15 mcg, Nebulization, BID, 15 mcg at 01/04/23 0928 **AND** budesonide (PULMICORT) nebulizer suspension 500 mcg, 0.5 mg, Nebulization, BID, Farooq Liu DO, 500 mcg at 01/04/23 4416      Review of Systems:   General: denies weight gain, denies loss of appetite, fever, chills, night sweats. HEENT: denies headaches, dizziness, head trauma, visual changes, eye pain, tinnitus, nosebleeds, hoarseness or throat pain    Respiratory: denies chest pain, +ve dyspnea, cough but no hemoptysis  Cardiovascular: denies orthopnea, paroxysmal nocturnal dyspnea, leg swelling, and previous heart attack. Gastrointestinal: denies pain, nausea vomiting, diarrhea, constipation, melena or bleeding. Genitourinary: denies hematuria, frequency, urgency or dysuria  Neurology: denies syncope, seizures, paralysis, paraesthesia   Endocrine: denies polyuria, polydipsia, skin or hair changes, and heat or cold intolerance  Musculoskeletal: denies joint pain, swelling, arthritis or myalgia  Hematologic: denies bleeding, adenopathy and easy bruising  Skin: denies rashes and skin discoloration  Psychiatry: denies depression    Physical Exam:   Vital Signs:  BP (!) 167/75   Pulse 86   Temp 97.7 °F (36.5 °C) (Oral)   Resp 16   Ht 5' 6\" (1.676 m)   Wt 180 lb (81.6 kg)   SpO2 100%   BMI 29.05 kg/m²     Input/Output: In: 5 [P.O.:120;  I.V.:300]  Out: 1770     Oxygen requirements: NC      General appearance: not in pain or distress, in no respiratory distress    HEENT: Atraumatic/normocephalic, EOMI, DUTCH, pharynx clear, moist mucosa, redness of the uvula appreciated,   Neck: Supple, no jugular venous distension, lymphadenopathy, thyromegaly or carotid bruits  Chest: Decreased breath sounds, no wheezing, no crackles and no tenderness over ribs   Cardiovascular: Normal S1 , S2, regular rate and rhythm, no murmur, rub or gallop  Abdomen: Normal sounds present, soft, lax with no tenderness, no hepatosplenomegaly, and no masses  Extremities: No edema. Pulses are equally present. Skin: intact, no rashes   Neurologic: Alert and oriented x 3, No focal deficit     Investigations:  Labs, radiological imaging and cardiac work up were reviewed        STAFF PHYSICIAN NOTE OF PERSONAL INVOLVEMENT IN CARE  As the attending physician, I certify that I personally reviewed the patient's history and personally examined the patient to confirm the physical findings described above, and that I reviewed the relevant imaging studies and available reports. I also discussed the differential diagnosis and all of the proposed management plans with the patient and individuals accompanying the patient to this visit. They had the opportunity to ask questions about the proposed management plans and to have those questions answered.       Electronically signed by Elsa Jalloh MD on 1/4/2023 at 3:26 PM

## 2023-01-04 NOTE — PROGRESS NOTES
Department of Family Practice  Adult Daily Progress Note      JAVIER BUTTS IS SEEN IN FOLLOW UP TODAY ON 4 TELEMETRY. HE IS TOLERATING TREATMENT. HE HAD HIS EGD TODAY WHICH ONLY SHOWED GASTRITIS. A BIOPSY FOR H. PYLORI WAS TAKEN. HE CONTINUES ON A PPI BID AND CARAFATE. HE WAS ORDERED A CARDIAC DIET AFTER THE EGD AND WAS ALL UPSET OVER IT. HIS GLUCOSE LEVELS ARE HIGH SINCE ON THE STEROIDS AND THE SYSTEM DEMANDED FINGERSTICKS AND A HGBA1C.  HE WAS ALL UPSET OVER THAT. I HAD TO EXPLAIN IT ALL TO HIM SEVERAL TIMES. TOMORROW NIGHT THE STEROID WILL GO DOWN TO 40 Q8 HRS, THEN 40 BID, THEN ONE DAILY PRIOR TO DISCHARGE. HE IS ON GABAPENTIN 300 MG AT NIGHT. STILL WAITING ON PULMONOLOGY CONSULT. CONTINUE STEROID AT 60 MG Q8. HGBA1C IS 5.8. CONTINUE FINGERSTICKS. POTASSIUM AT 4.9.  CONTINUE LOW FLOW SALINE.    ALSO, HE WANTS RESCUE INHALER AT THE BEDSIDE.      OBJECTIVE    Physical  VITALS:  BP (!) 120/92   Pulse 100   Temp 97.7 °F (36.5 °C) (Oral)   Resp 16   Ht 5' 6\" (1.676 m)   Wt 180 lb (81.6 kg)   SpO2 97%   BMI 29.05 kg/m²   CONSTITUTIONAL:  awake, alert, cooperative, no apparent distress, and appears stated age  BACK:  Symmetric, no curvature, spinous processes are non-tender on palpation, paraspinous muscles are non-tender on palpation, no costal vertebral tenderness  LUNGS:  LESS increased work of breathing, STILL WITH POOR air exchange, clear to auscultation bilaterally, no crackles or wheezing  CARDIOVASCULAR:  Normal apical impulse, regular rate and rhythm, normal S1 and S2, no S3 or S4, and no murmur noted  ABDOMEN:  No scars, normal bowel sounds, soft, non-distended, non-tender, no masses palpated, no hepatosplenomegally  Data  CBC with Differential:    Lab Results   Component Value Date/Time    WBC 11.5 01/03/2023 09:12 AM    RBC 3.70 01/03/2023 09:12 AM    HGB 11.2 01/03/2023 09:12 AM    HCT 38.8 01/03/2023 09:12 AM     01/03/2023 09:12 AM    .9 01/03/2023 09:12 AM MCH 30.3 01/03/2023 09:12 AM    MCHC 28.9 01/03/2023 09:12 AM    RDW 12.9 01/03/2023 09:12 AM    NRBC 1.7 09/27/2022 05:30 AM    SEGSPCT 60 04/24/2013 03:25 PM    LYMPHOPCT 1.9 01/03/2023 09:12 AM    MONOPCT 5.7 01/03/2023 09:12 AM    BASOPCT 0.1 01/03/2023 09:12 AM    MONOSABS 0.65 01/03/2023 09:12 AM    LYMPHSABS 0.22 01/03/2023 09:12 AM    EOSABS 0.00 01/03/2023 09:12 AM    BASOSABS 0.01 01/03/2023 09:12 AM     BMP:    Lab Results   Component Value Date/Time     01/03/2023 09:12 AM    K 4.9 01/03/2023 09:12 AM    K 4.8 09/25/2022 08:17 AM    CL 91 01/03/2023 09:12 AM    CO2 40 01/03/2023 09:12 AM    BUN 33 01/03/2023 09:12 AM    LABALBU 3.7 01/03/2023 09:12 AM    LABALBU 4.6 07/14/2011 02:10 PM    CREATININE 1.5 01/03/2023 09:12 AM    CALCIUM 9.2 01/03/2023 09:12 AM    GFRAA >60 10/02/2022 05:33 AM    LABGLOM 50 01/03/2023 09:12 AM    GLUCOSE 159 01/03/2023 09:12 AM    GLUCOSE 101 07/14/2011 02:10 PM     Current Medications  Scheduled Meds:   methylPREDNISolone  60 mg IntraVENous Q8H    pantoprazole (PROTONIX) 40 mg injection  40 mg IntraVENous Q12H    sucralfate  1 g Oral 4x Daily AC & HS    gabapentin  300 mg Oral Nightly    aspirin  81 mg Oral Daily    cetirizine  10 mg Oral Daily    hydrocortisone   Topical Daily    hydroCHLOROthiazide  25 mg Oral Daily    ipratropium  0.5 mg Nebulization 4x daily    losartan  100 mg Oral Daily    metoprolol succinate  100 mg Oral Daily    triamcinolone   Topical BID    vitamin E  400 Units Oral Daily    Arformoterol Tartrate  15 mcg Nebulization BID    And    budesonide  0.5 mg Nebulization BID     Continuous Infusions:   dextrose      sodium chloride 50 mL/hr at 01/03/23 2011     PRN Meds:.glucose, dextrose bolus **OR** dextrose bolus, glucagon (rDNA), dextrose, HYDROcodone-acetaminophen, hydrocortisone, ketoconazole, albuterol    ASSESSMENT AND PLAN      Principal Problem:    COPD exacerbation ([facility] [address]  Resolved Problems:    * No resolved hospital problems. *      CONTINUE PLAN OF CARE.

## 2023-01-04 NOTE — CARE COORDINATION
1/4/2023 1334 CM note: Negative covid 12/30/23. Pt resides alone in a ranch home, no steps. He has a nebulizer and home [email or web address] [place] from [place]. Pt has aide services through waiver 7 days/week, 4hrs/day. [facility] accepted pt and received orders. He plans to return home at d/c and states his family will provide transportation.  Sari TAYLOR

## 2023-01-04 NOTE — PROGRESS NOTES
PROGRESS NOTE  By Maria Antonia Cardenas M.D. [facility]  Dr. Arturo Mcclendon M.D.,  Dr. Latia Victor M.D.,   Dr. Elie Collet, D.O.,  Dr. Angelica Hinton M.D.,  Dr. Alden Fink D.O.,          Holli November 81 y.o.  male    SUBJECTIVE:  Improved epigastric pain. Tolerating diet. Reports bowel movement this morning    OBJECTIVE:    BP (!) 167/75   Pulse 86   Temp 97.7 °F (36.5 °C) (Oral)   Resp 16   Ht 5' 6\" (1.676 m)   Wt 180 lb (81.6 kg)   SpO2 100%   BMI 29.05 kg/m²     General: NAD/-American male. AAOx3  HEENT: Anicteric sclera/moist oral mucosa  Neck: Supple/trachea midline  Chest: Symmetric excursion/nasal cannula supplemental oxygen  Cor: Regular  Abd.: Soft and nondistended  Extr.:  No significant peripheral edema  Skin: Warm and dry      DATA:    Monitor data reviewed -sinus rhythm noted.     Stool (measured) : 0 mL  Lab Results   Component Value Date/Time    WBC 11.5 01/03/2023 09:12 AM    RBC 3.70 01/03/2023 09:12 AM    HGB 11.2 01/03/2023 09:12 AM    HCT 38.8 01/03/2023 09:12 AM    .9 01/03/2023 09:12 AM    MCH 30.3 01/03/2023 09:12 AM    MCHC 28.9 01/03/2023 09:12 AM    RDW 12.9 01/03/2023 09:12 AM     01/03/2023 09:12 AM    MPV 10.9 01/03/2023 09:12 AM     Lab Results   Component Value Date/Time     01/03/2023 09:12 AM    K 4.9 01/03/2023 09:12 AM    K 4.8 09/25/2022 08:17 AM    CL 91 01/03/2023 09:12 AM    CO2 40 01/03/2023 09:12 AM    BUN 33 01/03/2023 09:12 AM    CREATININE 1.5 01/03/2023 09:12 AM    CALCIUM 9.2 01/03/2023 09:12 AM    PROT 6.5 01/03/2023 09:12 AM    LABALBU 3.7 01/03/2023 09:12 AM    LABALBU 4.6 07/14/2011 02:10 PM    BILITOT <0.2 01/03/2023 09:12 AM    ALKPHOS 71 01/03/2023 09:12 AM    AST 13 01/03/2023 09:12 AM    ALT 6 01/03/2023 09:12 AM     Lab Results   Component Value Date/Time    LIPASE 15 12/30/2022 08:15 PM     No results found for: AMYLASE      ASSESSMENT/PLAN:  Patient Active Problem List   Diagnosis    Hepatitis C Testicular swelling    COPD exacerbation (HCC)    NSTEMI (non-ST elevated myocardial infarction) (HCC)    CHF, acute on chronic [facility])    Essential hypertension    Neck pain    Cervical disc herniation    Acute respiratory failure with hypoxia and hypercapnia ([facility])    Chest pain    Elbow effusion, right    Acute on chronic respiratory failure with hypoxia and hypercapnia ([facility])    Chronic pain syndrome [G89.4 (ICD-10-CM)]    Lumbar spondylosis    Spinal stenosis of lumbar region without neurogenic claudication    Lumbar facet arthropathy    Lumbar disc disorder    Cervical spondylosis    Cervical facet joint syndrome    Cervical stenosis of spine    Cervical disc disorder    Cervical radiculopathy    Lumbar radiculopathy     1. Epigastric pain  -EGD 1/3 revealing gastritis.  -Continue twice a day PPI     2. Anemia  -Chronic anemia without evidence of overt bleed  -No iron deficiency  -Pending FOBT  -EGD as above  -Outpatient colonoscopy     3. COPD exacerbation  -Per admitting/pertinent consultants    No immediate plan for intervention from GI. Change PPI to oral  Recommend discharge home on once or twice a day PPI   Can discontinue Carafate at discharge. Will see as needed in the hospital -please, call with questions/concerns. Follow-up in the office in 2 to 3 weeks after discharge -patient to call for appointment and with questions/concerns at [phone]  Thank you for the opportunity to participate in the care of Mr. Rima Manuel. Jodee Cohen MD  1/4/2023  10:46 AM    NOTE:  This report was transcribed using voice recognition software. Every effort was made to ensure accuracy; however, inadvertent computerized transcription errors may be present.

## 2023-01-04 NOTE — PLAN OF CARE
Problem: Pain  Goal: Verbalizes/displays adequate comfort level or baseline comfort level  1/4/2023 1049 by Dariana Wright RN  Outcome: Progressing     Problem: Discharge Planning  Goal: Discharge to home or other facility with appropriate resources  1/4/2023 1049 by Dariana Wright RN  Outcome: Progressing     Problem: Chronic Conditions and Co-morbidities  Goal: Patient's chronic conditions and co-morbidity symptoms are monitored and maintained or improved  1/4/2023 1049 by Dariana Wright RN  Outcome: Progressing

## 2023-01-04 NOTE — PROGRESS NOTES
Pt refused blood sugar check. Pt is alert and oriented and provided pt education regarding the importance of BG monitoring.

## 2023-01-04 NOTE — PLAN OF CARE
Problem: Pain  Goal: Verbalizes/displays adequate comfort level or baseline comfort level  1/4/2023 0003 by Dariana Wright RN  Outcome: Progressing     Problem: Discharge Planning  Goal: Discharge to home or other facility with appropriate resources  1/4/2023 0003 by Dariana Wright RN  Outcome: Progressing     Problem: Chronic Conditions and Co-morbidities  Goal: Patient's chronic conditions and co-morbidity symptoms are monitored and maintained or improved  Outcome: Progressing

## 2023-01-05 LAB
ANION GAP SERPL CALCULATED.3IONS-SCNC: 6 MMOL/L (ref 7–16)
BLOOD CULTURE, ROUTINE: NORMAL
BUN BLDV-MCNC: 32 MG/DL (ref 6–23)
CALCIUM SERPL-MCNC: 9.3 MG/DL (ref 8.6–10.2)
CHLORIDE BLD-SCNC: 93 MMOL/L (ref 98–107)
CO2: 39 MMOL/L (ref 22–29)
CREAT SERPL-MCNC: 1.4 MG/DL (ref 0.7–1.2)
CULTURE, BLOOD 2: NORMAL
GFR SERPL CREATININE-BSD FRML MDRD: 54 ML/MIN/1.73
GLUCOSE BLD-MCNC: 150 MG/DL (ref 74–99)
MAGNESIUM: 2.4 MG/DL (ref 1.6–2.6)
POTASSIUM SERPL-SCNC: 4.1 MMOL/L (ref 3.5–5)
SODIUM BLD-SCNC: 138 MMOL/L (ref 132–146)

## 2023-01-05 PROCEDURE — 80048 BASIC METABOLIC PNL TOTAL CA: CPT

## 2023-01-05 PROCEDURE — 6370000000 HC RX 637 (ALT 250 FOR IP): Performed by: INTERNAL MEDICINE

## 2023-01-05 PROCEDURE — 6360000002 HC RX W HCPCS: Performed by: FAMILY MEDICINE

## 2023-01-05 PROCEDURE — 1200000000 HC SEMI PRIVATE

## 2023-01-05 PROCEDURE — 6370000000 HC RX 637 (ALT 250 FOR IP): Performed by: HOSPITALIST

## 2023-01-05 PROCEDURE — 6370000000 HC RX 637 (ALT 250 FOR IP): Performed by: FAMILY MEDICINE

## 2023-01-05 PROCEDURE — 36415 COLL VENOUS BLD VENIPUNCTURE: CPT

## 2023-01-05 PROCEDURE — 83735 ASSAY OF MAGNESIUM: CPT

## 2023-01-05 PROCEDURE — 2700000000 HC OXYGEN THERAPY PER DAY

## 2023-01-05 PROCEDURE — 94640 AIRWAY INHALATION TREATMENT: CPT

## 2023-01-05 RX ADMIN — HYDROCODONE BITARTRATE AND ACETAMINOPHEN 1 TABLET: 10; 325 TABLET ORAL at 05:35

## 2023-01-05 RX ADMIN — HYDROCHLOROTHIAZIDE 25 MG: 25 TABLET ORAL at 09:06

## 2023-01-05 RX ADMIN — SUCRALFATE 1 G: 1 TABLET ORAL at 05:35

## 2023-01-05 RX ADMIN — ARFORMOTEROL TARTRATE 15 MCG: 15 SOLUTION RESPIRATORY (INHALATION) at 18:14

## 2023-01-05 RX ADMIN — CETIRIZINE HYDROCHLORIDE 10 MG: 10 TABLET, FILM COATED ORAL at 09:06

## 2023-01-05 RX ADMIN — IPRATROPIUM BROMIDE 0.5 MG: 0.5 SOLUTION RESPIRATORY (INHALATION) at 14:23

## 2023-01-05 RX ADMIN — HYDROCODONE BITARTRATE AND ACETAMINOPHEN 1 TABLET: 10; 325 TABLET ORAL at 21:08

## 2023-01-05 RX ADMIN — SUCRALFATE 1 G: 1 TABLET ORAL at 21:08

## 2023-01-05 RX ADMIN — IPRATROPIUM BROMIDE 0.5 MG: 0.5 SOLUTION RESPIRATORY (INHALATION) at 18:14

## 2023-01-05 RX ADMIN — BUDESONIDE 500 MCG: 0.5 SUSPENSION RESPIRATORY (INHALATION) at 18:14

## 2023-01-05 RX ADMIN — PANTOPRAZOLE SODIUM 40 MG: 40 TABLET, DELAYED RELEASE ORAL at 16:09

## 2023-01-05 RX ADMIN — SUCRALFATE 1 G: 1 TABLET ORAL at 11:09

## 2023-01-05 RX ADMIN — LOSARTAN POTASSIUM 100 MG: 50 TABLET, FILM COATED ORAL at 09:06

## 2023-01-05 RX ADMIN — TRIAMCINOLONE ACETONIDE: 1 CREAM TOPICAL at 09:09

## 2023-01-05 RX ADMIN — METOPROLOL SUCCINATE 100 MG: 100 TABLET, EXTENDED RELEASE ORAL at 09:06

## 2023-01-05 RX ADMIN — ASPIRIN 81 MG: 81 TABLET, COATED ORAL at 09:06

## 2023-01-05 RX ADMIN — HYDROCODONE BITARTRATE AND ACETAMINOPHEN 1 TABLET: 10; 325 TABLET ORAL at 12:08

## 2023-01-05 RX ADMIN — PREDNISONE 40 MG: 20 TABLET ORAL at 09:05

## 2023-01-05 RX ADMIN — ARFORMOTEROL TARTRATE 15 MCG: 15 SOLUTION RESPIRATORY (INHALATION) at 05:13

## 2023-01-05 RX ADMIN — PANTOPRAZOLE SODIUM 40 MG: 40 TABLET, DELAYED RELEASE ORAL at 05:35

## 2023-01-05 RX ADMIN — GABAPENTIN 300 MG: 300 CAPSULE ORAL at 21:08

## 2023-01-05 RX ADMIN — BUDESONIDE 500 MCG: 0.5 SUSPENSION RESPIRATORY (INHALATION) at 05:13

## 2023-01-05 RX ADMIN — Medication 400 UNITS: at 09:06

## 2023-01-05 RX ADMIN — SUCRALFATE 1 G: 1 TABLET ORAL at 16:09

## 2023-01-05 RX ADMIN — IPRATROPIUM BROMIDE 0.5 MG: 0.5 SOLUTION RESPIRATORY (INHALATION) at 05:13

## 2023-01-05 RX ADMIN — AZITHROMYCIN MONOHYDRATE 500 MG: 250 TABLET ORAL at 16:09

## 2023-01-05 ASSESSMENT — PAIN DESCRIPTION - LOCATION
LOCATION: GENERALIZED

## 2023-01-05 ASSESSMENT — PAIN SCALES - GENERAL
PAINLEVEL_OUTOF10: 8
PAINLEVEL_OUTOF10: 8
PAINLEVEL_OUTOF10: 9
PAINLEVEL_OUTOF10: 8

## 2023-01-05 ASSESSMENT — PAIN DESCRIPTION - DESCRIPTORS: DESCRIPTORS: ACHING

## 2023-01-05 NOTE — PROGRESS NOTES
Department of Family Practice  Adult Daily Progress Note      JAVIER BUTTS IS SEEN IN FOLLOW UP TODAY ON 4 TELEMETRY. HE IS TOLERATING TREATMENT. HE HAD HIS EGD YESTERDAY WHICH ONLY SHOWED GASTRITIS. A BIOPSY FOR H. PYLORI WAS TAKEN. HE CONTINUES ON A PPI BID AND CARAFATE. GI SAYS HIS CARAFATE CAN STOP AT DISCHARGE AND HE CAN CONTINUE ON THE PPI ONCE OR TWICE DAILY. HIS GLUCOSE LEVELS ARE HIGH SINCE ON THE STEROIDS AND THE SYSTEM DEMANDED FINGERSTICKS AND A HGBA1C.  HE WAS ALL UPSET OVER THAT. I HAD TO EXPLAIN IT ALL TO HIM SEVERAL TIMES. HGBA1C IS 5.8. PULMONARY VISITED TODAY AND CHANGED HIS STEROID TO 40 MG PO DAILY. HE IS TO CONTINUE FOR TWO WEEKS TAPERING THE DOSE OVER THAT TIME. HE ALSO STARTED ZITHROMAX 500 MG PO DAILY FOR 5 DAYS. HE IS ON GABAPENTIN 300 MG AT NIGHT. CONTINUE FINGERSTICKS. CREATININE UP TO 1.5  WILL STOP THE LOW FLOW SALINE. PLAN FOR DISCHARGE ON Friday.     OBJECTIVE    Physical  VITALS:  BP (!) 167/75   Pulse 86   Temp 97.7 °F (36.5 °C) (Oral)   Resp 16   Ht 5' 6\" (1.676 m)   Wt 180 lb (81.6 kg)   SpO2 100%   BMI 29.05 kg/m²   CONSTITUTIONAL:  awake, alert, cooperative, no apparent distress, and appears stated age  BACK:  Symmetric, no curvature, spinous processes are non-tender on palpation, paraspinous muscles are non-tender on palpation, no costal vertebral tenderness  LUNGS:  LESS increased work of breathing, SLIGHTLY BETTER  air exchange, clear to auscultation bilaterally, no crackles or wheezing  CARDIOVASCULAR:  Normal apical impulse, regular rate and rhythm, normal S1 and S2, no S3 or S4, and no murmur noted  ABDOMEN:  No scars, normal bowel sounds, soft, non-distended, non-tender, no masses palpated, no hepatosplenomegally  Data  CBC with Differential:    Lab Results   Component Value Date/Time    WBC 11.5 01/03/2023 09:12 AM    RBC 3.70 01/03/2023 09:12 AM    HGB 11.2 01/03/2023 09:12 AM    HCT 38.8 01/03/2023 09:12 AM     01/03/2023 09:12 AM .9 01/03/2023 09:12 AM    MCH 30.3 01/03/2023 09:12 AM    MCHC 28.9 01/03/2023 09:12 AM    RDW 12.9 01/03/2023 09:12 AM    NRBC 1.7 09/27/2022 05:30 AM    SEGSPCT 60 04/24/2013 03:25 PM    LYMPHOPCT 1.9 01/03/2023 09:12 AM    MONOPCT 5.7 01/03/2023 09:12 AM    BASOPCT 0.1 01/03/2023 09:12 AM    MONOSABS 0.65 01/03/2023 09:12 AM    LYMPHSABS 0.22 01/03/2023 09:12 AM    EOSABS 0.00 01/03/2023 09:12 AM    BASOSABS 0.01 01/03/2023 09:12 AM     BMP:    Lab Results   Component Value Date/Time     01/03/2023 09:12 AM    K 4.9 01/03/2023 09:12 AM    K 4.8 09/25/2022 08:17 AM    CL 91 01/03/2023 09:12 AM    CO2 40 01/03/2023 09:12 AM    BUN 33 01/03/2023 09:12 AM    LABALBU 3.7 01/03/2023 09:12 AM    LABALBU 4.6 07/14/2011 02:10 PM    CREATININE 1.5 01/03/2023 09:12 AM    CALCIUM 9.2 01/03/2023 09:12 AM    GFRAA >60 10/02/2022 05:33 AM    LABGLOM 50 01/03/2023 09:12 AM    GLUCOSE 159 01/03/2023 09:12 AM    GLUCOSE 101 07/14/2011 02:10 PM     Current Medications  Scheduled Meds:   pantoprazole  40 mg Oral BID AC    predniSONE  40 mg Oral Daily    azithromycin  500 mg Oral Q24H    sucralfate  1 g Oral 4x Daily AC & HS    gabapentin  300 mg Oral Nightly    aspirin  81 mg Oral Daily    cetirizine  10 mg Oral Daily    hydrocortisone   Topical Daily    hydroCHLOROthiazide  25 mg Oral Daily    ipratropium  0.5 mg Nebulization 4x daily    losartan  100 mg Oral Daily    metoprolol succinate  100 mg Oral Daily    triamcinolone   Topical BID    vitamin E  400 Units Oral Daily    Arformoterol Tartrate  15 mcg Nebulization BID    And    budesonide  0.5 mg Nebulization BID     Continuous Infusions:   dextrose      sodium chloride 50 mL/hr at 01/04/23 0855     PRN Meds:.glucose, dextrose bolus **OR** dextrose bolus, glucagon (rDNA), dextrose, HYDROcodone-acetaminophen, hydrocortisone, ketoconazole, albuterol    ASSESSMENT AND PLAN      Principal Problem:    COPD exacerbation ([facility] [address]  Resolved Problems:    * No resolved hospital problems. *      CONTINUE PLAN OF CARE. DISCHARGE IN THE NEXT COUPLE OF DAYS.

## 2023-01-05 NOTE — PROGRESS NOTES
Assessment and Plan  Assessment and Plan  Patient is a 81 y.o. male with the following medical Problems:   Acute exacerbation of COPD  Chronic hypoxic and hypercapnic respiratory failure  3. Nicotine dependence  4. History of marijuana abuse        Plan of care:  1. Taper prednisone 40 mg daily and wean over 2 weeks. 2.  Azithromycin for acute exacerbation of COPD for 5 days  3. Schedule DuoNeb and budesonide  4. DVT prophylaxis  5. Incentive spirometer  6. Assess home oxygen need before discharge. 7.  Urine toxicology was positive for opioids  8. Patient was strongly encouraged to quit smoking  9. Follow-up as outpatient with primary pulmonologist and consider low-dose CT scan for screening for lung cancer yearly basis. This was discussed with the patient. 10.  Patient can be discharged from pulmonary point of view. 11.  We will sign off please call with questions    History of Present Illness:   Patient is a 79-year-old man with above-mentioned medical problem who presented with progressive shortness of breath for the last few weeks. He was admitted on December 31, 2022 and was started on IV Solu-Medrol. He underwent an endoscopy which showed gastritis. CTA on admission ruled out pneumonia and pulmonary embolism. Patient is currently on 4 L nasal cannula saturating 100%. Daily progress:  January 5, 2023: Patient respiratory status remained stable. He is currently on 4 L nasal cannula. He has no fever, chills, rigors. Continues to endorse mild exertional dyspnea however he feels that he is improving.       Past Medical History:  Past Medical History:   Diagnosis Date    Chest pain 3-6-2015    lexiscan nuclear stress    Chronic diastolic CHF (congestive heart failure) ([facility] [address]     Echo 1/18/2016; EF 57%    COPD (chronic obstructive pulmonary disease) (HCC)     Hepatitis C     Hilar lymphadenopathy     CT 1/2016; 1.8 cm    History of echocardiogram 01/02/2019    EF 35%; Stage I diastolic dysfunction    Hypertension     Irregular heart beat     Lumbar spondylosis 10/14/2022    Oxygen dependent       Past Surgical History:   Procedure Laterality Date    CATARACT REMOVAL Right 2019    CATARACT REMOVAL Left 2019    CERVICAL FUSION  2018    ACF C4-C7    COLONOSCOPY      HERNIA REPAIR      tracee inguinal repair    UPPER GASTROINTESTINAL ENDOSCOPY N/A 1/3/2023    EGD BIOPSY performed by Karina Shoko MD at [facility] ENDOSCOPY       Family History:   Family History   Problem Relation Age of Onset    Heart Disease Mother     Alcohol Abuse Father     Diabetes Father     Diabetes Sister     Heart Disease Sister     Diabetes Sister     High Blood Pressure Sister        Allergies:         Ace inhibitors and Lisinopril    Social history:  Social History     Socioeconomic History    Marital status: Single     Spouse name: Not on file    Number of children: Not on file    Years of education: Not on file    Highest education level: Not on file   Occupational History    Not on file   Tobacco Use    Smoking status: Former     Packs/day: 0.50     Types: Cigarettes     Quit date: 2015     Years since quittin.9    Smokeless tobacco: Never   Vaping Use    Vaping Use: Never used   Substance and Sexual Activity    Alcohol use:  Yes     Alcohol/week: 4.0 standard drinks     Types: 4 Cans of beer per week    Drug use: No    Sexual activity: Never   Other Topics Concern    Not on file   Social History Narrative    Not on file     Social Determinants of Health     Financial Resource Strain: Not on file   Food Insecurity: Not on file   Transportation Needs: Not on file   Physical Activity: Not on file   Stress: Not on file   Social Connections: Not on file   Intimate Partner Violence: Not on file   Housing Stability: Not on file       Current Medications:     Current Facility-Administered Medications:     pantoprazole (PROTONIX) tablet 40 mg, 40 mg, Oral, BID ACElizabeth MD, 40 mg at [id number] predniSONE (DELTASONE) tablet 40 mg, 40 mg, Oral, Daily, Elsa Jalloh MD, 40 mg at 01/05/23 0905    azithromycin [facility]) tablet 500 mg, 500 mg, Oral, Q24H, Elsa Jalloh MD, 500 mg at 01/04/23 1649    glucose chewable tablet 16 g, 4 tablet, Oral, PRN, Dyane Neeraj, DO    dextrose bolus 10% 125 mL, 125 mL, IntraVENous, PRN **OR** dextrose bolus 10% 250 mL, 250 mL, IntraVENous, PRN, Dyane Neeraj, DO    glucagon (rDNA) injection 1 mg, 1 mg, SubCUTAneous, PRN, Dyane Neeraj, DO    dextrose 10 % infusion, , IntraVENous, Continuous PRN, Dyane Neeraj, DO    sucralfate (CARAFATE) tablet 1 g, 1 g, Oral, 4x Daily AC & HS, Jacqueline Villeda MD, 1 g at 01/05/23 1109    HYDROcodone-acetaminophen ([address] [place] [address])  MG per tablet 1 tablet, 1 tablet, Oral, Q6H PRN, Dyane Neeraj, DO, 1 tablet at 01/05/23 1208    gabapentin (NEURONTIN) capsule 300 mg, 300 mg, Oral, Nightly, Dyane Neeraj, DO, 300 mg at 01/04/23 2255    aspirin EC tablet 81 mg, 81 mg, Oral, Daily, Dyane Neeraj, DO, 81 mg at 01/05/23 4438    cetirizine (ZYRTEC) tablet 10 mg, 10 mg, Oral, Daily, Dyane Neeraj, DO, 10 mg at 01/05/23 8824    hydrocortisone 2.5 % cream, , Topical, Daily, Dyane Neeraj, DO, Given at 01/04/23 2256    hydroCHLOROthiazide (HYDRODIURIL) tablet 25 mg, 25 mg, Oral, Daily, Dyane Neeraj, DO, 25 mg at 01/05/23 8844    hydrocortisone 2.5 % cream, , Topical, BID PRN, Dyane Neeraj, DO    ipratropium (ATROVENT) 0.02 % nebulizer solution 0.5 mg, 0.5 mg, Nebulization, 4x daily, Dyane Neeraj, DO, 0.5 mg at 01/05/23 5064    ketoconazole (NIZORAL) 2 % cream, , Topical, Daily PRN, Dyane Neeraj, DO    losartan (COZAAR) tablet 100 mg, 100 mg, Oral, Daily, Dyane Neeraj, DO, 100 mg at 01/05/23 4120    metoprolol succinate (TOPROL XL) extended release tablet 100 mg, 100 mg, Oral, Daily, Dyane Neeraj, DO, 100 mg at 01/05/23 0906    triamcinolone (KENALOG) 0.1 % cream, , Topical, BID, [facility] Chris Duet, DO, Given at 01/05/23 0909    vitamin E capsule 400 Units, 400 Units, Oral, Daily, Claudio Punch, DO, 400 Units at 01/05/23 0906    albuterol (PROVENTIL) nebulizer solution 2.5 mg, 2.5 mg, Nebulization, Q6H PRN, Claudio Punch, DO, 2.5 mg at 01/01/23 1832    Arformoterol Tartrate (BROVANA) nebulizer solution 15 mcg, 15 mcg, Nebulization, BID, 15 mcg at 01/05/23 0513 **AND** budesonide (PULMICORT) nebulizer suspension 500 mcg, 0.5 mg, Nebulization, BID, Claudio Punch, DO, 500 mcg at 01/05/23 5659      Review of Systems:   General: denies weight gain, denies loss of appetite, fever, chills, night sweats. HEENT: denies headaches, dizziness, head trauma, visual changes, eye pain, tinnitus, nosebleeds, hoarseness or throat pain    Respiratory: denies chest pain, +ve dyspnea, cough but no hemoptysis  Cardiovascular: denies orthopnea, paroxysmal nocturnal dyspnea, leg swelling, and previous heart attack. Gastrointestinal: denies pain, nausea vomiting, diarrhea, constipation, melena or bleeding. Genitourinary: denies hematuria, frequency, urgency or dysuria  Neurology: denies syncope, seizures, paralysis, paraesthesia   Endocrine: denies polyuria, polydipsia, skin or hair changes, and heat or cold intolerance  Musculoskeletal: denies joint pain, swelling, arthritis or myalgia  Hematologic: denies bleeding, adenopathy and easy bruising  Skin: denies rashes and skin discoloration  Psychiatry: denies depression    Physical Exam:   Vital Signs:  BP (!) 158/94   Pulse 92   Temp 97.9 °F (36.6 °C) (Oral)   Resp 20   Ht 5' 6\" (1.676 m)   Wt 180 lb (81.6 kg)   SpO2 98%   BMI 29.05 kg/m²     Input/Output:   In: 120 [P.O.:120]  Out: 2350     Oxygen requirements: NC      General appearance: not in pain or distress, in no respiratory distress    HEENT: Atraumatic/normocephalic, EOMI, DUTCH, pharynx clear, moist mucosa, redness of the uvula appreciated,   Neck: Supple, no jugular venous distension, lymphadenopathy, thyromegaly or carotid bruits  Chest: Decreased breath sounds, no wheezing, no crackles and no tenderness over ribs   Cardiovascular: Normal S1 , S2, regular rate and rhythm, no murmur, rub or gallop  Abdomen: Normal sounds present, soft, lax with no tenderness, no hepatosplenomegaly, and no masses  Extremities: No edema. Pulses are equally present. Skin: intact, no rashes   Neurologic: Alert and oriented x 3, No focal deficit     Investigations:  Labs, radiological imaging and cardiac work up were reviewed        STAFF PHYSICIAN NOTE OF PERSONAL INVOLVEMENT IN CARE  As the attending physician, I certify that I personally reviewed the patient's history and personally examined the patient to confirm the physical findings described above, and that I reviewed the relevant imaging studies and available reports. I also discussed the differential diagnosis and all of the proposed management plans with the patient and individuals accompanying the patient to this visit. They had the opportunity to ask questions about the proposed management plans and to have those questions answered.       Electronically signed by Morena Doss MD on 1/5/2023 at 1:11 PM

## 2023-01-05 NOTE — DISCHARGE INSTRUCTIONS
[facility]([phone]) will call to schedule a home care visit      Your information:  Name: Hailey Todd  : 1950    Your instructions:    YOU ARE BEING DISCHARGED HOME. PLEASE MAKE AND KEEP YOUR FOLLOW UP APPOINTMENTS. What to do after you leave the hospital:    Recommended diet: regular diet    Recommended activity: activity as tolerated        The following personal items were collected during your admission and were returned to you:    Belongings  Dental Appliances: None  Vision - Corrective Lenses: Eyeglasses  Hearing Aid: None  Clothing: Footwear, Hat, Pants, Shirt, Socks, Undergarments  Jewelry: Ring  Electronic Devices: Cell Phone  Weapons (Notify Protective Services/Security): None  Home Medications: None  Valuables Given To: Patient  Provide Name(s) of Who Valuable(s) Were Given To: self    Information obtained by:  By signing below, I understand that if any problems occur once I leave the hospital I am to contact Dr. Aylin Mendez. I understand and acknowledge receipt of the instructions indicated above. IF YOU EXPERIENCE ANY OF THE FOLLOWING SYMPTOMS, CHEST PAIN, SHORTNESS OF BREATH, COUGHING UP BLOOD OR BLOODY SPUTUM, STOMACH PAIN OR CRAMPING, DARK, TARRY STOOLS, LOSS OF APPETITE, GENERAL NOT FEELING WELL, SIGNS AND SYMPTOMS OF INFECTION LIKE FEVER AND OR CHILLS, PLEASE CALL DR Aaliyah Albarran OR RETURN TO THE EMERGENCY ROOM.

## 2023-01-05 NOTE — DISCHARGE INSTR - COC
Continuity of Care Form    Patient Name: Vanessa Montoya   :  1950  MRN:  [id number]    Admit date:  2022  Discharge date:  ***    Code Status Order: Full Code   Advance Directives:     Admitting Physician:  Brendan Borges DO  PCP: Brendan Borges DO    Discharging Nurse: [facility] Unit/Room#: 2245/0566-63  Discharging Unit Phone Number: ***    Emergency Contact:   Extended Emergency Contact Information  Primary Emergency Contact: [address] Phone: [phone]  Mobile Phone: [phone]  Relation: Brother/Sister  Preferred language: English   needed? No    Past Surgical History:  Past Surgical History:   Procedure Laterality Date    CATARACT REMOVAL Right 2019    CATARACT REMOVAL Left 2019    CERVICAL FUSION  2018    ACF C4-C7    COLONOSCOPY      HERNIA REPAIR      tracee inguinal repair    UPPER GASTROINTESTINAL ENDOSCOPY N/A 1/3/2023    EGD BIOPSY performed by Sherif Lester MD at [facility] ENDOSCOPY       Immunization History: There is no immunization history on file for this patient.     Active Problems:  Patient Active Problem List   Diagnosis Code    Hepatitis C B19.20    Testicular swelling N50.89    COPD exacerbation (HCC) J44.1    NSTEMI (non-ST elevated myocardial infarction) (HCC) I21.4    CHF, acute on chronic (HCC) I50.9    Essential hypertension I10    Neck pain M54.2    Cervical disc herniation M50.20    Acute respiratory failure with hypoxia and hypercapnia (HCC) J96.01, J96.02    Chest pain R07.9    Elbow effusion, right M25.421    Acute on chronic respiratory failure with hypoxia and hypercapnia (HCC) J96.21, J96.22    Chronic pain syndrome [G89.4 (ICD-10-CM)] G89.4    Lumbar spondylosis M47.816    Spinal stenosis of lumbar region without neurogenic claudication M48.061    Lumbar facet arthropathy M47.816    Lumbar disc disorder M51.9    Cervical spondylosis M47.812    Cervical facet joint syndrome M47.812    Cervical stenosis of spine M48.02 Cervical disc disorder M50.90    Cervical radiculopathy M54.12    Lumbar radiculopathy M54.16       Isolation/Infection:   Isolation            No Isolation          Patient Infection Status       Infection Onset Added Last Indicated Last Indicated By Review Planned Expiration Resolved Resolved By    None active    Resolved    COVID-19 (Rule Out) 12/30/22 12/30/22 12/30/22 COVID-19, Rapid (Ordered)   12/30/22 Rule-Out Test Resulted            Nurse Assessment:  Last Vital Signs: BP (!) 158/94   Pulse 92   Temp 97.9 °F (36.6 °C) (Oral)   Resp 20   Ht 5' 6\" (1.676 m)   Wt 180 lb (81.6 kg)   SpO2 98%   BMI 29.05 kg/m²     Last documented pain score (0-10 scale): Pain Level: 9  Last Weight:   Wt Readings from Last 1 Encounters:   12/31/22 180 lb (81.6 kg)     Mental Status:  {IP PT MENTAL STATUS:20030}    IV Access:  { GILLIAN IV ACCESS:252910359}    Nursing Mobility/ADLs:  Walking   {P DME ERAJ:754484115}  Transfer  {P DME DZSH:467124675}  Bathing  {P DME SAYY:746849701}  Dressing  {CHP DME QRWX:292120101}  Toileting  {P DME JRZA:041597077}  Feeding  {P DME GHRE:828823769}  Med Admin  {P DME ZARV:696052717}  Med Delivery   { GILLIAN MED Delivery:144529120}    Wound Care Documentation and Therapy:        Elimination:  Continence: Bowel: {YES / EN:24254}  Bladder: {YES / RH:00152}  Urinary Catheter: {Urinary Catheter:393336898}   Colostomy/Ileostomy/Ileal Conduit: {YES / UN:72697}       Date of Last BM: ***    Intake/Output Summary (Last 24 hours) at 1/5/2023 1110  Last data filed at 1/5/2023 0040  Gross per 24 hour   Intake 120 ml   Output 2350 ml   Net -2230 ml     I/O last 3 completed shifts:   In: 120 [P.O.:120]  Out: 6010 [Urine:3670]    Safety Concerns:     [facility] GILLIAN Safety Concerns:127232176}    Impairments/Disabilities:      [address] FRENCH Impairments/Disabilities:567503565}    Nutrition Therapy:  Current Nutrition Therapy:   [address] FRENCH Diet List:262596775}    Routes of Feeding: {P DME Other Feedings:582031164}  Liquids: {Slp liquid thickness:40742}  Daily Fluid Restriction: {CHP DME Yes amt example:814493967}  Last Modified Barium Swallow with Video (Video Swallowing Test): {Done Not Done OODA:928945929}    Treatments at the Time of Hospital Discharge:   Respiratory Treatments: ***  Oxygen Therapy:  {Therapy; copd oxygen:61683}  Ventilator:    { CC Vent LALM:355916808}    Rehab Therapies: {THERAPEUTIC INTERVENTION:6937907227}  Weight Bearing Status/Restrictions: { CC Weight Bearin}  Other Medical Equipment (for information only, NOT a DME order):  {EQUIPMENT:114918924}  Other Treatments: ***    Patient's personal belongings (please select all that are sent with patient):  {CHP DME Belongings:097835998}    RN SIGNATURE:  {Esignature:022499088}    CASE MANAGEMENT/SOCIAL WORK SECTION    Inpatient Status Date: ***    Readmission Risk Assessment Score:  Readmission Risk              Risk of Unplanned Readmission:  20           Discharging to Facility/ Agency   Name:   Address:  Phone:  Fax:    Dialysis Facility (if applicable)   Name:  Address:  Dialysis Schedule:  Phone:  Fax:    / signature: {Esignature:075784532}    PHYSICIAN SECTION    Prognosis: {Prognosis:9688411608}    Condition at Discharge: [facility] Patient Condition:755140658}    Rehab Potential (if transferring to Rehab): {Prognosis:9808657025}    Recommended Labs or Other Treatments After Discharge: ***    Physician Certification: I certify the above information and transfer of Pietro Huff  is necessary for the continuing treatment of the diagnosis listed and that he requires {Admit to Appropriate Level of Care:84314} for {GREATER/LESS:542584640} 30 days.      Update Admission H&P: {CHP DME Changes in LTWLS:153316792}    PHYSICIAN SIGNATURE:  {Esignature:645961637}

## 2023-01-05 NOTE — PROGRESS NOTES
Physical Therapy    Room #:   1003/2373-46    Date: 2023       Patient Name: Charles Merritt  : 1950      MRN: [id number]     Patient unavailable for physical therapy treatment due to refusal. Pt states he is tired and just finished eating and is not interested in therapy today. PT told patient to plan on tomorrow working with therapy, pt agreed. Will attempt PT treatment at a later time. Thank you.         Elena Benson, PT

## 2023-01-06 PROCEDURE — 6370000000 HC RX 637 (ALT 250 FOR IP): Performed by: FAMILY MEDICINE

## 2023-01-06 PROCEDURE — 6370000000 HC RX 637 (ALT 250 FOR IP): Performed by: SPECIALIST

## 2023-01-06 PROCEDURE — 6370000000 HC RX 637 (ALT 250 FOR IP): Performed by: INTERNAL MEDICINE

## 2023-01-06 PROCEDURE — 1200000000 HC SEMI PRIVATE

## 2023-01-06 PROCEDURE — 94640 AIRWAY INHALATION TREATMENT: CPT

## 2023-01-06 PROCEDURE — 6370000000 HC RX 637 (ALT 250 FOR IP): Performed by: HOSPITALIST

## 2023-01-06 PROCEDURE — 2700000000 HC OXYGEN THERAPY PER DAY

## 2023-01-06 PROCEDURE — 6360000002 HC RX W HCPCS: Performed by: FAMILY MEDICINE

## 2023-01-06 RX ORDER — FUROSEMIDE 40 MG/1
40 TABLET ORAL 2 TIMES DAILY
Status: DISCONTINUED | OUTPATIENT
Start: 2023-01-06 | End: 2023-01-09

## 2023-01-06 RX ORDER — METOPROLOL SUCCINATE 100 MG/1
100 TABLET, EXTENDED RELEASE ORAL 2 TIMES DAILY
Status: DISCONTINUED | OUTPATIENT
Start: 2023-01-06 | End: 2023-01-09 | Stop reason: HOSPADM

## 2023-01-06 RX ADMIN — TRIAMCINOLONE ACETONIDE: 1 CREAM TOPICAL at 09:13

## 2023-01-06 RX ADMIN — PANTOPRAZOLE SODIUM 40 MG: 40 TABLET, DELAYED RELEASE ORAL at 17:10

## 2023-01-06 RX ADMIN — HYDROCHLOROTHIAZIDE 25 MG: 25 TABLET ORAL at 09:11

## 2023-01-06 RX ADMIN — SUCRALFATE 1 G: 1 TABLET ORAL at 17:09

## 2023-01-06 RX ADMIN — IPRATROPIUM BROMIDE 0.5 MG: 0.5 SOLUTION RESPIRATORY (INHALATION) at 18:07

## 2023-01-06 RX ADMIN — ARFORMOTEROL TARTRATE 15 MCG: 15 SOLUTION RESPIRATORY (INHALATION) at 07:02

## 2023-01-06 RX ADMIN — AZITHROMYCIN MONOHYDRATE 500 MG: 250 TABLET ORAL at 17:09

## 2023-01-06 RX ADMIN — HYDROCODONE BITARTRATE AND ACETAMINOPHEN 1 TABLET: 10; 325 TABLET ORAL at 07:23

## 2023-01-06 RX ADMIN — HYDROCODONE BITARTRATE AND ACETAMINOPHEN 1 TABLET: 10; 325 TABLET ORAL at 20:39

## 2023-01-06 RX ADMIN — ASPIRIN 81 MG: 81 TABLET, COATED ORAL at 09:11

## 2023-01-06 RX ADMIN — Medication 400 UNITS: at 09:11

## 2023-01-06 RX ADMIN — METOPROLOL SUCCINATE 100 MG: 100 TABLET, EXTENDED RELEASE ORAL at 20:40

## 2023-01-06 RX ADMIN — SUCRALFATE 1 G: 1 TABLET ORAL at 06:39

## 2023-01-06 RX ADMIN — TRIAMCINOLONE ACETONIDE: 1 CREAM TOPICAL at 20:42

## 2023-01-06 RX ADMIN — PANTOPRAZOLE SODIUM 40 MG: 40 TABLET, DELAYED RELEASE ORAL at 06:39

## 2023-01-06 RX ADMIN — GABAPENTIN 300 MG: 300 CAPSULE ORAL at 20:39

## 2023-01-06 RX ADMIN — CETIRIZINE HYDROCHLORIDE 10 MG: 10 TABLET, FILM COATED ORAL at 09:11

## 2023-01-06 RX ADMIN — METOPROLOL SUCCINATE 100 MG: 100 TABLET, EXTENDED RELEASE ORAL at 09:11

## 2023-01-06 RX ADMIN — BUDESONIDE 500 MCG: 0.5 SUSPENSION RESPIRATORY (INHALATION) at 07:02

## 2023-01-06 RX ADMIN — IPRATROPIUM BROMIDE 0.5 MG: 0.5 SOLUTION RESPIRATORY (INHALATION) at 07:02

## 2023-01-06 RX ADMIN — FUROSEMIDE 40 MG: 40 TABLET ORAL at 17:09

## 2023-01-06 RX ADMIN — BUDESONIDE 500 MCG: 0.5 SUSPENSION RESPIRATORY (INHALATION) at 18:07

## 2023-01-06 RX ADMIN — IPRATROPIUM BROMIDE 0.5 MG: 0.5 SOLUTION RESPIRATORY (INHALATION) at 14:35

## 2023-01-06 RX ADMIN — LOSARTAN POTASSIUM 100 MG: 50 TABLET, FILM COATED ORAL at 09:11

## 2023-01-06 RX ADMIN — HYDROCORTISONE: 25 CREAM TOPICAL at 20:42

## 2023-01-06 RX ADMIN — PREDNISONE 40 MG: 20 TABLET ORAL at 09:11

## 2023-01-06 RX ADMIN — SUCRALFATE 1 G: 1 TABLET ORAL at 20:39

## 2023-01-06 RX ADMIN — ARFORMOTEROL TARTRATE 15 MCG: 15 SOLUTION RESPIRATORY (INHALATION) at 18:07

## 2023-01-06 ASSESSMENT — PAIN DESCRIPTION - LOCATION
LOCATION: GENERALIZED
LOCATION: GENERALIZED

## 2023-01-06 ASSESSMENT — PAIN SCALES - GENERAL
PAINLEVEL_OUTOF10: 0
PAINLEVEL_OUTOF10: 8
PAINLEVEL_OUTOF10: 8

## 2023-01-06 ASSESSMENT — PAIN DESCRIPTION - DESCRIPTORS: DESCRIPTORS: ACHING

## 2023-01-06 ASSESSMENT — PAIN DESCRIPTION - ORIENTATION: ORIENTATION: RIGHT;LEFT

## 2023-01-06 ASSESSMENT — PAIN DESCRIPTION - PAIN TYPE: TYPE: CHRONIC PAIN

## 2023-01-06 NOTE — PROGRESS NOTES
Comprehensive Nutrition Assessment    Type and Reason for Visit:  Initial, RD Nutrition Re-Screen/LOS    Nutrition Recommendations/Plan:   Continue current diet & monitor     Nutrition Assessment:    Pt admits w/ CP& SOB Dx COPD exacerbation. PMH: CHF, COPD, Hepatitis C, HTN. Note 1/3 s/p EGD, Pt toleratiing diet w/ good meal intakes,  >75%. Will monitor    Nutrition Related Findings:    A/O x4, distended/taut abd, +BS, I/O -6.0L, no edema noted. Elevated renal labs, Wound Type: None       Current Nutrition Intake & Therapies:    Average Meal Intake: %  Average Supplements Intake: None Ordered  ADULT DIET; Regular    Anthropometric Measures:  Height: 5' 6\" (167.6 cm)  Ideal Body Weight (IBW): 142 lbs (65 kg)    Admission Body Weight: 180 lb (81.6 kg) (12/31)  Current Body Weight: 180 lb (81.6 kg) (12/31, UTO), 126.8 % IBW. Current BMI (kg/m2): 29.1  Usual Body Weight: 170 lb (77.1 kg) (9/2022 only accurate wt in EMR)  % Weight Change (Calculated): 5.9  Weight Adjustment For: No Adjustment           Nutrition Diagnosis:   No nutrition diagnosis at this time       Nutrition Interventions:   Food and/or Nutrient Delivery: Continue Current Diet  Nutrition Education/Counseling: No recommendation at this time  Coordination of Nutrition Care: Continue to monitor while inpatient     Goals:     Goals: PO intake 75% or greater     Nutrition Monitoring and Evaluation:   Behavioral-Environmental Outcomes: None Identified  Food/Nutrient Intake Outcomes: Food and Nutrient Intake     Discharge Planning:     Too soon to determine     Rad Quintanilla RD  Contact: [phone]

## 2023-01-06 NOTE — PROGRESS NOTES
Physical Therapy    4235/8929-61    Patient unavailable for physical therapy treatment due to patient stated \" I am to full for therapy\". Patient just got done eating his breakfast and told me to come back this pm for therapy. Adam Tucker.  Jelena  [facility]  LIC # [id number]

## 2023-01-06 NOTE — PROGRESS NOTES
Department of Family Practice  Adult Daily Progress Note      JAVIER BUTTS IS SEEN IN FOLLOW UP TODAY ON 4 TELEMETRY. HE IS TOLERATING TREATMENT. HE HAD HIS EGD 01/03/2023 WHICH ONLY SHOWED GASTRITIS. A BIOPSY FOR H. PYLORI WAS TAKEN. HE CONTINUES ON A PPI BID AND CARAFATE. GI SAYS HIS CARAFATE CAN STOP AT DISCHARGE AND HE CAN CONTINUE ON THE PPI ONCE OR TWICE DAILY. HIS GLUCOSE LEVELS ARE HIGH SINCE ON THE STEROIDS AND THE SYSTEM DEMANDED FINGERSTICKS AND A HGBA1C.  HE WAS ALL UPSET OVER THAT. I HAD TO EXPLAIN IT ALL TO HIM SEVERAL TIMES. HGBA1C IS 5.8. PULMONARY VISITED 01/04/2023 AND CHANGED HIS STEROID TO 40 MG PO DAILY. HE IS TO CONTINUE FOR TWO WEEKS TAPERING THE DOSE OVER THAT TIME. HE ALSO STARTED ZITHROMAX 500 MG PO DAILY FOR 5 DAYS. HE IS ON GABAPENTIN 300 MG AT NIGHT. CONTINUE FINGERSTICKS. CREATININE is 1.4. HE IS OFF IV SALINE. HE HAD SOME RUNS OF A. FIB, SOME PAC'S. DR. Hare Danae CONSULTED TO SEE HIM ABOUT THIS. DISCHARGE ON HOLD FOR NOW.     OBJECTIVE    Physical  VITALS:  BP (!) 143/80   Pulse 80   Temp 97.6 °F (36.4 °C) (Oral)   Resp 18   Ht 5' 6\" (1.676 m)   Wt 180 lb (81.6 kg)   SpO2 98%   BMI 29.05 kg/m²   CONSTITUTIONAL:  awake, alert, cooperative, no apparent distress, and appears stated age  BACK:  Symmetric, no curvature, spinous processes are non-tender on palpation, paraspinous muscles are non-tender on palpation, no costal vertebral tenderness  LUNGS:  LESS increased work of breathing, SLIGHTLY BETTER  air exchange, clear to auscultation bilaterally, no crackles or wheezing  CARDIOVASCULAR:  Normal apical impulse, regular rate and rhythm, normal S1 and S2, no S3 or S4, and no murmur noted  ABDOMEN:  No scars, normal bowel sounds, soft, non-distended, non-tender, no masses palpated, no hepatosplenomegally  Data  CBC with Differential:    Lab Results   Component Value Date/Time    WBC 11.5 01/03/2023 09:12 AM    RBC 3.70 01/03/2023 09:12 AM    HGB 11.2 01/03/2023 09:12 AM    HCT 38.8 01/03/2023 09:12 AM     01/03/2023 09:12 AM    .9 01/03/2023 09:12 AM    MCH 30.3 01/03/2023 09:12 AM    MCHC 28.9 01/03/2023 09:12 AM    RDW 12.9 01/03/2023 09:12 AM    NRBC 1.7 09/27/2022 05:30 AM    SEGSPCT 60 04/24/2013 03:25 PM    LYMPHOPCT 1.9 01/03/2023 09:12 AM    MONOPCT 5.7 01/03/2023 09:12 AM    BASOPCT 0.1 01/03/2023 09:12 AM    MONOSABS 0.65 01/03/2023 09:12 AM    LYMPHSABS 0.22 01/03/2023 09:12 AM    EOSABS 0.00 01/03/2023 09:12 AM    BASOSABS 0.01 01/03/2023 09:12 AM     BMP:    Lab Results   Component Value Date/Time     01/05/2023 07:34 AM    K 4.1 01/05/2023 07:34 AM    K 4.8 09/25/2022 08:17 AM    CL 93 01/05/2023 07:34 AM    CO2 39 01/05/2023 07:34 AM    BUN 32 01/05/2023 07:34 AM    LABALBU 3.7 01/03/2023 09:12 AM    LABALBU 4.6 07/14/2011 02:10 PM    CREATININE 1.4 01/05/2023 07:34 AM    CALCIUM 9.3 01/05/2023 07:34 AM    GFRAA >60 10/02/2022 05:33 AM    LABGLOM 54 01/05/2023 07:34 AM    GLUCOSE 150 01/05/2023 07:34 AM    GLUCOSE 101 07/14/2011 02:10 PM     Current Medications  Scheduled Meds:   pantoprazole  40 mg Oral BID AC    predniSONE  40 mg Oral Daily    azithromycin  500 mg Oral Q24H    sucralfate  1 g Oral 4x Daily AC & HS    gabapentin  300 mg Oral Nightly    aspirin  81 mg Oral Daily    cetirizine  10 mg Oral Daily    hydrocortisone   Topical Daily    hydroCHLOROthiazide  25 mg Oral Daily    ipratropium  0.5 mg Nebulization 4x daily    losartan  100 mg Oral Daily    metoprolol succinate  100 mg Oral Daily    triamcinolone   Topical BID    vitamin E  400 Units Oral Daily    arformoterol tartrate  15 mcg Nebulization BID    And    budesonide  0.5 mg Nebulization BID     Continuous Infusions:   dextrose       PRN Meds:.glucose, dextrose bolus **OR** dextrose bolus, glucagon (rDNA), dextrose, HYDROcodone-acetaminophen, hydrocortisone, ketoconazole, albuterol    ASSESSMENT AND PLAN      Principal Problem:    COPD exacerbation [facility])  Resolved Problems:    * No resolved hospital problems. *      CONTINUE PLAN OF CARE. DR. Mandy Olson TO  FOR ARRHYTHMIAS.

## 2023-01-06 NOTE — CONSULTS
[address]                                  CONSULTATION    PATIENT NAME: Scot Coughlin                   :        1950  MED REC NO:   [id number]                            ROOM:         ACCOUNT NO:   [de-identified]                           ADMIT DATE: 2022  PROVIDER:     Esme Lopez MD    CONSULT DATE:  2023    HISTORY OF PRESENT ILLNESS:  The patient is 27-year-old gentleman who I  saw in the past.    He was diagnosed with severe dilated cardiomyopathy in the past.  He had  echocardiogram in  showing ejection fraction around 20%. His  cardiomyopathy was felt to be more related to hypertensive etiology and  excessive alcohol drinking. He had stress test at that time showing no  clear evidence of ischemia. He had significant improvement in his left  ventricular systolic function on lisinopril and carvedilol. He had echocardiogram done in 2022 in my office showing ejection  fraction around 40-45%. He presented to the emergency room about a week ago with worsening  shortness of breath with some cough. He was felt to have acute exacerbation of COPD. He was admitted. He was started on aerosol treatments. He was found to have tachycardia and cardiac consult was requested. The patient was sitting up eating breakfast when I came to see him. He  did not appear in acute distress. PAST MEDICAL HISTORY:  As above. History of severe COPD from smoking in  the past.  Hepatitis C. Hypertension. Lumbar spondylosis. HABITS:  He quit smoking in . He used to drink alcohol excessively,  although he said that he cut down somewhat lately. REVIEW OF SYSTEMS:  CONSTITUTIONAL:  Generalized body weakness. No fever or chills. HEENT:  No nosebleed. No hearing problem. No double vision. No  stridor. No hoarseness of the voice. CARDIAC:  Cardiomyopathy.   No exertional chest pain. He gets some chest  pain with cough. PULMONARY:  Shortness of breath. Severe COPD. GASTROENTEROLOGY:  The patient has some dysphagia. He underwent EGD by  Dr. Ana Cristina Lepe, gastroenterologist and findings came indicating gastritis but  no acute findings. GENITOURINARY:  No dysuria or frequency. No bladder or kidney tumor. NEURO:  No clear history of seizure or stroke. No syncope. HEMATOLOGY:  No bleeding disorder. No adenopathy. ENDOCRINOLOGY:  No polyuria or polydipsia. SKIN:  No skin disorder. MUSCULOSKELETAL:  The patient has arthritis in his back. PSYCH:  No depression or suicidal ideation. PHYSICAL EXAMINATION:  GENERAL:  The patient was sitting up eating breakfast.  He looked tired,  but no acute distress. VITAL SIGNS:  Blood pressure 137/85, pulse 70, temperature 97.9. NECK:  No JVD. HEART:  Regular S1 and S2. No S3.  1/6 systolic murmur heard best at  the left sternal border. LUNGS:  Diminished breath sounds in the bases bilaterally. Few  scattered rhonchi in the bases. ABDOMEN:  Soft, nontender. EXTREMITIES:  Trace ankle edema bilaterally. NEURO:  Alert and awake with no focal deficits. EKG on 12/30 showing sinus bradycardia with heart rate of 115 a minute  and single PAC with nonspecific ST-T wave changes. LABS:  On admission, WBC 9.4, hemoglobin 12.8, platelet count 851. Sodium 139, potassium 4.7, BUN 22, creatinine 1.2. Troponin 62. His  troponin was always elevated in the past.  His troponin was 61 on  09/2022 about 4 months ago. IMPRESSION:  1. Tachycardia. This patient had sinus tachycardia. This appears to  be more related to his respiratory problems and also to the inhalers  that he is taking. He has problem with dilated nonischemic  cardiomyopathy and I feel we can increase the dose of his Toprol-XL to  100 mg twice a day in view of his cardiomyopathy and tachycardia. 2.  Dilated cardiomyopathy.   His shortness of breath this time looked  more from COPD exacerbation. However, I recommend echocardiogram to  reassess his left ventricular systolic function.         Maria Esther Real MD    D: 01/06/2023 12:40:37       T: 01/06/2023 12:45:05     [email or web address]  Job#: [id number]     Doc#: [id number]    CC:

## 2023-01-06 NOTE — CARE COORDINATION
1/6/2023 1102 CM note: Negative covid 12/30/23. Pt resides alone in a ranch home,. He has a nebulizer and home [email or web address] [place] from [place]. Pt has aide services through waiver 7 days/week, 4hrs/day. [facility] accepted pt and received orders. He plans to return home at d/c and states his family will provide transportation.  Sari TAYLOR

## 2023-01-07 LAB
LV EF: 48 %
LVEF MODALITY: NORMAL

## 2023-01-07 PROCEDURE — 6370000000 HC RX 637 (ALT 250 FOR IP): Performed by: HOSPITALIST

## 2023-01-07 PROCEDURE — 6360000002 HC RX W HCPCS: Performed by: FAMILY MEDICINE

## 2023-01-07 PROCEDURE — 6370000000 HC RX 637 (ALT 250 FOR IP): Performed by: FAMILY MEDICINE

## 2023-01-07 PROCEDURE — 93306 TTE W/DOPPLER COMPLETE: CPT

## 2023-01-07 PROCEDURE — 6370000000 HC RX 637 (ALT 250 FOR IP): Performed by: SPECIALIST

## 2023-01-07 PROCEDURE — 6370000000 HC RX 637 (ALT 250 FOR IP): Performed by: INTERNAL MEDICINE

## 2023-01-07 PROCEDURE — 94640 AIRWAY INHALATION TREATMENT: CPT

## 2023-01-07 PROCEDURE — 2700000000 HC OXYGEN THERAPY PER DAY

## 2023-01-07 PROCEDURE — 1200000000 HC SEMI PRIVATE

## 2023-01-07 RX ADMIN — SUCRALFATE 1 G: 1 TABLET ORAL at 09:44

## 2023-01-07 RX ADMIN — AZITHROMYCIN MONOHYDRATE 500 MG: 250 TABLET ORAL at 16:44

## 2023-01-07 RX ADMIN — HYDROCODONE BITARTRATE AND ACETAMINOPHEN 1 TABLET: 10; 325 TABLET ORAL at 22:52

## 2023-01-07 RX ADMIN — FUROSEMIDE 40 MG: 40 TABLET ORAL at 09:45

## 2023-01-07 RX ADMIN — METOPROLOL SUCCINATE 100 MG: 100 TABLET, EXTENDED RELEASE ORAL at 21:30

## 2023-01-07 RX ADMIN — PANTOPRAZOLE SODIUM 40 MG: 40 TABLET, DELAYED RELEASE ORAL at 16:45

## 2023-01-07 RX ADMIN — Medication 400 UNITS: at 09:44

## 2023-01-07 RX ADMIN — HYDROCODONE BITARTRATE AND ACETAMINOPHEN 1 TABLET: 10; 325 TABLET ORAL at 16:43

## 2023-01-07 RX ADMIN — SUCRALFATE 1 G: 1 TABLET ORAL at 06:23

## 2023-01-07 RX ADMIN — GABAPENTIN 300 MG: 300 CAPSULE ORAL at 21:30

## 2023-01-07 RX ADMIN — IPRATROPIUM BROMIDE 0.5 MG: 0.5 SOLUTION RESPIRATORY (INHALATION) at 14:56

## 2023-01-07 RX ADMIN — PREDNISONE 40 MG: 20 TABLET ORAL at 09:44

## 2023-01-07 RX ADMIN — BUDESONIDE 500 MCG: 0.5 SUSPENSION RESPIRATORY (INHALATION) at 18:17

## 2023-01-07 RX ADMIN — CETIRIZINE HYDROCHLORIDE 10 MG: 10 TABLET, FILM COATED ORAL at 09:44

## 2023-01-07 RX ADMIN — SUCRALFATE 1 G: 1 TABLET ORAL at 21:30

## 2023-01-07 RX ADMIN — HYDROCORTISONE: 25 CREAM TOPICAL at 21:30

## 2023-01-07 RX ADMIN — IPRATROPIUM BROMIDE 0.5 MG: 0.5 SOLUTION RESPIRATORY (INHALATION) at 18:16

## 2023-01-07 RX ADMIN — PANTOPRAZOLE SODIUM 40 MG: 40 TABLET, DELAYED RELEASE ORAL at 06:23

## 2023-01-07 RX ADMIN — FUROSEMIDE 40 MG: 40 TABLET ORAL at 19:11

## 2023-01-07 RX ADMIN — METOPROLOL SUCCINATE 100 MG: 100 TABLET, EXTENDED RELEASE ORAL at 09:44

## 2023-01-07 RX ADMIN — SUCRALFATE 1 G: 1 TABLET ORAL at 16:47

## 2023-01-07 RX ADMIN — ARFORMOTEROL TARTRATE 15 MCG: 15 SOLUTION RESPIRATORY (INHALATION) at 18:16

## 2023-01-07 RX ADMIN — TRIAMCINOLONE ACETONIDE: 1 CREAM TOPICAL at 21:31

## 2023-01-07 RX ADMIN — ASPIRIN 81 MG: 81 TABLET, COATED ORAL at 09:44

## 2023-01-07 RX ADMIN — LOSARTAN POTASSIUM 100 MG: 50 TABLET, FILM COATED ORAL at 09:44

## 2023-01-07 RX ADMIN — TRIAMCINOLONE ACETONIDE: 1 CREAM TOPICAL at 09:46

## 2023-01-07 ASSESSMENT — PAIN SCALES - GENERAL
PAINLEVEL_OUTOF10: 0
PAINLEVEL_OUTOF10: 8
PAINLEVEL_OUTOF10: 8
PAINLEVEL_OUTOF10: 0
PAINLEVEL_OUTOF10: 8
PAINLEVEL_OUTOF10: 0

## 2023-01-07 ASSESSMENT — PAIN DESCRIPTION - DESCRIPTORS
DESCRIPTORS: ACHING
DESCRIPTORS: ACHING

## 2023-01-07 ASSESSMENT — PAIN DESCRIPTION - PAIN TYPE: TYPE: CHRONIC PAIN

## 2023-01-07 ASSESSMENT — PAIN DESCRIPTION - LOCATION
LOCATION: GENERALIZED

## 2023-01-07 NOTE — PROGRESS NOTES
Physician Progress Note      Susan Cisneros  [facility] #:                  [id number]  :                       1950  ADMIT DATE:       2022 7:46 PM  DISCH DATE:  RESPONDING  PROVIDER #:        Antolin Almaguer          QUERY TEXT:    Patient admitted with COPD exacerbation. Noted documentation of Acute on   chronic hypoxic and hypercapnic respiratory failure in CC & Pulmonology PN    & . In order to support the diagnosis of acute respiratory failure, please   include additional clinical indicators in your documentation. Or please   document if the diagnosis of acute respiratory failure has been ruled out   after further study.     The medical record reflects the following:  Risk Factors: Smoker, COPD exacerbation, home [email or web address] [place] baseline  Clinical Indicators: Per CC & Pulmonology PN  &  \"Acute on chronic   hypoxic and hypercapnic respiratory failure, mild exertional dyspnea however   he feels that he is improving\", Per H&P \"RESPIRATORY: positive for dry cough,   dyspnea, and wheezing, SOB\", Per FM PN  \"LUNGS:  LESS increased work of   breathing, slightly better air exchange, clear to auscultation bilaterally\",   spo2 94%-100%, home [email or web address] [place] baseline, RR 16-24  Treatment: 4 L nasal cannula, DuoNeb and budesonide, SYMBICORT, Incentive   spirometer, strongly encouraged to quit smoking    Acute Respiratory Failure Clinical Indicators per  MS-DRG Training Guide and   Quick Reference Guide:  pO2 < 60 mmHg or SpO2 (pulse oximetry) < 91% breathing room air  pCO2 > 50 and pH < 7.35  P/F ratio (pO2 / FIO2) < 300  pO2 decrease or pCO2 increase by 10 mmHg from baseline (if known)  Supplemental oxygen of 40% or more  Presence of respiratory distress, tachypnea, dyspnea, shortness of breath,   wheezing  Unable to speak in complete sentences  Use of accessory muscles to breathe  Extreme anxiety and feeling of impending doom  Tripod position  Confusion/altered mental status/obtunded  Options provided:  -- Acute on chronic hypoxic and hypercapnic respiratory failure as evidenced   by, Please document evidence.   -- Acute hypoxic and hypercapnic Respiratory Failure ruled out after study and   Chronic Respiratory Failure confirmed  -- Other - I will add my own diagnosis  -- Disagree - Not applicable / Not valid  -- Disagree - Clinically unable to determine / Unknown  -- Refer to Clinical Documentation Reviewer    PROVIDER RESPONSE TEXT:    Chronic hypoxic and hypercapnic respiratory failure    Query created by: Erick Hoff on 1/6/2023 8:30 AM      Electronically signed by:  Jake Raman 1/7/2023 9:04 AM

## 2023-01-07 NOTE — PROGRESS NOTES
Cardiology  Progress Note      SUBJECTIVE:  No chest pain. Shortness of breath is somewhat better.     Current Inpatient Medications  Current Facility-Administered Medications: metoprolol succinate (TOPROL XL) extended release tablet 100 mg, 100 mg, Oral, BID  furosemide (LASIX) tablet 40 mg, 40 mg, Oral, BID  pantoprazole (PROTONIX) tablet 40 mg, 40 mg, Oral, BID AC  predniSONE (DELTASONE) tablet 40 mg, 40 mg, Oral, Daily  azithromycin (ZITHROMAX) tablet 500 mg, 500 mg, Oral, Q24H  glucose chewable tablet 16 g, 4 tablet, Oral, PRN  dextrose bolus 10% 125 mL, 125 mL, IntraVENous, PRN **OR** dextrose bolus 10% 250 mL, 250 mL, IntraVENous, PRN  glucagon (rDNA) injection 1 mg, 1 mg, SubCUTAneous, PRN  dextrose 10 % infusion, , IntraVENous, Continuous PRN  sucralfate (CARAFATE) tablet 1 g, 1 g, Oral, 4x Daily AC & HS  HYDROcodone-acetaminophen (NORCO)  MG per tablet 1 tablet, 1 tablet, Oral, Q6H PRN  gabapentin (NEURONTIN) capsule 300 mg, 300 mg, Oral, Nightly  aspirin EC tablet 81 mg, 81 mg, Oral, Daily  cetirizine (ZYRTEC) tablet 10 mg, 10 mg, Oral, Daily  hydrocortisone 2.5 % cream, , Topical, Daily  hydrocortisone 2.5 % cream, , Topical, BID PRN  ipratropium (ATROVENT) 0.02 % nebulizer solution 0.5 mg, 0.5 mg, Nebulization, 4x daily  ketoconazole (NIZORAL) 2 % cream, , Topical, Daily PRN  losartan (COZAAR) tablet 100 mg, 100 mg, Oral, Daily  triamcinolone (KENALOG) 0.1 % cream, , Topical, BID  vitamin E capsule 400 Units, 400 Units, Oral, Daily  albuterol (PROVENTIL) nebulizer solution 2.5 mg, 2.5 mg, Nebulization, Q6H PRN  Arformoterol Tartrate (BROVANA) nebulizer solution 15 mcg, 15 mcg, Nebulization, BID **AND** budesonide (PULMICORT) nebulizer suspension 500 mcg, 0.5 mg, Nebulization, BID      Physical  VITALS:  BP (!) 155/80   Pulse 78   Temp 97.6 °F (36.4 °C) (Oral)   Resp 20   Ht 5' 6\" (1.676 m)   Wt 180 lb (81.6 kg)   SpO2 100%   BMI 29.05 kg/m²   CURRENT TEMPERATURE:  Temp: 97.6 °F (36.4 °C)  CONSTITUTIONAL: No acute distress. EYES: Vision is intact. ENT: No sore throat. No ear drainage. NECK: No JVD. BACK: Symmetric. LUNGS:  diminished breath sounds right base and left base  CARDIOVASCULAR:  normal S1 and S2, no S3, and no S4  ABDOMEN:  non-tender  NEUROLOGIC: No focal deficits. EXTREMITIES: No edema cyanosis or clubbing. DATA:      ECG:  I have reviewed EKG with the following interpretation:  normal sinus rhythm    Cardiology Labs:  BMP:    Lab Results   Component Value Date/Time     01/05/2023 07:34 AM    K 4.1 01/05/2023 07:34 AM    K 4.8 09/25/2022 08:17 AM    CL 93 01/05/2023 07:34 AM    CO2 39 01/05/2023 07:34 AM    BUN 32 01/05/2023 07:34 AM     CBC:    Lab Results   Component Value Date/Time    WBC 11.5 01/03/2023 09:12 AM    RBC 3.70 01/03/2023 09:12 AM    HGB 11.2 01/03/2023 09:12 AM    HCT 38.8 01/03/2023 09:12 AM    .9 01/03/2023 09:12 AM    RDW 12.9 01/03/2023 09:12 AM     01/03/2023 09:12 AM     PT/INR:  No results found for: PTINR  TROPONIN:  No components found for: TROP    ASSESSMENT    1.dilated cardiomyopathy with shortness of breath. Please refer to consult. Echocardiogram was ordered to reassess left ventricular systolic function. Patient has also problem with COPD from smoking in the past and some of his shortness of breath can be related to COPD exacerbation. 2.tachycardia. It can be related to his inhalers and respiratory problems. Dose of Toprol-XL was increased especially with his history of cardiomyopathy. 3.elevated troponin level. Probably demand ischemia from his respiratory problems with type II non-STEMI. He had elevation of his troponin level on previous blood test done in September of last year. Will consider coronary work-up down the road once pulmonary status is better.

## 2023-01-07 NOTE — PROGRESS NOTES
Department of Family Practice  Adult Daily Progress Note      JAVIER BUTTS IS SEEN IN FOLLOW UP TODAY ON 4 TELEMETRY. HE IS TOLERATING TREATMENT. HE HAD HIS EGD 01/03/2023 WHICH ONLY SHOWED GASTRITIS. A BIOPSY FOR H. PYLORI WAS TAKEN. HE CONTINUES ON A PPI BID AND CARAFATE. GI SAYS HIS CARAFATE CAN STOP AT DISCHARGE AND HE CAN CONTINUE ON THE PPI ONCE OR TWICE DAILY. HIS GLUCOSE LEVELS ARE HIGH SINCE ON THE STEROIDS AND THE SYSTEM DEMANDED FINGERSTICKS AND A HGBA1C.  HE WAS ALL UPSET OVER THAT. I HAD TO EXPLAIN IT ALL TO HIM SEVERAL TIMES. HGBA1C IS 5.8. PULMONARY VISITED 01/04/2023 AND CHANGED HIS STEROID TO 40 MG PO DAILY. HE IS TO CONTINUE FOR TWO WEEKS TAPERING THE DOSE OVER THAT TIME. HE ALSO STARTED ZITHROMAX 500 MG PO DAILY FOR 5 DAYS. HE IS ON GABAPENTIN 300 MG AT NIGHT. HE HAD SOME RUNS OF A. FIB, SOME PAC'S. DR. David Villafuerte CONSULTED TO SEE HIM ABOUT THIS.    HE HAD A REPEAT ECHOCARDIOGRAM.        OBJECTIVE    Physical  VITALS:  /80   Pulse 90   Temp 98.4 °F (36.9 °C) (Oral)   Resp 18   Ht 5' 6\" (1.676 m)   Wt 180 lb (81.6 kg)   SpO2 100%   BMI 29.05 kg/m²   CONSTITUTIONAL:  awake, alert, cooperative, no apparent distress, and appears stated age  BACK:  Symmetric, no curvature, spinous processes are non-tender on palpation, paraspinous muscles are non-tender on palpation, no costal vertebral tenderness  LUNGS:  LESS increased work of breathing, SLIGHTLY BETTER  air exchange, clear to auscultation bilaterally, no crackles or wheezing  CARDIOVASCULAR:  Normal apical impulse, regular rate and rhythm, normal S1 and S2, no S3 or S4, and no murmur noted  ABDOMEN:  No scars, normal bowel sounds, soft, non-distended, non-tender, no masses palpated, no hepatosplenomegally  Data  CBC with Differential:    Lab Results   Component Value Date/Time    WBC 11.5 01/03/2023 09:12 AM    RBC 3.70 01/03/2023 09:12 AM    HGB 11.2 01/03/2023 09:12 AM    HCT 38.8 01/03/2023 09:12 AM     01/03/2023 09:12 AM    .9 01/03/2023 09:12 AM    MCH 30.3 01/03/2023 09:12 AM    MCHC 28.9 01/03/2023 09:12 AM    RDW 12.9 01/03/2023 09:12 AM    NRBC 1.7 09/27/2022 05:30 AM    SEGSPCT 60 04/24/2013 03:25 PM    LYMPHOPCT 1.9 01/03/2023 09:12 AM    MONOPCT 5.7 01/03/2023 09:12 AM    BASOPCT 0.1 01/03/2023 09:12 AM    MONOSABS 0.65 01/03/2023 09:12 AM    LYMPHSABS 0.22 01/03/2023 09:12 AM    EOSABS 0.00 01/03/2023 09:12 AM    BASOSABS 0.01 01/03/2023 09:12 AM     BMP:    Lab Results   Component Value Date/Time     01/05/2023 07:34 AM    K 4.1 01/05/2023 07:34 AM    K 4.8 09/25/2022 08:17 AM    CL 93 01/05/2023 07:34 AM    CO2 39 01/05/2023 07:34 AM    BUN 32 01/05/2023 07:34 AM    LABALBU 3.7 01/03/2023 09:12 AM    LABALBU 4.6 07/14/2011 02:10 PM    CREATININE 1.4 01/05/2023 07:34 AM    CALCIUM 9.3 01/05/2023 07:34 AM    GFRAA >60 10/02/2022 05:33 AM    LABGLOM 54 01/05/2023 07:34 AM    GLUCOSE 150 01/05/2023 07:34 AM    GLUCOSE 101 07/14/2011 02:10 PM     Current Medications  Scheduled Meds:   metoprolol succinate  100 mg Oral BID    furosemide  40 mg Oral BID    pantoprazole  40 mg Oral BID AC    predniSONE  40 mg Oral Daily    azithromycin  500 mg Oral Q24H    sucralfate  1 g Oral 4x Daily AC & HS    gabapentin  300 mg Oral Nightly    aspirin  81 mg Oral Daily    cetirizine  10 mg Oral Daily    hydrocortisone   Topical Daily    ipratropium  0.5 mg Nebulization 4x daily    losartan  100 mg Oral Daily    triamcinolone   Topical BID    vitamin E  400 Units Oral Daily    arformoterol tartrate  15 mcg Nebulization BID    And    budesonide  0.5 mg Nebulization BID     Continuous Infusions:   dextrose       PRN Meds:.glucose, dextrose bolus **OR** dextrose bolus, glucagon (rDNA), dextrose, HYDROcodone-acetaminophen, hydrocortisone, ketoconazole, albuterol    ASSESSMENT AND PLAN      Principal Problem:    COPD exacerbation (HCC)  Resolved Problems:    * No resolved hospital problems.  *      CONTINUE PLAN OF CARE.   AWAIT RECOMMENDATIONS.

## 2023-01-07 NOTE — PLAN OF CARE
Problem: Pain  Goal: Verbalizes/displays adequate comfort level or baseline comfort level  1/7/2023 1643 by Betsey Escobar RN  Outcome: Progressing     Problem: Chronic Conditions and Co-morbidities  Goal: Patient's chronic conditions and co-morbidity symptoms are monitored and maintained or improved  1/7/2023 1643 by Betsey Escobar RN  Outcome: Progressing

## 2023-01-08 ENCOUNTER — APPOINTMENT (OUTPATIENT)
Dept: GENERAL RADIOLOGY | Age: 73
DRG: 190 | End: 2023-01-08
Payer: COMMERCIAL

## 2023-01-08 PROBLEM — R07.9 CHEST PAIN: Status: RESOLVED | Noted: 2019-04-26 | Resolved: 2023-01-08

## 2023-01-08 PROCEDURE — 6370000000 HC RX 637 (ALT 250 FOR IP): Performed by: INTERNAL MEDICINE

## 2023-01-08 PROCEDURE — 1200000000 HC SEMI PRIVATE

## 2023-01-08 PROCEDURE — 73502 X-RAY EXAM HIP UNI 2-3 VIEWS: CPT | Performed by: RADIOLOGY

## 2023-01-08 PROCEDURE — 6370000000 HC RX 637 (ALT 250 FOR IP): Performed by: HOSPITALIST

## 2023-01-08 PROCEDURE — 6370000000 HC RX 637 (ALT 250 FOR IP): Performed by: FAMILY MEDICINE

## 2023-01-08 PROCEDURE — 2700000000 HC OXYGEN THERAPY PER DAY

## 2023-01-08 PROCEDURE — 6370000000 HC RX 637 (ALT 250 FOR IP): Performed by: SPECIALIST

## 2023-01-08 PROCEDURE — 73502 X-RAY EXAM HIP UNI 2-3 VIEWS: CPT

## 2023-01-08 PROCEDURE — 94640 AIRWAY INHALATION TREATMENT: CPT

## 2023-01-08 PROCEDURE — 97530 THERAPEUTIC ACTIVITIES: CPT

## 2023-01-08 PROCEDURE — 6360000002 HC RX W HCPCS: Performed by: FAMILY MEDICINE

## 2023-01-08 RX ORDER — TRIAMCINOLONE ACETONIDE 1 MG/G
CREAM TOPICAL
Qty: 28.4 G | Refills: 0 | Status: SHIPPED | OUTPATIENT
Start: 2023-01-08

## 2023-01-08 RX ORDER — FUROSEMIDE 40 MG/1
40 TABLET ORAL 2 TIMES DAILY
Qty: 60 TABLET | Refills: 3 | Status: SHIPPED | OUTPATIENT
Start: 2023-01-09

## 2023-01-08 RX ORDER — GABAPENTIN 300 MG/1
300 CAPSULE ORAL NIGHTLY
Qty: 30 CAPSULE | Refills: 0 | Status: SHIPPED | OUTPATIENT
Start: 2023-01-08 | End: 2023-02-07

## 2023-01-08 RX ORDER — METOPROLOL SUCCINATE 100 MG/1
100 TABLET, EXTENDED RELEASE ORAL 2 TIMES DAILY
Qty: 60 TABLET | Refills: 0 | Status: SHIPPED | OUTPATIENT
Start: 2023-01-08

## 2023-01-08 RX ORDER — ASPIRIN 81 MG/1
81 TABLET ORAL DAILY
Qty: 30 TABLET | Refills: 3 | Status: SHIPPED | OUTPATIENT
Start: 2023-01-09

## 2023-01-08 RX ORDER — PREDNISONE 20 MG/1
20 TABLET ORAL DAILY
Qty: 5 TABLET | Refills: 0 | Status: SHIPPED | OUTPATIENT
Start: 2023-01-09 | End: 2023-01-14

## 2023-01-08 RX ORDER — PANTOPRAZOLE SODIUM 40 MG/1
40 TABLET, DELAYED RELEASE ORAL
Qty: 30 TABLET | Refills: 3 | Status: SHIPPED | OUTPATIENT
Start: 2023-01-09

## 2023-01-08 RX ORDER — KETOCONAZOLE 20 MG/G
CREAM TOPICAL
Qty: 30 G | Refills: 1 | Status: SHIPPED | OUTPATIENT
Start: 2023-01-08

## 2023-01-08 RX ORDER — PREDNISONE 20 MG/1
20 TABLET ORAL DAILY
Status: DISCONTINUED | OUTPATIENT
Start: 2023-01-09 | End: 2023-01-09 | Stop reason: HOSPADM

## 2023-01-08 RX ADMIN — PANTOPRAZOLE SODIUM 40 MG: 40 TABLET, DELAYED RELEASE ORAL at 16:46

## 2023-01-08 RX ADMIN — IPRATROPIUM BROMIDE 0.5 MG: 0.5 SOLUTION RESPIRATORY (INHALATION) at 11:02

## 2023-01-08 RX ADMIN — BUDESONIDE 500 MCG: 0.5 SUSPENSION RESPIRATORY (INHALATION) at 11:02

## 2023-01-08 RX ADMIN — SUCRALFATE 1 G: 1 TABLET ORAL at 16:45

## 2023-01-08 RX ADMIN — GABAPENTIN 300 MG: 300 CAPSULE ORAL at 21:18

## 2023-01-08 RX ADMIN — METOPROLOL SUCCINATE 100 MG: 100 TABLET, EXTENDED RELEASE ORAL at 08:59

## 2023-01-08 RX ADMIN — HYDROCODONE BITARTRATE AND ACETAMINOPHEN 1 TABLET: 10; 325 TABLET ORAL at 23:51

## 2023-01-08 RX ADMIN — SUCRALFATE 1 G: 1 TABLET ORAL at 21:18

## 2023-01-08 RX ADMIN — ARFORMOTEROL TARTRATE 15 MCG: 15 SOLUTION RESPIRATORY (INHALATION) at 18:24

## 2023-01-08 RX ADMIN — FUROSEMIDE 40 MG: 40 TABLET ORAL at 08:59

## 2023-01-08 RX ADMIN — SUCRALFATE 1 G: 1 TABLET ORAL at 05:37

## 2023-01-08 RX ADMIN — SUCRALFATE 1 G: 1 TABLET ORAL at 11:46

## 2023-01-08 RX ADMIN — METOPROLOL SUCCINATE 100 MG: 100 TABLET, EXTENDED RELEASE ORAL at 21:18

## 2023-01-08 RX ADMIN — IPRATROPIUM BROMIDE 0.5 MG: 0.5 SOLUTION RESPIRATORY (INHALATION) at 18:24

## 2023-01-08 RX ADMIN — ALBUTEROL SULFATE 2.5 MG: 2.5 SOLUTION RESPIRATORY (INHALATION) at 11:01

## 2023-01-08 RX ADMIN — PREDNISONE 40 MG: 20 TABLET ORAL at 09:00

## 2023-01-08 RX ADMIN — HYDROCODONE BITARTRATE AND ACETAMINOPHEN 1 TABLET: 10; 325 TABLET ORAL at 16:46

## 2023-01-08 RX ADMIN — ASPIRIN 81 MG: 81 TABLET, COATED ORAL at 08:59

## 2023-01-08 RX ADMIN — FUROSEMIDE 40 MG: 40 TABLET ORAL at 19:29

## 2023-01-08 RX ADMIN — BUDESONIDE 500 MCG: 0.5 SUSPENSION RESPIRATORY (INHALATION) at 18:24

## 2023-01-08 RX ADMIN — PANTOPRAZOLE SODIUM 40 MG: 40 TABLET, DELAYED RELEASE ORAL at 05:37

## 2023-01-08 RX ADMIN — HYDROCODONE BITARTRATE AND ACETAMINOPHEN 1 TABLET: 10; 325 TABLET ORAL at 09:06

## 2023-01-08 RX ADMIN — Medication 400 UNITS: at 08:59

## 2023-01-08 RX ADMIN — CETIRIZINE HYDROCHLORIDE 10 MG: 10 TABLET, FILM COATED ORAL at 08:59

## 2023-01-08 RX ADMIN — LOSARTAN POTASSIUM 100 MG: 50 TABLET, FILM COATED ORAL at 08:59

## 2023-01-08 RX ADMIN — ARFORMOTEROL TARTRATE 15 MCG: 15 SOLUTION RESPIRATORY (INHALATION) at 11:02

## 2023-01-08 RX ADMIN — AZITHROMYCIN MONOHYDRATE 500 MG: 250 TABLET ORAL at 16:45

## 2023-01-08 ASSESSMENT — PAIN SCALES - GENERAL
PAINLEVEL_OUTOF10: 4
PAINLEVEL_OUTOF10: 8
PAINLEVEL_OUTOF10: 7
PAINLEVEL_OUTOF10: 9
PAINLEVEL_OUTOF10: 8

## 2023-01-08 ASSESSMENT — PAIN DESCRIPTION - LOCATION
LOCATION: OTHER (COMMENT)
LOCATION: GENERALIZED

## 2023-01-08 ASSESSMENT — PAIN DESCRIPTION - DESCRIPTORS
DESCRIPTORS: DISCOMFORT
DESCRIPTORS: DISCOMFORT
DESCRIPTORS: ACHING

## 2023-01-08 ASSESSMENT — PAIN DESCRIPTION - PAIN TYPE
TYPE: CHRONIC PAIN
TYPE: CHRONIC PAIN

## 2023-01-08 NOTE — PROGRESS NOTES
Cardiology  Progress Note      SUBJECTIVE:  No chest pain. Shortness of breath is somewhat better.     Current Inpatient Medications  Current Facility-Administered Medications: metoprolol succinate (TOPROL XL) extended release tablet 100 mg, 100 mg, Oral, BID  furosemide (LASIX) tablet 40 mg, 40 mg, Oral, BID  pantoprazole (PROTONIX) tablet 40 mg, 40 mg, Oral, BID AC  predniSONE (DELTASONE) tablet 40 mg, 40 mg, Oral, Daily  azithromycin (ZITHROMAX) tablet 500 mg, 500 mg, Oral, Q24H  glucose chewable tablet 16 g, 4 tablet, Oral, PRN  dextrose bolus 10% 125 mL, 125 mL, IntraVENous, PRN **OR** dextrose bolus 10% 250 mL, 250 mL, IntraVENous, PRN  glucagon (rDNA) injection 1 mg, 1 mg, SubCUTAneous, PRN  dextrose 10 % infusion, , IntraVENous, Continuous PRN  sucralfate (CARAFATE) tablet 1 g, 1 g, Oral, 4x Daily AC & HS  HYDROcodone-acetaminophen (NORCO)  MG per tablet 1 tablet, 1 tablet, Oral, Q6H PRN  gabapentin (NEURONTIN) capsule 300 mg, 300 mg, Oral, Nightly  aspirin EC tablet 81 mg, 81 mg, Oral, Daily  cetirizine (ZYRTEC) tablet 10 mg, 10 mg, Oral, Daily  hydrocortisone 2.5 % cream, , Topical, Daily  hydrocortisone 2.5 % cream, , Topical, BID PRN  ipratropium (ATROVENT) 0.02 % nebulizer solution 0.5 mg, 0.5 mg, Nebulization, 4x daily  ketoconazole (NIZORAL) 2 % cream, , Topical, Daily PRN  losartan (COZAAR) tablet 100 mg, 100 mg, Oral, Daily  triamcinolone (KENALOG) 0.1 % cream, , Topical, BID  vitamin E capsule 400 Units, 400 Units, Oral, Daily  albuterol (PROVENTIL) nebulizer solution 2.5 mg, 2.5 mg, Nebulization, Q6H PRN  Arformoterol Tartrate (BROVANA) nebulizer solution 15 mcg, 15 mcg, Nebulization, BID **AND** budesonide (PULMICORT) nebulizer suspension 500 mcg, 0.5 mg, Nebulization, BID      Physical  VITALS:  BP (!) 136/99   Pulse 63   Temp 97.5 °F (36.4 °C) (Oral)   Resp 20   Ht 5' 6\" (1.676 m)   Wt 180 lb (81.6 kg)   SpO2 100%   BMI 29.05 kg/m²   CURRENT TEMPERATURE:  Temp: 97.5 °F (36.4 °C)  CONSTITUTIONAL: No acute distress. EYES: Vision is intact. ENT: No sore throat. No ear drainage. NECK: No JVD. BACK: Symmetric. LUNGS:  diminished breath sounds right base and left base  CARDIOVASCULAR:  normal S1 and S2, no S3, and no S4  ABDOMEN:  non-tender  NEUROLOGIC: No focal deficits. EXTREMITIES: Trace ankle edema. DATA:          Cardiology Labs:  BMP:    Lab Results   Component Value Date/Time     01/05/2023 07:34 AM    K 4.1 01/05/2023 07:34 AM    K 4.8 09/25/2022 08:17 AM    CL 93 01/05/2023 07:34 AM    CO2 39 01/05/2023 07:34 AM    BUN 32 01/05/2023 07:34 AM     CBC:    Lab Results   Component Value Date/Time    WBC 11.5 01/03/2023 09:12 AM    RBC 3.70 01/03/2023 09:12 AM    HGB 11.2 01/03/2023 09:12 AM    HCT 38.8 01/03/2023 09:12 AM    .9 01/03/2023 09:12 AM    RDW 12.9 01/03/2023 09:12 AM     01/03/2023 09:12 AM     PT/INR:  No results found for: PTINR  TROPONIN:  No components found for: TROP    ASSESSMENT    1.dilated cardiomyopathy with shortness of breath. Please refer to consult. Echocardiogram is showing mildly reduced left ventricular systolic function with ejection fraction around 45 to 50%. Patient had improvement in his left ventricular systolic function from before. Is on Toprol-XL and losartan in addition to hydrochlorothiazide and we will keep him on that. Patient has also problem with COPD from smoking in the past and some of his shortness of breath can be related to COPD exacerbation. 2.tachycardia. It can be related to his inhalers and respiratory problems. Dose of Toprol-XL was increased especially with his history of cardiomyopathy. 3.elevated troponin level. Probably demand ischemia from his respiratory problems with type II non-STEMI. He had elevation of his troponin level on previous blood test done in September of last year. Patient wants to go home.     He was instructed to call my office for appointment in 2 weeks and I will emy with him at that time doing coronary work-up.

## 2023-01-08 NOTE — PLAN OF CARE
Problem: Pain  Goal: Verbalizes/displays adequate comfort level or baseline comfort level  1/7/2023 1643 by Nicol Vasques RN  Outcome: Progressing  1/7/2023 1520 by Victor Manuel Eisenberg RN  Outcome: Progressing     Problem: Discharge Planning  Goal: Discharge to home or other facility with appropriate resources  Outcome: Progressing     Problem: Chronic Conditions and Co-morbidities  Goal: Patient's chronic conditions and co-morbidity symptoms are monitored and maintained or improved  1/8/2023 0029 by Yash Rod RN  Outcome: Progressing  1/7/2023 1643 by Nicol Vasques RN  Outcome: Progressing  1/7/2023 1520 by Victor Manuel Eisenberg RN  Outcome: Progressing

## 2023-01-08 NOTE — PROGRESS NOTES
Department of Family Practice  Adult Daily Progress Note      JAVIER BUTTS IS SEEN IN FOLLOW UP TODAY ON 4 TELEMETRY. HE IS TOLERATING TREATMENT. HE HAD HIS EGD 01/03/2023 WHICH ONLY SHOWED GASTRITIS. A BIOPSY FOR H. PYLORI WAS TAKEN. HE CONTINUES ON A PPI BID AND CARAFATE. GI SAYS HIS CARAFATE CAN STOP AT DISCHARGE AND HE CAN CONTINUE ON THE PPI ONCE OR TWICE DAILY. HIS GLUCOSE LEVELS ARE HIGH SINCE ON THE STEROIDS AND THE SYSTEM DEMANDED FINGERSTICKS AND A HGBA1C.  HE WAS ALL UPSET OVER THAT. I HAD TO EXPLAIN IT ALL TO HIM SEVERAL TIMES. HGBA1C IS 5.8. PULMONARY VISITED 01/04/2023 AND CHANGED HIS STEROID TO 40 MG PO DAILY. HE IS TO CONTINUE FOR TWO WEEKS TAPERING THE DOSE OVER THAT TIME. HE ALSO STARTED ZITHROMAX 500 MG PO DAILY FOR 5 DAYS. HE IS ON GABAPENTIN 300 MG AT NIGHT. HE HAD SOME RUNS OF A. FIB, SOME PAC'S. DR. Veronica Torrez CONSULTED TO SEE HIM ABOUT THIS. HE FEELS IT WAS SINUS TACHYCARDIA DUE TO HIS BREATHING PROBLEMS AND THE INHALERS. THE ECHOCARDIOGRAM WAS NOT DONE TILL TODAY. RESULTS PENDING. HIS BREATHING REMAINS GOOD.          OBJECTIVE    Physical  VITALS:  /78   Pulse 73   Temp 97.3 °F (36.3 °C) (Temporal)   Resp 18   Ht 5' 6\" (1.676 m)   Wt 180 lb (81.6 kg)   SpO2 100%   BMI 29.05 kg/m²   CONSTITUTIONAL:  awake, alert, cooperative, no apparent distress, and appears stated age  BACK:  Symmetric, no curvature, spinous processes are non-tender on palpation, paraspinous muscles are non-tender on palpation, no costal vertebral tenderness  LUNGS:  LESS increased work of breathing, SLIGHTLY BETTER  air exchange, clear to auscultation bilaterally, no crackles or wheezing  CARDIOVASCULAR:  Normal apical impulse, regular rate and rhythm, normal S1 and S2, no S3 or S4, and no murmur noted  ABDOMEN:  No scars, normal bowel sounds, soft, non-distended, non-tender, no masses palpated, no hepatosplenomegally  Data  CBC with Differential:    Lab Results   Component Value Date/Time    WBC 11.5 01/03/2023 09:12 AM    RBC 3.70 01/03/2023 09:12 AM    HGB 11.2 01/03/2023 09:12 AM    HCT 38.8 01/03/2023 09:12 AM     01/03/2023 09:12 AM    .9 01/03/2023 09:12 AM    MCH 30.3 01/03/2023 09:12 AM    MCHC 28.9 01/03/2023 09:12 AM    RDW 12.9 01/03/2023 09:12 AM    NRBC 1.7 09/27/2022 05:30 AM    SEGSPCT 60 04/24/2013 03:25 PM    LYMPHOPCT 1.9 01/03/2023 09:12 AM    MONOPCT 5.7 01/03/2023 09:12 AM    BASOPCT 0.1 01/03/2023 09:12 AM    MONOSABS 0.65 01/03/2023 09:12 AM    LYMPHSABS 0.22 01/03/2023 09:12 AM    EOSABS 0.00 01/03/2023 09:12 AM    BASOSABS 0.01 01/03/2023 09:12 AM     BMP:    Lab Results   Component Value Date/Time     01/05/2023 07:34 AM    K 4.1 01/05/2023 07:34 AM    K 4.8 09/25/2022 08:17 AM    CL 93 01/05/2023 07:34 AM    CO2 39 01/05/2023 07:34 AM    BUN 32 01/05/2023 07:34 AM    LABALBU 3.7 01/03/2023 09:12 AM    LABALBU 4.6 07/14/2011 02:10 PM    CREATININE 1.4 01/05/2023 07:34 AM    CALCIUM 9.3 01/05/2023 07:34 AM    GFRAA >60 10/02/2022 05:33 AM    LABGLOM 54 01/05/2023 07:34 AM    GLUCOSE 150 01/05/2023 07:34 AM    GLUCOSE 101 07/14/2011 02:10 PM     Current Medications  Scheduled Meds:   metoprolol succinate  100 mg Oral BID    furosemide  40 mg Oral BID    pantoprazole  40 mg Oral BID AC    predniSONE  40 mg Oral Daily    azithromycin  500 mg Oral Q24H    sucralfate  1 g Oral 4x Daily AC & HS    gabapentin  300 mg Oral Nightly    aspirin  81 mg Oral Daily    cetirizine  10 mg Oral Daily    hydrocortisone   Topical Daily    ipratropium  0.5 mg Nebulization 4x daily    losartan  100 mg Oral Daily    triamcinolone   Topical BID    vitamin E  400 Units Oral Daily    arformoterol tartrate  15 mcg Nebulization BID    And    budesonide  0.5 mg Nebulization BID     Continuous Infusions:   dextrose       PRN Meds:.glucose, dextrose bolus **OR** dextrose bolus, glucagon (rDNA), dextrose, HYDROcodone-acetaminophen, hydrocortisone, ketoconazole, albuterol    ASSESSMENT AND PLAN      Principal Problem:    COPD exacerbation ([facility] [address]  Resolved Problems:    * No resolved hospital problems. *      CONTINUE PLAN OF CARE. AWAIT RECOMMENDATIONS.

## 2023-01-08 NOTE — PROGRESS NOTES
Physical Therapy Treatment Note/Plan of Care    Room #:  1822/7234-57  Patient Name: Nakul Salgado  YOB: 1950  MRN: [id number]    Date of Service: 1/8/2023     Tentative placement recommendation: Home Health Physical Therapy   Equipment recommendation: To be determined      Evaluating Physical Therapist: Awais Puente #[id number]      Specific Provider Orders/Date/Referring Provider :   12/31/22 2015    PT eval and treat  Start:  12/31/22 2015,   End:  12/31/22 2015,   ONE TIME,   Standing Count:  1 Occurrences,   R         Andreas Ellis,      Admitting Diagnosis:   COPD exacerbation ([facility] [address] [J44.1]      Surgery: none  Visit Diagnoses         Codes    Epigastric pain     R10.13            Patient Active Problem List   Diagnosis    Hepatitis C    Testicular swelling    COPD exacerbation ([facility] [address]    NSTEMI (non-ST elevated myocardial infarction) ([facility] [address]    CHF, acute on chronic [facility])    Essential hypertension    Neck pain    Cervical disc herniation    Acute respiratory failure with hypoxia and hypercapnia (HCC)    Chest pain    Elbow effusion, right    Acute on chronic respiratory failure with hypoxia and hypercapnia (HCC)    Chronic pain syndrome [G89.4 (ICD-10-CM)]    Lumbar spondylosis    Spinal stenosis of lumbar region without neurogenic claudication    Lumbar facet arthropathy    Lumbar disc disorder    Cervical spondylosis    Cervical facet joint syndrome    Cervical stenosis of spine    Cervical disc disorder    Cervical radiculopathy    Lumbar radiculopathy        ASSESSMENT of Current Deficits Patient exhibits decreased balance and endurance impairing gait distance and tolerance to activity. Patient impulsive at times, cues for safety and O2 line management. Patient tolerates short ambulation before wanting to lay back down due to pain in back.  The patient will benefit from continued skilled therapy to increase strength and improve balance for safe functional mobility, to decrease risk of falls, and to meet goals at discharge. PHYSICAL THERAPY  PLAN OF CARE       Physical therapy plan of care is established based on physician order,  patient diagnosis and clinical assessment    Current Treatment Recommendations:    -Bed Mobility: Lower extremity exercises , Upper extremity exercises , and Trunk control activities   -Sitting Balance: Incorporate reaching activities to activate trunk muscles , Hands on support to maintain midline , and Facilitate active trunk muscle engagement   -Standing Balance: Perform strengthening exercises in standing to promote motor control with or without upper extremity support  and Instruct patient on adequate base of support to maintain balance  -Transfers: Provide instruction on proper hand and foot position for adequate transfer of weight onto lower extremities and use of gait device if needed and Cues for hand placement, technique and safety. Provide stabilization to prevent fall   -Gait: Gait training and Standing activities to improve: base of support, weight shift, weight bearing    -Endurance: Utilize Supervised activities to increase level of endurance to allow for safe functional mobility including transfers and gait     PT long term treatment goals are located in below grid    Patient and or family understand(s) diagnosis, prognosis, and plan of care. Frequency of treatments: Patient will be seen  daily.          Prior Level of Function: Patient ambulated independently   Rehab Potential: good  for baseline    Past medical history:   Past Medical History:   Diagnosis Date    Chest pain 3-6-2015    lexiscan nuclear stress    Chronic diastolic CHF (congestive heart failure) ([facility] [address]     Echo 1/18/2016; EF 57%    COPD (chronic obstructive pulmonary disease) (HCC)     Hepatitis C     Hilar lymphadenopathy     CT 1/2016; 1.8 cm    History of echocardiogram 01/02/2019    EF 74%; Stage I diastolic dysfunction    Hypertension     Irregular heart beat     Lumbar spondylosis 10/14/2022    Oxygen dependent      Past Surgical History:   Procedure Laterality Date    CATARACT REMOVAL Right 05/2019    CATARACT REMOVAL Left 06/2019    CERVICAL FUSION  03/09/2018    ACF C4-C7    COLONOSCOPY      HERNIA REPAIR      tracee inguinal repair    UPPER GASTROINTESTINAL ENDOSCOPY N/A 1/3/2023    EGD BIOPSY performed by Blanche Dias MD at [facility]:    Precautions: , falls , 4L O2 at baseline, shortness of breath     Social history: Patient  lives alone in a ranch home  with No steps  to enter   Tub shower with grab bars    Equipment owned: Shower chair and O2(4L),      [address]   How much difficulty turning over in bed?: None  How much difficulty sitting down on / standing up from a chair with arms?: A Little  How much difficulty moving from lying on back to sitting on side of bed?: A Little  How much help from another person moving to and from a bed to a chair?: A Little  How much help from another person needed to walk in hospital room?: A Little  How much help from another person for climbing 3-5 steps with a railing?: A Little  AM-[facility] Inpatient Mobility Raw Score : 19  AM-[facility]AC Inpatient T-Scale Score : 45.44  Mobility Inpatient CMS 0-100% Score: 41.77  Mobility Inpatient CMS G-Code Modifier : CK    Nursing cleared patient for PT treatment. OBJECTIVE;   Initial Evaluation  Date: 1/8/2023   Treatment Date:    1/8/2023   Short Term/ Long Term   Goals   Was pt agreeable to Eval/treatment? Yes Yes  To be met in 4 days   Pain level   0/10   0/10    Bed Mobility    Rolling: Supervision     Supine to sit: Supervision     Sit to supine: Supervision     Scooting: Supervision    Rolling: Independent   Supine to sit: Supervision    Sit to supine: Supervision    Scooting: Supervision     Rolling: Independent    Supine to sit:  Independent    Sit to supine: Independent    Scooting: Independent     Transfers Sit to stand: Supervision   Sit to stand: Supervision     Sit to stand: Independent    Ambulation     2x40 feet using  no device with Minimal assist of 1   for balance, upright, safety, and O2 line management  and cues for pursed lip breathing 2x10', 4x15 feet using  no device with Supervision    for balance, safety, and O2 line management        100 feet using  no device with Independent    Stair negotiation: ascended and descended   Not assessed          ROM Within functional limits    Increase range of motion 10% of affected joints    Strength BUE:  refer to OT eval  RLE:  4+/5  LLE:  4+/5  Increase strength in affected mm groups by 1/3 grade   Balance Sitting EOB:  good -  Dynamic Standing:  fair + Sitting EOB: good minus   Dynamic Standing: fair plus     Sitting EOB:  good   Dynamic Standing: good      Patient is Alert & Oriented x person, place, time, and situation and follows directions    Sensation:  Patient  reports tingling right hand     Edema:  no   Endurance: fair      Vitals:  4 liters nasal cannula   Blood Pressure at rest  Blood Pressure during session    Heart Rate at rest  Heart Rate during session    SPO2 at rest %  SPO2 during session %     Patient education  Patient educated on role of Physical Therapy, risks of immobility, safety and plan of care, energy conservation,  importance of mobility while in hospital , purse lip breathing, ankle pumps, quad set and glut set for edema control, blood clot prevention, and safety      Patient response to education:   Pt verbalized understanding Pt demonstrated skill Pt requires further education in this area   Yes Partial Yes      Treatment:  Patient practiced and was instructed/facilitated in the following treatment: Patient transferred to EOB and stood to amb to bathroom, cues for safety. Patient closed door. Patient amb in room and back to bed.            Therapeutic Exercises:  not performed       At end of session, patient in bed with  call light and phone within reach,  all lines and tubes intact, nursing notified. Patient would benefit from continued skilled Physical Therapy to improve functional independence and quality of life. Patient's/ family goals   home    Time in  208  Time out  226    Total Treatment Time 18 minutes      CPT codes.      Therapeutic activities (53990)   18 minutes  1 unit(s)    Daljit Brito PTA [facility]#[id number]

## 2023-01-09 VITALS
HEIGHT: 66 IN | WEIGHT: 180 LBS | RESPIRATION RATE: 20 BRPM | BODY MASS INDEX: 28.93 KG/M2 | DIASTOLIC BLOOD PRESSURE: 67 MMHG | SYSTOLIC BLOOD PRESSURE: 116 MMHG | TEMPERATURE: 98.6 F | OXYGEN SATURATION: 84 % | HEART RATE: 89 BPM

## 2023-01-09 PROCEDURE — 6370000000 HC RX 637 (ALT 250 FOR IP): Performed by: SPECIALIST

## 2023-01-09 PROCEDURE — 94640 AIRWAY INHALATION TREATMENT: CPT

## 2023-01-09 PROCEDURE — 6360000002 HC RX W HCPCS: Performed by: FAMILY MEDICINE

## 2023-01-09 PROCEDURE — 6370000000 HC RX 637 (ALT 250 FOR IP): Performed by: FAMILY MEDICINE

## 2023-01-09 PROCEDURE — 6370000000 HC RX 637 (ALT 250 FOR IP): Performed by: HOSPITALIST

## 2023-01-09 PROCEDURE — 2700000000 HC OXYGEN THERAPY PER DAY

## 2023-01-09 RX ORDER — FUROSEMIDE 40 MG/1
40 TABLET ORAL DAILY
Status: DISCONTINUED | OUTPATIENT
Start: 2023-01-09 | End: 2023-01-09 | Stop reason: HOSPADM

## 2023-01-09 RX ADMIN — SUCRALFATE 1 G: 1 TABLET ORAL at 06:21

## 2023-01-09 RX ADMIN — BUDESONIDE 500 MCG: 0.5 SUSPENSION RESPIRATORY (INHALATION) at 06:31

## 2023-01-09 RX ADMIN — METOPROLOL SUCCINATE 100 MG: 100 TABLET, EXTENDED RELEASE ORAL at 09:07

## 2023-01-09 RX ADMIN — PANTOPRAZOLE SODIUM 40 MG: 40 TABLET, DELAYED RELEASE ORAL at 06:21

## 2023-01-09 RX ADMIN — HYDROCODONE BITARTRATE AND ACETAMINOPHEN 1 TABLET: 10; 325 TABLET ORAL at 06:20

## 2023-01-09 RX ADMIN — SUCRALFATE 1 G: 1 TABLET ORAL at 09:07

## 2023-01-09 RX ADMIN — ARFORMOTEROL TARTRATE 15 MCG: 15 SOLUTION RESPIRATORY (INHALATION) at 06:31

## 2023-01-09 RX ADMIN — FUROSEMIDE 40 MG: 40 TABLET ORAL at 09:12

## 2023-01-09 RX ADMIN — LOSARTAN POTASSIUM 100 MG: 50 TABLET, FILM COATED ORAL at 09:07

## 2023-01-09 RX ADMIN — Medication 400 UNITS: at 09:07

## 2023-01-09 RX ADMIN — PREDNISONE 20 MG: 20 TABLET ORAL at 09:07

## 2023-01-09 RX ADMIN — CETIRIZINE HYDROCHLORIDE 10 MG: 10 TABLET, FILM COATED ORAL at 09:07

## 2023-01-09 RX ADMIN — HYDROCODONE BITARTRATE AND ACETAMINOPHEN 1 TABLET: 10; 325 TABLET ORAL at 12:14

## 2023-01-09 RX ADMIN — ASPIRIN 81 MG: 81 TABLET, COATED ORAL at 09:07

## 2023-01-09 RX ADMIN — IPRATROPIUM BROMIDE 0.5 MG: 0.5 SOLUTION RESPIRATORY (INHALATION) at 06:31

## 2023-01-09 RX ADMIN — IPRATROPIUM BROMIDE 0.5 MG: 0.5 SOLUTION RESPIRATORY (INHALATION) at 10:21

## 2023-01-09 ASSESSMENT — PAIN SCALES - GENERAL
PAINLEVEL_OUTOF10: 7
PAINLEVEL_OUTOF10: 9

## 2023-01-09 ASSESSMENT — PAIN DESCRIPTION - DESCRIPTORS: DESCRIPTORS: ACHING;DISCOMFORT;GNAWING

## 2023-01-09 NOTE — PLAN OF CARE
Problem: Pain  Goal: Verbalizes/displays adequate comfort level or baseline comfort level  1/9/2023 0334 by Birdie Schultz RN  Outcome: Progressing  1/8/2023 1648 by Rachael Richard RN  Outcome: Progressing     Problem: Discharge Planning  Goal: Discharge to home or other facility with appropriate resources  Outcome: Progressing     Problem: Chronic Conditions and Co-morbidities  Goal: Patient's chronic conditions and co-morbidity symptoms are monitored and maintained or improved  1/9/2023 0334 by Birdie Schultz RN  Outcome: Progressing  1/8/2023 1648 by Rachael Richard RN  Outcome: Progressing

## 2023-01-09 NOTE — PROGRESS NOTES
Department of Family Practice  Adult Daily Progress Note      JAVIER BUTTS IS SEEN IN FOLLOW UP TODAY ON 4 TELEMETRY. HE IS TOLERATING TREATMENT. HE HAD HIS EGD 01/03/2023 WHICH ONLY SHOWED GASTRITIS. A BIOPSY FOR H. PYLORI WAS TAKEN. HE CONTINUES ON A PPI BID AND CARAFATE. GI SAYS HIS CARAFATE CAN STOP AT DISCHARGE AND HE CAN CONTINUE ON THE PPI ONCE OR TWICE DAILY. HIS GLUCOSE LEVELS ARE HIGH SINCE ON THE STEROIDS AND THE SYSTEM DEMANDED FINGERSTICKS AND A HGBA1C.  HE WAS ALL UPSET OVER THAT. I HAD TO EXPLAIN IT ALL TO HIM SEVERAL TIMES. HGBA1C IS 5.8. PULMONARY VISITED 01/04/2023 AND CHANGED HIS STEROID TO 40 MG PO DAILY. HE IS TO CONTINUE FOR TWO WEEKS TAPERING THE DOSE OVER THAT TIME. HE ALSO STARTED ZITHROMAX 500 MG PO DAILY FOR 5 DAYS. HE IS ON GABAPENTIN 300 MG AT NIGHT. HE HAD SOME RUNS OF A. FIB, SOME PAC'S. DR. Shea Coreas CONSULTED TO SEE HIM ABOUT THIS. HE FEELS IT WAS SINUS TACHYCARDIA DUE TO HIS BREATHING PROBLEMS AND THE INHALERS. THE ECHOCARDIOGRAM WAS NOT DONE TILL 01/07/2023. RESULTS SHOW SOME IMPROVEMENT IN EF SINCE THE LAST ECHO. HE CAN SEE DR. ARRIETA IN A COUPLE OF SEEKS. HE IS READY TO GO.  HE HAD A FALL A SHORT WHILE AGO. MAY HAVE SLIPPED ON SOME WATER. HE IS ABLE TO WALK BUT I WILL ORDER A RIGHT HIP X-RAY JUST THE SAME. WILL PREPARE HIM FOR DISCHARGE.   IF X-RAY IS NEGATIVE HE CAN GO.          OBJECTIVE    Physical  VITALS:  /73   Pulse 70   Temp 97.5 °F (36.4 °C) (Temporal)   Resp 18   Ht 5' 6\" (1.676 m)   Wt 180 lb (81.6 kg)   SpO2 99%   BMI 29.05 kg/m²   CONSTITUTIONAL:  awake, alert, cooperative, no apparent distress, and appears stated age  BACK:  Symmetric, no curvature, spinous processes are non-tender on palpation, paraspinous muscles are non-tender on palpation, no costal vertebral tenderness  LUNGS:  NO increased work of breathing, BETTER  air exchange, clear to auscultation bilaterally, no crackles or wheezing  CARDIOVASCULAR:  Normal apical impulse, regular rate and rhythm, normal S1 and S2, no S3 or S4, and no murmur noted  ABDOMEN:  No scars, normal bowel sounds, soft, non-distended, non-tender, no masses palpated, no hepatosplenomegally   MUSCULOSKELETAL:  NO IMPAIRED MOBILITY; NO SIGNS OF PAIN; GOT UP FROM LYING POSITION IN BED TO A SITTING POSITION WITHOUT DIFFICULTY.   Data  CBC with Differential:    Lab Results   Component Value Date/Time    WBC 11.5 01/03/2023 09:12 AM    RBC 3.70 01/03/2023 09:12 AM    HGB 11.2 01/03/2023 09:12 AM    HCT 38.8 01/03/2023 09:12 AM     01/03/2023 09:12 AM    .9 01/03/2023 09:12 AM    MCH 30.3 01/03/2023 09:12 AM    MCHC 28.9 01/03/2023 09:12 AM    RDW 12.9 01/03/2023 09:12 AM    NRBC 1.7 09/27/2022 05:30 AM    SEGSPCT 60 04/24/2013 03:25 PM    LYMPHOPCT 1.9 01/03/2023 09:12 AM    MONOPCT 5.7 01/03/2023 09:12 AM    BASOPCT 0.1 01/03/2023 09:12 AM    MONOSABS 0.65 01/03/2023 09:12 AM    LYMPHSABS 0.22 01/03/2023 09:12 AM    EOSABS 0.00 01/03/2023 09:12 AM    BASOSABS 0.01 01/03/2023 09:12 AM     BMP:    Lab Results   Component Value Date/Time     01/05/2023 07:34 AM    K 4.1 01/05/2023 07:34 AM    K 4.8 09/25/2022 08:17 AM    CL 93 01/05/2023 07:34 AM    CO2 39 01/05/2023 07:34 AM    BUN 32 01/05/2023 07:34 AM    LABALBU 3.7 01/03/2023 09:12 AM    LABALBU 4.6 07/14/2011 02:10 PM    CREATININE 1.4 01/05/2023 07:34 AM    CALCIUM 9.3 01/05/2023 07:34 AM    GFRAA >60 10/02/2022 05:33 AM    LABGLOM 54 01/05/2023 07:34 AM    GLUCOSE 150 01/05/2023 07:34 AM    GLUCOSE 101 07/14/2011 02:10 PM     Current Medications  Scheduled Meds:   metoprolol succinate  100 mg Oral BID    furosemide  40 mg Oral BID    pantoprazole  40 mg Oral BID AC    predniSONE  40 mg Oral Daily    sucralfate  1 g Oral 4x Daily AC & HS    gabapentin  300 mg Oral Nightly    aspirin  81 mg Oral Daily    cetirizine  10 mg Oral Daily    hydrocortisone   Topical Daily    ipratropium  0.5 mg Nebulization 4x daily    losartan  100 mg Oral Daily    triamcinolone   Topical BID    vitamin E  400 Units Oral Daily    arformoterol tartrate  15 mcg Nebulization BID    And    budesonide  0.5 mg Nebulization BID     Continuous Infusions:   dextrose       PRN Meds:.glucose, dextrose bolus **OR** dextrose bolus, glucagon (rDNA), dextrose, HYDROcodone-acetaminophen, hydrocortisone, ketoconazole, albuterol    ASSESSMENT AND PLAN      Principal Problem:    COPD exacerbation (HCC)  Resolved Problems:    * No resolved hospital problems. *      PREPARE FOR DISCHARGE TOMORROW. X-RAY RIGHT HIP.

## 2023-01-09 NOTE — PROGRESS NOTES
Cardiology  Progress Note      SUBJECTIVE:  No chest pain. Baseline dyspnea with activities. Patient said that he was sitting up on the commode this morning when he felt lightheaded and with clammy sensation.     Current Inpatient Medications  Current Facility-Administered Medications: predniSONE (DELTASONE) tablet 20 mg, 20 mg, Oral, Daily  metoprolol succinate (TOPROL XL) extended release tablet 100 mg, 100 mg, Oral, BID  furosemide (LASIX) tablet 40 mg, 40 mg, Oral, BID  pantoprazole (PROTONIX) tablet 40 mg, 40 mg, Oral, BID AC  glucose chewable tablet 16 g, 4 tablet, Oral, PRN  dextrose bolus 10% 125 mL, 125 mL, IntraVENous, PRN **OR** dextrose bolus 10% 250 mL, 250 mL, IntraVENous, PRN  glucagon (rDNA) injection 1 mg, 1 mg, SubCUTAneous, PRN  dextrose 10 % infusion, , IntraVENous, Continuous PRN  sucralfate (CARAFATE) tablet 1 g, 1 g, Oral, 4x Daily AC & HS  HYDROcodone-acetaminophen (NORCO)  MG per tablet 1 tablet, 1 tablet, Oral, Q6H PRN  gabapentin (NEURONTIN) capsule 300 mg, 300 mg, Oral, Nightly  aspirin EC tablet 81 mg, 81 mg, Oral, Daily  cetirizine (ZYRTEC) tablet 10 mg, 10 mg, Oral, Daily  hydrocortisone 2.5 % cream, , Topical, Daily  hydrocortisone 2.5 % cream, , Topical, BID PRN  ipratropium (ATROVENT) 0.02 % nebulizer solution 0.5 mg, 0.5 mg, Nebulization, 4x daily  ketoconazole (NIZORAL) 2 % cream, , Topical, Daily PRN  losartan (COZAAR) tablet 100 mg, 100 mg, Oral, Daily  triamcinolone (KENALOG) 0.1 % cream, , Topical, BID  vitamin E capsule 400 Units, 400 Units, Oral, Daily  albuterol (PROVENTIL) nebulizer solution 2.5 mg, 2.5 mg, Nebulization, Q6H PRN  Arformoterol Tartrate (BROVANA) nebulizer solution 15 mcg, 15 mcg, Nebulization, BID **AND** budesonide (PULMICORT) nebulizer suspension 500 mcg, 0.5 mg, Nebulization, BID      Physical  VITALS:  /67   Pulse 89   Temp 98.6 °F (37 °C) (Oral)   Resp 20   Ht 5' 6\" (1.676 m)   Wt 180 lb (81.6 kg)   SpO2 (!) 84%   BMI 29.05 kg/m² CURRENT TEMPERATURE:  Temp: 98.6 °F (37 °C)  CONSTITUTIONAL: No acute distress. EYES: Vision is intact. ENT: No sore throat. No ear drainage. NECK: No JVD. BACK: Symmetric. LUNGS:  diminished breath sounds right base and left base  CARDIOVASCULAR:  normal S1 and S2, no S3, and no S4  ABDOMEN:  non-tender  NEUROLOGIC: No focal deficits. EXTREMITIES: No edema cyanosis or clubbing. DATA:      ECG:  I have reviewed EKG with the following interpretation:  normal sinus rhythm    Cardiology Labs:  BMP:    Lab Results   Component Value Date/Time     01/05/2023 07:34 AM    K 4.1 01/05/2023 07:34 AM    K 4.8 09/25/2022 08:17 AM    CL 93 01/05/2023 07:34 AM    CO2 39 01/05/2023 07:34 AM    BUN 32 01/05/2023 07:34 AM     CBC:    Lab Results   Component Value Date/Time    WBC 11.5 01/03/2023 09:12 AM    RBC 3.70 01/03/2023 09:12 AM    HGB 11.2 01/03/2023 09:12 AM    HCT 38.8 01/03/2023 09:12 AM    .9 01/03/2023 09:12 AM    RDW 12.9 01/03/2023 09:12 AM     01/03/2023 09:12 AM     PT/INR:  No results found for: PTINR  TROPONIN:  No components found for: TROP    ASSESSMENT      1.dilated cardiomyopathy with shortness of breath. Please refer to consult. Echocardiogram is showing mildly reduced left ventricular systolic function with ejection fraction around 45 to 50%. Patient had improvement in his left ventricular systolic function from before. Is on Toprol-XL and losartan in addition to Lasix. Episode of orthostatic lightheadedness this morning while in the bathroom sitting on the commode. This can be from orthostatic hypotension. I instructed the nurse to check for orthostatic changes. Next I recommend decreasing dose of Lasix to once a day. Patient has also problem with COPD from smoking in the past and some of his shortness of breath can be related to COPD exacerbation. 2.tachycardia. It can be related to his inhalers and respiratory problems.   Dose of Toprol-XL was increased especially with his history of cardiomyopathy. 3.elevated troponin level. Probably demand ischemia from his respiratory problems with type II non-STEMI. He had elevation of his troponin level on previous blood test done in September of last year. Patient wants to go home. He was instructed to call my office for appointment in 2 weeks and I will rediscuss with him at that time doing coronary work-up.

## 2023-01-09 NOTE — PROGRESS NOTES
Per hospital [address] screening, patient triggers at a High Fall risk and requires bed alarm and assistance to get out bed. Patient was walking around room refuses to use call light and use of bed/chair alarms for safetys sake. Refused to sign \"refusal form\" . Told this nurse \"this has nothing to do with you, im leaving tomorrow and its my business, not yours\".

## 2023-01-09 NOTE — CARE COORDINATION
1/9/2023 0921 CM note:  Negative covid 12/30/23. Pt resides alone in a ranch home,. He has a nebulizer and home [email or web address] from [place]. Pt has aide services through waiver 7 days/week, 4hrs/day. [facility] accepted pt,received orders and notified of discharge order. Plan is for pt to return home with [facility] services.  Sari TAYLOR

## 2023-04-06 ENCOUNTER — HOSPITAL ENCOUNTER (INPATIENT)
Age: 73
LOS: 6 days | Discharge: HOME | DRG: 190 | End: 2023-04-12
Attending: EMERGENCY MEDICINE | Admitting: FAMILY MEDICINE
Payer: COMMERCIAL

## 2023-04-06 ENCOUNTER — APPOINTMENT (OUTPATIENT)
Dept: CT IMAGING | Age: 73
DRG: 190 | End: 2023-04-06
Payer: COMMERCIAL

## 2023-04-06 ENCOUNTER — APPOINTMENT (OUTPATIENT)
Dept: GENERAL RADIOLOGY | Age: 73
DRG: 190 | End: 2023-04-06
Payer: COMMERCIAL

## 2023-04-06 DIAGNOSIS — J44.1 COPD EXACERBATION (HCC): Primary | ICD-10-CM

## 2023-04-06 LAB
ALBUMIN SERPL-MCNC: 4.4 G/DL (ref 3.5–5.2)
ALP SERPL-CCNC: 55 U/L (ref 40–129)
ALT SERPL-CCNC: 7 U/L (ref 0–40)
ANION GAP SERPL CALCULATED.3IONS-SCNC: 6 MMOL/L (ref 7–16)
AST SERPL-CCNC: 27 U/L (ref 0–39)
B.E.: 7.2 MMOL/L (ref -3–3)
BASOPHILS # BLD: 0.05 E9/L (ref 0–0.2)
BASOPHILS NFR BLD: 0.7 % (ref 0–2)
BILIRUB SERPL-MCNC: 0.6 MG/DL (ref 0–1.2)
BILIRUB UR QL STRIP: ABNORMAL
BUN SERPL-MCNC: 23 MG/DL (ref 6–23)
CALCIUM SERPL-MCNC: 9.9 MG/DL (ref 8.6–10.2)
CHLORIDE SERPL-SCNC: 87 MMOL/L (ref 98–107)
CLARITY UR: CLEAR
CO2 SERPL-SCNC: 42 MMOL/L (ref 22–29)
COHB: 0.2 % (ref 0–1.5)
COLOR UR: YELLOW
CREAT SERPL-MCNC: 1.6 MG/DL (ref 0.7–1.2)
CRITICAL: ABNORMAL
DATE ANALYZED: ABNORMAL
DATE OF COLLECTION: ABNORMAL
EOSINOPHIL # BLD: 0.01 E9/L (ref 0.05–0.5)
EOSINOPHIL NFR BLD: 0.1 % (ref 0–6)
ERYTHROCYTE [DISTWIDTH] IN BLOOD BY AUTOMATED COUNT: 13.5 FL (ref 11.5–15)
GLUCOSE SERPL-MCNC: 104 MG/DL (ref 74–99)
GLUCOSE UR STRIP-MCNC: NEGATIVE MG/DL
HCO3: 33.8 MMOL/L (ref 22–26)
HCT VFR BLD AUTO: 37.2 % (ref 37–54)
HGB BLD-MCNC: 11.5 G/DL (ref 12.5–16.5)
HGB UR QL STRIP: NEGATIVE
HHB: 1.9 % (ref 0–5)
IMM GRANULOCYTES # BLD: 0.03 E9/L
IMM GRANULOCYTES NFR BLD: 0.4 % (ref 0–5)
KETONES UR STRIP-MCNC: ABNORMAL MG/DL
LAB: ABNORMAL
LEUKOCYTE ESTERASE UR QL STRIP: NEGATIVE
LIPASE: 21 U/L (ref 13–60)
LYMPHOCYTES # BLD: 1.04 E9/L (ref 1.5–4)
LYMPHOCYTES NFR BLD: 14.2 % (ref 20–42)
Lab: ABNORMAL
MAGNESIUM SERPL-MCNC: 2.2 MG/DL (ref 1.6–2.6)
MCH RBC QN AUTO: 31.1 PG (ref 26–35)
MCHC RBC AUTO-ENTMCNC: 30.9 % (ref 32–34.5)
MCV RBC AUTO: 100.5 FL (ref 80–99.9)
METHB: 0.5 % (ref 0–1.5)
MODE: ABNORMAL
MONOCYTES # BLD: 1.2 E9/L (ref 0.1–0.95)
MONOCYTES NFR BLD: 16.3 % (ref 2–12)
NEUTROPHILS # BLD: 5.01 E9/L (ref 1.8–7.3)
NEUTS SEG NFR BLD: 68.3 % (ref 43–80)
NITRITE UR QL STRIP: NEGATIVE
O2 CONTENT: 17 ML/DL
O2 SATURATION: 98.1 % (ref 92–98.5)
O2HB: 97.4 % (ref 94–97)
OPERATOR ID: 30
PATIENT TEMP: 37 C
PCO2: 57.4 MMHG (ref 35–45)
PH BLOOD GAS: 7.39 (ref 7.35–7.45)
PH UR STRIP: 6 [PH] (ref 5–9)
PLATELET # BLD AUTO: 257 E9/L (ref 130–450)
PMV BLD AUTO: 10.3 FL (ref 7–12)
PO2: 109.7 MMHG (ref 75–100)
POTASSIUM SERPL-SCNC: 4.4 MMOL/L (ref 3.5–5)
PROT SERPL-MCNC: 8 G/DL (ref 6.4–8.3)
PROT UR STRIP-MCNC: NEGATIVE MG/DL
RBC # BLD AUTO: 3.7 E12/L (ref 3.8–5.8)
SARS-COV-2 RDRP RESP QL NAA+PROBE: NOT DETECTED
SODIUM SERPL-SCNC: 135 MMOL/L (ref 132–146)
SOURCE, BLOOD GAS: ABNORMAL
SP GR UR STRIP: 1.02 (ref 1–1.03)
THB: 12.3 G/DL (ref 11.5–16.5)
TIME ANALYZED: 1913
TROPONIN, HIGH SENSITIVITY: 68 NG/L (ref 0–11)
TROPONIN, HIGH SENSITIVITY: 78 NG/L (ref 0–11)
UROBILINOGEN UR STRIP-ACNC: 1 E.U./DL
WBC # BLD: 7.3 E9/L (ref 4.5–11.5)

## 2023-04-06 PROCEDURE — 71046 X-RAY EXAM CHEST 2 VIEWS: CPT

## 2023-04-06 PROCEDURE — 36415 COLL VENOUS BLD VENIPUNCTURE: CPT

## 2023-04-06 PROCEDURE — 81003 URINALYSIS AUTO W/O SCOPE: CPT

## 2023-04-06 PROCEDURE — 1200000000 HC SEMI PRIVATE

## 2023-04-06 PROCEDURE — 6360000002 HC RX W HCPCS: Performed by: STUDENT IN AN ORGANIZED HEALTH CARE EDUCATION/TRAINING PROGRAM

## 2023-04-06 PROCEDURE — 6370000000 HC RX 637 (ALT 250 FOR IP): Performed by: STUDENT IN AN ORGANIZED HEALTH CARE EDUCATION/TRAINING PROGRAM

## 2023-04-06 PROCEDURE — 87635 SARS-COV-2 COVID-19 AMP PRB: CPT

## 2023-04-06 PROCEDURE — 94640 AIRWAY INHALATION TREATMENT: CPT

## 2023-04-06 PROCEDURE — 80053 COMPREHEN METABOLIC PANEL: CPT

## 2023-04-06 PROCEDURE — 93005 ELECTROCARDIOGRAM TRACING: CPT | Performed by: STUDENT IN AN ORGANIZED HEALTH CARE EDUCATION/TRAINING PROGRAM

## 2023-04-06 PROCEDURE — 85025 COMPLETE CBC W/AUTO DIFF WBC: CPT

## 2023-04-06 PROCEDURE — 83735 ASSAY OF MAGNESIUM: CPT

## 2023-04-06 PROCEDURE — 84484 ASSAY OF TROPONIN QUANT: CPT

## 2023-04-06 PROCEDURE — 99285 EMERGENCY DEPT VISIT HI MDM: CPT

## 2023-04-06 PROCEDURE — 74176 CT ABD & PELVIS W/O CONTRAST: CPT

## 2023-04-06 PROCEDURE — 82805 BLOOD GASES W/O2 SATURATION: CPT

## 2023-04-06 PROCEDURE — 83690 ASSAY OF LIPASE: CPT

## 2023-04-06 PROCEDURE — 96374 THER/PROPH/DIAG INJ IV PUSH: CPT

## 2023-04-06 RX ORDER — METHYLPREDNISOLONE SODIUM SUCCINATE 125 MG/2ML
125 INJECTION, POWDER, LYOPHILIZED, FOR SOLUTION INTRAMUSCULAR; INTRAVENOUS ONCE
Status: COMPLETED | OUTPATIENT
Start: 2023-04-06 | End: 2023-04-06

## 2023-04-06 RX ORDER — IPRATROPIUM BROMIDE AND ALBUTEROL SULFATE 2.5; .5 MG/3ML; MG/3ML
3 SOLUTION RESPIRATORY (INHALATION) ONCE
Status: COMPLETED | OUTPATIENT
Start: 2023-04-06 | End: 2023-04-06

## 2023-04-06 RX ADMIN — IPRATROPIUM BROMIDE AND ALBUTEROL SULFATE 3 AMPULE: .5; 2.5 SOLUTION RESPIRATORY (INHALATION) at 19:51

## 2023-04-06 RX ADMIN — METHYLPREDNISOLONE SODIUM SUCCINATE 125 MG: 125 INJECTION, POWDER, FOR SOLUTION INTRAMUSCULAR; INTRAVENOUS at 16:58

## 2023-04-06 RX ADMIN — IPRATROPIUM BROMIDE AND ALBUTEROL SULFATE 3 AMPULE: .5; 3 SOLUTION RESPIRATORY (INHALATION) at 16:57

## 2023-04-06 ASSESSMENT — PAIN - FUNCTIONAL ASSESSMENT
PAIN_FUNCTIONAL_ASSESSMENT: 0-10
PAIN_FUNCTIONAL_ASSESSMENT: PREVENTS OR INTERFERES SOME ACTIVE ACTIVITIES AND ADLS

## 2023-04-06 ASSESSMENT — PAIN SCALES - GENERAL
PAINLEVEL_OUTOF10: 10
PAINLEVEL_OUTOF10: 10

## 2023-04-06 ASSESSMENT — PAIN DESCRIPTION - LOCATION: LOCATION: GENERALIZED

## 2023-04-06 ASSESSMENT — PAIN DESCRIPTION - DESCRIPTORS: DESCRIPTORS: ACHING

## 2023-04-07 LAB
EKG ATRIAL RATE: 82 BPM
EKG P AXIS: 62 DEGREES
EKG P-R INTERVAL: 172 MS
EKG Q-T INTERVAL: 406 MS
EKG QRS DURATION: 82 MS
EKG QTC CALCULATION (BAZETT): 474 MS
EKG R AXIS: 56 DEGREES
EKG T AXIS: 69 DEGREES
EKG VENTRICULAR RATE: 82 BPM

## 2023-04-07 PROCEDURE — 6370000000 HC RX 637 (ALT 250 FOR IP): Performed by: FAMILY MEDICINE

## 2023-04-07 PROCEDURE — 94640 AIRWAY INHALATION TREATMENT: CPT

## 2023-04-07 PROCEDURE — 1200000000 HC SEMI PRIVATE

## 2023-04-07 PROCEDURE — 93010 ELECTROCARDIOGRAM REPORT: CPT | Performed by: INTERNAL MEDICINE

## 2023-04-07 PROCEDURE — 6360000002 HC RX W HCPCS: Performed by: FAMILY MEDICINE

## 2023-04-07 PROCEDURE — 2700000000 HC OXYGEN THERAPY PER DAY

## 2023-04-07 RX ORDER — TRIAMCINOLONE ACETONIDE 1 MG/G
CREAM TOPICAL 2 TIMES DAILY
Status: DISCONTINUED | OUTPATIENT
Start: 2023-04-07 | End: 2023-04-12 | Stop reason: HOSPADM

## 2023-04-07 RX ORDER — FUROSEMIDE 40 MG/1
40 TABLET ORAL 2 TIMES DAILY
Status: DISCONTINUED | OUTPATIENT
Start: 2023-04-07 | End: 2023-04-12 | Stop reason: HOSPADM

## 2023-04-07 RX ORDER — DIAPER,BRIEF,INFANT-TODD,DISP
EACH MISCELLANEOUS 2 TIMES DAILY
Status: DISCONTINUED | OUTPATIENT
Start: 2023-04-07 | End: 2023-04-12 | Stop reason: HOSPADM

## 2023-04-07 RX ORDER — ASPIRIN 81 MG/1
81 TABLET, CHEWABLE ORAL DAILY
Status: DISCONTINUED | OUTPATIENT
Start: 2023-04-07 | End: 2023-04-12 | Stop reason: HOSPADM

## 2023-04-07 RX ORDER — HYDROCODONE BITARTRATE AND ACETAMINOPHEN 5; 325 MG/1; MG/1
1 TABLET ORAL ONCE
Status: COMPLETED | OUTPATIENT
Start: 2023-04-07 | End: 2023-04-07

## 2023-04-07 RX ORDER — BUDESONIDE 0.5 MG/2ML
0.5 INHALANT ORAL 2 TIMES DAILY
Status: DISCONTINUED | OUTPATIENT
Start: 2023-04-07 | End: 2023-04-12 | Stop reason: HOSPADM

## 2023-04-07 RX ORDER — ALBUTEROL SULFATE 2.5 MG/3ML
2.5 SOLUTION RESPIRATORY (INHALATION) EVERY 6 HOURS PRN
Status: DISCONTINUED | OUTPATIENT
Start: 2023-04-07 | End: 2023-04-12 | Stop reason: HOSPADM

## 2023-04-07 RX ORDER — KETOCONAZOLE 20 MG/G
CREAM TOPICAL DAILY
Status: DISCONTINUED | OUTPATIENT
Start: 2023-04-07 | End: 2023-04-12 | Stop reason: HOSPADM

## 2023-04-07 RX ORDER — METHYLPREDNISOLONE SODIUM SUCCINATE 40 MG/ML
20 INJECTION, POWDER, LYOPHILIZED, FOR SOLUTION INTRAMUSCULAR; INTRAVENOUS EVERY 8 HOURS
Status: DISCONTINUED | OUTPATIENT
Start: 2023-04-09 | End: 2023-04-08

## 2023-04-07 RX ORDER — HYDROCHLOROTHIAZIDE 25 MG/1
25 TABLET ORAL DAILY
Status: DISCONTINUED | OUTPATIENT
Start: 2023-04-07 | End: 2023-04-12 | Stop reason: HOSPADM

## 2023-04-07 RX ORDER — GABAPENTIN 300 MG/1
300 CAPSULE ORAL NIGHTLY
Status: DISCONTINUED | OUTPATIENT
Start: 2023-04-07 | End: 2023-04-12 | Stop reason: HOSPADM

## 2023-04-07 RX ORDER — BUDESONIDE AND FORMOTEROL FUMARATE DIHYDRATE 160; 4.5 UG/1; UG/1
2 AEROSOL RESPIRATORY (INHALATION) 2 TIMES DAILY
Status: DISCONTINUED | OUTPATIENT
Start: 2023-04-07 | End: 2023-04-07 | Stop reason: CLARIF

## 2023-04-07 RX ORDER — HYDROCODONE BITARTRATE AND ACETAMINOPHEN 5; 325 MG/1; MG/1
1 TABLET ORAL EVERY 6 HOURS PRN
Status: DISCONTINUED | OUTPATIENT
Start: 2023-04-07 | End: 2023-04-12 | Stop reason: HOSPADM

## 2023-04-07 RX ORDER — LOSARTAN POTASSIUM 50 MG/1
100 TABLET ORAL DAILY
Status: DISCONTINUED | OUTPATIENT
Start: 2023-04-07 | End: 2023-04-12 | Stop reason: HOSPADM

## 2023-04-07 RX ORDER — METHYLPREDNISOLONE SODIUM SUCCINATE 125 MG/2ML
60 INJECTION, POWDER, LYOPHILIZED, FOR SOLUTION INTRAMUSCULAR; INTRAVENOUS EVERY 8 HOURS
Status: COMPLETED | OUTPATIENT
Start: 2023-04-07 | End: 2023-04-07

## 2023-04-07 RX ORDER — ARFORMOTEROL TARTRATE 15 UG/2ML
15 SOLUTION RESPIRATORY (INHALATION) 2 TIMES DAILY
Status: DISCONTINUED | OUTPATIENT
Start: 2023-04-07 | End: 2023-04-12 | Stop reason: HOSPADM

## 2023-04-07 RX ORDER — METHYLPREDNISOLONE SODIUM SUCCINATE 40 MG/ML
40 INJECTION, POWDER, LYOPHILIZED, FOR SOLUTION INTRAMUSCULAR; INTRAVENOUS EVERY 8 HOURS
Status: DISCONTINUED | OUTPATIENT
Start: 2023-04-08 | End: 2023-04-08

## 2023-04-07 RX ORDER — PANTOPRAZOLE SODIUM 40 MG/1
40 TABLET, DELAYED RELEASE ORAL
Status: DISCONTINUED | OUTPATIENT
Start: 2023-04-07 | End: 2023-04-12 | Stop reason: HOSPADM

## 2023-04-07 RX ORDER — METOPROLOL SUCCINATE 100 MG/1
100 TABLET, EXTENDED RELEASE ORAL EVERY 12 HOURS SCHEDULED
Status: DISCONTINUED | OUTPATIENT
Start: 2023-04-07 | End: 2023-04-12 | Stop reason: HOSPADM

## 2023-04-07 RX ORDER — CETIRIZINE HYDROCHLORIDE 10 MG/1
10 TABLET ORAL DAILY
Status: DISCONTINUED | OUTPATIENT
Start: 2023-04-07 | End: 2023-04-12 | Stop reason: HOSPADM

## 2023-04-07 RX ADMIN — METOPROLOL SUCCINATE 100 MG: 100 TABLET, FILM COATED, EXTENDED RELEASE ORAL at 09:31

## 2023-04-07 RX ADMIN — ASPIRIN 81 MG 81 MG: 81 TABLET ORAL at 09:32

## 2023-04-07 RX ADMIN — HYDROCODONE BITARTRATE AND ACETAMINOPHEN 1 TABLET: 5; 325 TABLET ORAL at 11:01

## 2023-04-07 RX ADMIN — KETOCONAZOLE: 20 CREAM TOPICAL at 12:19

## 2023-04-07 RX ADMIN — METHYLPREDNISOLONE SODIUM SUCCINATE 60 MG: 125 INJECTION, POWDER, FOR SOLUTION INTRAMUSCULAR; INTRAVENOUS at 13:49

## 2023-04-07 RX ADMIN — IPRATROPIUM BROMIDE 0.5 MG: 0.5 SOLUTION RESPIRATORY (INHALATION) at 14:13

## 2023-04-07 RX ADMIN — FUROSEMIDE 40 MG: 40 TABLET ORAL at 17:45

## 2023-04-07 RX ADMIN — HYDROCODONE BITARTRATE AND ACETAMINOPHEN 1 TABLET: 5; 325 TABLET ORAL at 02:16

## 2023-04-07 RX ADMIN — ALBUTEROL SULFATE 2.5 MG: 2.5 SOLUTION RESPIRATORY (INHALATION) at 18:06

## 2023-04-07 RX ADMIN — CETIRIZINE HYDROCHLORIDE 10 MG: 10 TABLET, FILM COATED ORAL at 09:32

## 2023-04-07 RX ADMIN — METOPROLOL SUCCINATE 100 MG: 100 TABLET, FILM COATED, EXTENDED RELEASE ORAL at 20:36

## 2023-04-07 RX ADMIN — HYDROCODONE BITARTRATE AND ACETAMINOPHEN 1 TABLET: 5; 325 TABLET ORAL at 17:45

## 2023-04-07 RX ADMIN — HYDROCORTISONE: 1 CREAM TOPICAL at 20:40

## 2023-04-07 RX ADMIN — ALBUTEROL SULFATE 2.5 MG: 2.5 SOLUTION RESPIRATORY (INHALATION) at 14:13

## 2023-04-07 RX ADMIN — TRIAMCINOLONE ACETONIDE: 1 CREAM TOPICAL at 12:19

## 2023-04-07 RX ADMIN — GABAPENTIN 300 MG: 300 CAPSULE ORAL at 20:35

## 2023-04-07 RX ADMIN — FUROSEMIDE 40 MG: 40 TABLET ORAL at 09:32

## 2023-04-07 RX ADMIN — HYDROCORTISONE: 1 CREAM TOPICAL at 12:17

## 2023-04-07 RX ADMIN — HYDROCHLOROTHIAZIDE 25 MG: 25 TABLET ORAL at 09:32

## 2023-04-07 RX ADMIN — ARFORMOTEROL TARTRATE 15 MCG: 15 SOLUTION RESPIRATORY (INHALATION) at 06:45

## 2023-04-07 RX ADMIN — BUDESONIDE 500 MCG: 0.5 SUSPENSION RESPIRATORY (INHALATION) at 06:45

## 2023-04-07 RX ADMIN — PANTOPRAZOLE SODIUM 40 MG: 40 TABLET, DELAYED RELEASE ORAL at 06:49

## 2023-04-07 RX ADMIN — IPRATROPIUM BROMIDE 0.5 MG: 0.5 SOLUTION RESPIRATORY (INHALATION) at 06:45

## 2023-04-07 RX ADMIN — METHYLPREDNISOLONE SODIUM SUCCINATE 60 MG: 125 INJECTION, POWDER, FOR SOLUTION INTRAMUSCULAR; INTRAVENOUS at 20:35

## 2023-04-07 RX ADMIN — BUDESONIDE 500 MCG: 0.5 SUSPENSION RESPIRATORY (INHALATION) at 18:06

## 2023-04-07 RX ADMIN — METHYLPREDNISOLONE SODIUM SUCCINATE 60 MG: 125 INJECTION, POWDER, FOR SOLUTION INTRAMUSCULAR; INTRAVENOUS at 07:35

## 2023-04-07 RX ADMIN — ARFORMOTEROL TARTRATE 15 MCG: 15 SOLUTION RESPIRATORY (INHALATION) at 18:06

## 2023-04-07 RX ADMIN — LOSARTAN POTASSIUM 100 MG: 50 TABLET, FILM COATED ORAL at 09:32

## 2023-04-07 RX ADMIN — TRIAMCINOLONE ACETONIDE: 1 CREAM TOPICAL at 20:39

## 2023-04-07 RX ADMIN — IPRATROPIUM BROMIDE 0.5 MG: 0.5 SOLUTION RESPIRATORY (INHALATION) at 18:06

## 2023-04-07 ASSESSMENT — PAIN DESCRIPTION - LOCATION
LOCATION: GENERALIZED
LOCATION: BACK;NECK
LOCATION: NECK;BACK
LOCATION: GENERALIZED

## 2023-04-07 ASSESSMENT — PAIN - FUNCTIONAL ASSESSMENT: PAIN_FUNCTIONAL_ASSESSMENT: ACTIVITIES ARE NOT PREVENTED

## 2023-04-07 ASSESSMENT — PAIN DESCRIPTION - DESCRIPTORS
DESCRIPTORS: ACHING;NAGGING;DISCOMFORT
DESCRIPTORS: ACHING;CRAMPING;DISCOMFORT
DESCRIPTORS: ACHING
DESCRIPTORS: ACHING;DISCOMFORT;CRAMPING

## 2023-04-07 ASSESSMENT — PAIN SCALES - GENERAL
PAINLEVEL_OUTOF10: 10
PAINLEVEL_OUTOF10: 8

## 2023-04-07 ASSESSMENT — PAIN DESCRIPTION - PAIN TYPE: TYPE: ACUTE PAIN

## 2023-04-08 ENCOUNTER — APPOINTMENT (OUTPATIENT)
Dept: CT IMAGING | Age: 73
DRG: 190 | End: 2023-04-08
Payer: COMMERCIAL

## 2023-04-08 LAB
B PARAP IS1001 DNA NPH QL NAA+NON-PROBE: NOT DETECTED
B PERT.PT PRMT NPH QL NAA+NON-PROBE: NOT DETECTED
B.E.: 11.6 MMOL/L (ref -3–3)
C PNEUM DNA NPH QL NAA+NON-PROBE: NOT DETECTED
COHB: 0 % (ref 0–1.5)
COMMENT: ABNORMAL
CRITICAL: ABNORMAL
DATE ANALYZED: ABNORMAL
DATE OF COLLECTION: ABNORMAL
FLUAV RNA NPH QL NAA+NON-PROBE: NOT DETECTED
FLUBV RNA NPH QL NAA+NON-PROBE: NOT DETECTED
HADV DNA NPH QL NAA+NON-PROBE: NOT DETECTED
HCO3: 39.4 MMOL/L (ref 22–26)
HCOV 229E RNA NPH QL NAA+NON-PROBE: NOT DETECTED
HCOV HKU1 RNA NPH QL NAA+NON-PROBE: NOT DETECTED
HCOV NL63 RNA NPH QL NAA+NON-PROBE: NOT DETECTED
HCOV OC43 RNA NPH QL NAA+NON-PROBE: NOT DETECTED
HHB: 2 % (ref 0–5)
HMPV RNA NPH QL NAA+NON-PROBE: NOT DETECTED
HPIV1 RNA NPH QL NAA+NON-PROBE: NOT DETECTED
HPIV2 RNA NPH QL NAA+NON-PROBE: NOT DETECTED
HPIV3 RNA NPH QL NAA+NON-PROBE: NOT DETECTED
HPIV4 RNA NPH QL NAA+NON-PROBE: NOT DETECTED
LAB: ABNORMAL
Lab: ABNORMAL
M PNEUMO DNA NPH QL NAA+NON-PROBE: NOT DETECTED
METHB: 0.4 % (ref 0–1.5)
MODE: ABNORMAL
O2 CONTENT: 16.9 ML/DL
O2 SATURATION: 98 % (ref 92–98.5)
O2HB: 97.6 % (ref 94–97)
OPERATOR ID: ABNORMAL
PATIENT TEMP: 37 C
PCO2: 69.3 MMHG (ref 35–45)
PH BLOOD GAS: 7.37 (ref 7.35–7.45)
PO2: 113.2 MMHG (ref 75–100)
RSV RNA NPH QL NAA+NON-PROBE: NOT DETECTED
RV+EV RNA NPH QL NAA+NON-PROBE: NOT DETECTED
SARS-COV-2 RNA NPH QL NAA+NON-PROBE: NOT DETECTED
SOURCE, BLOOD GAS: ABNORMAL
THB: 12.2 G/DL (ref 11.5–16.5)
TIME ANALYZED: 1950

## 2023-04-08 PROCEDURE — 94640 AIRWAY INHALATION TREATMENT: CPT

## 2023-04-08 PROCEDURE — 1200000000 HC SEMI PRIVATE

## 2023-04-08 PROCEDURE — 6360000002 HC RX W HCPCS: Performed by: FAMILY MEDICINE

## 2023-04-08 PROCEDURE — 0202U NFCT DS 22 TRGT SARS-COV-2: CPT

## 2023-04-08 PROCEDURE — 2700000000 HC OXYGEN THERAPY PER DAY

## 2023-04-08 PROCEDURE — 6370000000 HC RX 637 (ALT 250 FOR IP): Performed by: FAMILY MEDICINE

## 2023-04-08 PROCEDURE — 82805 BLOOD GASES W/O2 SATURATION: CPT

## 2023-04-08 PROCEDURE — 6370000000 HC RX 637 (ALT 250 FOR IP): Performed by: INTERNAL MEDICINE

## 2023-04-08 PROCEDURE — 71250 CT THORAX DX C-: CPT

## 2023-04-08 PROCEDURE — 36600 WITHDRAWAL OF ARTERIAL BLOOD: CPT

## 2023-04-08 PROCEDURE — 99222 1ST HOSP IP/OBS MODERATE 55: CPT | Performed by: INTERNAL MEDICINE

## 2023-04-08 RX ORDER — PREDNISONE 20 MG/1
40 TABLET ORAL DAILY
Status: COMPLETED | OUTPATIENT
Start: 2023-04-08 | End: 2023-04-12

## 2023-04-08 RX ORDER — ROFLUMILAST 500 UG/1
500 TABLET ORAL DAILY
Status: DISCONTINUED | OUTPATIENT
Start: 2023-04-08 | End: 2023-04-12 | Stop reason: HOSPADM

## 2023-04-08 RX ORDER — IPRATROPIUM BROMIDE AND ALBUTEROL SULFATE 2.5; .5 MG/3ML; MG/3ML
1 SOLUTION RESPIRATORY (INHALATION)
Status: DISCONTINUED | OUTPATIENT
Start: 2023-04-08 | End: 2023-04-12 | Stop reason: HOSPADM

## 2023-04-08 RX ORDER — AZITHROMYCIN 250 MG/1
500 TABLET, FILM COATED ORAL
Status: DISCONTINUED | OUTPATIENT
Start: 2023-04-08 | End: 2023-04-12 | Stop reason: HOSPADM

## 2023-04-08 RX ORDER — PREDNISONE 1 MG/1
5 TABLET ORAL DAILY
Status: DISCONTINUED | OUTPATIENT
Start: 2023-04-13 | End: 2023-04-12 | Stop reason: HOSPADM

## 2023-04-08 RX ADMIN — HYDROCORTISONE: 1 CREAM TOPICAL at 21:48

## 2023-04-08 RX ADMIN — METHYLPREDNISOLONE SODIUM SUCCINATE 40 MG: 40 INJECTION, POWDER, FOR SOLUTION INTRAMUSCULAR; INTRAVENOUS at 05:33

## 2023-04-08 RX ADMIN — BUDESONIDE 500 MCG: 0.5 SUSPENSION RESPIRATORY (INHALATION) at 18:47

## 2023-04-08 RX ADMIN — TRIAMCINOLONE ACETONIDE: 1 CREAM TOPICAL at 08:07

## 2023-04-08 RX ADMIN — KETOCONAZOLE: 20 CREAM TOPICAL at 08:07

## 2023-04-08 RX ADMIN — ARFORMOTEROL TARTRATE 15 MCG: 15 SOLUTION RESPIRATORY (INHALATION) at 18:47

## 2023-04-08 RX ADMIN — HYDROCHLOROTHIAZIDE 25 MG: 25 TABLET ORAL at 08:01

## 2023-04-08 RX ADMIN — IPRATROPIUM BROMIDE 0.5 MG: 0.5 SOLUTION RESPIRATORY (INHALATION) at 10:51

## 2023-04-08 RX ADMIN — HYDROCODONE BITARTRATE AND ACETAMINOPHEN 1 TABLET: 5; 325 TABLET ORAL at 18:02

## 2023-04-08 RX ADMIN — HYDROCORTISONE: 1 CREAM TOPICAL at 08:08

## 2023-04-08 RX ADMIN — PREDNISONE 40 MG: 20 TABLET ORAL at 18:01

## 2023-04-08 RX ADMIN — HYDROCODONE BITARTRATE AND ACETAMINOPHEN 1 TABLET: 5; 325 TABLET ORAL at 01:51

## 2023-04-08 RX ADMIN — FUROSEMIDE 40 MG: 40 TABLET ORAL at 08:01

## 2023-04-08 RX ADMIN — CETIRIZINE HYDROCHLORIDE 10 MG: 10 TABLET, FILM COATED ORAL at 08:01

## 2023-04-08 RX ADMIN — LOSARTAN POTASSIUM 100 MG: 50 TABLET, FILM COATED ORAL at 08:01

## 2023-04-08 RX ADMIN — GABAPENTIN 300 MG: 300 CAPSULE ORAL at 21:48

## 2023-04-08 RX ADMIN — ARFORMOTEROL TARTRATE 15 MCG: 15 SOLUTION RESPIRATORY (INHALATION) at 06:56

## 2023-04-08 RX ADMIN — METOPROLOL SUCCINATE 100 MG: 100 TABLET, FILM COATED, EXTENDED RELEASE ORAL at 21:48

## 2023-04-08 RX ADMIN — HYDROCODONE BITARTRATE AND ACETAMINOPHEN 1 TABLET: 5; 325 TABLET ORAL at 08:05

## 2023-04-08 RX ADMIN — AZITHROMYCIN MONOHYDRATE 500 MG: 250 TABLET ORAL at 18:01

## 2023-04-08 RX ADMIN — IPRATROPIUM BROMIDE 0.5 MG: 0.5 SOLUTION RESPIRATORY (INHALATION) at 06:56

## 2023-04-08 RX ADMIN — IPRATROPIUM BROMIDE AND ALBUTEROL SULFATE 1 AMPULE: .5; 2.5 SOLUTION RESPIRATORY (INHALATION) at 18:47

## 2023-04-08 RX ADMIN — FUROSEMIDE 40 MG: 40 TABLET ORAL at 18:01

## 2023-04-08 RX ADMIN — PANTOPRAZOLE SODIUM 40 MG: 40 TABLET, DELAYED RELEASE ORAL at 05:33

## 2023-04-08 RX ADMIN — ASPIRIN 81 MG 81 MG: 81 TABLET ORAL at 08:01

## 2023-04-08 RX ADMIN — METHYLPREDNISOLONE SODIUM SUCCINATE 40 MG: 40 INJECTION, POWDER, FOR SOLUTION INTRAMUSCULAR; INTRAVENOUS at 13:41

## 2023-04-08 RX ADMIN — ROFLUMILAST 500 MCG: 500 TABLET ORAL at 18:01

## 2023-04-08 RX ADMIN — BUDESONIDE 500 MCG: 0.5 SUSPENSION RESPIRATORY (INHALATION) at 06:56

## 2023-04-08 RX ADMIN — METOPROLOL SUCCINATE 100 MG: 100 TABLET, FILM COATED, EXTENDED RELEASE ORAL at 08:01

## 2023-04-08 RX ADMIN — TRIAMCINOLONE ACETONIDE: 1 CREAM TOPICAL at 21:49

## 2023-04-08 ASSESSMENT — PAIN DESCRIPTION - LOCATION
LOCATION: BACK
LOCATION: BACK

## 2023-04-08 ASSESSMENT — PAIN SCALES - GENERAL
PAINLEVEL_OUTOF10: 10

## 2023-04-08 ASSESSMENT — PAIN DESCRIPTION - DESCRIPTORS: DESCRIPTORS: ACHING

## 2023-04-09 ENCOUNTER — APPOINTMENT (OUTPATIENT)
Dept: GENERAL RADIOLOGY | Age: 73
DRG: 190 | End: 2023-04-09
Payer: COMMERCIAL

## 2023-04-09 LAB
AADO2: 62.9 MMHG
B.E.: 12.9 MMOL/L (ref -3–3)
B.E.: 14.3 MMOL/L (ref -3–3)
BNP BLD-MCNC: 252 PG/ML (ref 0–125)
COHB: 0 % (ref 0–1.5)
COHB: 0.3 % (ref 0–1.5)
COMMENT: ABNORMAL
COMMENT: ABNORMAL
CRITICAL: ABNORMAL
CRITICAL: ABNORMAL
DATE ANALYZED: ABNORMAL
DATE ANALYZED: ABNORMAL
DATE OF COLLECTION: ABNORMAL
DATE OF COLLECTION: ABNORMAL
FIO2: 40 %
HCO3: 40.8 MMOL/L (ref 22–26)
HCO3: 44.5 MMOL/L (ref 22–26)
HHB: 1.6 % (ref 0–5)
HHB: 1.6 % (ref 0–5)
LAB: ABNORMAL
METHB: 0.5 % (ref 0–1.5)
METHB: 0.6 % (ref 0–1.5)
MODE: ABNORMAL
MODE: ABNORMAL
O2 CONTENT: 16.1 ML/DL
O2 CONTENT: 16.8 ML/DL
O2 SATURATION: 98.4 % (ref 92–98.5)
O2 SATURATION: 98.4 % (ref 92–98.5)
O2HB: 97.5 % (ref 94–97)
O2HB: 97.9 % (ref 94–97)
OPERATOR ID: ABNORMAL
OPERATOR ID: ABNORMAL
PATIENT TEMP: 37 C
PATIENT TEMP: 37 C
PCO2: 71.2 MMHG (ref 35–45)
PCO2: 92.5 MMHG (ref 35–45)
PEEP/CPAP: 6 CMH2O
PFO2: 3.26 MMHG/%
PH BLOOD GAS: 7.3 (ref 7.35–7.45)
PH BLOOD GAS: 7.38 (ref 7.35–7.45)
PO2: 130.4 MMHG (ref 75–100)
PO2: 133.6 MMHG (ref 75–100)
PS: 12 CMH20
RI(T): 0.48
SOURCE, BLOOD GAS: ABNORMAL
SOURCE, BLOOD GAS: ABNORMAL
THB: 11.5 G/DL (ref 11.5–16.5)
THB: 12.1 G/DL (ref 11.5–16.5)
TIME ANALYZED: 1651
TIME ANALYZED: 618

## 2023-04-09 PROCEDURE — 6370000000 HC RX 637 (ALT 250 FOR IP): Performed by: FAMILY MEDICINE

## 2023-04-09 PROCEDURE — 94660 CPAP INITIATION&MGMT: CPT

## 2023-04-09 PROCEDURE — 6370000000 HC RX 637 (ALT 250 FOR IP): Performed by: INTERNAL MEDICINE

## 2023-04-09 PROCEDURE — 71045 X-RAY EXAM CHEST 1 VIEW: CPT

## 2023-04-09 PROCEDURE — 82805 BLOOD GASES W/O2 SATURATION: CPT

## 2023-04-09 PROCEDURE — 36600 WITHDRAWAL OF ARTERIAL BLOOD: CPT

## 2023-04-09 PROCEDURE — 2700000000 HC OXYGEN THERAPY PER DAY

## 2023-04-09 PROCEDURE — 94640 AIRWAY INHALATION TREATMENT: CPT

## 2023-04-09 PROCEDURE — 36415 COLL VENOUS BLD VENIPUNCTURE: CPT

## 2023-04-09 PROCEDURE — 1200000000 HC SEMI PRIVATE

## 2023-04-09 PROCEDURE — 83880 ASSAY OF NATRIURETIC PEPTIDE: CPT

## 2023-04-09 PROCEDURE — 82103 ALPHA-1-ANTITRYPSIN TOTAL: CPT

## 2023-04-09 PROCEDURE — 6360000002 HC RX W HCPCS: Performed by: FAMILY MEDICINE

## 2023-04-09 RX ORDER — LIDOCAINE AND PRILOCAINE 25; 25 MG/G; MG/G
CREAM TOPICAL PRN
Status: DISCONTINUED | OUTPATIENT
Start: 2023-04-09 | End: 2023-04-12 | Stop reason: HOSPADM

## 2023-04-09 RX ADMIN — BUDESONIDE 500 MCG: 0.5 SUSPENSION RESPIRATORY (INHALATION) at 06:07

## 2023-04-09 RX ADMIN — LIDOCAINE AND PRILOCAINE: 25; 25 CREAM TOPICAL at 15:40

## 2023-04-09 RX ADMIN — METOPROLOL SUCCINATE 100 MG: 100 TABLET, FILM COATED, EXTENDED RELEASE ORAL at 09:04

## 2023-04-09 RX ADMIN — LOSARTAN POTASSIUM 100 MG: 50 TABLET, FILM COATED ORAL at 09:04

## 2023-04-09 RX ADMIN — ARFORMOTEROL TARTRATE 15 MCG: 15 SOLUTION RESPIRATORY (INHALATION) at 06:07

## 2023-04-09 RX ADMIN — ARFORMOTEROL TARTRATE 15 MCG: 15 SOLUTION RESPIRATORY (INHALATION) at 19:26

## 2023-04-09 RX ADMIN — PREDNISONE 40 MG: 20 TABLET ORAL at 09:04

## 2023-04-09 RX ADMIN — ROFLUMILAST 500 MCG: 500 TABLET ORAL at 09:04

## 2023-04-09 RX ADMIN — KETOCONAZOLE: 20 CREAM TOPICAL at 09:06

## 2023-04-09 RX ADMIN — CETIRIZINE HYDROCHLORIDE 10 MG: 10 TABLET, FILM COATED ORAL at 09:04

## 2023-04-09 RX ADMIN — BUDESONIDE 500 MCG: 0.5 SUSPENSION RESPIRATORY (INHALATION) at 19:26

## 2023-04-09 RX ADMIN — TRIAMCINOLONE ACETONIDE: 1 CREAM TOPICAL at 09:06

## 2023-04-09 RX ADMIN — FUROSEMIDE 40 MG: 40 TABLET ORAL at 17:20

## 2023-04-09 RX ADMIN — IPRATROPIUM BROMIDE AND ALBUTEROL SULFATE 1 AMPULE: .5; 2.5 SOLUTION RESPIRATORY (INHALATION) at 06:07

## 2023-04-09 RX ADMIN — FUROSEMIDE 40 MG: 40 TABLET ORAL at 09:04

## 2023-04-09 RX ADMIN — IPRATROPIUM BROMIDE AND ALBUTEROL SULFATE 1 AMPULE: .5; 2.5 SOLUTION RESPIRATORY (INHALATION) at 09:30

## 2023-04-09 RX ADMIN — HYDROCODONE BITARTRATE AND ACETAMINOPHEN 1 TABLET: 5; 325 TABLET ORAL at 06:23

## 2023-04-09 RX ADMIN — ASPIRIN 81 MG 81 MG: 81 TABLET ORAL at 09:04

## 2023-04-09 RX ADMIN — IPRATROPIUM BROMIDE AND ALBUTEROL SULFATE 1 AMPULE: .5; 2.5 SOLUTION RESPIRATORY (INHALATION) at 15:32

## 2023-04-09 RX ADMIN — PANTOPRAZOLE SODIUM 40 MG: 40 TABLET, DELAYED RELEASE ORAL at 06:23

## 2023-04-09 RX ADMIN — METOPROLOL SUCCINATE 100 MG: 100 TABLET, FILM COATED, EXTENDED RELEASE ORAL at 20:35

## 2023-04-09 RX ADMIN — HYDROCODONE BITARTRATE AND ACETAMINOPHEN 1 TABLET: 5; 325 TABLET ORAL at 00:36

## 2023-04-09 RX ADMIN — HYDROCODONE BITARTRATE AND ACETAMINOPHEN 1 TABLET: 5; 325 TABLET ORAL at 20:35

## 2023-04-09 RX ADMIN — HYDROCORTISONE: 1 CREAM TOPICAL at 09:06

## 2023-04-09 RX ADMIN — GABAPENTIN 300 MG: 300 CAPSULE ORAL at 20:35

## 2023-04-09 RX ADMIN — IPRATROPIUM BROMIDE AND ALBUTEROL SULFATE 1 AMPULE: .5; 2.5 SOLUTION RESPIRATORY (INHALATION) at 19:25

## 2023-04-09 RX ADMIN — HYDROCHLOROTHIAZIDE 25 MG: 25 TABLET ORAL at 09:04

## 2023-04-09 ASSESSMENT — PAIN DESCRIPTION - DESCRIPTORS
DESCRIPTORS: ACHING
DESCRIPTORS: ACHING

## 2023-04-09 ASSESSMENT — PAIN SCALES - GENERAL
PAINLEVEL_OUTOF10: 10

## 2023-04-09 ASSESSMENT — PAIN DESCRIPTION - LOCATION
LOCATION: BACK
LOCATION: BACK;NECK
LOCATION: BACK;NECK

## 2023-04-09 ASSESSMENT — PAIN DESCRIPTION - ORIENTATION
ORIENTATION: MID
ORIENTATION: MID

## 2023-04-09 ASSESSMENT — PAIN - FUNCTIONAL ASSESSMENT: PAIN_FUNCTIONAL_ASSESSMENT: ACTIVITIES ARE NOT PREVENTED

## 2023-04-10 PROCEDURE — 2700000000 HC OXYGEN THERAPY PER DAY

## 2023-04-10 PROCEDURE — 6370000000 HC RX 637 (ALT 250 FOR IP): Performed by: FAMILY MEDICINE

## 2023-04-10 PROCEDURE — 6360000002 HC RX W HCPCS: Performed by: FAMILY MEDICINE

## 2023-04-10 PROCEDURE — 6370000000 HC RX 637 (ALT 250 FOR IP): Performed by: INTERNAL MEDICINE

## 2023-04-10 PROCEDURE — 94640 AIRWAY INHALATION TREATMENT: CPT

## 2023-04-10 PROCEDURE — 97161 PT EVAL LOW COMPLEX 20 MIN: CPT | Performed by: PHYSICAL THERAPIST

## 2023-04-10 PROCEDURE — 1200000000 HC SEMI PRIVATE

## 2023-04-10 PROCEDURE — 94660 CPAP INITIATION&MGMT: CPT

## 2023-04-10 RX ADMIN — GABAPENTIN 300 MG: 300 CAPSULE ORAL at 21:20

## 2023-04-10 RX ADMIN — BUDESONIDE 500 MCG: 0.5 SUSPENSION RESPIRATORY (INHALATION) at 09:40

## 2023-04-10 RX ADMIN — HYDROCODONE BITARTRATE AND ACETAMINOPHEN 1 TABLET: 5; 325 TABLET ORAL at 04:05

## 2023-04-10 RX ADMIN — HYDROCODONE BITARTRATE AND ACETAMINOPHEN 1 TABLET: 5; 325 TABLET ORAL at 10:54

## 2023-04-10 RX ADMIN — FUROSEMIDE 40 MG: 40 TABLET ORAL at 19:10

## 2023-04-10 RX ADMIN — ARFORMOTEROL TARTRATE 15 MCG: 15 SOLUTION RESPIRATORY (INHALATION) at 17:23

## 2023-04-10 RX ADMIN — METOPROLOL SUCCINATE 100 MG: 100 TABLET, FILM COATED, EXTENDED RELEASE ORAL at 08:17

## 2023-04-10 RX ADMIN — ASPIRIN 81 MG 81 MG: 81 TABLET ORAL at 08:16

## 2023-04-10 RX ADMIN — FUROSEMIDE 40 MG: 40 TABLET ORAL at 08:16

## 2023-04-10 RX ADMIN — LOSARTAN POTASSIUM 100 MG: 50 TABLET, FILM COATED ORAL at 08:16

## 2023-04-10 RX ADMIN — BUDESONIDE 500 MCG: 0.5 SUSPENSION RESPIRATORY (INHALATION) at 17:23

## 2023-04-10 RX ADMIN — TRIAMCINOLONE ACETONIDE: 1 CREAM TOPICAL at 08:20

## 2023-04-10 RX ADMIN — PREDNISONE 40 MG: 20 TABLET ORAL at 08:16

## 2023-04-10 RX ADMIN — AZITHROMYCIN MONOHYDRATE 500 MG: 250 TABLET ORAL at 19:09

## 2023-04-10 RX ADMIN — KETOCONAZOLE: 20 CREAM TOPICAL at 08:20

## 2023-04-10 RX ADMIN — PANTOPRAZOLE SODIUM 40 MG: 40 TABLET, DELAYED RELEASE ORAL at 05:17

## 2023-04-10 RX ADMIN — ROFLUMILAST 500 MCG: 500 TABLET ORAL at 08:16

## 2023-04-10 RX ADMIN — HYDROCODONE BITARTRATE AND ACETAMINOPHEN 1 TABLET: 5; 325 TABLET ORAL at 19:10

## 2023-04-10 RX ADMIN — HYDROCHLOROTHIAZIDE 25 MG: 25 TABLET ORAL at 08:16

## 2023-04-10 RX ADMIN — IPRATROPIUM BROMIDE AND ALBUTEROL SULFATE 1 AMPULE: .5; 2.5 SOLUTION RESPIRATORY (INHALATION) at 09:40

## 2023-04-10 RX ADMIN — ARFORMOTEROL TARTRATE 15 MCG: 15 SOLUTION RESPIRATORY (INHALATION) at 09:40

## 2023-04-10 RX ADMIN — IPRATROPIUM BROMIDE AND ALBUTEROL SULFATE 1 AMPULE: .5; 2.5 SOLUTION RESPIRATORY (INHALATION) at 13:22

## 2023-04-10 RX ADMIN — METOPROLOL SUCCINATE 100 MG: 100 TABLET, FILM COATED, EXTENDED RELEASE ORAL at 21:20

## 2023-04-10 RX ADMIN — IPRATROPIUM BROMIDE AND ALBUTEROL SULFATE 1 AMPULE: .5; 2.5 SOLUTION RESPIRATORY (INHALATION) at 17:23

## 2023-04-10 RX ADMIN — HYDROCORTISONE: 1 CREAM TOPICAL at 08:20

## 2023-04-10 RX ADMIN — CETIRIZINE HYDROCHLORIDE 10 MG: 10 TABLET, FILM COATED ORAL at 08:16

## 2023-04-10 ASSESSMENT — PAIN DESCRIPTION - LOCATION
LOCATION: NECK;BACK
LOCATION: BACK

## 2023-04-10 ASSESSMENT — PAIN DESCRIPTION - DESCRIPTORS: DESCRIPTORS: ACHING;DISCOMFORT;DULL

## 2023-04-10 ASSESSMENT — PAIN DESCRIPTION - ORIENTATION: ORIENTATION: MID

## 2023-04-10 ASSESSMENT — PAIN SCALES - GENERAL
PAINLEVEL_OUTOF10: 10
PAINLEVEL_OUTOF10: 7
PAINLEVEL_OUTOF10: 10

## 2023-04-10 NOTE — CARE COORDINATION
Plan currently remains home at [place]. PT was ordered, eval is pending. Patient reports living home alone, independent at baseline, uses no DME. Has home oxygen (baseline 4L) and a nebulizer through [place]. Has Aide services through [employer] 7 days a week, 4 hours each day. Will follow, no needs currently noted.

## 2023-04-11 LAB
A1AT SERPL-MCNC: 172 MG/DL (ref 90–200)
ANION GAP SERPL CALCULATED.3IONS-SCNC: 10 MMOL/L (ref 7–16)
ANION GAP SERPL CALCULATED.3IONS-SCNC: 7 MMOL/L (ref 7–16)
B.E.: 13.5 MMOL/L (ref -3–3)
BUN SERPL-MCNC: 55 MG/DL (ref 6–23)
BUN SERPL-MCNC: 56 MG/DL (ref 6–23)
CALCIUM SERPL-MCNC: 9.6 MG/DL (ref 8.6–10.2)
CALCIUM SERPL-MCNC: 9.6 MG/DL (ref 8.6–10.2)
CHLORIDE SERPL-SCNC: 87 MMOL/L (ref 98–107)
CHLORIDE SERPL-SCNC: 87 MMOL/L (ref 98–107)
CO2 SERPL-SCNC: 38 MMOL/L (ref 22–29)
CO2 SERPL-SCNC: 44 MMOL/L (ref 22–29)
COHB: 0.1 % (ref 0–1.5)
CREAT SERPL-MCNC: 2 MG/DL (ref 0.7–1.2)
CREAT SERPL-MCNC: 2.1 MG/DL (ref 0.7–1.2)
CRITICAL: ABNORMAL
DATE ANALYZED: ABNORMAL
DATE OF COLLECTION: ABNORMAL
GLUCOSE SERPL-MCNC: 172 MG/DL (ref 74–99)
GLUCOSE SERPL-MCNC: 304 MG/DL (ref 74–99)
HCO3: 40.2 MMOL/L (ref 22–26)
HHB: 1.7 % (ref 0–5)
LAB: ABNORMAL
Lab: ABNORMAL
METHB: 0.5 % (ref 0–1.5)
MODE: ABNORMAL
O2 CONTENT: 16.4 ML/DL
O2 SATURATION: 98.3 % (ref 92–98.5)
O2HB: 97.7 % (ref 94–97)
OPERATOR ID: 797
PATIENT TEMP: 37 C
PCO2: 61.5 MMHG (ref 35–45)
PH BLOOD GAS: 7.43 (ref 7.35–7.45)
PO2: 119.2 MMHG (ref 75–100)
POTASSIUM SERPL-SCNC: 3.1 MMOL/L (ref 3.5–5)
POTASSIUM SERPL-SCNC: 3.6 MMOL/L (ref 3.5–5)
SODIUM SERPL-SCNC: 135 MMOL/L (ref 132–146)
SODIUM SERPL-SCNC: 138 MMOL/L (ref 132–146)
SOURCE, BLOOD GAS: ABNORMAL
THB: 11.8 G/DL (ref 11.5–16.5)
TIME ANALYZED: 1000

## 2023-04-11 PROCEDURE — 6370000000 HC RX 637 (ALT 250 FOR IP): Performed by: INTERNAL MEDICINE

## 2023-04-11 PROCEDURE — 94660 CPAP INITIATION&MGMT: CPT

## 2023-04-11 PROCEDURE — 36600 WITHDRAWAL OF ARTERIAL BLOOD: CPT

## 2023-04-11 PROCEDURE — 1200000000 HC SEMI PRIVATE

## 2023-04-11 PROCEDURE — 80048 BASIC METABOLIC PNL TOTAL CA: CPT

## 2023-04-11 PROCEDURE — 99233 SBSQ HOSP IP/OBS HIGH 50: CPT | Performed by: INTERNAL MEDICINE

## 2023-04-11 PROCEDURE — 82805 BLOOD GASES W/O2 SATURATION: CPT

## 2023-04-11 PROCEDURE — 36415 COLL VENOUS BLD VENIPUNCTURE: CPT

## 2023-04-11 PROCEDURE — 6360000002 HC RX W HCPCS: Performed by: FAMILY MEDICINE

## 2023-04-11 PROCEDURE — 94640 AIRWAY INHALATION TREATMENT: CPT

## 2023-04-11 PROCEDURE — 2580000003 HC RX 258: Performed by: FAMILY MEDICINE

## 2023-04-11 PROCEDURE — 6370000000 HC RX 637 (ALT 250 FOR IP): Performed by: FAMILY MEDICINE

## 2023-04-11 PROCEDURE — 2700000000 HC OXYGEN THERAPY PER DAY

## 2023-04-11 RX ORDER — SODIUM CHLORIDE 9 MG/ML
INJECTION, SOLUTION INTRAVENOUS CONTINUOUS
Status: DISCONTINUED | OUTPATIENT
Start: 2023-04-11 | End: 2023-04-12 | Stop reason: HOSPADM

## 2023-04-11 RX ADMIN — IPRATROPIUM BROMIDE AND ALBUTEROL SULFATE 1 AMPULE: .5; 2.5 SOLUTION RESPIRATORY (INHALATION) at 06:03

## 2023-04-11 RX ADMIN — SODIUM CHLORIDE: 9 INJECTION, SOLUTION INTRAVENOUS at 20:12

## 2023-04-11 RX ADMIN — HYDROCODONE BITARTRATE AND ACETAMINOPHEN 1 TABLET: 5; 325 TABLET ORAL at 17:20

## 2023-04-11 RX ADMIN — LOSARTAN POTASSIUM 100 MG: 50 TABLET, FILM COATED ORAL at 08:19

## 2023-04-11 RX ADMIN — BUDESONIDE 500 MCG: 0.5 SUSPENSION RESPIRATORY (INHALATION) at 18:45

## 2023-04-11 RX ADMIN — HYDROCODONE BITARTRATE AND ACETAMINOPHEN 1 TABLET: 5; 325 TABLET ORAL at 23:22

## 2023-04-11 RX ADMIN — KETOCONAZOLE: 20 CREAM TOPICAL at 08:19

## 2023-04-11 RX ADMIN — HYDROCODONE BITARTRATE AND ACETAMINOPHEN 1 TABLET: 5; 325 TABLET ORAL at 00:58

## 2023-04-11 RX ADMIN — TRIAMCINOLONE ACETONIDE: 1 CREAM TOPICAL at 20:07

## 2023-04-11 RX ADMIN — POTASSIUM BICARBONATE 25 MEQ: 978 TABLET, EFFERVESCENT ORAL at 11:55

## 2023-04-11 RX ADMIN — HYDROCODONE BITARTRATE AND ACETAMINOPHEN 1 TABLET: 5; 325 TABLET ORAL at 08:20

## 2023-04-11 RX ADMIN — PREDNISONE 40 MG: 20 TABLET ORAL at 08:19

## 2023-04-11 RX ADMIN — HYDROCHLOROTHIAZIDE 25 MG: 25 TABLET ORAL at 08:20

## 2023-04-11 RX ADMIN — IPRATROPIUM BROMIDE AND ALBUTEROL SULFATE 1 AMPULE: .5; 2.5 SOLUTION RESPIRATORY (INHALATION) at 09:10

## 2023-04-11 RX ADMIN — GABAPENTIN 300 MG: 300 CAPSULE ORAL at 20:06

## 2023-04-11 RX ADMIN — HYDROCORTISONE: 1 CREAM TOPICAL at 08:19

## 2023-04-11 RX ADMIN — ROFLUMILAST 500 MCG: 500 TABLET ORAL at 08:20

## 2023-04-11 RX ADMIN — METOPROLOL SUCCINATE 100 MG: 100 TABLET, FILM COATED, EXTENDED RELEASE ORAL at 20:06

## 2023-04-11 RX ADMIN — FUROSEMIDE 40 MG: 40 TABLET ORAL at 08:19

## 2023-04-11 RX ADMIN — ARFORMOTEROL TARTRATE 15 MCG: 15 SOLUTION RESPIRATORY (INHALATION) at 06:03

## 2023-04-11 RX ADMIN — METOPROLOL SUCCINATE 100 MG: 100 TABLET, FILM COATED, EXTENDED RELEASE ORAL at 08:20

## 2023-04-11 RX ADMIN — SODIUM CHLORIDE: 9 INJECTION, SOLUTION INTRAVENOUS at 11:33

## 2023-04-11 RX ADMIN — PANTOPRAZOLE SODIUM 40 MG: 40 TABLET, DELAYED RELEASE ORAL at 06:22

## 2023-04-11 RX ADMIN — FUROSEMIDE 40 MG: 40 TABLET ORAL at 17:19

## 2023-04-11 RX ADMIN — TRIAMCINOLONE ACETONIDE: 1 CREAM TOPICAL at 08:19

## 2023-04-11 RX ADMIN — ARFORMOTEROL TARTRATE 15 MCG: 15 SOLUTION RESPIRATORY (INHALATION) at 18:45

## 2023-04-11 RX ADMIN — ASPIRIN 81 MG 81 MG: 81 TABLET ORAL at 08:20

## 2023-04-11 RX ADMIN — CETIRIZINE HYDROCHLORIDE 10 MG: 10 TABLET, FILM COATED ORAL at 08:20

## 2023-04-11 RX ADMIN — BUDESONIDE 500 MCG: 0.5 SUSPENSION RESPIRATORY (INHALATION) at 06:03

## 2023-04-11 RX ADMIN — HYDROCORTISONE: 1 CREAM TOPICAL at 20:06

## 2023-04-11 RX ADMIN — IPRATROPIUM BROMIDE AND ALBUTEROL SULFATE 1 AMPULE: .5; 2.5 SOLUTION RESPIRATORY (INHALATION) at 18:45

## 2023-04-11 ASSESSMENT — PAIN DESCRIPTION - ORIENTATION
ORIENTATION: LOWER;POSTERIOR

## 2023-04-11 ASSESSMENT — PAIN DESCRIPTION - PAIN TYPE
TYPE: ACUTE PAIN
TYPE: ACUTE PAIN

## 2023-04-11 ASSESSMENT — PAIN SCALES - GENERAL
PAINLEVEL_OUTOF10: 10

## 2023-04-11 ASSESSMENT — PAIN DESCRIPTION - LOCATION
LOCATION: BACK;NECK
LOCATION: NECK;BACK
LOCATION: BACK

## 2023-04-11 ASSESSMENT — PAIN DESCRIPTION - DESCRIPTORS
DESCRIPTORS: ACHING;DISCOMFORT;NAGGING
DESCRIPTORS: ACHING

## 2023-04-11 NOTE — CARE COORDINATION
4/11/2023 1431 CM note: Pt is wearing [email or web address] and has home oxygen at 4L NC and a nebulizer through [place] [facility]. Per Kathia Jarvis at [facility], in order to arrange home BIPAP , will need Rx for BIPAP, nocturnal pox on o2 NC(not BIPAP), ABG on o2, Physician documentation for BIPAP need faxed to [phone] then will need insurance approval. Pt declines Pulmonary Rehab, he states he has done rehab in past and declines at this time. In the event pt changes his mind-Per [facility] - [facility] Pulmonary Rehab, pt would need Rx with dx and have completed PFTS prior to scheduling patient. He plans to return home at d/c and will have transportation.  Sari TAYLOR

## 2023-04-11 NOTE — CARE COORDINATION
4/11/2023 1342 CM note: Patient resides alone and has Aide services through [employer] 7 days a week, 4 hours each day. He is wearing [email or web address] and has home oxygen at 4L NC and a nebulizer through [place]. Spoke with pt regarding need for home BIPAP and Pulmonary Rehab. Pt agreeable for home BIPAP and referral placed to [facility] to return my call regarding BIPAP qualifications and  documentation needs. Pt declines Pulmonary Rehab, he states he has done rehab in past and declines at this time. In the event pt changes his mind-Per [facility] Pulmonary Rehab, pt would need Rx with dx and have completed PFTS prior to scheduling patient. He plans to return home at d/c and will have transportation. CM will await return call from [place].  Sari TAYLOR

## 2023-04-12 VITALS
WEIGHT: 185 LBS | SYSTOLIC BLOOD PRESSURE: 177 MMHG | OXYGEN SATURATION: 99 % | TEMPERATURE: 98 F | RESPIRATION RATE: 16 BRPM | BODY MASS INDEX: 29.73 KG/M2 | DIASTOLIC BLOOD PRESSURE: 78 MMHG | HEIGHT: 66 IN | HEART RATE: 82 BPM

## 2023-04-12 PROCEDURE — 94640 AIRWAY INHALATION TREATMENT: CPT

## 2023-04-12 PROCEDURE — 6370000000 HC RX 637 (ALT 250 FOR IP): Performed by: INTERNAL MEDICINE

## 2023-04-12 PROCEDURE — 6370000000 HC RX 637 (ALT 250 FOR IP): Performed by: FAMILY MEDICINE

## 2023-04-12 PROCEDURE — 2700000000 HC OXYGEN THERAPY PER DAY

## 2023-04-12 PROCEDURE — 99233 SBSQ HOSP IP/OBS HIGH 50: CPT | Performed by: INTERNAL MEDICINE

## 2023-04-12 PROCEDURE — 6360000002 HC RX W HCPCS: Performed by: FAMILY MEDICINE

## 2023-04-12 PROCEDURE — 97530 THERAPEUTIC ACTIVITIES: CPT

## 2023-04-12 PROCEDURE — 2580000003 HC RX 258: Performed by: FAMILY MEDICINE

## 2023-04-12 PROCEDURE — 94660 CPAP INITIATION&MGMT: CPT

## 2023-04-12 RX ORDER — GABAPENTIN 300 MG/1
300 CAPSULE ORAL NIGHTLY
Qty: 90 CAPSULE | Refills: 3 | Status: SHIPPED | OUTPATIENT
Start: 2023-04-12 | End: 2023-05-12

## 2023-04-12 RX ORDER — ROFLUMILAST 500 UG/1
500 TABLET ORAL DAILY
Qty: 30 TABLET | Refills: 2 | Status: SHIPPED | OUTPATIENT
Start: 2023-04-13

## 2023-04-12 RX ORDER — ASPIRIN 81 MG/1
81 TABLET, CHEWABLE ORAL DAILY
Qty: 30 TABLET | Refills: 3 | Status: SHIPPED | OUTPATIENT
Start: 2023-04-13

## 2023-04-12 RX ORDER — ALBUTEROL SULFATE 2.5 MG/3ML
2.5 SOLUTION RESPIRATORY (INHALATION) EVERY 6 HOURS PRN
Qty: 120 EACH | Refills: 3 | Status: SHIPPED | OUTPATIENT
Start: 2023-04-12

## 2023-04-12 RX ORDER — AZITHROMYCIN 500 MG/1
500 TABLET, FILM COATED ORAL
Qty: 12 TABLET | Refills: 2 | Status: SHIPPED | OUTPATIENT
Start: 2023-04-12 | End: 2023-05-12

## 2023-04-12 RX ORDER — PREDNISONE 1 MG/1
5 TABLET ORAL DAILY
Qty: 10 TABLET | Refills: 0 | Status: SHIPPED | OUTPATIENT
Start: 2023-04-13 | End: 2023-04-23

## 2023-04-12 RX ADMIN — HYDROCODONE BITARTRATE AND ACETAMINOPHEN 1 TABLET: 5; 325 TABLET ORAL at 13:24

## 2023-04-12 RX ADMIN — HYDROCHLOROTHIAZIDE 25 MG: 25 TABLET ORAL at 09:56

## 2023-04-12 RX ADMIN — ASPIRIN 81 MG 81 MG: 81 TABLET ORAL at 09:56

## 2023-04-12 RX ADMIN — FUROSEMIDE 40 MG: 40 TABLET ORAL at 09:56

## 2023-04-12 RX ADMIN — SODIUM CHLORIDE: 9 INJECTION, SOLUTION INTRAVENOUS at 03:52

## 2023-04-12 RX ADMIN — CETIRIZINE HYDROCHLORIDE 10 MG: 10 TABLET, FILM COATED ORAL at 09:56

## 2023-04-12 RX ADMIN — LOSARTAN POTASSIUM 100 MG: 50 TABLET, FILM COATED ORAL at 09:56

## 2023-04-12 RX ADMIN — IPRATROPIUM BROMIDE AND ALBUTEROL SULFATE 1 AMPULE: .5; 2.5 SOLUTION RESPIRATORY (INHALATION) at 06:19

## 2023-04-12 RX ADMIN — IPRATROPIUM BROMIDE AND ALBUTEROL SULFATE 1 AMPULE: .5; 2.5 SOLUTION RESPIRATORY (INHALATION) at 10:12

## 2023-04-12 RX ADMIN — PANTOPRAZOLE SODIUM 40 MG: 40 TABLET, DELAYED RELEASE ORAL at 05:24

## 2023-04-12 RX ADMIN — BUDESONIDE 500 MCG: 0.5 SUSPENSION RESPIRATORY (INHALATION) at 06:20

## 2023-04-12 RX ADMIN — ARFORMOTEROL TARTRATE 15 MCG: 15 SOLUTION RESPIRATORY (INHALATION) at 06:20

## 2023-04-12 RX ADMIN — HYDROCODONE BITARTRATE AND ACETAMINOPHEN 1 TABLET: 5; 325 TABLET ORAL at 05:24

## 2023-04-12 RX ADMIN — PREDNISONE 40 MG: 20 TABLET ORAL at 09:56

## 2023-04-12 RX ADMIN — ROFLUMILAST 500 MCG: 500 TABLET ORAL at 09:56

## 2023-04-12 RX ADMIN — TRIAMCINOLONE ACETONIDE: 1 CREAM TOPICAL at 09:57

## 2023-04-12 RX ADMIN — METOPROLOL SUCCINATE 100 MG: 100 TABLET, FILM COATED, EXTENDED RELEASE ORAL at 09:56

## 2023-04-12 RX ADMIN — HYDROCORTISONE: 1 CREAM TOPICAL at 09:57

## 2023-04-12 RX ADMIN — KETOCONAZOLE: 20 CREAM TOPICAL at 09:57

## 2023-04-12 ASSESSMENT — PAIN DESCRIPTION - ORIENTATION: ORIENTATION: LOWER;POSTERIOR

## 2023-04-12 ASSESSMENT — PAIN - FUNCTIONAL ASSESSMENT: PAIN_FUNCTIONAL_ASSESSMENT: ACTIVITIES ARE NOT PREVENTED

## 2023-04-12 ASSESSMENT — PAIN SCALES - GENERAL
PAINLEVEL_OUTOF10: 9
PAINLEVEL_OUTOF10: 10

## 2023-04-12 ASSESSMENT — PAIN DESCRIPTION - DESCRIPTORS
DESCRIPTORS: ACHING;DISCOMFORT;NAGGING
DESCRIPTORS: ACHING;DISCOMFORT;NAGGING

## 2023-04-12 ASSESSMENT — PAIN DESCRIPTION - LOCATION
LOCATION: NECK;BACK
LOCATION: BACK

## 2023-04-12 ASSESSMENT — PAIN DESCRIPTION - PAIN TYPE: TYPE: ACUTE PAIN

## 2023-04-12 NOTE — PLAN OF CARE
Problem: Pain  Goal: Verbalizes/displays adequate comfort level or baseline comfort level  4/12/2023 0018 by Janice Perdomo RN  Outcome: Progressing     Problem: Safety - Adult  Goal: Free from fall injury  4/12/2023 0018 by Janice Perdomo RN  Outcome: Progressing     Problem: Chronic Conditions and Co-morbidities  Goal: Patient's chronic conditions and co-morbidity symptoms are monitored and maintained or improved  4/12/2023 0018 by Janice Perdomo RN  Outcome: Progressing
Problem: Pain  Goal: Verbalizes/displays adequate comfort level or baseline comfort level  Outcome: Progressing     Problem: Safety - Adult  Goal: Free from fall injury  Outcome: Progressing
Problem: Pain  Goal: Verbalizes/displays adequate comfort level or baseline comfort level  Outcome: Progressing     Problem: Safety - Adult  Goal: Free from fall injury  Outcome: Progressing     Problem: Chronic Conditions and Co-morbidities  Goal: Patient's chronic conditions and co-morbidity symptoms are monitored and maintained or improved  Outcome: Progressing
Problem: Safety - Adult  Goal: Free from fall injury  4/10/2023 2147 by Victor M Montano RN  Outcome: Progressing
The patient is a 55y Female complaining of psychiatric evaluation.

## 2023-04-12 NOTE — PROGRESS NOTES
ABG drawn x 1 from Right Radial 0950. Patient had NormalAllen's Test. Patient was on 4 liters/min via nasal cannula  at time of puncture. Pressure held for 5. No bleeding or bruising noted at puncture site.  Patient tolerated procedure well      Performed by Rashi Lezama RCP
Department of Family Practice  Adult Daily Progress Note      JAVIER BUTTS IS SEEN IN FOLLOW UP TODAY ON 4 TELEMETRY. HE IS TOLERATING TREATMENT. PULMONARY HAS ORDERED AZITHROMYCIN.  ABG'S NOTED. HE IS ORDERED A BIPAP. HAS NEVER WORN ONE BEFORE. HE FEELS IT WILL MAKE HIM TOO HOT. HE NEEDS TO BE COOL. HE TRIED IT LAST NIGHT; LASTED ONLY TILL 2:45 AM.  HE STATES HE BROUGHT UP A LOT OF SPUTUM TODAY.      OBJECTIVE    Physical  VITALS:  /76   Pulse (!) 109   Temp 98.2 °F (36.8 °C) (Oral)   Resp 18   Ht 5' 6\" (1.676 m)   Wt 185 lb (83.9 kg)   SpO2 100%   BMI 29.86 kg/m²   CONSTITUTIONAL:  awake, alert, cooperative, no apparent distress, and appears stated age  LUNGS:  No increased work of breathing, good air exchange, clear to auscultation bilaterally, no crackles or wheezing  CARDIOVASCULAR:  Normal apical impulse, regular rate and rhythm, normal S1 and S2, no S3 or S4, and no murmur noted  ABDOMEN:  No scars, normal bowel sounds, soft, non-distended, non-tender, no masses palpated, no hepatosplenomegally  SKIN:  DRY and no bruising or bleeding  Data  CBC with Differential:    Lab Results   Component Value Date/Time    WBC 7.3 04/06/2023 04:45 PM    RBC 3.70 04/06/2023 04:45 PM    HGB 11.5 04/06/2023 04:45 PM    HCT 37.2 04/06/2023 04:45 PM     04/06/2023 04:45 PM    .5 04/06/2023 04:45 PM    MCH 31.1 04/06/2023 04:45 PM    MCHC 30.9 04/06/2023 04:45 PM    RDW 13.5 04/06/2023 04:45 PM    NRBC 1.7 09/27/2022 05:30 AM    SEGSPCT 60 04/24/2013 03:25 PM    LYMPHOPCT 14.2 04/06/2023 04:45 PM    MONOPCT 16.3 04/06/2023 04:45 PM    BASOPCT 0.7 04/06/2023 04:45 PM    MONOSABS 1.20 04/06/2023 04:45 PM    LYMPHSABS 1.04 04/06/2023 04:45 PM    EOSABS 0.01 04/06/2023 04:45 PM    BASOSABS 0.05 04/06/2023 04:45 PM     BMP:    Lab Results   Component Value Date/Time     04/06/2023 04:45 PM    K 4.4 04/06/2023 04:45 PM    K 4.8 09/25/2022 08:17 AM    CL 87 04/06/2023 04:45 PM    CO2 42
Department of Family Practice  Adult Daily Progress Note      JAVIER BUTTS IS SEEN IN FOLLOW UP TODAY ON 4 TELEMETRY. HE IS TOLERATING TREATMENT. PULMONARY HAS ORDERED AZITHROMYCIN.  ABG'S NOTED. HE IS ORDERED A BIPAP. HAS NEVER WORN ONE BEFORE. HE FEELS IT WILL MAKE HIM TOO HOT. HE NEEDS TO BE COOL. HE TRIED IT NIGHT BEFORE LAST; LASTED ONLY TILL 2:45 AM.  HE STATES HE BROUGHT UP A LOT OF SPUTUM YESTERDAY. NOT SURE IF HE WORE THE BIPAP AT NIGHT. HE DOES NEED IT FOR NIGHT USE AT HOME. PULMONARY HAS INDICATED AS WELL THAT IT IS NEEDED. HE IS TO HAVE OVERNIGHT OXIMETRY TONIGHT. WILL DISCUSS WITH CASE MANAGEMENT TOMORROW WHAT ELSE WILL BE NECESSARY TO GET HIM READY FOR DISCHARGE FROM AN EQUIPMENT STANDPOINT.      OBJECTIVE    Physical  VITALS:  BP (!) 157/76   Pulse 67   Temp 97.8 °F (36.6 °C) (Oral)   Resp 16   Ht 5' 6\" (1.676 m)   Wt 185 lb (83.9 kg)   SpO2 96%   BMI 29.86 kg/m²   CONSTITUTIONAL:  awake, alert, cooperative, no apparent distress, and appears stated age  LUNGS:  No increased work of breathing, good air exchange, clear to auscultation bilaterally, no crackles or wheezing  CARDIOVASCULAR:  Normal apical impulse, regular rate and rhythm, normal S1 and S2, no S3 or S4, and no murmur noted  ABDOMEN:  No scars, normal bowel sounds, soft, non-distended, non-tender, no masses palpated, no hepatosplenomegally  SKIN:  DRY and no bruising or bleeding  Data  CBC with Differential:    Lab Results   Component Value Date/Time    WBC 7.3 04/06/2023 04:45 PM    RBC 3.70 04/06/2023 04:45 PM    HGB 11.5 04/06/2023 04:45 PM    HCT 37.2 04/06/2023 04:45 PM     04/06/2023 04:45 PM    .5 04/06/2023 04:45 PM    MCH 31.1 04/06/2023 04:45 PM    MCHC 30.9 04/06/2023 04:45 PM    RDW 13.5 04/06/2023 04:45 PM    NRBC 1.7 09/27/2022 05:30 AM    SEGSPCT 60 04/24/2013 03:25 PM    LYMPHOPCT 14.2 04/06/2023 04:45 PM    MONOPCT 16.3 04/06/2023 04:45 PM    BASOPCT 0.7 04/06/2023 04:45 PM
PFT order faxed to [facility] with respiratory ([phone]) - he will schedule it for us.
Patient removed bipap and refusing to put it back on
Physical Therapy    Physical Therapy Treatment Note/Plan of Care    Room #:  2623/3200-06  Patient Name: Konstantin Pike  YOB: 1950  MRN: [id number]    Date of Service: 4/12/2023     Tentative placement recommendation: Home with no Physical Therapy needs  Equipment recommendation: Patient has needed equipment       Evaluating Physical Therapist: Karen Donald, PT  #[id number]    /  Specific Provider Orders/Date/Referring Provider : dr Chio Pichardo 4/10/23 pt eval and treat     Admitting Diagnosis:   COPD exacerbation ([facility] [J44.1]      Surgery: none      Patient Active Problem List   Diagnosis    Hepatitis C    Testicular swelling    COPD exacerbation ([facility]    NSTEMI (non-ST elevated myocardial infarction) ([facility]    CHF, acute on chronic [facility])    Essential hypertension    Neck pain    Cervical disc herniation    Acute respiratory failure with hypoxia and hypercapnia ([facility]    Elbow effusion, right    Acute on chronic respiratory failure with hypoxia and hypercapnia ([facility])    Chronic pain syndrome [G89.4 (ICD-10-CM)]    Lumbar spondylosis    Spinal stenosis of lumbar region without neurogenic claudication    Lumbar facet arthropathy    Lumbar disc disorder    Cervical spondylosis    Cervical facet joint syndrome    Cervical stenosis of spine    Cervical disc disorder    Cervical radiculopathy    Lumbar radiculopathy        ASSESSMENT of Current Deficits Patient exhibits decreased strength, balance, and endurance impairing functional mobility, transfers, gait , gait distance, and tolerance to activity are barriers to d/c and require skilled intervention to address concerns listed above to increase safety and independence at discharge. Decreased strength, balance and endurance  increases patient's risk for fall. Patient with difficulty oxygenating impairing endurance and functional independence. Patient needing a standing rest period and a slow jaleesa during ambulation due to shortness of breath.  Pt's SPO2
Physical Therapy  Physical Therapy    Room #: 2644/1121-19  Date: 4/10/2023       Patient Name: Theodore Hanson  : 1950      MRN: [id number]     Patient unavailable for physical therapy eval due to  patient set up for bath  . Will attempt PT evaluation at a later time. Thank you.        Radha Perez, PT
Pulmonary Critical Care Medicine           PULMONARY  CRITICAL CARE   SERVICE DAILY PROGRESS  NOTE     2023   Hospital LOS:  LOS: 5 days     Impression:  Acute on chronic respiratory insufficiency and impending failure secondary to severe COPD with acute exacerbation. Severe degree of underlying paraseptal and panlobular emphysema with significant bullous disease  Recurrent and persistent hypercapnia secondary to above    Recommendations:    -Continue supportive care  - Sputum cultures negative  -  Duonebs  Scheduled   - Pulmicort and Brovana nebulizations   -Continue 3 times a week azithromycin, long-term  -Patient will need nocturnal BiPAP therapy at home.,  Will get nocturnal oximetry tonight. BiPAP should be at his house preferably before discharge  -Patient will benefit from outpatient pulmonary rehabilitation referral  -Continue p.o. prednisone  -Will need outpatient pulmonary follow-up with complete pulmonary function tests  -We will continue to follow    Interval History/Event Changes:  Comfortably sitting on the side of the bed  No new complaints  Advised on the need of BiPAP at night, patient seems to understand and agrees with the plan. Allergies  Allergies   Allergen Reactions    Ace Inhibitors Shortness Of Breath, Swelling and Angioedema    Lisinopril Other (See Comments)     Angioedema       Review of Systems    A pertinent review of systems was performed and was otherwise non-contributory.     Vitals-     BP (!) 157/76   Pulse 67   Temp 97.8 °F (36.6 °C) (Oral)   Resp 13   Ht 5' 6\" (1.676 m)   Wt 185 lb (83.9 kg)   SpO2 96%   BMI 29.86 kg/m²    Tmax: Temp (24hrs), Av.2 °F (36.8 °C), Min:97.8 °F (36.6 °C), Max:98.6 °F (37 °C)      Hemodynamics:  Cuff:   Systolic (48VWJ), CASS:506 , Min:133 , WHZ:490   /Diastolic (65ILX), WLE:96, Min:76, Max:84    Cuff MAP:MAP (mmHg)  Av  Min: 89  Max: 107  P:   Pulse  Av  Min: 59  Max: 67      Airway:     Observed RR: Resp  Av
Pulmonary Critical Care Medicine           PULMONARY  CRITICAL CARE   SERVICE DAILY PROGRESS  NOTE     2023   Hospital LOS:  LOS: 6 days     Impression:  Acute on chronic respiratory insufficiency and impending failure secondary to severe COPD with acute exacerbation. Severe degree of underlying paraseptal and panlobular emphysema with significant bullous disease  Recurrent and persistent hypercapnia secondary to above    Recommendations:    -Continue supportive care, supplemental oxygen. He is at his baseline oxygen requirement  -Continue Duonebs  Scheduled   - Pulmicort and Brovana nebulizations   -Continue 3 times a week azithromycin, long-term  - Reviewed the overnight oxymetry , no desaturations , Patient will still benefit from nocturnal BiPAP therapy at home for Hypercapnia and preventing repeat hospitalization for the same. BiPAP order has been sent to DME .     -Patient will benefit from outpatient pulmonary rehabilitation referral  -Continue p.o. prednisone until he sees pulmonary MD as outpatient   -Will need outpatient pulmonary follow-up with complete pulmonary function tests    Interval History/Event Changes:  Comfortably sitting on the side of the bed  Nocturnal oximetry was personally reviewed. He did not drop his oxygen saturation baseline oxygen. Allergies  Allergies   Allergen Reactions    Ace Inhibitors Shortness Of Breath, Swelling and Angioedema    Lisinopril Other (See Comments)     Angioedema       Review of Systems    A pertinent review of systems was performed and was otherwise non-contributory.     Vitals-     BP (!) 177/78   Pulse 82   Temp 98 °F (36.7 °C) (Oral)   Resp 16   Ht 5' 6\" (1.676 m)   Wt 185 lb (83.9 kg)   SpO2 99%   BMI 29.86 kg/m²    Tmax: Temp (24hrs), Av °F (36.7 °C), Min:98 °F (36.7 °C), Max:98 °F (36.7 °C)      Hemodynamics:  Cuff:   Systolic (49TEQ), ZZP:274 , Min:127 , ZEJ:461   /Diastolic (28YXO), WVH:48, Min:78, Max:82    Cuff MAP:MAP
Updated Dr. Maria Esther Malik about ABGs (pt not wearing biPAP at night), morning lab results (BUN and creatinine), PFT scheduling (will call respiratory manager to find out how to do this as an outpatient).
04:45 PM    LABALBU 4.6 07/14/2011 02:10 PM    CREATININE 1.6 04/06/2023 04:45 PM    CALCIUM 9.9 04/06/2023 04:45 PM    GFRAA >60 10/02/2022 05:33 AM    LABGLOM 46 04/06/2023 04:45 PM    GLUCOSE 104 04/06/2023 04:45 PM    GLUCOSE 101 07/14/2011 02:10 PM     Current Medications  Scheduled Meds:   predniSONE  40 mg Oral Daily    [START ON 4/13/2023] predniSONE  5 mg Oral Daily    Roflumilast  500 mcg Oral Daily    azithromycin  500 mg Oral Q MWF    ipratropium-albuterol  1 ampule Inhalation Q4H WA    aspirin  81 mg Oral Daily    budesonide  0.5 mg Nebulization BID    gabapentin  300 mg Oral Nightly    cetirizine  10 mg Oral Daily    losartan  100 mg Oral Daily    metoprolol succinate  100 mg Oral 2 times per day    hydrocortisone   Topical BID    ketoconazole   Topical Daily    triamcinolone   Topical BID    furosemide  40 mg Oral BID    hydroCHLOROthiazide  25 mg Oral Daily    pantoprazole  40 mg Oral QAM AC    arformoterol tartrate  15 mcg Nebulization BID     Continuous Infusions:  PRN Meds:.lidocaine-prilocaine, albuterol, HYDROcodone 5 mg - acetaminophen    ASSESSMENT AND PLAN      Principal Problem:    COPD exacerbation (HCC)  Active Problems:    Acute on chronic respiratory failure with hypoxia and hypercapnia (HCC)    Essential hypertension    Lumbar spondylosis  Resolved Problems:    * No resolved hospital problems. *      CONTINUE PLAN OF CARE.
Past Surgical History:   Procedure Laterality Date    CATARACT REMOVAL Right 05/2019    CATARACT REMOVAL Left 06/2019    CERVICAL FUSION  03/09/2018    ACF C4-C7    COLONOSCOPY      HERNIA REPAIR      tracee inguinal repair    UPPER GASTROINTESTINAL ENDOSCOPY N/A 1/3/2023    EGD BIOPSY performed by Simi Marcos MD at [facility]:    Precautions: , falls and O2 ,  4 liters baseline    Social history: Patient lives alone in a ranch home  with no steps  to enter home. Tub shower , grab bars      Equipment owned: Shower chair,       [address] [facility]   How much help is needed turning from your back to your side while in a flat bed without using bedrails?: None  How much help is needed moving from lying on your back to sitting on the side of a flat bed without using bedrails?: None  How much help is needed moving to and from a bed to a chair?: None  How much help is needed standing up from a chair using your arms?: None  How much help is needed walking in hospital room?: A Little  How much help is needed climbing 3-5 steps with a railing?: A Lot  AM-PAC Inpatient Mobility Raw Score : 21  AM-PAC Inpatient T-Scale Score : 50.25  Mobility Inpatient CMS 0-100% Score: 28.97  Mobility Inpatient CMS G-Code Modifier : [address] cleared patient for PT evaluation. The admitting diagnosis and active problem list as listed above have been reviewed prior to the initiation of this evaluation. OBJECTIVE;   Initial Evaluation  Date: 4/10/2023 Treatment Date:     Short Term/ Long Term   Goals   Was pt agreeable to Eval/treatment? Yes  To be met in 3 days   Pain level   0/10        Bed Mobility  Using rails and head of bed elevated:     Rolling: Independent    Supine to sit:  Independent    Sit to supine: Independent    Scooting: Independent       Transfers Sit to stand: Supervision     Sit to stand: Independent     Ambulation     80 feet using  no device with

## 2023-04-12 NOTE — CARE COORDINATION
4/12/2023 1503 CM note: Pt is wearing [email or web address] NC and has home oxygen at 3L NC and a nebulizer through [facility]. BIPAP Rx, nocturnal pox on o2 NC, ABG on o2, Physician documentation for BIPAP faxed to [phone] earlier today. Per Jovi Hassan at [facility], pt does not qualify for home BIPAP as pt did not desat on nocturnal pox testing. Leida SAIIN rep relays that [facility] can repeat nocturnal pox testing in home on 3L NC if warranted-would need Rx for this faxed to [phone], then IF pt qualifies, [facility] would need to obtain insurance authorization for BIPAP. [employer] notified Pulmonologist of above. Pt informed he does not qualify for home BIPAP at this time. He continues to decline Pulmonary Rehab, he states he has done rehab in past and declines at this time. (In the event pt changes his mind-Per [facility], pt would need Rx with dx and have completed PFTS prior to scheduling patient.) Pt plans to return home at d/c and will have transportation.  Sari TAYLOR

## 2023-04-12 NOTE — DISCHARGE INSTRUCTIONS
Your information:  Name: Juan Antonio Davila  : 1950    Your instructions:    YOU ARE BEING DISCHARGED HOME . PLEASE MAKE AND KEEP YOUR FOLLOW UP APPOINTMENTS. What to do after you leave the hospital:    Recommended diet: regular diet    Recommended activity: activity as tolerated    IF YOU EXPERIENCE ANY OF THE FOLLOWING SYMPTOMS, CHEST PAIN, SHORTNESS OF BREATH, COUGHING UP BLOOD OR BLOODY SPUTUM, STOMACH PAIN OR CRAMPING, DARK, TARRY STOOLS, LOSS OF APPETITE, GENERAL NOT FEELING WELL, SIGNS AND SYMPTOMS OF INFECTION LIKE FEVER AND OR CHILLS, PLEASE CALL DR Ajit Velarde OR RETURN TO THE EMERGENCY ROOM. The following personal items were collected during your admission and were returned to you:    Belongings  Dental Appliances: None  Vision - Corrective Lenses: None  Hearing Aid: None  Clothing: Slippers, Undergarments, At bedside, Jacket/Coat, Shirt, Pants  Jewelry: Bracelet, Necklace, Ring, At bedside  Body Piercings Removed: No  Electronic Devices: Cell Phone,   Weapons (Notify Protective Services/Security): None  Other Valuables: Money, Money Broken arrow, [address], At bedside  Home Medications: None  Valuables Given To: Patient  Provide Name(s) of Who Valuable(s) Were Given To: n/a  Responsible person(s) in the waiting room: n/a  Patient approves for provider to speak to responsible person post operatively: No    Information obtained by:  By signing below, I understand that if any problems occur once I leave the hospital I am to contact Dr Lupe Jimenez or go to emergency room. I understand and acknowledge receipt of the instructions indicated above.

## 2023-05-28 ENCOUNTER — APPOINTMENT (OUTPATIENT)
Dept: GENERAL RADIOLOGY | Age: 73
DRG: 190 | End: 2023-05-28
Payer: COMMERCIAL

## 2023-05-28 ENCOUNTER — HOSPITAL ENCOUNTER (INPATIENT)
Age: 73
LOS: 10 days | Discharge: HOME | DRG: 190 | End: 2023-06-07
Attending: EMERGENCY MEDICINE | Admitting: FAMILY MEDICINE
Payer: COMMERCIAL

## 2023-05-28 ENCOUNTER — APPOINTMENT (OUTPATIENT)
Dept: CT IMAGING | Age: 73
DRG: 190 | End: 2023-05-28
Payer: COMMERCIAL

## 2023-05-28 DIAGNOSIS — R10.9 ABDOMINAL PAIN, UNSPECIFIED ABDOMINAL LOCATION: ICD-10-CM

## 2023-05-28 DIAGNOSIS — J44.1 COPD EXACERBATION (HCC): ICD-10-CM

## 2023-05-28 DIAGNOSIS — I50.9 CONGESTIVE HEART FAILURE, UNSPECIFIED HF CHRONICITY, UNSPECIFIED HEART FAILURE TYPE (HCC): ICD-10-CM

## 2023-05-28 DIAGNOSIS — R00.0 TACHYCARDIA: ICD-10-CM

## 2023-05-28 DIAGNOSIS — J96.01 ACUTE RESPIRATORY FAILURE WITH HYPOXIA (HCC): Primary | ICD-10-CM

## 2023-05-28 LAB
ALBUMIN SERPL-MCNC: 4.4 G/DL (ref 3.5–5.2)
ALP SERPL-CCNC: 58 U/L (ref 40–129)
ALT SERPL-CCNC: 7 U/L (ref 0–40)
ANION GAP SERPL CALCULATED.3IONS-SCNC: 13 MMOL/L (ref 7–16)
AST SERPL-CCNC: 34 U/L (ref 0–39)
B.E.: 8.5 MMOL/L (ref -3–3)
BASOPHILS # BLD: 0.05 E9/L (ref 0–0.2)
BASOPHILS NFR BLD: 0.5 % (ref 0–2)
BILIRUB SERPL-MCNC: 0.8 MG/DL (ref 0–1.2)
BNP BLD-MCNC: 2799 PG/ML (ref 0–125)
BUN SERPL-MCNC: 32 MG/DL (ref 6–23)
CALCIUM SERPL-MCNC: 10.4 MG/DL (ref 8.6–10.2)
CHLORIDE SERPL-SCNC: 86 MMOL/L (ref 98–107)
CO2 SERPL-SCNC: 34 MMOL/L (ref 22–29)
CREAT SERPL-MCNC: 1.8 MG/DL (ref 0.7–1.2)
DELIVERY SYSTEMS: ABNORMAL
DEVICE: ABNORMAL
EOSINOPHIL # BLD: 0.01 E9/L (ref 0.05–0.5)
EOSINOPHIL NFR BLD: 0.1 % (ref 0–6)
ERYTHROCYTE [DISTWIDTH] IN BLOOD BY AUTOMATED COUNT: 13 FL (ref 11.5–15)
GLUCOSE SERPL-MCNC: 105 MG/DL (ref 74–99)
HCO3: 36.7 MMOL/L (ref 22–26)
HCT VFR BLD AUTO: 39.1 % (ref 37–54)
HGB BLD-MCNC: 12 G/DL (ref 12.5–16.5)
IMM GRANULOCYTES # BLD: 0.03 E9/L
IMM GRANULOCYTES NFR BLD: 0.3 % (ref 0–5)
LIPASE: 24 U/L (ref 13–60)
LYMPHOCYTES # BLD: 1.5 E9/L (ref 1.5–4)
LYMPHOCYTES NFR BLD: 16.1 % (ref 20–42)
MCH RBC QN AUTO: 30.8 PG (ref 26–35)
MCHC RBC AUTO-ENTMCNC: 30.7 % (ref 32–34.5)
MCV RBC AUTO: 100.3 FL (ref 80–99.9)
MONOCYTES # BLD: 1.4 E9/L (ref 0.1–0.95)
MONOCYTES NFR BLD: 15 % (ref 2–12)
NEUTROPHILS # BLD: 6.35 E9/L (ref 1.8–7.3)
NEUTS SEG NFR BLD: 68 % (ref 43–80)
O2 SATURATION: 99.3 % (ref 92–98.5)
OPERATOR ID: 1119
PCO2 37: 65.5 MMHG (ref 35–45)
PH 37: 7.36 (ref 7.35–7.45)
PLATELET # BLD AUTO: 405 E9/L (ref 130–450)
PMV BLD AUTO: 10.8 FL (ref 7–12)
PO2 37: 161.4 MMHG (ref 60–80)
POC SOURCE: ABNORMAL
POTASSIUM SERPL-SCNC: 3.9 MMOL/L (ref 3.5–5)
PROT SERPL-MCNC: 8.5 G/DL (ref 6.4–8.3)
RBC # BLD AUTO: 3.9 E12/L (ref 3.8–5.8)
SODIUM SERPL-SCNC: 133 MMOL/L (ref 132–146)
TROPONIN, HIGH SENSITIVITY: 91 NG/L (ref 0–11)
TROPONIN, HIGH SENSITIVITY: 96 NG/L (ref 0–11)
WBC # BLD: 9.3 E9/L (ref 4.5–11.5)

## 2023-05-28 PROCEDURE — 74174 CTA ABD&PLVS W/CONTRAST: CPT

## 2023-05-28 PROCEDURE — 82803 BLOOD GASES ANY COMBINATION: CPT

## 2023-05-28 PROCEDURE — 99285 EMERGENCY DEPT VISIT HI MDM: CPT

## 2023-05-28 PROCEDURE — 71045 X-RAY EXAM CHEST 1 VIEW: CPT

## 2023-05-28 PROCEDURE — 94640 AIRWAY INHALATION TREATMENT: CPT

## 2023-05-28 PROCEDURE — 94664 DEMO&/EVAL PT USE INHALER: CPT

## 2023-05-28 PROCEDURE — 85025 COMPLETE CBC W/AUTO DIFF WBC: CPT

## 2023-05-28 PROCEDURE — 2060000000 HC ICU INTERMEDIATE R&B

## 2023-05-28 PROCEDURE — 80053 COMPREHEN METABOLIC PANEL: CPT

## 2023-05-28 PROCEDURE — 36415 COLL VENOUS BLD VENIPUNCTURE: CPT

## 2023-05-28 PROCEDURE — 71275 CT ANGIOGRAPHY CHEST: CPT

## 2023-05-28 PROCEDURE — 84484 ASSAY OF TROPONIN QUANT: CPT

## 2023-05-28 PROCEDURE — 93005 ELECTROCARDIOGRAM TRACING: CPT | Performed by: STUDENT IN AN ORGANIZED HEALTH CARE EDUCATION/TRAINING PROGRAM

## 2023-05-28 PROCEDURE — 2580000003 HC RX 258: Performed by: STUDENT IN AN ORGANIZED HEALTH CARE EDUCATION/TRAINING PROGRAM

## 2023-05-28 PROCEDURE — 2500000003 HC RX 250 WO HCPCS: Performed by: STUDENT IN AN ORGANIZED HEALTH CARE EDUCATION/TRAINING PROGRAM

## 2023-05-28 PROCEDURE — 6370000000 HC RX 637 (ALT 250 FOR IP): Performed by: STUDENT IN AN ORGANIZED HEALTH CARE EDUCATION/TRAINING PROGRAM

## 2023-05-28 PROCEDURE — 83880 ASSAY OF NATRIURETIC PEPTIDE: CPT

## 2023-05-28 PROCEDURE — 6360000002 HC RX W HCPCS: Performed by: STUDENT IN AN ORGANIZED HEALTH CARE EDUCATION/TRAINING PROGRAM

## 2023-05-28 PROCEDURE — 96374 THER/PROPH/DIAG INJ IV PUSH: CPT

## 2023-05-28 PROCEDURE — 83690 ASSAY OF LIPASE: CPT

## 2023-05-28 PROCEDURE — 87635 SARS-COV-2 COVID-19 AMP PRB: CPT

## 2023-05-28 PROCEDURE — 6360000004 HC RX CONTRAST MEDICATION: Performed by: RADIOLOGY

## 2023-05-28 PROCEDURE — 2700000000 HC OXYGEN THERAPY PER DAY

## 2023-05-28 PROCEDURE — 96375 TX/PRO/DX INJ NEW DRUG ADDON: CPT

## 2023-05-28 RX ORDER — IPRATROPIUM BROMIDE AND ALBUTEROL SULFATE 2.5; .5 MG/3ML; MG/3ML
3 SOLUTION RESPIRATORY (INHALATION) ONCE
Status: COMPLETED | OUTPATIENT
Start: 2023-05-28 | End: 2023-05-28

## 2023-05-28 RX ORDER — DEXAMETHASONE SODIUM PHOSPHATE 4 MG/ML
4 INJECTION, SOLUTION INTRA-ARTICULAR; INTRALESIONAL; INTRAMUSCULAR; INTRAVENOUS; SOFT TISSUE EVERY 6 HOURS
Status: DISCONTINUED | OUTPATIENT
Start: 2023-05-28 | End: 2023-05-28

## 2023-05-28 RX ORDER — DEXAMETHASONE SODIUM PHOSPHATE 10 MG/ML
10 INJECTION INTRAMUSCULAR; INTRAVENOUS ONCE
Status: COMPLETED | OUTPATIENT
Start: 2023-05-28 | End: 2023-05-28

## 2023-05-28 RX ORDER — DILTIAZEM HYDROCHLORIDE 5 MG/ML
10 INJECTION INTRAVENOUS ONCE
Status: COMPLETED | OUTPATIENT
Start: 2023-05-28 | End: 2023-05-28

## 2023-05-28 RX ORDER — 0.9 % SODIUM CHLORIDE 0.9 %
1000 INTRAVENOUS SOLUTION INTRAVENOUS ONCE
Status: COMPLETED | OUTPATIENT
Start: 2023-05-28 | End: 2023-05-29

## 2023-05-28 RX ADMIN — SODIUM CHLORIDE 1000 ML: 9 INJECTION, SOLUTION INTRAVENOUS at 21:26

## 2023-05-28 RX ADMIN — IOPAMIDOL 75 ML: 755 INJECTION, SOLUTION INTRAVENOUS at 20:46

## 2023-05-28 RX ADMIN — IPRATROPIUM BROMIDE AND ALBUTEROL SULFATE 3 DOSE: .5; 2.5 SOLUTION RESPIRATORY (INHALATION) at 19:12

## 2023-05-28 RX ADMIN — DILTIAZEM HYDROCHLORIDE 10 MG: 5 INJECTION INTRAVENOUS at 22:41

## 2023-05-28 RX ADMIN — DEXAMETHASONE SODIUM PHOSPHATE 10 MG: 10 INJECTION INTRAMUSCULAR; INTRAVENOUS at 19:55

## 2023-05-29 PROBLEM — N17.9 AKI (ACUTE KIDNEY INJURY) (HCC): Status: ACTIVE | Noted: 2023-05-29

## 2023-05-29 LAB
B PARAP IS1001 DNA NPH QL NAA+NON-PROBE: NOT DETECTED
B PERT.PT PRMT NPH QL NAA+NON-PROBE: NOT DETECTED
C PNEUM DNA NPH QL NAA+NON-PROBE: NOT DETECTED
FLUAV RNA NPH QL NAA+NON-PROBE: NOT DETECTED
FLUBV RNA NPH QL NAA+NON-PROBE: NOT DETECTED
HADV DNA NPH QL NAA+NON-PROBE: NOT DETECTED
HCOV 229E RNA NPH QL NAA+NON-PROBE: NOT DETECTED
HCOV HKU1 RNA NPH QL NAA+NON-PROBE: NOT DETECTED
HCOV NL63 RNA NPH QL NAA+NON-PROBE: NOT DETECTED
HCOV OC43 RNA NPH QL NAA+NON-PROBE: NOT DETECTED
HMPV RNA NPH QL NAA+NON-PROBE: NOT DETECTED
HPIV1 RNA NPH QL NAA+NON-PROBE: NOT DETECTED
HPIV2 RNA NPH QL NAA+NON-PROBE: NOT DETECTED
HPIV3 RNA NPH QL NAA+NON-PROBE: NOT DETECTED
HPIV4 RNA NPH QL NAA+NON-PROBE: NOT DETECTED
M PNEUMO DNA NPH QL NAA+NON-PROBE: NOT DETECTED
RSV RNA NPH QL NAA+NON-PROBE: NOT DETECTED
RV+EV RNA NPH QL NAA+NON-PROBE: NOT DETECTED
SARS-COV-2 RDRP RESP QL NAA+PROBE: NOT DETECTED
SARS-COV-2 RDRP RESP QL NAA+PROBE: NOT DETECTED
SARS-COV-2 RNA NPH QL NAA+NON-PROBE: NOT DETECTED

## 2023-05-29 PROCEDURE — 94640 AIRWAY INHALATION TREATMENT: CPT

## 2023-05-29 PROCEDURE — 6370000000 HC RX 637 (ALT 250 FOR IP): Performed by: FAMILY MEDICINE

## 2023-05-29 PROCEDURE — 87449 NOS EACH ORGANISM AG IA: CPT

## 2023-05-29 PROCEDURE — 2060000000 HC ICU INTERMEDIATE R&B

## 2023-05-29 PROCEDURE — 0202U NFCT DS 22 TRGT SARS-COV-2: CPT

## 2023-05-29 PROCEDURE — 6360000002 HC RX W HCPCS: Performed by: FAMILY MEDICINE

## 2023-05-29 PROCEDURE — 99222 1ST HOSP IP/OBS MODERATE 55: CPT | Performed by: INTERNAL MEDICINE

## 2023-05-29 RX ORDER — ROFLUMILAST 500 UG/1
500 TABLET ORAL DAILY
Status: DISCONTINUED | OUTPATIENT
Start: 2023-05-29 | End: 2023-06-02

## 2023-05-29 RX ORDER — TACROLIMUS 1 MG/G
1 OINTMENT TOPICAL 2 TIMES DAILY PRN
COMMUNITY
Start: 2023-03-30

## 2023-05-29 RX ORDER — GABAPENTIN 300 MG/1
300 CAPSULE ORAL NIGHTLY
Status: DISCONTINUED | OUTPATIENT
Start: 2023-05-29 | End: 2023-06-07 | Stop reason: HOSPADM

## 2023-05-29 RX ORDER — ASPIRIN 81 MG/1
81 TABLET, CHEWABLE ORAL DAILY
Status: DISCONTINUED | OUTPATIENT
Start: 2023-05-29 | End: 2023-06-07 | Stop reason: HOSPADM

## 2023-05-29 RX ORDER — ALBUTEROL SULFATE 2.5 MG/3ML
2.5 SOLUTION RESPIRATORY (INHALATION) EVERY 6 HOURS PRN
Status: DISCONTINUED | OUTPATIENT
Start: 2023-05-29 | End: 2023-06-07 | Stop reason: HOSPADM

## 2023-05-29 RX ORDER — BUDESONIDE AND FORMOTEROL FUMARATE DIHYDRATE 160; 4.5 UG/1; UG/1
2 AEROSOL RESPIRATORY (INHALATION) 2 TIMES DAILY
Status: DISCONTINUED | OUTPATIENT
Start: 2023-05-29 | End: 2023-05-29 | Stop reason: CLARIF

## 2023-05-29 RX ORDER — ALBUTEROL SULFATE 90 UG/1
2 AEROSOL, METERED RESPIRATORY (INHALATION) 4 TIMES DAILY PRN
COMMUNITY
Start: 2023-04-25

## 2023-05-29 RX ORDER — METOPROLOL SUCCINATE 100 MG/1
100 TABLET, EXTENDED RELEASE ORAL 2 TIMES DAILY
Status: DISCONTINUED | OUTPATIENT
Start: 2023-05-29 | End: 2023-06-07 | Stop reason: HOSPADM

## 2023-05-29 RX ORDER — AZITHROMYCIN 500 MG/1
500 TABLET, FILM COATED ORAL DAILY
Status: ON HOLD | COMMUNITY
Start: 2023-05-20 | End: 2023-06-07 | Stop reason: HOSPADM

## 2023-05-29 RX ORDER — BUDESONIDE 0.5 MG/2ML
0.5 INHALANT ORAL 2 TIMES DAILY
Status: DISCONTINUED | OUTPATIENT
Start: 2023-05-29 | End: 2023-06-07 | Stop reason: HOSPADM

## 2023-05-29 RX ORDER — HYDROCHLOROTHIAZIDE 25 MG/1
25 TABLET ORAL DAILY
Status: DISCONTINUED | OUTPATIENT
Start: 2023-05-29 | End: 2023-06-07

## 2023-05-29 RX ORDER — KETOCONAZOLE 20 MG/G
CREAM TOPICAL 2 TIMES DAILY
Status: DISCONTINUED | OUTPATIENT
Start: 2023-05-29 | End: 2023-06-07 | Stop reason: HOSPADM

## 2023-05-29 RX ORDER — TRIAMCINOLONE ACETONIDE 1 MG/G
CREAM TOPICAL 2 TIMES DAILY
Status: DISCONTINUED | OUTPATIENT
Start: 2023-05-29 | End: 2023-06-07 | Stop reason: HOSPADM

## 2023-05-29 RX ORDER — VITAMIN E 268 MG
400 CAPSULE ORAL DAILY
Status: DISCONTINUED | OUTPATIENT
Start: 2023-05-29 | End: 2023-06-07 | Stop reason: HOSPADM

## 2023-05-29 RX ORDER — LOSARTAN POTASSIUM 100 MG/1
100 TABLET ORAL DAILY
Status: DISCONTINUED | OUTPATIENT
Start: 2023-05-29 | End: 2023-06-07 | Stop reason: HOSPADM

## 2023-05-29 RX ORDER — PREDNISONE 10 MG/1
10 TABLET ORAL EVERY OTHER DAY
COMMUNITY
Start: 2023-03-30

## 2023-05-29 RX ORDER — HYDROCODONE BITARTRATE AND ACETAMINOPHEN 5; 325 MG/1; MG/1
1 TABLET ORAL EVERY 6 HOURS PRN
Status: DISCONTINUED | OUTPATIENT
Start: 2023-05-29 | End: 2023-06-04

## 2023-05-29 RX ORDER — PANTOPRAZOLE SODIUM 40 MG/1
40 TABLET, DELAYED RELEASE ORAL
Status: DISCONTINUED | OUTPATIENT
Start: 2023-05-29 | End: 2023-06-07 | Stop reason: HOSPADM

## 2023-05-29 RX ORDER — ARFORMOTEROL TARTRATE 15 UG/2ML
15 SOLUTION RESPIRATORY (INHALATION) 2 TIMES DAILY
Status: DISCONTINUED | OUTPATIENT
Start: 2023-05-29 | End: 2023-06-07 | Stop reason: HOSPADM

## 2023-05-29 RX ORDER — PREDNISONE 10 MG/1
10 TABLET ORAL EVERY OTHER DAY
Status: DISCONTINUED | OUTPATIENT
Start: 2023-05-29 | End: 2023-06-06

## 2023-05-29 RX ORDER — AZITHROMYCIN 250 MG/1
500 TABLET, FILM COATED ORAL DAILY
Status: COMPLETED | OUTPATIENT
Start: 2023-05-29 | End: 2023-05-31

## 2023-05-29 RX ORDER — CETIRIZINE HYDROCHLORIDE 10 MG/1
10 TABLET ORAL DAILY
Status: DISCONTINUED | OUTPATIENT
Start: 2023-05-29 | End: 2023-06-07 | Stop reason: HOSPADM

## 2023-05-29 RX ORDER — FUROSEMIDE 40 MG/1
40 TABLET ORAL 2 TIMES DAILY
Status: DISCONTINUED | OUTPATIENT
Start: 2023-05-29 | End: 2023-06-07

## 2023-05-29 RX ADMIN — BUDESONIDE 500 MCG: 0.5 SUSPENSION RESPIRATORY (INHALATION) at 17:06

## 2023-05-29 RX ADMIN — ARFORMOTEROL TARTRATE 15 MCG: 15 SOLUTION RESPIRATORY (INHALATION) at 09:15

## 2023-05-29 RX ADMIN — BUDESONIDE 500 MCG: 0.5 SUSPENSION RESPIRATORY (INHALATION) at 09:15

## 2023-05-29 RX ADMIN — HYDROCODONE BITARTRATE AND ACETAMINOPHEN 1 TABLET: 5; 325 TABLET ORAL at 21:33

## 2023-05-29 RX ADMIN — ALBUTEROL SULFATE 2.5 MG: 2.5 SOLUTION RESPIRATORY (INHALATION) at 09:16

## 2023-05-29 RX ADMIN — ARFORMOTEROL TARTRATE 15 MCG: 15 SOLUTION RESPIRATORY (INHALATION) at 17:06

## 2023-05-29 RX ADMIN — ROFLUMILAST 500 MCG: 500 TABLET ORAL at 08:36

## 2023-05-29 RX ADMIN — CETIRIZINE HYDROCHLORIDE 10 MG: 10 TABLET, FILM COATED ORAL at 08:37

## 2023-05-29 RX ADMIN — METOPROLOL SUCCINATE 100 MG: 100 TABLET, EXTENDED RELEASE ORAL at 21:31

## 2023-05-29 RX ADMIN — FUROSEMIDE 40 MG: 40 TABLET ORAL at 16:32

## 2023-05-29 RX ADMIN — GABAPENTIN 300 MG: 300 CAPSULE ORAL at 21:31

## 2023-05-29 RX ADMIN — HYDROCODONE BITARTRATE AND ACETAMINOPHEN 1 TABLET: 5; 325 TABLET ORAL at 14:54

## 2023-05-29 RX ADMIN — IPRATROPIUM BROMIDE 0.5 MG: 0.5 SOLUTION RESPIRATORY (INHALATION) at 09:16

## 2023-05-29 RX ADMIN — PANTOPRAZOLE SODIUM 40 MG: 40 TABLET, DELAYED RELEASE ORAL at 16:32

## 2023-05-29 RX ADMIN — AZITHROMYCIN MONOHYDRATE 500 MG: 250 TABLET ORAL at 08:36

## 2023-05-29 RX ADMIN — KETOCONAZOLE: 20 CREAM TOPICAL at 08:38

## 2023-05-29 RX ADMIN — PREDNISONE 10 MG: 10 TABLET ORAL at 08:36

## 2023-05-29 RX ADMIN — METOPROLOL SUCCINATE 100 MG: 100 TABLET, EXTENDED RELEASE ORAL at 08:37

## 2023-05-29 RX ADMIN — HYDROCODONE BITARTRATE AND ACETAMINOPHEN 1 TABLET: 5; 325 TABLET ORAL at 08:36

## 2023-05-29 RX ADMIN — ASPIRIN 81 MG 81 MG: 81 TABLET ORAL at 08:36

## 2023-05-29 RX ADMIN — PANTOPRAZOLE SODIUM 40 MG: 40 TABLET, DELAYED RELEASE ORAL at 08:36

## 2023-05-29 RX ADMIN — IPRATROPIUM BROMIDE 0.5 MG: 0.5 SOLUTION RESPIRATORY (INHALATION) at 17:05

## 2023-05-29 RX ADMIN — LOSARTAN POTASSIUM 100 MG: 100 TABLET, FILM COATED ORAL at 08:36

## 2023-05-29 RX ADMIN — HYDROCHLOROTHIAZIDE 25 MG: 25 TABLET ORAL at 08:37

## 2023-05-29 RX ADMIN — TRIAMCINOLONE ACETONIDE: 1 CREAM TOPICAL at 08:38

## 2023-05-29 RX ADMIN — FUROSEMIDE 40 MG: 40 TABLET ORAL at 08:36

## 2023-05-29 ASSESSMENT — PAIN DESCRIPTION - FREQUENCY
FREQUENCY: CONTINUOUS
FREQUENCY: CONTINUOUS

## 2023-05-29 ASSESSMENT — PAIN - FUNCTIONAL ASSESSMENT
PAIN_FUNCTIONAL_ASSESSMENT: ACTIVITIES ARE NOT PREVENTED
PAIN_FUNCTIONAL_ASSESSMENT: PREVENTS OR INTERFERES SOME ACTIVE ACTIVITIES AND ADLS

## 2023-05-29 ASSESSMENT — PAIN DESCRIPTION - DESCRIPTORS
DESCRIPTORS: ACHING;SORE;SPASM
DESCRIPTORS: ACHING;SPASM;SORE
DESCRIPTORS: SPASM;ACHING
DESCRIPTORS: ACHING;SHARP

## 2023-05-29 ASSESSMENT — PAIN DESCRIPTION - ORIENTATION
ORIENTATION: LOWER;LEFT
ORIENTATION: LOWER;MID
ORIENTATION: LOWER;MID

## 2023-05-29 ASSESSMENT — PAIN DESCRIPTION - LOCATION
LOCATION: BACK;NECK
LOCATION: NECK;BACK
LOCATION: BACK;NECK
LOCATION: NECK;BACK
LOCATION: BACK
LOCATION: BACK;NECK

## 2023-05-29 ASSESSMENT — PAIN SCALES - GENERAL
PAINLEVEL_OUTOF10: 10
PAINLEVEL_OUTOF10: 8
PAINLEVEL_OUTOF10: 10
PAINLEVEL_OUTOF10: 10

## 2023-05-29 ASSESSMENT — PAIN DESCRIPTION - ONSET: ONSET: ON-GOING

## 2023-05-29 ASSESSMENT — PAIN DESCRIPTION - PAIN TYPE
TYPE: CHRONIC PAIN
TYPE: CHRONIC PAIN

## 2023-05-30 ENCOUNTER — APPOINTMENT (OUTPATIENT)
Dept: ULTRASOUND IMAGING | Age: 73
DRG: 190 | End: 2023-05-30
Payer: COMMERCIAL

## 2023-05-30 LAB
EKG ATRIAL RATE: 153 BPM
EKG P AXIS: 31 DEGREES
EKG P-R INTERVAL: 128 MS
EKG Q-T INTERVAL: 260 MS
EKG QRS DURATION: 78 MS
EKG QTC CALCULATION (BAZETT): 415 MS
EKG R AXIS: 15 DEGREES
EKG T AXIS: 57 DEGREES
EKG VENTRICULAR RATE: 153 BPM
LEGIONELLA AG UR QL: NORMAL
S PNEUM AG SPEC QL: NORMAL

## 2023-05-30 PROCEDURE — 94640 AIRWAY INHALATION TREATMENT: CPT

## 2023-05-30 PROCEDURE — 87328 CRYPTOSPORIDIUM AG IA: CPT

## 2023-05-30 PROCEDURE — 87045 FECES CULTURE AEROBIC BACT: CPT

## 2023-05-30 PROCEDURE — 6370000000 HC RX 637 (ALT 250 FOR IP): Performed by: HOSPITALIST

## 2023-05-30 PROCEDURE — 87329 GIARDIA AG IA: CPT

## 2023-05-30 PROCEDURE — 36415 COLL VENOUS BLD VENIPUNCTURE: CPT

## 2023-05-30 PROCEDURE — 2700000000 HC OXYGEN THERAPY PER DAY

## 2023-05-30 PROCEDURE — 6360000002 HC RX W HCPCS: Performed by: FAMILY MEDICINE

## 2023-05-30 PROCEDURE — 99232 SBSQ HOSP IP/OBS MODERATE 35: CPT | Performed by: INTERNAL MEDICINE

## 2023-05-30 PROCEDURE — 2060000000 HC ICU INTERMEDIATE R&B

## 2023-05-30 PROCEDURE — 89055 LEUKOCYTE ASSESSMENT FECAL: CPT

## 2023-05-30 PROCEDURE — 93010 ELECTROCARDIOGRAM REPORT: CPT | Performed by: INTERNAL MEDICINE

## 2023-05-30 PROCEDURE — 76705 ECHO EXAM OF ABDOMEN: CPT

## 2023-05-30 PROCEDURE — 86738 MYCOPLASMA ANTIBODY: CPT

## 2023-05-30 PROCEDURE — 82272 OCCULT BLD FECES 1-3 TESTS: CPT

## 2023-05-30 PROCEDURE — 6370000000 HC RX 637 (ALT 250 FOR IP): Performed by: FAMILY MEDICINE

## 2023-05-30 RX ORDER — SUCRALFATE 1 G/1
1 TABLET ORAL
Status: DISCONTINUED | OUTPATIENT
Start: 2023-05-30 | End: 2023-06-07 | Stop reason: HOSPADM

## 2023-05-30 RX ADMIN — AZITHROMYCIN MONOHYDRATE 500 MG: 250 TABLET ORAL at 08:29

## 2023-05-30 RX ADMIN — PANTOPRAZOLE SODIUM 40 MG: 40 TABLET, DELAYED RELEASE ORAL at 06:14

## 2023-05-30 RX ADMIN — PANTOPRAZOLE SODIUM 40 MG: 40 TABLET, DELAYED RELEASE ORAL at 16:03

## 2023-05-30 RX ADMIN — ROFLUMILAST 500 MCG: 500 TABLET ORAL at 08:29

## 2023-05-30 RX ADMIN — TRIAMCINOLONE ACETONIDE: 1 CREAM TOPICAL at 20:42

## 2023-05-30 RX ADMIN — Medication 400 UNITS: at 08:29

## 2023-05-30 RX ADMIN — TRIAMCINOLONE ACETONIDE: 1 CREAM TOPICAL at 08:30

## 2023-05-30 RX ADMIN — HYDROCHLOROTHIAZIDE 25 MG: 25 TABLET ORAL at 08:29

## 2023-05-30 RX ADMIN — ASPIRIN 81 MG 81 MG: 81 TABLET ORAL at 08:29

## 2023-05-30 RX ADMIN — METOPROLOL SUCCINATE 100 MG: 100 TABLET, EXTENDED RELEASE ORAL at 20:39

## 2023-05-30 RX ADMIN — HYDROCODONE BITARTRATE AND ACETAMINOPHEN 1 TABLET: 5; 325 TABLET ORAL at 20:39

## 2023-05-30 RX ADMIN — FUROSEMIDE 40 MG: 40 TABLET ORAL at 08:29

## 2023-05-30 RX ADMIN — SUCRALFATE 1 G: 1 TABLET ORAL at 20:39

## 2023-05-30 RX ADMIN — FUROSEMIDE 40 MG: 40 TABLET ORAL at 16:03

## 2023-05-30 RX ADMIN — KETOCONAZOLE: 20 CREAM TOPICAL at 20:40

## 2023-05-30 RX ADMIN — LOSARTAN POTASSIUM 100 MG: 100 TABLET, FILM COATED ORAL at 08:29

## 2023-05-30 RX ADMIN — GABAPENTIN 300 MG: 300 CAPSULE ORAL at 20:39

## 2023-05-30 RX ADMIN — HYDROCODONE BITARTRATE AND ACETAMINOPHEN 1 TABLET: 5; 325 TABLET ORAL at 13:40

## 2023-05-30 RX ADMIN — CETIRIZINE HYDROCHLORIDE 10 MG: 10 TABLET, FILM COATED ORAL at 08:29

## 2023-05-30 RX ADMIN — HYDROCODONE BITARTRATE AND ACETAMINOPHEN 1 TABLET: 5; 325 TABLET ORAL at 06:14

## 2023-05-30 RX ADMIN — SUCRALFATE 1 G: 1 TABLET ORAL at 16:02

## 2023-05-30 RX ADMIN — HYDROCORTISONE: 25 CREAM TOPICAL at 21:06

## 2023-05-30 RX ADMIN — IPRATROPIUM BROMIDE 0.5 MG: 0.5 SOLUTION RESPIRATORY (INHALATION) at 09:31

## 2023-05-30 RX ADMIN — IPRATROPIUM BROMIDE 0.5 MG: 0.5 SOLUTION RESPIRATORY (INHALATION) at 15:00

## 2023-05-30 RX ADMIN — KETOCONAZOLE: 20 CREAM TOPICAL at 08:29

## 2023-05-30 RX ADMIN — METOPROLOL SUCCINATE 100 MG: 100 TABLET, EXTENDED RELEASE ORAL at 08:29

## 2023-05-30 ASSESSMENT — PAIN SCALES - GENERAL
PAINLEVEL_OUTOF10: 10

## 2023-05-30 ASSESSMENT — PAIN DESCRIPTION - LOCATION: LOCATION: GENERALIZED

## 2023-05-30 ASSESSMENT — PAIN DESCRIPTION - DESCRIPTORS: DESCRIPTORS: ACHING;DISCOMFORT;SORE

## 2023-05-30 ASSESSMENT — PAIN - FUNCTIONAL ASSESSMENT: PAIN_FUNCTIONAL_ASSESSMENT: ACTIVITIES ARE NOT PREVENTED

## 2023-05-31 LAB
ALBUMIN SERPL-MCNC: 4.1 G/DL (ref 3.5–5.2)
ALP SERPL-CCNC: 65 U/L (ref 40–129)
ALT SERPL-CCNC: 8 U/L (ref 0–40)
ANION GAP SERPL CALCULATED.3IONS-SCNC: 6 MMOL/L (ref 7–16)
AST SERPL-CCNC: 28 U/L (ref 0–39)
BILIRUB SERPL-MCNC: 0.2 MG/DL (ref 0–1.2)
BUN SERPL-MCNC: 48 MG/DL (ref 6–23)
CALCIUM SERPL-MCNC: 9.6 MG/DL (ref 8.6–10.2)
CHLORIDE SERPL-SCNC: 91 MMOL/L (ref 98–107)
CO2 SERPL-SCNC: 38 MMOL/L (ref 22–29)
CREAT SERPL-MCNC: 2.4 MG/DL (ref 0.7–1.2)
CRYPTOSP AG STL QL IA: NORMAL
ERYTHROCYTE [DISTWIDTH] IN BLOOD BY AUTOMATED COUNT: 13.1 FL (ref 11.5–15)
FERRITIN SERPL-MCNC: 276 NG/ML
G LAMBLIA AG STL QL IA: NORMAL
GLUCOSE SERPL-MCNC: 116 MG/DL (ref 74–99)
HCT VFR BLD AUTO: 36.5 % (ref 37–54)
HEMOCCULT STL QL: NORMAL
HGB BLD-MCNC: 11 G/DL (ref 12.5–16.5)
INR BLD: 1
IRON SATN MFR SERPL: 14 % (ref 20–55)
IRON SERPL-MCNC: 45 MCG/DL (ref 59–158)
MCH RBC QN AUTO: 30.6 PG (ref 26–35)
MCHC RBC AUTO-ENTMCNC: 30.1 % (ref 32–34.5)
MCV RBC AUTO: 101.4 FL (ref 80–99.9)
PLATELET # BLD AUTO: 467 E9/L (ref 130–450)
PMV BLD AUTO: 10.1 FL (ref 7–12)
POTASSIUM SERPL-SCNC: 3.9 MMOL/L (ref 3.5–5)
PROT SERPL-MCNC: 7.5 G/DL (ref 6.4–8.3)
PROTHROMBIN TIME: 11.1 SEC (ref 9.3–12.4)
RBC # BLD AUTO: 3.6 E12/L (ref 3.8–5.8)
SODIUM SERPL-SCNC: 135 MMOL/L (ref 132–146)
TIBC SERPL-MCNC: 327 MCG/DL (ref 250–450)
WBC # BLD: 6.1 E9/L (ref 4.5–11.5)

## 2023-05-31 PROCEDURE — 2060000000 HC ICU INTERMEDIATE R&B

## 2023-05-31 PROCEDURE — 80053 COMPREHEN METABOLIC PANEL: CPT

## 2023-05-31 PROCEDURE — 83520 IMMUNOASSAY QUANT NOS NONAB: CPT

## 2023-05-31 PROCEDURE — 85027 COMPLETE CBC AUTOMATED: CPT

## 2023-05-31 PROCEDURE — 94640 AIRWAY INHALATION TREATMENT: CPT

## 2023-05-31 PROCEDURE — 36415 COLL VENOUS BLD VENIPUNCTURE: CPT

## 2023-05-31 PROCEDURE — 85610 PROTHROMBIN TIME: CPT

## 2023-05-31 PROCEDURE — 6370000000 HC RX 637 (ALT 250 FOR IP): Performed by: FAMILY MEDICINE

## 2023-05-31 PROCEDURE — 83540 ASSAY OF IRON: CPT

## 2023-05-31 PROCEDURE — 6370000000 HC RX 637 (ALT 250 FOR IP): Performed by: HOSPITALIST

## 2023-05-31 PROCEDURE — 6360000002 HC RX W HCPCS: Performed by: FAMILY MEDICINE

## 2023-05-31 PROCEDURE — 87070 CULTURE OTHR SPECIMN AEROBIC: CPT

## 2023-05-31 PROCEDURE — 99232 SBSQ HOSP IP/OBS MODERATE 35: CPT | Performed by: INTERNAL MEDICINE

## 2023-05-31 PROCEDURE — 82728 ASSAY OF FERRITIN: CPT

## 2023-05-31 PROCEDURE — 83550 IRON BINDING TEST: CPT

## 2023-05-31 PROCEDURE — 82705 FATS/LIPIDS FECES QUAL: CPT

## 2023-05-31 RX ADMIN — FUROSEMIDE 40 MG: 40 TABLET ORAL at 16:28

## 2023-05-31 RX ADMIN — ROFLUMILAST 500 MCG: 500 TABLET ORAL at 07:52

## 2023-05-31 RX ADMIN — METOPROLOL SUCCINATE 100 MG: 100 TABLET, EXTENDED RELEASE ORAL at 20:58

## 2023-05-31 RX ADMIN — AZITHROMYCIN MONOHYDRATE 500 MG: 250 TABLET ORAL at 07:52

## 2023-05-31 RX ADMIN — FUROSEMIDE 40 MG: 40 TABLET ORAL at 07:52

## 2023-05-31 RX ADMIN — BUDESONIDE 500 MCG: 0.5 SUSPENSION RESPIRATORY (INHALATION) at 20:18

## 2023-05-31 RX ADMIN — IPRATROPIUM BROMIDE 0.5 MG: 0.5 SOLUTION RESPIRATORY (INHALATION) at 16:38

## 2023-05-31 RX ADMIN — PANTOPRAZOLE SODIUM 40 MG: 40 TABLET, DELAYED RELEASE ORAL at 16:28

## 2023-05-31 RX ADMIN — HYDROCORTISONE: 25 CREAM TOPICAL at 20:59

## 2023-05-31 RX ADMIN — IPRATROPIUM BROMIDE 0.5 MG: 0.5 SOLUTION RESPIRATORY (INHALATION) at 10:54

## 2023-05-31 RX ADMIN — Medication 400 UNITS: at 07:52

## 2023-05-31 RX ADMIN — METOPROLOL SUCCINATE 100 MG: 100 TABLET, EXTENDED RELEASE ORAL at 07:53

## 2023-05-31 RX ADMIN — GABAPENTIN 300 MG: 300 CAPSULE ORAL at 20:58

## 2023-05-31 RX ADMIN — LOSARTAN POTASSIUM 100 MG: 100 TABLET, FILM COATED ORAL at 07:52

## 2023-05-31 RX ADMIN — ASPIRIN 81 MG 81 MG: 81 TABLET ORAL at 07:52

## 2023-05-31 RX ADMIN — IPRATROPIUM BROMIDE 0.5 MG: 0.5 SOLUTION RESPIRATORY (INHALATION) at 20:18

## 2023-05-31 RX ADMIN — SUCRALFATE 1 G: 1 TABLET ORAL at 10:49

## 2023-05-31 RX ADMIN — HYDROCODONE BITARTRATE AND ACETAMINOPHEN 1 TABLET: 5; 325 TABLET ORAL at 16:28

## 2023-05-31 RX ADMIN — PREDNISONE 10 MG: 10 TABLET ORAL at 07:52

## 2023-05-31 RX ADMIN — HYDROCODONE BITARTRATE AND ACETAMINOPHEN 1 TABLET: 5; 325 TABLET ORAL at 06:11

## 2023-05-31 RX ADMIN — HYDROCHLOROTHIAZIDE 25 MG: 25 TABLET ORAL at 07:53

## 2023-05-31 RX ADMIN — SUCRALFATE 1 G: 1 TABLET ORAL at 16:28

## 2023-05-31 RX ADMIN — ARFORMOTEROL TARTRATE 15 MCG: 15 SOLUTION RESPIRATORY (INHALATION) at 20:18

## 2023-05-31 RX ADMIN — SUCRALFATE 1 G: 1 TABLET ORAL at 06:11

## 2023-05-31 RX ADMIN — KETOCONAZOLE: 20 CREAM TOPICAL at 20:58

## 2023-05-31 RX ADMIN — TRIAMCINOLONE ACETONIDE: 1 CREAM TOPICAL at 20:59

## 2023-05-31 RX ADMIN — PANTOPRAZOLE SODIUM 40 MG: 40 TABLET, DELAYED RELEASE ORAL at 06:11

## 2023-05-31 RX ADMIN — CETIRIZINE HYDROCHLORIDE 10 MG: 10 TABLET, FILM COATED ORAL at 07:52

## 2023-05-31 RX ADMIN — SUCRALFATE 1 G: 1 TABLET ORAL at 20:58

## 2023-05-31 ASSESSMENT — PAIN DESCRIPTION - LOCATION
LOCATION: BACK;NECK
LOCATION: BACK;NECK

## 2023-05-31 ASSESSMENT — PAIN - FUNCTIONAL ASSESSMENT: PAIN_FUNCTIONAL_ASSESSMENT: ACTIVITIES ARE NOT PREVENTED

## 2023-05-31 ASSESSMENT — PAIN DESCRIPTION - DESCRIPTORS
DESCRIPTORS: DISCOMFORT;ACHING
DESCRIPTORS: DISCOMFORT;ACHING

## 2023-05-31 ASSESSMENT — PAIN SCALES - GENERAL
PAINLEVEL_OUTOF10: 10
PAINLEVEL_OUTOF10: 8
PAINLEVEL_OUTOF10: 10

## 2023-05-31 ASSESSMENT — PAIN DESCRIPTION - FREQUENCY: FREQUENCY: CONTINUOUS

## 2023-06-01 ENCOUNTER — APPOINTMENT (OUTPATIENT)
Dept: NUCLEAR MEDICINE | Age: 73
DRG: 190 | End: 2023-06-01
Payer: COMMERCIAL

## 2023-06-01 LAB
ALBUMIN SERPL-MCNC: 3.6 G/DL (ref 3.5–5.2)
ALP SERPL-CCNC: 65 U/L (ref 40–129)
ALT SERPL-CCNC: 8 U/L (ref 0–40)
ANION GAP SERPL CALCULATED.3IONS-SCNC: 5 MMOL/L (ref 7–16)
AST SERPL-CCNC: 20 U/L (ref 0–39)
BILIRUB SERPL-MCNC: <0.2 MG/DL (ref 0–1.2)
BUN SERPL-MCNC: 45 MG/DL (ref 6–23)
CALCIUM SERPL-MCNC: 9.5 MG/DL (ref 8.6–10.2)
CHLORIDE SERPL-SCNC: 90 MMOL/L (ref 98–107)
CO2 SERPL-SCNC: 43 MMOL/L (ref 22–29)
CREAT SERPL-MCNC: 2 MG/DL (ref 0.7–1.2)
GLUCOSE SERPL-MCNC: 109 MG/DL (ref 74–99)
MYCOPLASMA PNEUMONIAE IGM: NORMAL
POTASSIUM SERPL-SCNC: 3.6 MMOL/L (ref 3.5–5)
PROT SERPL-MCNC: 6.6 G/DL (ref 6.4–8.3)
REASON FOR REJECTION: NORMAL
REJECTED TEST: NORMAL
SODIUM SERPL-SCNC: 138 MMOL/L (ref 132–146)

## 2023-06-01 PROCEDURE — 78226 HEPATOBILIARY SYSTEM IMAGING: CPT

## 2023-06-01 PROCEDURE — A9537 TC99M MEBROFENIN: HCPCS | Performed by: RADIOLOGY

## 2023-06-01 PROCEDURE — 36415 COLL VENOUS BLD VENIPUNCTURE: CPT

## 2023-06-01 PROCEDURE — 99232 SBSQ HOSP IP/OBS MODERATE 35: CPT | Performed by: INTERNAL MEDICINE

## 2023-06-01 PROCEDURE — 94640 AIRWAY INHALATION TREATMENT: CPT

## 2023-06-01 PROCEDURE — 2060000000 HC ICU INTERMEDIATE R&B

## 2023-06-01 PROCEDURE — 6360000002 HC RX W HCPCS: Performed by: FAMILY MEDICINE

## 2023-06-01 PROCEDURE — 80053 COMPREHEN METABOLIC PANEL: CPT

## 2023-06-01 PROCEDURE — 6370000000 HC RX 637 (ALT 250 FOR IP): Performed by: HOSPITALIST

## 2023-06-01 PROCEDURE — 6370000000 HC RX 637 (ALT 250 FOR IP): Performed by: FAMILY MEDICINE

## 2023-06-01 PROCEDURE — 3430000000 HC RX DIAGNOSTIC RADIOPHARMACEUTICAL: Performed by: RADIOLOGY

## 2023-06-01 RX ADMIN — HYDROCORTISONE: 25 CREAM TOPICAL at 10:26

## 2023-06-01 RX ADMIN — METOPROLOL SUCCINATE 100 MG: 100 TABLET, EXTENDED RELEASE ORAL at 10:24

## 2023-06-01 RX ADMIN — KETOCONAZOLE: 20 CREAM TOPICAL at 10:25

## 2023-06-01 RX ADMIN — GABAPENTIN 300 MG: 300 CAPSULE ORAL at 21:10

## 2023-06-01 RX ADMIN — CETIRIZINE HYDROCHLORIDE 10 MG: 10 TABLET, FILM COATED ORAL at 10:25

## 2023-06-01 RX ADMIN — FUROSEMIDE 40 MG: 40 TABLET ORAL at 16:48

## 2023-06-01 RX ADMIN — HYDROCODONE BITARTRATE AND ACETAMINOPHEN 1 TABLET: 5; 325 TABLET ORAL at 21:11

## 2023-06-01 RX ADMIN — SUCRALFATE 1 G: 1 TABLET ORAL at 21:10

## 2023-06-01 RX ADMIN — SUCRALFATE 1 G: 1 TABLET ORAL at 10:31

## 2023-06-01 RX ADMIN — Medication 6 MILLICURIE: at 08:42

## 2023-06-01 RX ADMIN — LOSARTAN POTASSIUM 100 MG: 100 TABLET, FILM COATED ORAL at 10:24

## 2023-06-01 RX ADMIN — FUROSEMIDE 40 MG: 40 TABLET ORAL at 10:25

## 2023-06-01 RX ADMIN — HYDROCHLOROTHIAZIDE 25 MG: 25 TABLET ORAL at 10:24

## 2023-06-01 RX ADMIN — PANTOPRAZOLE SODIUM 40 MG: 40 TABLET, DELAYED RELEASE ORAL at 16:47

## 2023-06-01 RX ADMIN — ASPIRIN 81 MG 81 MG: 81 TABLET ORAL at 10:25

## 2023-06-01 RX ADMIN — IPRATROPIUM BROMIDE 0.5 MG: 0.5 SOLUTION RESPIRATORY (INHALATION) at 14:25

## 2023-06-01 RX ADMIN — Medication 400 UNITS: at 10:25

## 2023-06-01 RX ADMIN — ROFLUMILAST 500 MCG: 500 TABLET ORAL at 10:24

## 2023-06-01 RX ADMIN — HYDROCODONE BITARTRATE AND ACETAMINOPHEN 1 TABLET: 5; 325 TABLET ORAL at 10:36

## 2023-06-01 RX ADMIN — TRIAMCINOLONE ACETONIDE: 1 CREAM TOPICAL at 10:25

## 2023-06-01 RX ADMIN — SUCRALFATE 1 G: 1 TABLET ORAL at 16:47

## 2023-06-01 ASSESSMENT — PAIN SCALES - GENERAL
PAINLEVEL_OUTOF10: 0
PAINLEVEL_OUTOF10: 10
PAINLEVEL_OUTOF10: 0
PAINLEVEL_OUTOF10: 10

## 2023-06-01 ASSESSMENT — PAIN DESCRIPTION - LOCATION: LOCATION: BACK;NECK

## 2023-06-02 ENCOUNTER — ANESTHESIA (OUTPATIENT)
Dept: ENDOSCOPY | Age: 73
End: 2023-06-02
Payer: COMMERCIAL

## 2023-06-02 ENCOUNTER — ANESTHESIA EVENT (OUTPATIENT)
Dept: ENDOSCOPY | Age: 73
End: 2023-06-02
Payer: COMMERCIAL

## 2023-06-02 LAB
ALBUMIN SERPL-MCNC: 3.9 G/DL (ref 3.5–5.2)
ALP SERPL-CCNC: 52 U/L (ref 40–129)
ALT SERPL-CCNC: 7 U/L (ref 0–40)
ANION GAP SERPL CALCULATED.3IONS-SCNC: 6 MMOL/L (ref 7–16)
AST SERPL-CCNC: 19 U/L (ref 0–39)
BACTERIA STL CULT: NORMAL
BILIRUB SERPL-MCNC: <0.2 MG/DL (ref 0–1.2)
BUN SERPL-MCNC: 41 MG/DL (ref 6–23)
CALCIUM SERPL-MCNC: 9.6 MG/DL (ref 8.6–10.2)
CHLORIDE SERPL-SCNC: 87 MMOL/L (ref 98–107)
CO2 SERPL-SCNC: 41 MMOL/L (ref 22–29)
CREAT SERPL-MCNC: 2.1 MG/DL (ref 0.7–1.2)
GLUCOSE SERPL-MCNC: 119 MG/DL (ref 74–99)
POTASSIUM SERPL-SCNC: 3.4 MMOL/L (ref 3.5–5)
PROT SERPL-MCNC: 6.9 G/DL (ref 6.4–8.3)
SODIUM SERPL-SCNC: 134 MMOL/L (ref 132–146)
VITAMIN D 25-HYDROXY: 17 NG/ML (ref 30–100)
WBC #/AREA STL HPF: NORMAL /[HPF]

## 2023-06-02 PROCEDURE — 82306 VITAMIN D 25 HYDROXY: CPT

## 2023-06-02 PROCEDURE — 6360000002 HC RX W HCPCS: Performed by: NURSE ANESTHETIST, CERTIFIED REGISTERED

## 2023-06-02 PROCEDURE — 6370000000 HC RX 637 (ALT 250 FOR IP): Performed by: FAMILY MEDICINE

## 2023-06-02 PROCEDURE — 2709999900 HC NON-CHARGEABLE SUPPLY: Performed by: INTERNAL MEDICINE

## 2023-06-02 PROCEDURE — 80053 COMPREHEN METABOLIC PANEL: CPT

## 2023-06-02 PROCEDURE — 6370000000 HC RX 637 (ALT 250 FOR IP): Performed by: HOSPITALIST

## 2023-06-02 PROCEDURE — 0DB98ZX EXCISION OF DUODENUM, VIA NATURAL OR ARTIFICIAL OPENING ENDOSCOPIC, DIAGNOSTIC: ICD-10-PCS | Performed by: INTERNAL MEDICINE

## 2023-06-02 PROCEDURE — 6360000002 HC RX W HCPCS: Performed by: FAMILY MEDICINE

## 2023-06-02 PROCEDURE — 2580000003 HC RX 258: Performed by: NURSE ANESTHETIST, CERTIFIED REGISTERED

## 2023-06-02 PROCEDURE — 7100000011 HC PHASE II RECOVERY - ADDTL 15 MIN: Performed by: INTERNAL MEDICINE

## 2023-06-02 PROCEDURE — 36415 COLL VENOUS BLD VENIPUNCTURE: CPT

## 2023-06-02 PROCEDURE — 7100000010 HC PHASE II RECOVERY - FIRST 15 MIN: Performed by: INTERNAL MEDICINE

## 2023-06-02 PROCEDURE — 2060000000 HC ICU INTERMEDIATE R&B

## 2023-06-02 PROCEDURE — 3609012400 HC EGD TRANSORAL BIOPSY SINGLE/MULTIPLE: Performed by: INTERNAL MEDICINE

## 2023-06-02 PROCEDURE — 3700000001 HC ADD 15 MINUTES (ANESTHESIA): Performed by: INTERNAL MEDICINE

## 2023-06-02 PROCEDURE — 2580000003 HC RX 258: Performed by: INTERNAL MEDICINE

## 2023-06-02 PROCEDURE — 3700000000 HC ANESTHESIA ATTENDED CARE: Performed by: INTERNAL MEDICINE

## 2023-06-02 PROCEDURE — 88305 TISSUE EXAM BY PATHOLOGIST: CPT

## 2023-06-02 PROCEDURE — 94640 AIRWAY INHALATION TREATMENT: CPT

## 2023-06-02 PROCEDURE — 99232 SBSQ HOSP IP/OBS MODERATE 35: CPT | Performed by: INTERNAL MEDICINE

## 2023-06-02 PROCEDURE — 2700000000 HC OXYGEN THERAPY PER DAY

## 2023-06-02 PROCEDURE — 6360000002 HC RX W HCPCS: Performed by: INTERNAL MEDICINE

## 2023-06-02 RX ORDER — PROPOFOL 10 MG/ML
INJECTION, EMULSION INTRAVENOUS CONTINUOUS PRN
Status: DISCONTINUED | OUTPATIENT
Start: 2023-06-02 | End: 2023-06-02 | Stop reason: SDUPTHER

## 2023-06-02 RX ORDER — POTASSIUM CHLORIDE 7.45 MG/ML
10 INJECTION INTRAVENOUS
Status: DISPENSED | OUTPATIENT
Start: 2023-06-02 | End: 2023-06-02

## 2023-06-02 RX ORDER — SODIUM CHLORIDE 9 MG/ML
INJECTION, SOLUTION INTRAVENOUS CONTINUOUS PRN
Status: DISCONTINUED | OUTPATIENT
Start: 2023-06-02 | End: 2023-06-02 | Stop reason: SDUPTHER

## 2023-06-02 RX ADMIN — BUDESONIDE 500 MCG: 0.5 SUSPENSION RESPIRATORY (INHALATION) at 19:14

## 2023-06-02 RX ADMIN — ARFORMOTEROL TARTRATE 15 MCG: 15 SOLUTION RESPIRATORY (INHALATION) at 04:48

## 2023-06-02 RX ADMIN — POTASSIUM CHLORIDE 10 MEQ: 7.46 INJECTION, SOLUTION INTRAVENOUS at 09:49

## 2023-06-02 RX ADMIN — ALBUTEROL SULFATE 2.5 MG: 2.5 SOLUTION RESPIRATORY (INHALATION) at 04:48

## 2023-06-02 RX ADMIN — PANTOPRAZOLE SODIUM 40 MG: 40 TABLET, DELAYED RELEASE ORAL at 05:03

## 2023-06-02 RX ADMIN — POTASSIUM CHLORIDE 10 MEQ: 7.46 INJECTION, SOLUTION INTRAVENOUS at 11:40

## 2023-06-02 RX ADMIN — BUDESONIDE 500 MCG: 0.5 SUSPENSION RESPIRATORY (INHALATION) at 04:48

## 2023-06-02 RX ADMIN — SUCRALFATE 1 G: 1 TABLET ORAL at 05:03

## 2023-06-02 RX ADMIN — FUROSEMIDE 40 MG: 40 TABLET ORAL at 17:10

## 2023-06-02 RX ADMIN — SUCRALFATE 1 G: 1 TABLET ORAL at 17:10

## 2023-06-02 RX ADMIN — SUCRALFATE 1 G: 1 TABLET ORAL at 21:34

## 2023-06-02 RX ADMIN — PHENYLEPHRINE HYDROCHLORIDE 100 MCG: 10 INJECTION INTRAVENOUS at 14:40

## 2023-06-02 RX ADMIN — PANTOPRAZOLE SODIUM 40 MG: 40 TABLET, DELAYED RELEASE ORAL at 17:10

## 2023-06-02 RX ADMIN — HYDROCODONE BITARTRATE AND ACETAMINOPHEN 1 TABLET: 5; 325 TABLET ORAL at 17:14

## 2023-06-02 RX ADMIN — IPRATROPIUM BROMIDE 0.5 MG: 0.5 SOLUTION RESPIRATORY (INHALATION) at 13:23

## 2023-06-02 RX ADMIN — ACETAZOLAMIDE SODIUM 250 MG: 500 INJECTION, POWDER, LYOPHILIZED, FOR SOLUTION INTRAVENOUS at 21:36

## 2023-06-02 RX ADMIN — POTASSIUM CHLORIDE 10 MEQ: 7.46 INJECTION, SOLUTION INTRAVENOUS at 13:12

## 2023-06-02 RX ADMIN — ARFORMOTEROL TARTRATE 15 MCG: 15 SOLUTION RESPIRATORY (INHALATION) at 19:14

## 2023-06-02 RX ADMIN — GABAPENTIN 300 MG: 300 CAPSULE ORAL at 21:33

## 2023-06-02 RX ADMIN — IPRATROPIUM BROMIDE 0.5 MG: 0.5 SOLUTION RESPIRATORY (INHALATION) at 04:48

## 2023-06-02 RX ADMIN — PROPOFOL 100 MCG/KG/MIN: 10 INJECTION, EMULSION INTRAVENOUS at 14:31

## 2023-06-02 RX ADMIN — SODIUM CHLORIDE: 900 INJECTION, SOLUTION INTRAVENOUS at 14:24

## 2023-06-02 RX ADMIN — IPRATROPIUM BROMIDE 0.5 MG: 0.5 SOLUTION RESPIRATORY (INHALATION) at 19:15

## 2023-06-02 RX ADMIN — METOPROLOL SUCCINATE 100 MG: 100 TABLET, EXTENDED RELEASE ORAL at 21:33

## 2023-06-02 ASSESSMENT — PAIN SCALES - GENERAL: PAINLEVEL_OUTOF10: 9

## 2023-06-02 ASSESSMENT — COPD QUESTIONNAIRES: CAT_SEVERITY: NO INTERVAL CHANGE

## 2023-06-02 ASSESSMENT — PAIN DESCRIPTION - DESCRIPTORS: DESCRIPTORS: ACHING;GNAWING

## 2023-06-02 ASSESSMENT — ENCOUNTER SYMPTOMS
DYSPNEA ACTIVITY LEVEL: NO INTERVAL CHANGE
SHORTNESS OF BREATH: 1

## 2023-06-02 ASSESSMENT — LIFESTYLE VARIABLES: SMOKING_STATUS: 0

## 2023-06-02 ASSESSMENT — PAIN - FUNCTIONAL ASSESSMENT: PAIN_FUNCTIONAL_ASSESSMENT: ACTIVITIES ARE NOT PREVENTED

## 2023-06-02 ASSESSMENT — PAIN DESCRIPTION - ORIENTATION: ORIENTATION: LOWER;UPPER

## 2023-06-02 ASSESSMENT — PAIN DESCRIPTION - LOCATION: LOCATION: NECK;BACK

## 2023-06-02 NOTE — ANESTHESIA PRE PROCEDURE
 Neck pain M54.2    Cervical disc herniation M50.20    Acute respiratory failure with hypoxia and hypercapnia ([facility]) J96.01, J96.02    Elbow effusion, right M25.421    Acute on chronic respiratory failure with hypoxia and hypercapnia ([facility]) J96.21, J96.22    Chronic pain syndrome [G89.4 (ICD-10-CM)] G89.4    Lumbar spondylosis M47.816    Spinal stenosis of lumbar region without neurogenic claudication M48.061    Lumbar facet arthropathy M47.816    Lumbar disc disorder M51.9    Cervical spondylosis M47.812    Cervical facet joint syndrome M47.812    Cervical stenosis of spine M48.02    Cervical disc disorder M50.90    Cervical radiculopathy M54.12    Lumbar radiculopathy M54.16    JULISSA (acute kidney injury) ([facility]) N17.9       Past Medical History:        Diagnosis Date    JULISSA (acute kidney injury) ([facility] 2023    JULISSA (acute kidney injury) ([facility] 2023    Chest pain 3-6    lexiscan nuclear stress    Chronic diastolic CHF (congestive heart failure) ([facility])     Echo 2016; EF 57%    COPD (chronic obstructive pulmonary disease) ([facility])     Hepatitis C     Hilar lymphadenopathy     CT 2016; 1.8 cm    History of echocardiogram 2019    EF 93%; Stage I diastolic dysfunction    Hypertension     Irregular heart beat     Lumbar spondylosis 10/14/2022    Oxygen dependent        Past Surgical History:        Procedure Laterality Date    CATARACT REMOVAL Right 2019    CATARACT REMOVAL Left 2019    CERVICAL FUSION  2018    ACF C4-C7    COLONOSCOPY      HERNIA REPAIR      tracee inguinal repair    UPPER GASTROINTESTINAL ENDOSCOPY N/A 1/3/2023    EGD BIOPSY performed by Juan Corrales MD at [address] History:    Social History     Tobacco Use    Smoking status: Former     Packs/day: 0.50     Types: Cigarettes     Quit date: 2015     Years since quittin.3    Smokeless tobacco: Never   Substance Use Topics    Alcohol use: Not Currently

## 2023-06-02 NOTE — ANESTHESIA POSTPROCEDURE EVALUATION
Department of Anesthesiology  Postprocedure Note    Patient: Carmella Stevenson  MRN: [id number]  YOB: 1950  Date of evaluation: 6/2/2023      Procedure Summary     Date: 06/02/23 Room / Location: [address] [place] [address] / [facility] ([facility]    Anesthesia Start: 7678 Anesthesia Stop: 8099    Procedure: EGD BIOPSY Diagnosis:       Abdominal pain, unspecified abdominal location      (Abdominal pain, unspecified abdominal location [R10.9])    Surgeons: Yuridia Olvera MD Responsible Provider: Clara Crespo DO    Anesthesia Type: MAC ASA Status: 4          Anesthesia Type: No value filed.     Wilma Phase I:      Wilma Phase II: Wilma Score: 9      Anesthesia Post Evaluation    Patient location during evaluation: PACU  Patient participation: complete - patient participated  Level of consciousness: awake and alert  Airway patency: patent  Nausea & Vomiting: no nausea and no vomiting  Complications: no  Cardiovascular status: hemodynamically stable  Respiratory status: acceptable  Hydration status: euvolemic

## 2023-06-03 LAB
ALBUMIN SERPL-MCNC: 3.9 G/DL (ref 3.5–5.2)
ALP SERPL-CCNC: 62 U/L (ref 40–129)
ALT SERPL-CCNC: 7 U/L (ref 0–40)
ANION GAP SERPL CALCULATED.3IONS-SCNC: 6 MMOL/L (ref 7–16)
AST SERPL-CCNC: 23 U/L (ref 0–39)
BASOPHILS # BLD: 0.05 E9/L (ref 0–0.2)
BASOPHILS NFR BLD: 0.7 % (ref 0–2)
BILIRUB SERPL-MCNC: <0.2 MG/DL (ref 0–1.2)
BUN SERPL-MCNC: 32 MG/DL (ref 6–23)
CALCIUM SERPL-MCNC: 9.9 MG/DL (ref 8.6–10.2)
CHLORIDE SERPL-SCNC: 90 MMOL/L (ref 98–107)
CO2 SERPL-SCNC: 43 MMOL/L (ref 22–29)
CREAT SERPL-MCNC: 1.7 MG/DL (ref 0.7–1.2)
ELASTASE PANC STL-MCNT: >800 UG/G
EOSINOPHIL # BLD: 0.27 E9/L (ref 0.05–0.5)
EOSINOPHIL NFR BLD: 3.7 % (ref 0–6)
ERYTHROCYTE [DISTWIDTH] IN BLOOD BY AUTOMATED COUNT: 12.8 FL (ref 11.5–15)
FAT STL QL: NORMAL
GLUCOSE SERPL-MCNC: 135 MG/DL (ref 74–99)
HCT VFR BLD AUTO: 34.1 % (ref 37–54)
HGB BLD-MCNC: 10.1 G/DL (ref 12.5–16.5)
IMM GRANULOCYTES # BLD: 0.03 E9/L
IMM GRANULOCYTES NFR BLD: 0.4 % (ref 0–5)
LYMPHOCYTES # BLD: 0.93 E9/L (ref 1.5–4)
LYMPHOCYTES NFR BLD: 12.7 % (ref 20–42)
MCH RBC QN AUTO: 30.2 PG (ref 26–35)
MCHC RBC AUTO-ENTMCNC: 29.6 % (ref 32–34.5)
MCV RBC AUTO: 102.1 FL (ref 80–99.9)
MONOCYTES # BLD: 1.37 E9/L (ref 0.1–0.95)
MONOCYTES NFR BLD: 18.7 % (ref 2–12)
NEUTRAL FAT STL QL: NORMAL
NEUTROPHILS # BLD: 4.68 E9/L (ref 1.8–7.3)
NEUTS SEG NFR BLD: 63.8 % (ref 43–80)
PLATELET # BLD AUTO: 425 E9/L (ref 130–450)
PMV BLD AUTO: 10 FL (ref 7–12)
POTASSIUM SERPL-SCNC: 3.2 MMOL/L (ref 3.5–5)
PROT SERPL-MCNC: 7 G/DL (ref 6.4–8.3)
RBC # BLD AUTO: 3.34 E12/L (ref 3.8–5.8)
SODIUM SERPL-SCNC: 139 MMOL/L (ref 132–146)
WBC # BLD: 7.3 E9/L (ref 4.5–11.5)

## 2023-06-03 PROCEDURE — 85025 COMPLETE CBC W/AUTO DIFF WBC: CPT

## 2023-06-03 PROCEDURE — 6360000002 HC RX W HCPCS: Performed by: INTERNAL MEDICINE

## 2023-06-03 PROCEDURE — 6370000000 HC RX 637 (ALT 250 FOR IP): Performed by: FAMILY MEDICINE

## 2023-06-03 PROCEDURE — 94640 AIRWAY INHALATION TREATMENT: CPT

## 2023-06-03 PROCEDURE — 6360000002 HC RX W HCPCS: Performed by: FAMILY MEDICINE

## 2023-06-03 PROCEDURE — 36415 COLL VENOUS BLD VENIPUNCTURE: CPT

## 2023-06-03 PROCEDURE — 2060000000 HC ICU INTERMEDIATE R&B

## 2023-06-03 PROCEDURE — 99232 SBSQ HOSP IP/OBS MODERATE 35: CPT | Performed by: INTERNAL MEDICINE

## 2023-06-03 PROCEDURE — 80053 COMPREHEN METABOLIC PANEL: CPT

## 2023-06-03 PROCEDURE — 2580000003 HC RX 258: Performed by: INTERNAL MEDICINE

## 2023-06-03 PROCEDURE — 6370000000 HC RX 637 (ALT 250 FOR IP): Performed by: HOSPITALIST

## 2023-06-03 RX ORDER — POTASSIUM CHLORIDE 7.45 MG/ML
10 INJECTION INTRAVENOUS
Status: COMPLETED | OUTPATIENT
Start: 2023-06-03 | End: 2023-06-03

## 2023-06-03 RX ADMIN — Medication 400 UNITS: at 08:22

## 2023-06-03 RX ADMIN — KETOCONAZOLE: 20 CREAM TOPICAL at 08:22

## 2023-06-03 RX ADMIN — ACETAZOLAMIDE SODIUM 250 MG: 500 INJECTION, POWDER, LYOPHILIZED, FOR SOLUTION INTRAVENOUS at 16:40

## 2023-06-03 RX ADMIN — PANTOPRAZOLE SODIUM 40 MG: 40 TABLET, DELAYED RELEASE ORAL at 16:24

## 2023-06-03 RX ADMIN — HYDROCODONE BITARTRATE AND ACETAMINOPHEN 1 TABLET: 5; 325 TABLET ORAL at 18:05

## 2023-06-03 RX ADMIN — POTASSIUM CHLORIDE 10 MEQ: 10 INJECTION, SOLUTION INTRAVENOUS at 20:35

## 2023-06-03 RX ADMIN — POTASSIUM CHLORIDE 10 MEQ: 10 INJECTION, SOLUTION INTRAVENOUS at 17:14

## 2023-06-03 RX ADMIN — ACETAZOLAMIDE SODIUM 250 MG: 500 INJECTION, POWDER, LYOPHILIZED, FOR SOLUTION INTRAVENOUS at 08:24

## 2023-06-03 RX ADMIN — METOPROLOL SUCCINATE 100 MG: 100 TABLET, EXTENDED RELEASE ORAL at 20:36

## 2023-06-03 RX ADMIN — SUCRALFATE 1 G: 1 TABLET ORAL at 10:52

## 2023-06-03 RX ADMIN — TRIAMCINOLONE ACETONIDE: 1 CREAM TOPICAL at 20:27

## 2023-06-03 RX ADMIN — KETOCONAZOLE: 20 CREAM TOPICAL at 20:28

## 2023-06-03 RX ADMIN — HYDROCODONE BITARTRATE AND ACETAMINOPHEN 1 TABLET: 5; 325 TABLET ORAL at 08:22

## 2023-06-03 RX ADMIN — GABAPENTIN 300 MG: 300 CAPSULE ORAL at 20:36

## 2023-06-03 RX ADMIN — PANTOPRAZOLE SODIUM 40 MG: 40 TABLET, DELAYED RELEASE ORAL at 05:32

## 2023-06-03 RX ADMIN — IPRATROPIUM BROMIDE 0.5 MG: 0.5 SOLUTION RESPIRATORY (INHALATION) at 14:25

## 2023-06-03 RX ADMIN — METOPROLOL SUCCINATE 100 MG: 100 TABLET, EXTENDED RELEASE ORAL at 08:22

## 2023-06-03 RX ADMIN — ASPIRIN 81 MG 81 MG: 81 TABLET ORAL at 08:21

## 2023-06-03 RX ADMIN — SUCRALFATE 1 G: 1 TABLET ORAL at 05:32

## 2023-06-03 RX ADMIN — CETIRIZINE HYDROCHLORIDE 10 MG: 10 TABLET, FILM COATED ORAL at 08:22

## 2023-06-03 RX ADMIN — SUCRALFATE 1 G: 1 TABLET ORAL at 20:36

## 2023-06-03 RX ADMIN — IPRATROPIUM BROMIDE 0.5 MG: 0.5 SOLUTION RESPIRATORY (INHALATION) at 11:01

## 2023-06-03 RX ADMIN — SUCRALFATE 1 G: 1 TABLET ORAL at 16:40

## 2023-06-03 ASSESSMENT — PAIN SCALES - GENERAL: PAINLEVEL_OUTOF10: 6

## 2023-06-03 ASSESSMENT — PAIN DESCRIPTION - LOCATION: LOCATION: BACK

## 2023-06-04 ENCOUNTER — APPOINTMENT (OUTPATIENT)
Dept: CT IMAGING | Age: 73
DRG: 190 | End: 2023-06-04
Payer: COMMERCIAL

## 2023-06-04 PROBLEM — J96.01 ACUTE RESPIRATORY FAILURE WITH HYPOXIA (HCC): Status: ACTIVE | Noted: 2023-06-04

## 2023-06-04 LAB
AADO2: ABNORMAL MMHG
AMMONIA PLAS-SCNC: 59 UMOL/L (ref 16–60)
ANION GAP SERPL CALCULATED.3IONS-SCNC: 8 MMOL/L (ref 7–16)
B.E.: 12.7 MMOL/L (ref -3–3)
B.E.: 7.7 MMOL/L (ref -3–3)
B.E.: 9.2 MMOL/L (ref -3–3)
BASOPHILS # BLD: 0.08 E9/L (ref 0–0.2)
BASOPHILS NFR BLD: 0.7 % (ref 0–2)
BUN SERPL-MCNC: 24 MG/DL (ref 6–23)
CALCIUM SERPL-MCNC: 9.6 MG/DL (ref 8.6–10.2)
CHLORIDE SERPL-SCNC: 93 MMOL/L (ref 98–107)
CO2 SERPL-SCNC: 37 MMOL/L (ref 22–29)
COHB: 0.1 % (ref 0–1.5)
COHB: 0.3 % (ref 0–1.5)
COHB: 0.3 % (ref 0–1.5)
COMMENT: ABNORMAL
CREAT SERPL-MCNC: 1.8 MG/DL (ref 0.7–1.2)
CRITICAL: ABNORMAL
DATE ANALYZED: ABNORMAL
DATE OF COLLECTION: ABNORMAL
EOSINOPHIL # BLD: 0.73 E9/L (ref 0.05–0.5)
EOSINOPHIL NFR BLD: 5.9 % (ref 0–6)
ERYTHROCYTE [DISTWIDTH] IN BLOOD BY AUTOMATED COUNT: 12.7 FL (ref 11.5–15)
FIO2: 35 %
GLUCOSE SERPL-MCNC: 183 MG/DL (ref 74–99)
HCO3: 38.1 MMOL/L (ref 22–26)
HCO3: 38.4 MMOL/L (ref 22–26)
HCO3: 42.5 MMOL/L (ref 22–26)
HCT VFR BLD AUTO: 36.3 % (ref 37–54)
HGB BLD-MCNC: 10.9 G/DL (ref 12.5–16.5)
HHB: 1 % (ref 0–5)
HHB: 1.6 % (ref 0–5)
HHB: 3.3 % (ref 0–5)
IMM GRANULOCYTES # BLD: 0.06 E9/L
IMM GRANULOCYTES NFR BLD: 0.5 % (ref 0–5)
INR BLD: 1
LAB: ABNORMAL
LACTATE BLDV-SCNC: 1.1 MMOL/L (ref 0.5–2.2)
LYMPHOCYTES # BLD: 1.5 E9/L (ref 1.5–4)
LYMPHOCYTES NFR BLD: 12.2 % (ref 20–42)
Lab: ABNORMAL
MCH RBC QN AUTO: 30.9 PG (ref 26–35)
MCHC RBC AUTO-ENTMCNC: 30 % (ref 32–34.5)
MCV RBC AUTO: 102.8 FL (ref 80–99.9)
METER GLUCOSE: 154 MG/DL (ref 74–99)
METHB: 0.4 % (ref 0–1.5)
METHB: 0.4 % (ref 0–1.5)
METHB: 0.5 % (ref 0–1.5)
MODE: ABNORMAL
MONOCYTES # BLD: 1.39 E9/L (ref 0.1–0.95)
MONOCYTES NFR BLD: 11.3 % (ref 2–12)
NEUTROPHILS # BLD: 8.52 E9/L (ref 1.8–7.3)
NEUTS SEG NFR BLD: 69.4 % (ref 43–80)
O2 CONTENT: 16.3 ML/DL
O2 CONTENT: 16.4 ML/DL
O2 CONTENT: 16.5 ML/DL
O2 SATURATION: 96.7 % (ref 92–98.5)
O2 SATURATION: 98.4 % (ref 92–98.5)
O2 SATURATION: 99 % (ref 92–98.5)
O2HB: 96.2 % (ref 94–97)
O2HB: 97.7 % (ref 94–97)
O2HB: 98.2 % (ref 94–97)
OPERATOR ID: 1821
OPERATOR ID: 797
OPERATOR ID: 797
PATIENT TEMP: 37 C
PCO2: 78 MMHG (ref 35–45)
PCO2: 90.3 MMHG (ref 35–45)
PCO2: 95 MMHG (ref 35–45)
PEEP/CPAP: 5 CMH2O
PFO2: 5.22 MMHG/%
PH BLOOD GAS: 7.22 (ref 7.35–7.45)
PH BLOOD GAS: 7.29 (ref 7.35–7.45)
PH BLOOD GAS: 7.31 (ref 7.35–7.45)
PIP: 12 CMH2O
PLATELET # BLD AUTO: 490 E9/L (ref 130–450)
PMV BLD AUTO: 9.6 FL (ref 7–12)
PO2: 100.2 MMHG (ref 75–100)
PO2: 134.6 MMHG (ref 75–100)
PO2: 182.6 MMHG (ref 75–100)
POTASSIUM SERPL-SCNC: 4.1 MMOL/L (ref 3.5–5)
PROTHROMBIN TIME: 11.7 SEC (ref 9.3–12.4)
PS: 7 CMH20
RBC # BLD AUTO: 3.53 E12/L (ref 3.8–5.8)
SODIUM SERPL-SCNC: 138 MMOL/L (ref 132–146)
SOURCE, BLOOD GAS: ABNORMAL
THB: 11.5 G/DL (ref 11.5–16.5)
THB: 11.8 G/DL (ref 11.5–16.5)
THB: 12.1 G/DL (ref 11.5–16.5)
TIME ANALYZED: 1254
TIME ANALYZED: 1707
TIME ANALYZED: 655
WBC # BLD: 12.3 E9/L (ref 4.5–11.5)

## 2023-06-04 PROCEDURE — 36415 COLL VENOUS BLD VENIPUNCTURE: CPT

## 2023-06-04 PROCEDURE — 85610 PROTHROMBIN TIME: CPT

## 2023-06-04 PROCEDURE — 72125 CT NECK SPINE W/O DYE: CPT

## 2023-06-04 PROCEDURE — 6360000002 HC RX W HCPCS: Performed by: INTERNAL MEDICINE

## 2023-06-04 PROCEDURE — 80048 BASIC METABOLIC PNL TOTAL CA: CPT

## 2023-06-04 PROCEDURE — 82962 GLUCOSE BLOOD TEST: CPT

## 2023-06-04 PROCEDURE — 99232 SBSQ HOSP IP/OBS MODERATE 35: CPT | Performed by: INTERNAL MEDICINE

## 2023-06-04 PROCEDURE — 36600 WITHDRAWAL OF ARTERIAL BLOOD: CPT

## 2023-06-04 PROCEDURE — 82140 ASSAY OF AMMONIA: CPT

## 2023-06-04 PROCEDURE — 6370000000 HC RX 637 (ALT 250 FOR IP): Performed by: HOSPITALIST

## 2023-06-04 PROCEDURE — 94640 AIRWAY INHALATION TREATMENT: CPT

## 2023-06-04 PROCEDURE — 70450 CT HEAD/BRAIN W/O DYE: CPT

## 2023-06-04 PROCEDURE — 5A09457 ASSISTANCE WITH RESPIRATORY VENTILATION, 24-96 CONSECUTIVE HOURS, CONTINUOUS POSITIVE AIRWAY PRESSURE: ICD-10-PCS | Performed by: FAMILY MEDICINE

## 2023-06-04 PROCEDURE — 2060000000 HC ICU INTERMEDIATE R&B

## 2023-06-04 PROCEDURE — 6360000002 HC RX W HCPCS: Performed by: FAMILY MEDICINE

## 2023-06-04 PROCEDURE — 6370000000 HC RX 637 (ALT 250 FOR IP): Performed by: FAMILY MEDICINE

## 2023-06-04 PROCEDURE — 85025 COMPLETE CBC W/AUTO DIFF WBC: CPT

## 2023-06-04 PROCEDURE — 82805 BLOOD GASES W/O2 SATURATION: CPT

## 2023-06-04 PROCEDURE — 2580000003 HC RX 258: Performed by: INTERNAL MEDICINE

## 2023-06-04 PROCEDURE — 94660 CPAP INITIATION&MGMT: CPT

## 2023-06-04 PROCEDURE — 99291 CRITICAL CARE FIRST HOUR: CPT | Performed by: FAMILY MEDICINE

## 2023-06-04 PROCEDURE — 2700000000 HC OXYGEN THERAPY PER DAY

## 2023-06-04 PROCEDURE — 83605 ASSAY OF LACTIC ACID: CPT

## 2023-06-04 RX ORDER — METHYLPREDNISOLONE SODIUM SUCCINATE 40 MG/ML
20 INJECTION, POWDER, LYOPHILIZED, FOR SOLUTION INTRAMUSCULAR; INTRAVENOUS EVERY 6 HOURS
Status: DISCONTINUED | OUTPATIENT
Start: 2023-06-04 | End: 2023-06-05

## 2023-06-04 RX ADMIN — PREDNISONE 10 MG: 10 TABLET ORAL at 14:03

## 2023-06-04 RX ADMIN — ARFORMOTEROL TARTRATE 15 MCG: 15 SOLUTION RESPIRATORY (INHALATION) at 18:40

## 2023-06-04 RX ADMIN — GABAPENTIN 300 MG: 300 CAPSULE ORAL at 21:27

## 2023-06-04 RX ADMIN — HYDROCODONE BITARTRATE AND ACETAMINOPHEN 1 TABLET: 5; 325 TABLET ORAL at 00:30

## 2023-06-04 RX ADMIN — METHYLPREDNISOLONE SODIUM SUCCINATE 20 MG: 40 INJECTION, POWDER, FOR SOLUTION INTRAMUSCULAR; INTRAVENOUS at 21:27

## 2023-06-04 RX ADMIN — TRIAMCINOLONE ACETONIDE: 1 CREAM TOPICAL at 21:25

## 2023-06-04 RX ADMIN — ACETAZOLAMIDE SODIUM 250 MG: 500 INJECTION, POWDER, LYOPHILIZED, FOR SOLUTION INTRAVENOUS at 04:33

## 2023-06-04 RX ADMIN — IPRATROPIUM BROMIDE 0.5 MG: 0.5 SOLUTION RESPIRATORY (INHALATION) at 18:40

## 2023-06-04 RX ADMIN — SUCRALFATE 1 G: 1 TABLET ORAL at 21:27

## 2023-06-04 RX ADMIN — BUDESONIDE 500 MCG: 0.5 SUSPENSION RESPIRATORY (INHALATION) at 18:40

## 2023-06-04 RX ADMIN — METOPROLOL SUCCINATE 100 MG: 100 TABLET, EXTENDED RELEASE ORAL at 14:03

## 2023-06-04 RX ADMIN — KETOCONAZOLE: 20 CREAM TOPICAL at 21:26

## 2023-06-04 RX ADMIN — ACETAZOLAMIDE SODIUM 250 MG: 500 INJECTION, POWDER, LYOPHILIZED, FOR SOLUTION INTRAVENOUS at 14:55

## 2023-06-04 RX ADMIN — METHYLPREDNISOLONE SODIUM SUCCINATE 20 MG: 40 INJECTION, POWDER, FOR SOLUTION INTRAMUSCULAR; INTRAVENOUS at 14:54

## 2023-06-04 RX ADMIN — METOPROLOL SUCCINATE 100 MG: 100 TABLET, EXTENDED RELEASE ORAL at 21:27

## 2023-06-04 RX ADMIN — IPRATROPIUM BROMIDE 0.5 MG: 0.5 SOLUTION RESPIRATORY (INHALATION) at 10:44

## 2023-06-04 ASSESSMENT — PAIN DESCRIPTION - ORIENTATION: ORIENTATION: LOWER

## 2023-06-04 ASSESSMENT — PAIN DESCRIPTION - LOCATION
LOCATION: BACK;NECK
LOCATION: BACK;NECK

## 2023-06-04 ASSESSMENT — PAIN DESCRIPTION - PAIN TYPE: TYPE: CHRONIC PAIN

## 2023-06-04 ASSESSMENT — PAIN DESCRIPTION - ONSET: ONSET: ON-GOING

## 2023-06-04 ASSESSMENT — PAIN DESCRIPTION - DESCRIPTORS
DESCRIPTORS: ACHING;DISCOMFORT
DESCRIPTORS: ACHING;DISCOMFORT

## 2023-06-04 ASSESSMENT — PAIN DESCRIPTION - FREQUENCY: FREQUENCY: CONTINUOUS

## 2023-06-04 ASSESSMENT — PAIN SCALES - GENERAL
PAINLEVEL_OUTOF10: 10
PAINLEVEL_OUTOF10: 0
PAINLEVEL_OUTOF10: 8
PAINLEVEL_OUTOF10: 7

## 2023-06-05 LAB
ANION GAP SERPL CALCULATED.3IONS-SCNC: 11 MMOL/L (ref 7–16)
BUN SERPL-MCNC: 37 MG/DL (ref 6–23)
CALCIUM SERPL-MCNC: 9.8 MG/DL (ref 8.6–10.2)
CHLORIDE SERPL-SCNC: 93 MMOL/L (ref 98–107)
CO2 SERPL-SCNC: 34 MMOL/L (ref 22–29)
CREAT SERPL-MCNC: 1.7 MG/DL (ref 0.7–1.2)
GLUCOSE SERPL-MCNC: 143 MG/DL (ref 74–99)
POTASSIUM SERPL-SCNC: 4.3 MMOL/L (ref 3.5–5)
SODIUM SERPL-SCNC: 138 MMOL/L (ref 132–146)

## 2023-06-05 PROCEDURE — 99233 SBSQ HOSP IP/OBS HIGH 50: CPT | Performed by: INTERNAL MEDICINE

## 2023-06-05 PROCEDURE — 94660 CPAP INITIATION&MGMT: CPT

## 2023-06-05 PROCEDURE — 6370000000 HC RX 637 (ALT 250 FOR IP): Performed by: HOSPITALIST

## 2023-06-05 PROCEDURE — 2060000000 HC ICU INTERMEDIATE R&B

## 2023-06-05 PROCEDURE — 2700000000 HC OXYGEN THERAPY PER DAY

## 2023-06-05 PROCEDURE — 2580000003 HC RX 258: Performed by: INTERNAL MEDICINE

## 2023-06-05 PROCEDURE — 80048 BASIC METABOLIC PNL TOTAL CA: CPT

## 2023-06-05 PROCEDURE — 6360000002 HC RX W HCPCS: Performed by: INTERNAL MEDICINE

## 2023-06-05 PROCEDURE — 6360000002 HC RX W HCPCS: Performed by: FAMILY MEDICINE

## 2023-06-05 PROCEDURE — 6370000000 HC RX 637 (ALT 250 FOR IP): Performed by: FAMILY MEDICINE

## 2023-06-05 PROCEDURE — 36415 COLL VENOUS BLD VENIPUNCTURE: CPT

## 2023-06-05 PROCEDURE — 94640 AIRWAY INHALATION TREATMENT: CPT

## 2023-06-05 RX ORDER — ACETAMINOPHEN 325 MG/1
650 TABLET ORAL EVERY 6 HOURS PRN
Status: DISCONTINUED | OUTPATIENT
Start: 2023-06-05 | End: 2023-06-07 | Stop reason: HOSPADM

## 2023-06-05 RX ORDER — METHYLPREDNISOLONE SODIUM SUCCINATE 40 MG/ML
20 INJECTION, POWDER, LYOPHILIZED, FOR SOLUTION INTRAMUSCULAR; INTRAVENOUS EVERY 12 HOURS
Status: DISCONTINUED | OUTPATIENT
Start: 2023-06-05 | End: 2023-06-06

## 2023-06-05 RX ADMIN — ACETAMINOPHEN 650 MG: 325 TABLET ORAL at 21:04

## 2023-06-05 RX ADMIN — METHYLPREDNISOLONE SODIUM SUCCINATE 20 MG: 40 INJECTION, POWDER, FOR SOLUTION INTRAMUSCULAR; INTRAVENOUS at 02:44

## 2023-06-05 RX ADMIN — Medication 400 UNITS: at 08:20

## 2023-06-05 RX ADMIN — ACETAZOLAMIDE SODIUM 250 MG: 500 INJECTION, POWDER, LYOPHILIZED, FOR SOLUTION INTRAVENOUS at 14:47

## 2023-06-05 RX ADMIN — GABAPENTIN 300 MG: 300 CAPSULE ORAL at 21:04

## 2023-06-05 RX ADMIN — METHYLPREDNISOLONE SODIUM SUCCINATE 20 MG: 40 INJECTION, POWDER, FOR SOLUTION INTRAMUSCULAR; INTRAVENOUS at 21:04

## 2023-06-05 RX ADMIN — ARFORMOTEROL TARTRATE 15 MCG: 15 SOLUTION RESPIRATORY (INHALATION) at 18:35

## 2023-06-05 RX ADMIN — TRIAMCINOLONE ACETONIDE: 1 CREAM TOPICAL at 21:05

## 2023-06-05 RX ADMIN — METHYLPREDNISOLONE SODIUM SUCCINATE 20 MG: 40 INJECTION, POWDER, FOR SOLUTION INTRAMUSCULAR; INTRAVENOUS at 08:19

## 2023-06-05 RX ADMIN — SUCRALFATE 1 G: 1 TABLET ORAL at 05:56

## 2023-06-05 RX ADMIN — IPRATROPIUM BROMIDE 0.5 MG: 0.5 SOLUTION RESPIRATORY (INHALATION) at 05:40

## 2023-06-05 RX ADMIN — METOPROLOL SUCCINATE 100 MG: 100 TABLET, EXTENDED RELEASE ORAL at 08:20

## 2023-06-05 RX ADMIN — ARFORMOTEROL TARTRATE 15 MCG: 15 SOLUTION RESPIRATORY (INHALATION) at 05:40

## 2023-06-05 RX ADMIN — ACETAZOLAMIDE SODIUM 250 MG: 500 INJECTION, POWDER, LYOPHILIZED, FOR SOLUTION INTRAVENOUS at 02:47

## 2023-06-05 RX ADMIN — KETOCONAZOLE: 20 CREAM TOPICAL at 21:05

## 2023-06-05 RX ADMIN — IPRATROPIUM BROMIDE 0.5 MG: 0.5 SOLUTION RESPIRATORY (INHALATION) at 10:19

## 2023-06-05 RX ADMIN — SUCRALFATE 1 G: 1 TABLET ORAL at 11:50

## 2023-06-05 RX ADMIN — PANTOPRAZOLE SODIUM 40 MG: 40 TABLET, DELAYED RELEASE ORAL at 05:56

## 2023-06-05 RX ADMIN — METOPROLOL SUCCINATE 100 MG: 100 TABLET, EXTENDED RELEASE ORAL at 21:04

## 2023-06-05 RX ADMIN — CETIRIZINE HYDROCHLORIDE 10 MG: 10 TABLET, FILM COATED ORAL at 08:20

## 2023-06-05 RX ADMIN — IPRATROPIUM BROMIDE 0.5 MG: 0.5 SOLUTION RESPIRATORY (INHALATION) at 13:33

## 2023-06-05 RX ADMIN — SUCRALFATE 1 G: 1 TABLET ORAL at 16:07

## 2023-06-05 RX ADMIN — ACETAMINOPHEN 650 MG: 325 TABLET ORAL at 13:26

## 2023-06-05 RX ADMIN — BUDESONIDE 500 MCG: 0.5 SUSPENSION RESPIRATORY (INHALATION) at 18:35

## 2023-06-05 RX ADMIN — SUCRALFATE 1 G: 1 TABLET ORAL at 21:04

## 2023-06-05 RX ADMIN — BUDESONIDE 500 MCG: 0.5 SUSPENSION RESPIRATORY (INHALATION) at 05:40

## 2023-06-05 RX ADMIN — PANTOPRAZOLE SODIUM 40 MG: 40 TABLET, DELAYED RELEASE ORAL at 16:07

## 2023-06-05 RX ADMIN — IPRATROPIUM BROMIDE 0.5 MG: 0.5 SOLUTION RESPIRATORY (INHALATION) at 18:36

## 2023-06-05 ASSESSMENT — PAIN DESCRIPTION - DESCRIPTORS
DESCRIPTORS: ACHING;DISCOMFORT
DESCRIPTORS: ACHING;DISCOMFORT

## 2023-06-05 ASSESSMENT — PAIN - FUNCTIONAL ASSESSMENT: PAIN_FUNCTIONAL_ASSESSMENT: PREVENTS OR INTERFERES SOME ACTIVE ACTIVITIES AND ADLS

## 2023-06-05 ASSESSMENT — PAIN DESCRIPTION - ORIENTATION: ORIENTATION: LOWER;MID

## 2023-06-05 ASSESSMENT — PAIN DESCRIPTION - PAIN TYPE: TYPE: CHRONIC PAIN

## 2023-06-05 ASSESSMENT — PAIN SCALES - GENERAL
PAINLEVEL_OUTOF10: 10
PAINLEVEL_OUTOF10: 7

## 2023-06-05 ASSESSMENT — PAIN DESCRIPTION - LOCATION
LOCATION: NECK
LOCATION: BACK;NECK

## 2023-06-05 NOTE — PLAN OF CARE
Problem: Discharge Planning  Goal: Discharge to home or other facility with appropriate resources  6/5/2023 1100 by Prashanth Dan RN  Outcome: Progressing     Problem: Pain  Goal: Verbalizes/displays adequate comfort level or baseline comfort level  6/5/2023 1100 by Prashanth Dan RN  Outcome: Progressing     Problem: Safety - Adult  Goal: Free from fall injury  6/5/2023 1100 by Prashanth Dan RN  Outcome: Progressing     Problem: ABCDS Injury Assessment  Goal: Absence of physical injury  6/5/2023 1100 by Prashanth Dan RN  Outcome: Progressing     Problem: Skin/Tissue Integrity  Goal: Absence of new skin breakdown  Description: 1. Monitor for areas of redness and/or skin breakdown  2. Assess vascular access sites hourly  3. Every 4-6 hours minimum:  Change oxygen saturation probe site  4. Every 4-6 hours:  If on nasal continuous positive airway pressure, respiratory therapy assess nares and determine need for appliance change or resting period.   6/5/2023 1100 by Prashanth Dan RN  Outcome: Progressing

## 2023-06-05 NOTE — CARE COORDINATION
SOCIAL WORK / DISCHARGE PLANNING:   Sw spoke with pt at bedside. He had fall during the weekend. He continues to plan to return home as previous. Dc plan is to return home alone with waiver services 5x/week, to aide with homemaking and personal care. Pt has home O2 from [facility], has portable concentrator. Currently active with [facility]. through [address] [facility] fax [phone], WILL NEED [address] [facility]. Pt has necessary dme in home. States his sister will provide home going transport.                   Electronically signed by CLAYTON Steel on 6/5/2023 at 3:00 PM

## 2023-06-06 LAB
ANION GAP SERPL CALCULATED.3IONS-SCNC: 7 MMOL/L (ref 7–16)
ANION GAP SERPL CALCULATED.3IONS-SCNC: 8 MMOL/L (ref 7–16)
B.E.: 8.3 MMOL/L (ref -3–3)
BUN SERPL-MCNC: 41 MG/DL (ref 6–23)
BUN SERPL-MCNC: 42 MG/DL (ref 6–23)
CALCIUM SERPL-MCNC: 9.9 MG/DL (ref 8.6–10.2)
CALCIUM SERPL-MCNC: 9.9 MG/DL (ref 8.6–10.2)
CHLORIDE SERPL-SCNC: 95 MMOL/L (ref 98–107)
CHLORIDE SERPL-SCNC: 95 MMOL/L (ref 98–107)
CO2 SERPL-SCNC: 34 MMOL/L (ref 22–29)
CO2 SERPL-SCNC: 34 MMOL/L (ref 22–29)
COHB: 0.3 % (ref 0–1.5)
COMMENT: ABNORMAL
CREAT SERPL-MCNC: 1.9 MG/DL (ref 0.7–1.2)
CREAT SERPL-MCNC: 2.2 MG/DL (ref 0.7–1.2)
CRITICAL: ABNORMAL
DATE ANALYZED: ABNORMAL
DATE OF COLLECTION: ABNORMAL
GLUCOSE SERPL-MCNC: 124 MG/DL (ref 74–99)
GLUCOSE SERPL-MCNC: 186 MG/DL (ref 74–99)
HCO3: 36.3 MMOL/L (ref 22–26)
HHB: 1.2 % (ref 0–5)
LAB: ABNORMAL
Lab: ABNORMAL
METHB: 0.6 % (ref 0–1.5)
MODE: ABNORMAL
O2 CONTENT: 15.1 ML/DL
O2 SATURATION: 98.8 % (ref 92–98.5)
O2HB: 97.9 % (ref 94–97)
OPERATOR ID: 2323
PATIENT TEMP: 37 C
PCO2: 70.5 MMHG (ref 35–45)
PH BLOOD GAS: 7.33 (ref 7.35–7.45)
PO2: 166 MMHG (ref 75–100)
POTASSIUM SERPL-SCNC: 3.7 MMOL/L (ref 3.5–5)
POTASSIUM SERPL-SCNC: 3.8 MMOL/L (ref 3.5–5)
SODIUM SERPL-SCNC: 136 MMOL/L (ref 132–146)
SODIUM SERPL-SCNC: 137 MMOL/L (ref 132–146)
SOURCE, BLOOD GAS: ABNORMAL
THB: 10.7 G/DL (ref 11.5–16.5)
TIME ANALYZED: 457

## 2023-06-06 PROCEDURE — 36600 WITHDRAWAL OF ARTERIAL BLOOD: CPT

## 2023-06-06 PROCEDURE — 36415 COLL VENOUS BLD VENIPUNCTURE: CPT

## 2023-06-06 PROCEDURE — 6370000000 HC RX 637 (ALT 250 FOR IP): Performed by: HOSPITALIST

## 2023-06-06 PROCEDURE — 99233 SBSQ HOSP IP/OBS HIGH 50: CPT | Performed by: INTERNAL MEDICINE

## 2023-06-06 PROCEDURE — 94660 CPAP INITIATION&MGMT: CPT

## 2023-06-06 PROCEDURE — 6360000002 HC RX W HCPCS: Performed by: FAMILY MEDICINE

## 2023-06-06 PROCEDURE — 6360000002 HC RX W HCPCS: Performed by: INTERNAL MEDICINE

## 2023-06-06 PROCEDURE — 82805 BLOOD GASES W/O2 SATURATION: CPT

## 2023-06-06 PROCEDURE — 2700000000 HC OXYGEN THERAPY PER DAY

## 2023-06-06 PROCEDURE — 6370000000 HC RX 637 (ALT 250 FOR IP): Performed by: FAMILY MEDICINE

## 2023-06-06 PROCEDURE — 94640 AIRWAY INHALATION TREATMENT: CPT

## 2023-06-06 PROCEDURE — 2580000003 HC RX 258: Performed by: INTERNAL MEDICINE

## 2023-06-06 PROCEDURE — 2060000000 HC ICU INTERMEDIATE R&B

## 2023-06-06 PROCEDURE — 80048 BASIC METABOLIC PNL TOTAL CA: CPT

## 2023-06-06 RX ORDER — PREDNISONE 20 MG/1
20 TABLET ORAL DAILY
Status: DISCONTINUED | OUTPATIENT
Start: 2023-06-07 | End: 2023-06-07 | Stop reason: HOSPADM

## 2023-06-06 RX ADMIN — PANTOPRAZOLE SODIUM 40 MG: 40 TABLET, DELAYED RELEASE ORAL at 06:03

## 2023-06-06 RX ADMIN — Medication 400 UNITS: at 09:06

## 2023-06-06 RX ADMIN — ACETAMINOPHEN 650 MG: 325 TABLET ORAL at 20:50

## 2023-06-06 RX ADMIN — PREDNISONE 10 MG: 10 TABLET ORAL at 09:06

## 2023-06-06 RX ADMIN — PANTOPRAZOLE SODIUM 40 MG: 40 TABLET, DELAYED RELEASE ORAL at 16:39

## 2023-06-06 RX ADMIN — SUCRALFATE 1 G: 1 TABLET ORAL at 16:39

## 2023-06-06 RX ADMIN — SUCRALFATE 1 G: 1 TABLET ORAL at 11:09

## 2023-06-06 RX ADMIN — IPRATROPIUM BROMIDE 0.5 MG: 0.5 SOLUTION RESPIRATORY (INHALATION) at 10:22

## 2023-06-06 RX ADMIN — IPRATROPIUM BROMIDE 0.5 MG: 0.5 SOLUTION RESPIRATORY (INHALATION) at 06:44

## 2023-06-06 RX ADMIN — METOPROLOL SUCCINATE 100 MG: 100 TABLET, EXTENDED RELEASE ORAL at 09:06

## 2023-06-06 RX ADMIN — METHYLPREDNISOLONE SODIUM SUCCINATE 20 MG: 40 INJECTION, POWDER, FOR SOLUTION INTRAMUSCULAR; INTRAVENOUS at 09:11

## 2023-06-06 RX ADMIN — SUCRALFATE 1 G: 1 TABLET ORAL at 20:50

## 2023-06-06 RX ADMIN — TRIAMCINOLONE ACETONIDE: 1 CREAM TOPICAL at 20:51

## 2023-06-06 RX ADMIN — GABAPENTIN 300 MG: 300 CAPSULE ORAL at 20:50

## 2023-06-06 RX ADMIN — IPRATROPIUM BROMIDE 0.5 MG: 0.5 SOLUTION RESPIRATORY (INHALATION) at 14:13

## 2023-06-06 RX ADMIN — ARFORMOTEROL TARTRATE 15 MCG: 15 SOLUTION RESPIRATORY (INHALATION) at 06:44

## 2023-06-06 RX ADMIN — ACETAZOLAMIDE SODIUM 250 MG: 500 INJECTION, POWDER, LYOPHILIZED, FOR SOLUTION INTRAVENOUS at 02:40

## 2023-06-06 RX ADMIN — KETOCONAZOLE: 20 CREAM TOPICAL at 20:50

## 2023-06-06 RX ADMIN — SUCRALFATE 1 G: 1 TABLET ORAL at 06:04

## 2023-06-06 RX ADMIN — CETIRIZINE HYDROCHLORIDE 10 MG: 10 TABLET, FILM COATED ORAL at 09:06

## 2023-06-06 RX ADMIN — BUDESONIDE 500 MCG: 0.5 SUSPENSION RESPIRATORY (INHALATION) at 06:44

## 2023-06-06 RX ADMIN — METOPROLOL SUCCINATE 100 MG: 100 TABLET, EXTENDED RELEASE ORAL at 20:50

## 2023-06-06 ASSESSMENT — PAIN SCALES - GENERAL
PAINLEVEL_OUTOF10: 8
PAINLEVEL_OUTOF10: 9

## 2023-06-06 ASSESSMENT — PAIN DESCRIPTION - LOCATION: LOCATION: BACK

## 2023-06-06 NOTE — PLAN OF CARE
Problem: Discharge Planning  Goal: Discharge to home or other facility with appropriate resources  6/5/2023 2258 by Rodney Dugan RN  Outcome: Progressing  6/5/2023 1100 by Kasi Baker RN  Outcome: Progressing     Problem: Pain  Goal: Verbalizes/displays adequate comfort level or baseline comfort level  6/5/2023 2258 by Rodney Dugan RN  Outcome: Progressing  6/5/2023 1100 by Kasi Baker RN  Outcome: Progressing     Problem: Safety - Adult  Goal: Free from fall injury  6/5/2023 2258 by Rodney Dugan RN  Outcome: Progressing  6/5/2023 1100 by Kasi Baker RN  Outcome: Progressing     Problem: ABCDS Injury Assessment  Goal: Absence of physical injury  6/5/2023 2258 by Rodney Dugan RN  Outcome: Progressing  6/5/2023 1100 by Kasi Baker RN  Outcome: Progressing     Problem: Skin/Tissue Integrity  Goal: Absence of new skin breakdown  Description: 1. Monitor for areas of redness and/or skin breakdown  2. Assess vascular access sites hourly  3. Every 4-6 hours minimum:  Change oxygen saturation probe site  4. Every 4-6 hours:  If on nasal continuous positive airway pressure, respiratory therapy assess nares and determine need for appliance change or resting period.   6/5/2023 2258 by Rodney Dugan RN  Outcome: Progressing  6/5/2023 1100 by Kasi Baker RN  Outcome: Progressing     Problem: Chronic Conditions and Co-morbidities  Goal: Patient's chronic conditions and co-morbidity symptoms are monitored and maintained or improved  Outcome: Progressing

## 2023-06-06 NOTE — CARE COORDINATION
SOCIAL WORK / DISCHARGE PLANNING:   Sw met with pt at bedside. Pt's ABG 70.5 this am. Sw encouraged him to consider using the bipap as ordered. Pt says he doesn't like it. Dc plan remains to return home alone with his aide services 5x/week from [facility]. He has all necessary dme including Home O2 [facility] and a portable concentrator to use for transport home. Currently was receiving Pulmonary Rehab from [facility] in his home [phone] fax [facility]. He states his sister will provide home going transport.                  Electronically signed by CLAYTON Zimmerman on 6/6/2023 at 12:39 PM

## 2023-06-07 ENCOUNTER — APPOINTMENT (OUTPATIENT)
Dept: GENERAL RADIOLOGY | Age: 73
DRG: 190 | End: 2023-06-07
Payer: COMMERCIAL

## 2023-06-07 VITALS
RESPIRATION RATE: 20 BRPM | SYSTOLIC BLOOD PRESSURE: 126 MMHG | WEIGHT: 191.8 LBS | TEMPERATURE: 97.5 F | OXYGEN SATURATION: 100 % | DIASTOLIC BLOOD PRESSURE: 60 MMHG | BODY MASS INDEX: 30.82 KG/M2 | HEIGHT: 66 IN | HEART RATE: 60 BPM

## 2023-06-07 PROBLEM — N17.9 AKI (ACUTE KIDNEY INJURY) (HCC): Status: RESOLVED | Noted: 2023-05-29 | Resolved: 2023-06-07

## 2023-06-07 PROBLEM — I50.9 CHF, ACUTE ON CHRONIC (HCC): Status: RESOLVED | Noted: 2017-05-04 | Resolved: 2023-06-07

## 2023-06-07 LAB
B.E.: 8.3 MMOL/L (ref -3–3)
COHB: 0.3 % (ref 0–1.5)
COMMENT: ABNORMAL
CRITICAL: ABNORMAL
DATE ANALYZED: ABNORMAL
DATE OF COLLECTION: ABNORMAL
HCO3: 37.2 MMOL/L (ref 22–26)
HHB: 1.2 % (ref 0–5)
LAB: ABNORMAL
Lab: ABNORMAL
METHB: 0.6 % (ref 0–1.5)
MODE: ABNORMAL
O2 CONTENT: 15.1 ML/DL
O2 SATURATION: 98.8 % (ref 92–98.5)
O2HB: 97.9 % (ref 94–97)
OPERATOR ID: 2323
PATIENT TEMP: 37 C
PCO2: 79.9 MMHG (ref 35–45)
PH BLOOD GAS: 7.29 (ref 7.35–7.45)
PO2: 179.4 MMHG (ref 75–100)
SOURCE, BLOOD GAS: ABNORMAL
THB: 10.7 G/DL (ref 11.5–16.5)
TIME ANALYZED: 513

## 2023-06-07 PROCEDURE — 94660 CPAP INITIATION&MGMT: CPT

## 2023-06-07 PROCEDURE — 82805 BLOOD GASES W/O2 SATURATION: CPT

## 2023-06-07 PROCEDURE — 36600 WITHDRAWAL OF ARTERIAL BLOOD: CPT

## 2023-06-07 PROCEDURE — 6370000000 HC RX 637 (ALT 250 FOR IP): Performed by: INTERNAL MEDICINE

## 2023-06-07 PROCEDURE — 71045 X-RAY EXAM CHEST 1 VIEW: CPT

## 2023-06-07 PROCEDURE — 6360000002 HC RX W HCPCS: Performed by: FAMILY MEDICINE

## 2023-06-07 PROCEDURE — 6370000000 HC RX 637 (ALT 250 FOR IP): Performed by: FAMILY MEDICINE

## 2023-06-07 PROCEDURE — 6370000000 HC RX 637 (ALT 250 FOR IP): Performed by: HOSPITALIST

## 2023-06-07 PROCEDURE — 94640 AIRWAY INHALATION TREATMENT: CPT

## 2023-06-07 PROCEDURE — 2700000000 HC OXYGEN THERAPY PER DAY

## 2023-06-07 RX ORDER — ARFORMOTEROL TARTRATE 15 UG/2ML
15 SOLUTION RESPIRATORY (INHALATION) 2 TIMES DAILY
Qty: 120 ML | Refills: 3 | Status: SHIPPED | OUTPATIENT
Start: 2023-06-07

## 2023-06-07 RX ORDER — SUCRALFATE 1 G/1
1 TABLET ORAL
Qty: 120 TABLET | Refills: 3 | Status: SHIPPED | OUTPATIENT
Start: 2023-06-07

## 2023-06-07 RX ORDER — BUDESONIDE 0.5 MG/2ML
0.5 INHALANT ORAL 2 TIMES DAILY
Qty: 60 EACH | Refills: 3 | Status: SHIPPED | OUTPATIENT
Start: 2023-06-07

## 2023-06-07 RX ORDER — KETOCONAZOLE 20 MG/G
CREAM TOPICAL
Qty: 30 G | Refills: 1 | Status: SHIPPED | OUTPATIENT
Start: 2023-06-07

## 2023-06-07 RX ADMIN — TRIAMCINOLONE ACETONIDE: 1 CREAM TOPICAL at 09:19

## 2023-06-07 RX ADMIN — Medication 400 UNITS: at 09:18

## 2023-06-07 RX ADMIN — IPRATROPIUM BROMIDE 0.5 MG: 0.5 SOLUTION RESPIRATORY (INHALATION) at 10:00

## 2023-06-07 RX ADMIN — ACETAMINOPHEN 650 MG: 325 TABLET ORAL at 09:24

## 2023-06-07 RX ADMIN — CETIRIZINE HYDROCHLORIDE 10 MG: 10 TABLET, FILM COATED ORAL at 09:18

## 2023-06-07 RX ADMIN — PREDNISONE 20 MG: 20 TABLET ORAL at 09:18

## 2023-06-07 RX ADMIN — KETOCONAZOLE: 20 CREAM TOPICAL at 09:18

## 2023-06-07 RX ADMIN — SUCRALFATE 1 G: 1 TABLET ORAL at 11:46

## 2023-06-07 RX ADMIN — SUCRALFATE 1 G: 1 TABLET ORAL at 05:32

## 2023-06-07 RX ADMIN — PANTOPRAZOLE SODIUM 40 MG: 40 TABLET, DELAYED RELEASE ORAL at 05:31

## 2023-06-07 RX ADMIN — ASPIRIN 81 MG 81 MG: 81 TABLET ORAL at 09:18

## 2023-06-07 RX ADMIN — METOPROLOL SUCCINATE 100 MG: 100 TABLET, EXTENDED RELEASE ORAL at 09:18

## 2023-06-07 RX ADMIN — LOSARTAN POTASSIUM 100 MG: 100 TABLET, FILM COATED ORAL at 09:18

## 2023-06-07 ASSESSMENT — PAIN DESCRIPTION - PAIN TYPE: TYPE: CHRONIC PAIN

## 2023-06-07 ASSESSMENT — PAIN DESCRIPTION - ONSET: ONSET: ON-GOING

## 2023-06-07 ASSESSMENT — PAIN DESCRIPTION - ORIENTATION: ORIENTATION: LOWER

## 2023-06-07 ASSESSMENT — PAIN - FUNCTIONAL ASSESSMENT: PAIN_FUNCTIONAL_ASSESSMENT: ACTIVITIES ARE NOT PREVENTED

## 2023-06-07 ASSESSMENT — PAIN DESCRIPTION - LOCATION: LOCATION: BACK

## 2023-06-07 ASSESSMENT — PAIN SCALES - GENERAL
PAINLEVEL_OUTOF10: 6
PAINLEVEL_OUTOF10: 3

## 2023-06-07 ASSESSMENT — PAIN DESCRIPTION - DESCRIPTORS: DESCRIPTORS: ACHING

## 2023-06-07 ASSESSMENT — PAIN DESCRIPTION - FREQUENCY: FREQUENCY: CONTINUOUS

## 2023-06-07 NOTE — CARE COORDINATION
SOCIAL WORK / DISCHARGE PLANNING:   Shannan Gaytan continues to plan dc home with resumption of his aide services from [address] and he was reaching out to them. Dr Lorri Awan notes indicate plan dc today but pt wanted Thurs. He continues to have increasing PCO2 level and refusing the bipap despite education that this is what he needs to address CO2. Resume Pulm Rehab order faxed to [facility] in his home [phone] fax [phone]. Pt has necessary dme including a portable oxygen concentrator with car  to use for transport home. He states his sister will provide home going transport. Addendum: 153pm Sw spoke with Dr Lorri Awan via phone. She plans to dc pt due to continues to decline bipap. Sw called [facility], spoke with Della Morgan and they will resume his aides next date 6/8/23.            Electronically signed by CLAYTON Hewitt on 6/7/2023 at 11:37 AM

## 2023-06-07 NOTE — PLAN OF CARE
Problem: Discharge Planning  Goal: Discharge to home or other facility with appropriate resources  6/6/2023 2248 by Luis Miguel Jones RN  Outcome: Not Progressing  6/6/2023 1807 by Jennifer Liu RN  Outcome: Progressing     Problem: Pain  Goal: Verbalizes/displays adequate comfort level or baseline comfort level  6/6/2023 2248 by Luis Miguel Jones RN  Outcome: Not Progressing  6/6/2023 1807 by Jennifer Liu RN  Outcome: Progressing     Problem: Safety - Adult  Goal: Free from fall injury  6/6/2023 2248 by Luis Miguel Jones RN  Outcome: Not Progressing  6/6/2023 1807 by Jennifer Liu RN  Outcome: Progressing     Problem: ABCDS Injury Assessment  Goal: Absence of physical injury  6/6/2023 2248 by Luis Miguel Jones RN  Outcome: Not Progressing  6/6/2023 1807 by Jennifer Liu RN  Outcome: Progressing     Problem: Skin/Tissue Integrity  Goal: Absence of new skin breakdown  Description: 1. Monitor for areas of redness and/or skin breakdown  2. Assess vascular access sites hourly  3. Every 4-6 hours minimum:  Change oxygen saturation probe site  4. Every 4-6 hours:  If on nasal continuous positive airway pressure, respiratory therapy assess nares and determine need for appliance change or resting period.   6/6/2023 2248 by Luis Miguel Jones RN  Outcome: Not Progressing  6/6/2023 1807 by Jennifer Liu RN  Outcome: Progressing     Problem: Chronic Conditions and Co-morbidities  Goal: Patient's chronic conditions and co-morbidity symptoms are monitored and maintained or improved  6/6/2023 2248 by Luis Miguel Jones RN  Outcome: Not Progressing  6/6/2023 1807 by Jennifer Liu RN  Outcome: Progressing     Problem: Discharge Planning  Goal: Discharge to home or other facility with appropriate resources  6/6/2023 2248 by Luis Miguel Jones RN  Outcome: Not Progressing  6/6/2023 1807 by Jennifer Liu RN  Outcome: Progressing     Problem: Pain  Goal: Verbalizes/displays adequate comfort level or baseline comfort level  6/6/2023 2248 by Roseanne

## 2023-06-08 NOTE — PROGRESS NOTES
At approximately 0600 this nurse was walking down decker and heard 2 other nurses in room 637 yelling for help. Entered to observe pt face down on the floor on the right side of the bed. Pool of blood near his head. RRT was called. Pt was slow to respond. Barrel rolled with help of other nurses and he sat up supported by another nurse. VS obtained as RR team arrived. Pt was slow to respond  with obvious small laceration to right forehead. Collar was applied and pt was placed on back board. This nurse accompanied pt as he was taken to CT. Returned to room with collar and backboard until given the all clear to remove collar and backboard at 0712. At that time pt was responding normally.
Attempted a second length of stay visit and found the patient sleeping. This  left literature stating that He was there.
CLINICAL PHARMACY NOTE: MEDS TO BEDS    Total # of Prescriptions Filled: 4   The following medications were delivered to the patient:  Hydrocortisone 2.5% cream  Budesonide 0.5 mg/2 mL susp  Ipratropium Bromide 0.02% soln  Ketoconazole 2$ cream    Additional Documentation:    Carafate and Brovana were out of stock.  These will be transferred to [facility] for , per patient's request.
Call placed to Dr Caitlin Jarquin to notify of ABG results and pt continued refusal to wear the BIPAP despite all education.
Department of Family Practice  Adult Daily Progress Note      JAVIER BUTTS IS SEEN IN FOLLOW UP TODAY  [address]. HE IS TOLERATING TREATMENT. HE IS STILL NOT WEARING THE CPAP. HE HAS BEEN INFORMED OF HIS HIGH CO2 LEVEL. HE DOES HAVE A SITTER DEVICE IN HIS ROOM. HIS APPETITE IS GOOD BUT COMPLAINS ABOUT THE \"PROCESSED\" FOOD. NO ABG'S DONE TODAY. HIS CREATININE TODAY IS 1.7. POTASSIUM IS 4.3. HE IS ON IV DIAMOX AND REMAINS OFF OF COZAAR, HCTZ AND LASIX.   GET ABG'S IN AM.    OBJECTIVE    Physical  VITALS:  /77   Pulse 81   Temp 98 °F (36.7 °C) (Oral)   Resp 17   Ht 5' 6\" (1.676 m)   Wt 189 lb 9.5 oz (86 kg)   SpO2 100%   BMI 30.60 kg/m²   CONSTITUTIONAL:  awake, alert, cooperative, no apparent distress, and appears stated age  LUNGS:  No increased work of breathing, good air exchange, clear to auscultation bilaterally, no crackles or wheezing  CARDIOVASCULAR:  Normal apical impulse, regular rate and rhythm, normal S1 and S2, no S3 or S4, and no murmur noted  ABDOMEN:  No scars, normal bowel sounds, soft, non-distended, non-tender, no masses palpated, no hepatosplenomegally  SKIN:  VERY DRY AND FLAKY  Data  CBC with Differential:    Lab Results   Component Value Date/Time    WBC 12.3 06/04/2023 07:00 AM    RBC 3.53 06/04/2023 07:00 AM    HGB 10.9 06/04/2023 07:00 AM    HCT 36.3 06/04/2023 07:00 AM     06/04/2023 07:00 AM    .8 06/04/2023 07:00 AM    MCH 30.9 06/04/2023 07:00 AM    MCHC 30.0 06/04/2023 07:00 AM    RDW 12.7 06/04/2023 07:00 AM    NRBC 1.7 09/27/2022 05:30 AM    SEGSPCT 60 04/24/2013 03:25 PM    LYMPHOPCT 12.2 06/04/2023 07:00 AM    MONOPCT 11.3 06/04/2023 07:00 AM    BASOPCT 0.7 06/04/2023 07:00 AM    MONOSABS 1.39 06/04/2023 07:00 AM    LYMPHSABS 1.50 06/04/2023 07:00 AM    EOSABS 0.73 06/04/2023 07:00 AM    BASOSABS 0.08 06/04/2023 07:00 AM     BMP:    Lab Results   Component Value Date/Time     06/05/2023 07:11 AM    K 4.3 06/05/2023 07:11 AM    K 4.8
Department of Family Practice  Adult Daily Progress Note      JAVIER BUTTS IS SEEN IN FOLLOW UP TODAY  [address]. HE IS TOLERATING TREATMENT. HE IS STILL NOT WEARING THE CPAP. HE HAS BEEN INFORMED OF HIS HIGH CO2 LEVEL. HE DOES HAVE A SITTER DEVICE IN HIS ROOM. I ASKED HIM ABOUT H IS CODE STATUS. HE HAD NO IDEA WHAT THAT WAS. I ASKED IF HE WAS FOUND WITHOUT A PULSE AND NOT BREATHING, DOES HE WANT TO BE BROUGHT BACK AND HE SAID \"YES\". I TOLD HIM HE WOULD BE PUT ON A VENTILATOR AND HE AGAIN HE SAID \"YES\". I TOLD HIM THERE IS NO GUARANTEE THAT HE WOULD COME OFF THE VENTILATOR. ABG'S TODAY SHOWED PCO2 OF 70.5. CREATININE WAS UP TO 2.2 TODAY. HE IS OFF THE DIAMOX. I RESUMED THE ASA AND COZAAR TODAY. BMP IS BEING REPEATED TONIGHT. I ALSO INFORMED HIM THAT HE IS FOR DISCHARGE TOMORROW. HE WANTED TO LEAVE Thursday TO GIVE HIM TIME TO \"CONTACT HIS AIDES\",  BUT I TOLD HIM HE HAS TO GO TOMORROW. [address] WILL BE NOTIFIED SO THEY CAN ARRANGE ALL HIS SERVICES.     PULMONARY IS ORDERING A CXR AND ABG'S IN THE AM.     OBJECTIVE    Physical  VITALS:  BP (!) 150/85   Pulse 75   Temp 97.6 °F (36.4 °C) (Oral)   Resp 21   Ht 5' 6\" (1.676 m)   Wt 193 lb 2 oz (87.6 kg)   SpO2 100%   BMI 31.17 kg/m²   CONSTITUTIONAL:  awake, alert, cooperative, no apparent distress, and appears stated age  LUNGS:  No increased work of breathing, good air exchange, clear to auscultation bilaterally, no crackles or wheezing  CARDIOVASCULAR:  Normal apical impulse, regular rate and rhythm, normal S1 and S2, no S3 or S4, and no murmur noted  ABDOMEN:  No scars, normal bowel sounds, soft, non-distended, non-tender, no masses palpated, no hepatosplenomegally  SKIN:  VERY DRY AND FLAKY  Data  CBC with Differential:    Lab Results   Component Value Date/Time    WBC 12.3 06/04/2023 07:00 AM    RBC 3.53 06/04/2023 07:00 AM    HGB 10.9 06/04/2023 07:00 AM    HCT 36.3 06/04/2023 07:00 AM     06/04/2023 07:00 AM
Offered patient the BiPap. Patient refused to wear BiPap.
PROGRESS NOTE  By Reggie Tejeda M.D. [facility]  Dr. Kelsy Welch M.D.,  Dr. Kaya Dimas M.D.,   Dr. Tere Tello D.O.,  Dr. Maria Elena Powers M.D.,  Dr. Cindy Denis D.O.,          Sasser Hof  72 y.o.  male    SUBJECTIVE:  Improved abdominal pain. Tolerating diet. Reports 2 bowel movements yesterday    OBJECTIVE:    /86   Pulse 62   Temp 97.7 °F (36.5 °C) (Oral)   Resp 21   Ht 5' 6\" (1.676 m)   Wt 193 lb 2 oz (87.6 kg)   SpO2 100%   BMI 31.17 kg/m²     General: NAD/-American male  HEENT: Anicteric sclera/moist oral mucosa  Neck: Supple/trachea midline  Chest: Symmetric excursion/nonlabored respirations  Cor: Regular  Abd.: Soft and nondistended  Extr.:  Decreased muscle tone and bulk throughout, consistent with age and condition  Skin: Warm and dry        DATA:    Monitor data reviewed -sinus rhythm noted.     Stool (measured) : 0 mL  Lab Results   Component Value Date/Time    WBC 12.3 06/04/2023 07:00 AM    RBC 3.53 06/04/2023 07:00 AM    HGB 10.9 06/04/2023 07:00 AM    HCT 36.3 06/04/2023 07:00 AM    .8 06/04/2023 07:00 AM    MCH 30.9 06/04/2023 07:00 AM    MCHC 30.0 06/04/2023 07:00 AM    RDW 12.7 06/04/2023 07:00 AM     06/04/2023 07:00 AM    MPV 9.6 06/04/2023 07:00 AM     Lab Results   Component Value Date/Time     06/06/2023 06:38 AM    K 3.8 06/06/2023 06:38 AM    K 4.8 09/25/2022 08:17 AM    CL 95 06/06/2023 06:38 AM    CO2 34 06/06/2023 06:38 AM    BUN 42 06/06/2023 06:38 AM    CREATININE 2.2 06/06/2023 06:38 AM    CALCIUM 9.9 06/06/2023 06:38 AM    PROT 7.0 06/03/2023 05:21 AM    LABALBU 3.9 06/03/2023 05:21 AM    LABALBU 4.6 07/14/2011 02:10 PM    BILITOT <0.2 06/03/2023 05:21 AM    ALKPHOS 62 06/03/2023 05:21 AM    AST 23 06/03/2023 05:21 AM    ALT 7 06/03/2023 05:21 AM     Lab Results   Component Value Date/Time    LIPASE 24 05/28/2023 07:07 PM     No results found for: AMYLASE      ASSESSMENT/PLAN:  Patient Active Problem List
Physician Progress Note      Meg Estrada  CSN #:                  [id number]  :                       1950  ADMIT DATE:       2023 6:33 PM  [address] [place] DATE:  RESPONDING  PROVIDER #:        Domo Meng CNP        QUERY TEXT:    Type of Anemia: Please provide further specificity, if known. Clinical indicators include: rbc, hgb, hct, anemia, bleed, iron studies, fobt,   h&h, gi bleeding, iron deficiency, hemoglobin, hematoma  Options provided:  -- Anemia due to acute blood loss  -- Anemia due to chronic blood loss  -- Anemia due to iron deficiency  -- Anemia due to postoperative blood loss  -- Anemia due to chronic disease  -- Other - I will add my own diagnosis  -- Disagree - Not applicable / Not valid  -- Disagree - Clinically Unable to determine / Unknown        PROVIDER RESPONSE TEXT:    At time of documentation, all available information was used in diagnoses.       Electronically signed by:  Lynford Severs CNP 2023 9:00 AM
Pt is refusing BIPAP despite education. This nurse stated that she will return and encourage him again.
Pt states he is SOB but refusing to go on bipap. Educated the pt on why he should wear.  100% on 4L NC, gave breathing tx
AM    MONOSABS 1.39 06/04/2023 07:00 AM    LYMPHSABS 1.50 06/04/2023 07:00 AM    EOSABS 0.73 06/04/2023 07:00 AM    BASOSABS 0.08 06/04/2023 07:00 AM     CMP:    Lab Results   Component Value Date/Time     06/06/2023 06:38 AM    K 3.8 06/06/2023 06:38 AM    K 4.8 09/25/2022 08:17 AM    CL 95 06/06/2023 06:38 AM    CO2 34 06/06/2023 06:38 AM    BUN 42 06/06/2023 06:38 AM    CREATININE 2.2 06/06/2023 06:38 AM    GFRAA >60 10/02/2022 05:33 AM    LABGLOM 31 06/06/2023 06:38 AM    GLUCOSE 124 06/06/2023 06:38 AM    GLUCOSE 101 07/14/2011 02:10 PM    PROT 7.0 06/03/2023 05:21 AM    LABALBU 3.9 06/03/2023 05:21 AM    LABALBU 4.6 07/14/2011 02:10 PM    CALCIUM 9.9 06/06/2023 06:38 AM    BILITOT <0.2 06/03/2023 05:21 AM    ALKPHOS 62 06/03/2023 05:21 AM    AST 23 06/03/2023 05:21 AM    ALT 7 06/03/2023 05:21 AM     ABG:    Lab Results   Component Value Date/Time    PH 7.329 06/06/2023 04:57 AM    PCO2 70.5 06/06/2023 04:57 AM    PO2 166.0 06/06/2023 04:57 AM    HCO3 36.3 06/06/2023 04:57 AM    BE 8.3 06/06/2023 04:57 AM    O2SAT 98.8 06/06/2023 04:57 AM     Assessment:   Acute on chronic hypoxemic respiratory failure  EGD = gastritis  Abnormal US GB  Acute exacerbation of COPD-severe based on recent PFTs in 2022  Elevated BNP  Hep C  Macroctyitc anemia  Chronic  steroids and oxygen       Plan:   Continue bipap at HS, (pt has not been wearing)  Avoid Pain meds, narco and benzo  Continue Pulmicort, Brovana, DuoNebs. Cornelia Mtz is currently on hold  Carafate added  Discontinue solumedrol, started on prednisone  Wean FiO2 as tolerated.   Patient's baseline is 2 L,   Sputum culture, strep, Legionella, mycoplasma antigens, respiratory viral panel ordered = all negative   Patient may benefit from pulmonary rehab as outpatient  Repeat ABG and CXR in am      DISCHARGE PLANNING:  Patient will need close follow up with Dr. Maria Elena Ramesh  Recommend pulmonary rehab as outpatient    [facility] DO
LYMPHSABS 1.50 06/04/2023 07:00 AM    EOSABS 0.73 06/04/2023 07:00 AM    BASOSABS 0.08 06/04/2023 07:00 AM     CMP:    Lab Results   Component Value Date/Time     06/06/2023 06:30 PM    K 3.7 06/06/2023 06:30 PM    K 4.8 09/25/2022 08:17 AM    CL 95 06/06/2023 06:30 PM    CO2 34 06/06/2023 06:30 PM    BUN 41 06/06/2023 06:30 PM    CREATININE 1.9 06/06/2023 06:30 PM    GFRAA >60 10/02/2022 05:33 AM    LABGLOM 37 06/06/2023 06:30 PM    GLUCOSE 186 06/06/2023 06:30 PM    GLUCOSE 101 07/14/2011 02:10 PM    PROT 7.0 06/03/2023 05:21 AM    LABALBU 3.9 06/03/2023 05:21 AM    LABALBU 4.6 07/14/2011 02:10 PM    CALCIUM 9.9 06/06/2023 06:30 PM    BILITOT <0.2 06/03/2023 05:21 AM    ALKPHOS 62 06/03/2023 05:21 AM    AST 23 06/03/2023 05:21 AM    ALT 7 06/03/2023 05:21 AM     ABG:    Lab Results   Component Value Date/Time    PH 7.286 06/07/2023 05:13 AM    PCO2 79.9 06/07/2023 05:13 AM    PO2 179.4 06/07/2023 05:13 AM    HCO3 37.2 06/07/2023 05:13 AM    BE 8.3 06/07/2023 05:13 AM    O2SAT 98.8 06/07/2023 05:13 AM     Assessment:   Acute on chronic hypoxemic respiratory failure  EGD = gastritis  Abnormal US GB  Acute exacerbation of COPD-severe based on recent PFTs in 2022  Elevated BNP  Hep C  Macroctyitc anemia  Chronic  steroids and oxygen       Plan:   Continue bipap at HS, (pt has not been wearing)  Avoid Pain meds, narco and benzo  Continue Pulmicort, Brovana, DuoNebs. Lefty Campo is currently on hold  Carafate added  Discontinue solumedrol, started on prednisone  Wean FiO2 as tolerated.   Patient's baseline is 2 L,   Sputum culture, strep, Legionella, mycoplasma antigens, respiratory viral panel ordered = all negative   Patient may benefit from pulmonary rehab as outpatient  Repeat ABG and CXR in am      DISCHARGE PLANNING:  Patient will need close follow up with Dr. April Licona  Recommend pulmonary rehab as outpatient    [facility]
elevated myocardial infarction) (HCC)    CHF, acute on chronic [facility])    Essential hypertension    Neck pain    Cervical disc herniation    Acute respiratory failure with hypoxia and hypercapnia (HCC)    Elbow effusion, right    Acute on chronic respiratory failure with hypoxia and hypercapnia (HCC)    Chronic pain syndrome [G89.4 (ICD-10-CM)]    Lumbar spondylosis    Spinal stenosis of lumbar region without neurogenic claudication    Lumbar facet arthropathy    Lumbar disc disorder    Cervical spondylosis    Cervical facet joint syndrome    Cervical stenosis of spine    Cervical disc disorder    Cervical radiculopathy    Lumbar radiculopathy    JULISSA (acute kidney injury) ([facility] [address]    Acute respiratory failure with hypoxia ([facility] [address]     1. Abdominal pain  -EGD in January 2023 with finding of gastritis  -Continue PPI Carafate  -EGD 6/2/2023 showing prominent mucosal folds in the duodenum, otherwise unremarkable exam, biopsies pending  -CTA abdomen pelvis 5/20/2023 showing normal patent vasculature, no other findings to explain patient's pain  -Ultrasound 5/30/2023 showing coarsened liver, mild gallbladder wall thickening/contracted gallbladder, normal caliber CBD 3.6 mm  -NM hepatobiliary scan 6/1/2023 showing no evidence of cholecystitis     2. Anemia  -Chronic  -Near baseline  -No overt GI bleeding  -Mild iron deficiency - replacement per admitting  -Pending FOBT  -EGD January 2023  -EGD 6/2/2023 - reports / pathology pending  -Outpatient colonoscopy unless precipitous drop in H&H or overt bleed     3. Renal failure  -Appears chronic with creatinine slightly above baseline on presentation  -Per admitting/pertinent consultants     4. Abnormal ultrasound of the gallbladder  -Unremarkable liver profile  -HIDA negative      No immediate interventions at this time from GI POV. Will follow. Clifford Taveras MD  6/5/2023  10:26 AM    NOTE:  This report was transcribed using voice recognition software.   Every effort was made
DISCHARGE PLANNING:  Patient will need close follow up with Dr. April Licona  Recommend pulmonary rehab as outpatient    [facility] DO
can be related to CO2 narcosis from severe COPD. Patient is very noncompliant with the CPAP mask. He refused to wear it last night. CO2 level is very high.
control now. 4.  Hepatitis C.    5. Altered mental status. This can be related to CO2 narcosis from severe COPD.
test in the past showing no evidence of ischemia. 3.  Hypertension. Blood pressure is under reasonable control now. 4.  Hepatitis C.    5. Altered mental status. This can be related to CO2 narcosis from severe COPD. Patient is very noncompliant with the CPAP mask. He refused to wear it last night. CO2 level is very high.
using voice recognition software. Every effort was made to ensure accuracy; however, inadvertent computerized transcription errors may be present.

## 2023-06-19 ENCOUNTER — TELEPHONE (OUTPATIENT)
Dept: OTHER | Age: 73
End: 2023-06-19

## 2023-06-19 DIAGNOSIS — I50.9 CONGESTIVE HEART FAILURE, UNSPECIFIED HF CHRONICITY, UNSPECIFIED HEART FAILURE TYPE (HCC): Primary | ICD-10-CM

## 2023-06-19 NOTE — TELEPHONE ENCOUNTER
Durga Banuelos 1950 Called patient re: new referral from DR. Swartz office to become established with the CHF clinic. Called and left a voicmail and provided a contact number to return our call.

## 2023-06-22 ENCOUNTER — TELEPHONE (OUTPATIENT)
Dept: OTHER | Age: 73
End: 2023-06-22

## 2023-06-22 NOTE — TELEPHONE ENCOUNTER
Etienne Inman 1950 Called patient re: referral from the CHF clinic. Pt states he would like to talk to Dr. Kanika Major at his appointment today and decided and call us back.          Dom Pemberton RN

## 2023-06-26 ENCOUNTER — TELEPHONE (OUTPATIENT)
Dept: OTHER | Age: 73
End: 2023-06-26

## 2023-07-11 ENCOUNTER — TELEPHONE (OUTPATIENT)
Dept: NEUROSURGERY | Age: 73
End: 2023-07-11

## 2023-07-11 NOTE — TELEPHONE ENCOUNTER
Patient called in originally spoke with Ronel Bowie. looking for an EMG order that was not placed however that you had discussed with the patient on the telephone on 08.18.2022. Patient also had referral for pain management that when they contacted him to schedule he advised them they he wanted to wait. Now after a substantial stay in the hospital and a new CT scan of his cervical.      Patient is stating that his neck is the worse pain it has ever been and and that he wants a call from you to discuss what he should.       Patient has been scheduled with you on 07.20.2023

## 2023-07-11 NOTE — TELEPHONE ENCOUNTER
Patient should come in for appointment for further evaluation as I have not seen the patient in 1 year

## 2023-07-11 NOTE — TELEPHONE ENCOUNTER
Advised patient that all concerns would be addressed at upcoming appointment as he has not been sen in one year.

## 2023-10-31 ENCOUNTER — APPOINTMENT (OUTPATIENT)
Dept: CT IMAGING | Age: 73
DRG: 189 | End: 2023-10-31
Payer: COMMERCIAL

## 2023-10-31 ENCOUNTER — APPOINTMENT (OUTPATIENT)
Dept: GENERAL RADIOLOGY | Age: 73
DRG: 189 | End: 2023-10-31
Payer: COMMERCIAL

## 2023-10-31 ENCOUNTER — HOSPITAL ENCOUNTER (INPATIENT)
Age: 73
LOS: 6 days | Discharge: HOME | DRG: 189 | End: 2023-11-06
Attending: EMERGENCY MEDICINE | Admitting: FAMILY MEDICINE
Payer: COMMERCIAL

## 2023-10-31 DIAGNOSIS — I10 ESSENTIAL HYPERTENSION: ICD-10-CM

## 2023-10-31 DIAGNOSIS — J44.1 COPD EXACERBATION (HCC): Primary | ICD-10-CM

## 2023-10-31 DIAGNOSIS — J96.22 ACUTE ON CHRONIC RESPIRATORY FAILURE WITH HYPERCAPNIA (HCC): ICD-10-CM

## 2023-10-31 DIAGNOSIS — R09.02 HYPOXIA: ICD-10-CM

## 2023-10-31 PROBLEM — J96.92 HYPERCAPNIC RESPIRATORY FAILURE (HCC): Status: ACTIVE | Noted: 2023-10-31

## 2023-10-31 LAB
ALBUMIN SERPL-MCNC: 4.3 G/DL (ref 3.5–5.2)
ALP SERPL-CCNC: 74 U/L (ref 40–129)
ALT SERPL-CCNC: <5 U/L (ref 0–40)
ANION GAP SERPL CALCULATED.3IONS-SCNC: 5 MMOL/L (ref 7–16)
AST SERPL-CCNC: 26 U/L (ref 0–39)
B.E.: 5.7 MMOL/L (ref -3–3)
BASOPHILS # BLD: 0.04 K/UL (ref 0–0.2)
BASOPHILS NFR BLD: 1 % (ref 0–2)
BILIRUB SERPL-MCNC: 0.8 MG/DL (ref 0–1.2)
BNP SERPL-MCNC: 1817 PG/ML (ref 0–450)
BUN SERPL-MCNC: 14 MG/DL (ref 6–23)
CALCIUM SERPL-MCNC: 10.5 MG/DL (ref 8.6–10.2)
CHLORIDE SERPL-SCNC: 91 MMOL/L (ref 98–107)
CO2 SERPL-SCNC: 41 MMOL/L (ref 22–29)
COHB: 0.6 % (ref 0–1.5)
COMMENT: ABNORMAL
CREAT SERPL-MCNC: 1.3 MG/DL (ref 0.7–1.2)
CRITICAL: ABNORMAL
D DIMER: 458 NG/ML DDU (ref 0–232)
DATE ANALYZED: ABNORMAL
DATE OF COLLECTION: ABNORMAL
EKG ATRIAL RATE: 111 BPM
EKG P AXIS: 78 DEGREES
EKG P-R INTERVAL: 154 MS
EKG Q-T INTERVAL: 348 MS
EKG QRS DURATION: 78 MS
EKG QTC CALCULATION (BAZETT): 473 MS
EKG R AXIS: 19 DEGREES
EKG T AXIS: 58 DEGREES
EKG VENTRICULAR RATE: 111 BPM
EOSINOPHIL # BLD: 0.12 K/UL (ref 0.05–0.5)
EOSINOPHILS RELATIVE PERCENT: 2 % (ref 0–6)
ERYTHROCYTE [DISTWIDTH] IN BLOOD BY AUTOMATED COUNT: 12.8 % (ref 11.5–15)
GFR SERPL CREATININE-BSD FRML MDRD: >60 ML/MIN/1.73M2
GLUCOSE SERPL-MCNC: 83 MG/DL (ref 74–99)
HCO3: 34.4 MMOL/L (ref 22–26)
HCT VFR BLD AUTO: 42.1 % (ref 37–54)
HGB BLD-MCNC: 12.4 G/DL (ref 12.5–16.5)
HHB: 1 % (ref 0–5)
IMM GRANULOCYTES # BLD AUTO: <0.03 K/UL (ref 0–0.58)
IMM GRANULOCYTES NFR BLD: 0 % (ref 0–5)
INR PPP: 1.1
LAB: ABNORMAL
LACTATE BLDV-SCNC: 0.8 MMOL/L (ref 0.5–1.9)
LACTATE BLDV-SCNC: 1.3 MMOL/L (ref 0.5–1.9)
LYMPHOCYTES NFR BLD: 0.95 K/UL (ref 1.5–4)
LYMPHOCYTES RELATIVE PERCENT: 14 % (ref 20–42)
Lab: 1710
MCH RBC QN AUTO: 29 PG (ref 26–35)
MCHC RBC AUTO-ENTMCNC: 29.5 G/DL (ref 32–34.5)
MCV RBC AUTO: 98.6 FL (ref 80–99.9)
METHB: 0.1 % (ref 0–1.5)
MODE: ABNORMAL
MONOCYTES NFR BLD: 0.92 K/UL (ref 0.1–0.95)
MONOCYTES NFR BLD: 14 % (ref 2–12)
NEUTROPHILS NFR BLD: 69 % (ref 43–80)
NEUTS SEG NFR BLD: 4.66 K/UL (ref 1.8–7.3)
O2 CONTENT: 17.8 ML/DL
O2 SATURATION: 99 % (ref 92–98.5)
O2HB: 98.3 % (ref 94–97)
OPERATOR ID: ABNORMAL
PARTIAL THROMBOPLASTIN TIME: 37.9 SEC (ref 24.5–35.1)
PATIENT TEMP: 37 C
PCO2: 72.6 MMHG (ref 35–45)
PH BLOOD GAS: 7.29 (ref 7.35–7.45)
PLATELET # BLD AUTO: 354 K/UL (ref 130–450)
PMV BLD AUTO: 9.8 FL (ref 7–12)
PO2: 144.9 MMHG (ref 75–100)
POTASSIUM SERPL-SCNC: 4.5 MMOL/L (ref 3.5–5)
PROT SERPL-MCNC: 8.7 G/DL (ref 6.4–8.3)
PROTHROMBIN TIME: 12.5 SEC (ref 9.3–12.4)
RBC # BLD AUTO: 4.27 M/UL (ref 3.8–5.8)
SARS-COV-2 RDRP RESP QL NAA+PROBE: NOT DETECTED
SODIUM SERPL-SCNC: 137 MMOL/L (ref 132–146)
SOURCE, BLOOD GAS: ABNORMAL
SPECIMEN DESCRIPTION: NORMAL
THB: 12.7 G/DL (ref 11.5–16.5)
TIME ANALYZED: 1719
TROPONIN I SERPL HS-MCNC: 93 NG/L (ref 0–11)
WBC OTHER # BLD: 6.7 K/UL (ref 4.5–11.5)

## 2023-10-31 PROCEDURE — 83605 ASSAY OF LACTIC ACID: CPT

## 2023-10-31 PROCEDURE — 87040 BLOOD CULTURE FOR BACTERIA: CPT

## 2023-10-31 PROCEDURE — 82805 BLOOD GASES W/O2 SATURATION: CPT

## 2023-10-31 PROCEDURE — 93010 ELECTROCARDIOGRAM REPORT: CPT | Performed by: INTERNAL MEDICINE

## 2023-10-31 PROCEDURE — 99285 EMERGENCY DEPT VISIT HI MDM: CPT

## 2023-10-31 PROCEDURE — 6370000000 HC RX 637 (ALT 250 FOR IP): Performed by: EMERGENCY MEDICINE

## 2023-10-31 PROCEDURE — 1200000000 HC SEMI PRIVATE

## 2023-10-31 PROCEDURE — 6360000004 HC RX CONTRAST MEDICATION: Performed by: RADIOLOGY

## 2023-10-31 PROCEDURE — 83880 ASSAY OF NATRIURETIC PEPTIDE: CPT

## 2023-10-31 PROCEDURE — 80053 COMPREHEN METABOLIC PANEL: CPT

## 2023-10-31 PROCEDURE — 94640 AIRWAY INHALATION TREATMENT: CPT

## 2023-10-31 PROCEDURE — 85730 THROMBOPLASTIN TIME PARTIAL: CPT

## 2023-10-31 PROCEDURE — 96365 THER/PROPH/DIAG IV INF INIT: CPT

## 2023-10-31 PROCEDURE — 87635 SARS-COV-2 COVID-19 AMP PRB: CPT

## 2023-10-31 PROCEDURE — 71045 X-RAY EXAM CHEST 1 VIEW: CPT

## 2023-10-31 PROCEDURE — 96366 THER/PROPH/DIAG IV INF ADDON: CPT

## 2023-10-31 PROCEDURE — 84484 ASSAY OF TROPONIN QUANT: CPT

## 2023-10-31 PROCEDURE — 93005 ELECTROCARDIOGRAM TRACING: CPT | Performed by: EMERGENCY MEDICINE

## 2023-10-31 PROCEDURE — 6360000002 HC RX W HCPCS: Performed by: EMERGENCY MEDICINE

## 2023-10-31 PROCEDURE — 71275 CT ANGIOGRAPHY CHEST: CPT

## 2023-10-31 PROCEDURE — 85610 PROTHROMBIN TIME: CPT

## 2023-10-31 PROCEDURE — 85025 COMPLETE CBC W/AUTO DIFF WBC: CPT

## 2023-10-31 PROCEDURE — 2500000003 HC RX 250 WO HCPCS: Performed by: EMERGENCY MEDICINE

## 2023-10-31 PROCEDURE — 96375 TX/PRO/DX INJ NEW DRUG ADDON: CPT

## 2023-10-31 PROCEDURE — 2580000003 HC RX 258: Performed by: EMERGENCY MEDICINE

## 2023-10-31 PROCEDURE — 85379 FIBRIN DEGRADATION QUANT: CPT

## 2023-10-31 PROCEDURE — 96361 HYDRATE IV INFUSION ADD-ON: CPT

## 2023-10-31 PROCEDURE — 36415 COLL VENOUS BLD VENIPUNCTURE: CPT

## 2023-10-31 RX ORDER — ALBUTEROL SULFATE 2.5 MG/3ML
2.5 SOLUTION RESPIRATORY (INHALATION) EVERY 6 HOURS PRN
Status: DISCONTINUED | OUTPATIENT
Start: 2023-10-31 | End: 2023-10-31 | Stop reason: SDUPTHER

## 2023-10-31 RX ORDER — VITAMIN E 268 MG
400 CAPSULE ORAL DAILY
Status: DISCONTINUED | OUTPATIENT
Start: 2023-11-01 | End: 2023-11-06 | Stop reason: HOSPADM

## 2023-10-31 RX ORDER — 0.9 % SODIUM CHLORIDE 0.9 %
1000 INTRAVENOUS SOLUTION INTRAVENOUS ONCE
Status: COMPLETED | OUTPATIENT
Start: 2023-10-31 | End: 2023-10-31

## 2023-10-31 RX ORDER — DOXYCYCLINE HYCLATE 100 MG/1
100 CAPSULE ORAL EVERY 12 HOURS SCHEDULED
Status: COMPLETED | OUTPATIENT
Start: 2023-11-01 | End: 2023-11-05

## 2023-10-31 RX ORDER — METOPROLOL SUCCINATE 100 MG/1
100 TABLET, EXTENDED RELEASE ORAL 2 TIMES DAILY
Status: DISCONTINUED | OUTPATIENT
Start: 2023-10-31 | End: 2023-11-06 | Stop reason: HOSPADM

## 2023-10-31 RX ORDER — TACROLIMUS 1 MG/G
OINTMENT TOPICAL 2 TIMES DAILY PRN
Status: DISCONTINUED | OUTPATIENT
Start: 2023-10-31 | End: 2023-11-06 | Stop reason: HOSPADM

## 2023-10-31 RX ORDER — ALBUTEROL SULFATE 2.5 MG/3ML
5 SOLUTION RESPIRATORY (INHALATION) ONCE
Status: COMPLETED | OUTPATIENT
Start: 2023-10-31 | End: 2023-10-31

## 2023-10-31 RX ORDER — LOSARTAN POTASSIUM 50 MG/1
100 TABLET ORAL DAILY
Status: DISCONTINUED | OUTPATIENT
Start: 2023-11-01 | End: 2023-11-06 | Stop reason: HOSPADM

## 2023-10-31 RX ORDER — LABETALOL HYDROCHLORIDE 5 MG/ML
10 INJECTION, SOLUTION INTRAVENOUS EVERY 4 HOURS PRN
Status: DISCONTINUED | OUTPATIENT
Start: 2023-10-31 | End: 2023-11-06 | Stop reason: HOSPADM

## 2023-10-31 RX ORDER — PANTOPRAZOLE SODIUM 40 MG/1
40 TABLET, DELAYED RELEASE ORAL
Status: DISCONTINUED | OUTPATIENT
Start: 2023-11-01 | End: 2023-11-06 | Stop reason: HOSPADM

## 2023-10-31 RX ORDER — GABAPENTIN 300 MG/1
300 CAPSULE ORAL NIGHTLY
Status: DISCONTINUED | OUTPATIENT
Start: 2023-10-31 | End: 2023-11-06 | Stop reason: HOSPADM

## 2023-10-31 RX ORDER — ARFORMOTEROL TARTRATE 15 UG/2ML
15 SOLUTION RESPIRATORY (INHALATION)
Status: DISCONTINUED | OUTPATIENT
Start: 2023-10-31 | End: 2023-11-01

## 2023-10-31 RX ORDER — LABETALOL HYDROCHLORIDE 5 MG/ML
10 INJECTION, SOLUTION INTRAVENOUS EVERY 4 HOURS PRN
Status: DISCONTINUED | OUTPATIENT
Start: 2023-10-31 | End: 2023-10-31

## 2023-10-31 RX ORDER — CETIRIZINE HYDROCHLORIDE 10 MG/1
10 TABLET ORAL DAILY
Status: DISCONTINUED | OUTPATIENT
Start: 2023-11-01 | End: 2023-11-06 | Stop reason: HOSPADM

## 2023-10-31 RX ORDER — ASPIRIN 81 MG/1
81 TABLET, CHEWABLE ORAL DAILY
Status: DISCONTINUED | OUTPATIENT
Start: 2023-11-01 | End: 2023-11-06 | Stop reason: HOSPADM

## 2023-10-31 RX ORDER — ALBUTEROL SULFATE 2.5 MG/3ML
2.5 SOLUTION RESPIRATORY (INHALATION) EVERY 4 HOURS PRN
Status: DISCONTINUED | OUTPATIENT
Start: 2023-10-31 | End: 2023-11-06 | Stop reason: HOSPADM

## 2023-10-31 RX ORDER — LABETALOL HYDROCHLORIDE 5 MG/ML
10 INJECTION, SOLUTION INTRAVENOUS ONCE
Status: COMPLETED | OUTPATIENT
Start: 2023-10-31 | End: 2023-10-31

## 2023-10-31 RX ORDER — IPRATROPIUM BROMIDE AND ALBUTEROL SULFATE 2.5; .5 MG/3ML; MG/3ML
3 SOLUTION RESPIRATORY (INHALATION) ONCE
Status: COMPLETED | OUTPATIENT
Start: 2023-10-31 | End: 2023-10-31

## 2023-10-31 RX ORDER — SUCRALFATE 1 G/1
1 TABLET ORAL
Status: DISCONTINUED | OUTPATIENT
Start: 2023-10-31 | End: 2023-11-06 | Stop reason: HOSPADM

## 2023-10-31 RX ORDER — BUDESONIDE 0.5 MG/2ML
0.5 INHALANT ORAL
Status: DISCONTINUED | OUTPATIENT
Start: 2023-10-31 | End: 2023-11-06 | Stop reason: HOSPADM

## 2023-10-31 RX ADMIN — METHYLPREDNISOLONE SODIUM SUCCINATE 125 MG: 125 INJECTION, POWDER, FOR SOLUTION INTRAMUSCULAR; INTRAVENOUS at 15:29

## 2023-10-31 RX ADMIN — IOPAMIDOL 70 ML: 755 INJECTION, SOLUTION INTRAVENOUS at 18:47

## 2023-10-31 RX ADMIN — IPRATROPIUM BROMIDE AND ALBUTEROL SULFATE 3 DOSE: 2.5; .5 SOLUTION RESPIRATORY (INHALATION) at 14:54

## 2023-10-31 RX ADMIN — CEFTRIAXONE SODIUM 2000 MG: 2 INJECTION, POWDER, FOR SOLUTION INTRAMUSCULAR; INTRAVENOUS at 16:58

## 2023-10-31 RX ADMIN — DOXYCYCLINE 100 MG: 100 INJECTION, POWDER, LYOPHILIZED, FOR SOLUTION INTRAVENOUS at 17:00

## 2023-10-31 RX ADMIN — ALBUTEROL SULFATE 5 MG: 2.5 SOLUTION RESPIRATORY (INHALATION) at 20:20

## 2023-10-31 RX ADMIN — LABETALOL HYDROCHLORIDE 10 MG: 5 INJECTION INTRAVENOUS at 20:09

## 2023-10-31 RX ADMIN — SODIUM CHLORIDE 1000 ML: 9 INJECTION, SOLUTION INTRAVENOUS at 15:29

## 2023-10-31 ASSESSMENT — ENCOUNTER SYMPTOMS
COUGH: 0
WHEEZING: 1
DIARRHEA: 0
NAUSEA: 0
SHORTNESS OF BREATH: 1
VOMITING: 0
BACK PAIN: 0
SORE THROAT: 0
ABDOMINAL PAIN: 0
CHEST TIGHTNESS: 0

## 2023-10-31 ASSESSMENT — PAIN - FUNCTIONAL ASSESSMENT: PAIN_FUNCTIONAL_ASSESSMENT: 0-10

## 2023-10-31 ASSESSMENT — PAIN SCALES - GENERAL
PAINLEVEL_OUTOF10: 0
PAINLEVEL_OUTOF10: 6

## 2023-10-31 ASSESSMENT — LIFESTYLE VARIABLES
HOW MANY STANDARD DRINKS CONTAINING ALCOHOL DO YOU HAVE ON A TYPICAL DAY: 3 OR 4
HOW OFTEN DO YOU HAVE A DRINK CONTAINING ALCOHOL: 4 OR MORE TIMES A WEEK

## 2023-10-31 ASSESSMENT — PAIN DESCRIPTION - LOCATION: LOCATION: CHEST

## 2023-11-01 LAB
B PARAP IS1001 DNA NPH QL NAA+NON-PROBE: NOT DETECTED
B PERT DNA SPEC QL NAA+PROBE: NOT DETECTED
C PNEUM DNA NPH QL NAA+NON-PROBE: NOT DETECTED
FLUAV RNA NPH QL NAA+NON-PROBE: NOT DETECTED
FLUBV RNA NPH QL NAA+NON-PROBE: NOT DETECTED
HADV DNA NPH QL NAA+NON-PROBE: NOT DETECTED
HCOV 229E RNA NPH QL NAA+NON-PROBE: NOT DETECTED
HCOV HKU1 RNA NPH QL NAA+NON-PROBE: NOT DETECTED
HCOV NL63 RNA NPH QL NAA+NON-PROBE: NOT DETECTED
HCOV OC43 RNA NPH QL NAA+NON-PROBE: NOT DETECTED
HMPV RNA NPH QL NAA+NON-PROBE: NOT DETECTED
HPIV1 RNA NPH QL NAA+NON-PROBE: NOT DETECTED
HPIV2 RNA NPH QL NAA+NON-PROBE: NOT DETECTED
HPIV3 RNA NPH QL NAA+NON-PROBE: NOT DETECTED
HPIV4 RNA NPH QL NAA+NON-PROBE: NOT DETECTED
M PNEUMO DNA NPH QL NAA+NON-PROBE: NOT DETECTED
RSV RNA NPH QL NAA+NON-PROBE: NOT DETECTED
RV+EV RNA NPH QL NAA+NON-PROBE: NOT DETECTED
SARS-COV-2 RNA NPH QL NAA+NON-PROBE: NOT DETECTED
SPECIMEN DESCRIPTION: NORMAL

## 2023-11-01 PROCEDURE — 0202U NFCT DS 22 TRGT SARS-COV-2: CPT

## 2023-11-01 PROCEDURE — 6370000000 HC RX 637 (ALT 250 FOR IP): Performed by: FAMILY MEDICINE

## 2023-11-01 PROCEDURE — 6360000002 HC RX W HCPCS: Performed by: INTERNAL MEDICINE

## 2023-11-01 PROCEDURE — 6360000002 HC RX W HCPCS: Performed by: FAMILY MEDICINE

## 2023-11-01 PROCEDURE — 87070 CULTURE OTHR SPECIMN AEROBIC: CPT

## 2023-11-01 PROCEDURE — 87081 CULTURE SCREEN ONLY: CPT

## 2023-11-01 PROCEDURE — 87205 SMEAR GRAM STAIN: CPT

## 2023-11-01 PROCEDURE — 1200000000 HC SEMI PRIVATE

## 2023-11-01 PROCEDURE — 2580000003 HC RX 258: Performed by: FAMILY MEDICINE

## 2023-11-01 PROCEDURE — 87449 NOS EACH ORGANISM AG IA: CPT

## 2023-11-01 PROCEDURE — 2700000000 HC OXYGEN THERAPY PER DAY

## 2023-11-01 PROCEDURE — 87899 AGENT NOS ASSAY W/OPTIC: CPT

## 2023-11-01 PROCEDURE — 94640 AIRWAY INHALATION TREATMENT: CPT

## 2023-11-01 PROCEDURE — 99223 1ST HOSP IP/OBS HIGH 75: CPT | Performed by: INTERNAL MEDICINE

## 2023-11-01 RX ORDER — HYDROCODONE BITARTRATE AND ACETAMINOPHEN 5; 325 MG/1; MG/1
1 TABLET ORAL EVERY 6 HOURS PRN
Status: DISCONTINUED | OUTPATIENT
Start: 2023-11-01 | End: 2023-11-06 | Stop reason: HOSPADM

## 2023-11-01 RX ORDER — ENOXAPARIN SODIUM 100 MG/ML
40 INJECTION SUBCUTANEOUS DAILY
Status: DISCONTINUED | OUTPATIENT
Start: 2023-11-01 | End: 2023-11-06 | Stop reason: HOSPADM

## 2023-11-01 RX ADMIN — METOPROLOL SUCCINATE 100 MG: 100 TABLET, FILM COATED, EXTENDED RELEASE ORAL at 20:45

## 2023-11-01 RX ADMIN — DOXYCYCLINE HYCLATE 100 MG: 100 CAPSULE ORAL at 20:45

## 2023-11-01 RX ADMIN — SUCRALFATE 1 G: 1 TABLET ORAL at 10:58

## 2023-11-01 RX ADMIN — SUCRALFATE 1 G: 1 TABLET ORAL at 00:30

## 2023-11-01 RX ADMIN — CETIRIZINE HYDROCHLORIDE 10 MG: 10 TABLET, FILM COATED ORAL at 08:43

## 2023-11-01 RX ADMIN — IPRATROPIUM BROMIDE 0.5 MG: 0.5 SOLUTION RESPIRATORY (INHALATION) at 14:56

## 2023-11-01 RX ADMIN — PANTOPRAZOLE SODIUM 40 MG: 40 TABLET, DELAYED RELEASE ORAL at 16:39

## 2023-11-01 RX ADMIN — BUDESONIDE INHALATION 500 MCG: 0.5 SUSPENSION RESPIRATORY (INHALATION) at 06:24

## 2023-11-01 RX ADMIN — METOPROLOL SUCCINATE 100 MG: 100 TABLET, FILM COATED, EXTENDED RELEASE ORAL at 08:43

## 2023-11-01 RX ADMIN — Medication 400 UNITS: at 08:43

## 2023-11-01 RX ADMIN — IPRATROPIUM BROMIDE 0.5 MG: 0.5 SOLUTION RESPIRATORY (INHALATION) at 18:23

## 2023-11-01 RX ADMIN — SUCRALFATE 1 G: 1 TABLET ORAL at 16:39

## 2023-11-01 RX ADMIN — LOSARTAN POTASSIUM 100 MG: 50 TABLET, FILM COATED ORAL at 08:43

## 2023-11-01 RX ADMIN — SUCRALFATE 1 G: 1 TABLET ORAL at 20:45

## 2023-11-01 RX ADMIN — WATER 1000 MG: 1 INJECTION INTRAMUSCULAR; INTRAVENOUS; SUBCUTANEOUS at 17:47

## 2023-11-01 RX ADMIN — METHYLPREDNISOLONE SODIUM SUCCINATE 60 MG: 125 INJECTION, POWDER, FOR SOLUTION INTRAMUSCULAR; INTRAVENOUS at 08:42

## 2023-11-01 RX ADMIN — BUDESONIDE INHALATION 500 MCG: 0.5 SUSPENSION RESPIRATORY (INHALATION) at 18:23

## 2023-11-01 RX ADMIN — SUCRALFATE 1 G: 1 TABLET ORAL at 06:15

## 2023-11-01 RX ADMIN — ASPIRIN 81 MG CHEWABLE TABLET 81 MG: 81 TABLET CHEWABLE at 08:43

## 2023-11-01 RX ADMIN — PANTOPRAZOLE SODIUM 40 MG: 40 TABLET, DELAYED RELEASE ORAL at 06:15

## 2023-11-01 RX ADMIN — METOPROLOL SUCCINATE 100 MG: 100 TABLET, FILM COATED, EXTENDED RELEASE ORAL at 00:35

## 2023-11-01 RX ADMIN — DOXYCYCLINE HYCLATE 100 MG: 100 CAPSULE ORAL at 06:15

## 2023-11-01 RX ADMIN — HYDROCODONE BITARTRATE AND ACETAMINOPHEN 1 TABLET: 5; 325 TABLET ORAL at 06:52

## 2023-11-01 RX ADMIN — METHYLPREDNISOLONE SODIUM SUCCINATE 60 MG: 125 INJECTION, POWDER, FOR SOLUTION INTRAMUSCULAR; INTRAVENOUS at 00:30

## 2023-11-01 RX ADMIN — HYDROCODONE BITARTRATE AND ACETAMINOPHEN 1 TABLET: 5; 325 TABLET ORAL at 19:14

## 2023-11-01 RX ADMIN — ARFORMOTEROL TARTRATE 15 MCG: 15 SOLUTION RESPIRATORY (INHALATION) at 06:24

## 2023-11-01 RX ADMIN — METHYLPREDNISOLONE SODIUM SUCCINATE 40 MG: 40 INJECTION, POWDER, FOR SOLUTION INTRAMUSCULAR; INTRAVENOUS at 20:45

## 2023-11-01 RX ADMIN — IPRATROPIUM BROMIDE 0.5 MG: 0.5 SOLUTION RESPIRATORY (INHALATION) at 06:24

## 2023-11-01 ASSESSMENT — PAIN DESCRIPTION - ORIENTATION: ORIENTATION: LOWER;MID

## 2023-11-01 ASSESSMENT — PAIN SCALES - GENERAL
PAINLEVEL_OUTOF10: 10
PAINLEVEL_OUTOF10: 10

## 2023-11-01 ASSESSMENT — PAIN DESCRIPTION - ONSET: ONSET: GRADUAL

## 2023-11-01 ASSESSMENT — PAIN DESCRIPTION - DESCRIPTORS
DESCRIPTORS: ACHING
DESCRIPTORS: PRESSURE;SHOOTING

## 2023-11-01 ASSESSMENT — PAIN DESCRIPTION - LOCATION
LOCATION: NECK;BACK
LOCATION: NECK;BACK

## 2023-11-01 ASSESSMENT — PAIN DESCRIPTION - PAIN TYPE: TYPE: CHRONIC PAIN

## 2023-11-01 ASSESSMENT — PAIN DESCRIPTION - FREQUENCY: FREQUENCY: INTERMITTENT

## 2023-11-01 NOTE — ACP (ADVANCE CARE PLANNING)
Advance Care Planning   The patient has the following advanced directives on file:         The patient has appointed the following active healthcare agents:    Primary Decision Maker: Joanne Freeman - Brother/Sister - [phone]

## 2023-11-01 NOTE — CARE COORDINATION
Case Management Assessment  Initial Evaluation    Date/Time of Evaluation: 11/1/2023 12:28 PM  Assessment Completed by: CLAYTON Thurston    If patient is discharged prior to next notation, then this note serves as note for discharge by case management. Patient Name: Lyly Priest                   YOB: 1950  Diagnosis: Essential hypertension [I10]  Hypoxia [R09.02]  COPD exacerbation ([address]) [J44.1]  Acute on chronic respiratory failure with hypercapnia ([address]) [J96.22]  Hypercapnic respiratory failure ([address]) [J96.92]                   Date / Time: 10/31/2023  2:35 PM    Patient Admission Status: Inpatient   Readmission Risk (Low < 19, Mod (19-27), High > 27): Readmission Risk Score: 15.3    Current PCP: Cesar Sanders, DO  PCP verified by CM? Yes    Chart Reviewed: Yes      History Provided by: Patient  Patient Orientation: Alert and Oriented, Person, Place, Situation    Patient Cognition: Alert      Advance Directives:      Code Status: Prior   Patient's Primary Decision Maker is: Legal Next of Kin    Primary Decision MakerNorjaspal Tabares Brother/Sister - [phone]    Discharge Planning:    Patient lives with: Alone Type of Home: House  Primary Care Giver: Other (Comment) (has private duty aides from [facility] m-f 10-2 pm)  Patient Support Systems include: Family Members   Current Financial resources:    Current community resources:    Current services prior to admission: Oxygen Therapy            Current DME:  has home 02 from [facility] pt. Type of Home Care services:  Aide Services    ADLS  Prior functional level: Independent in ADLs/IADLs (drove self here.)  Current functional level: Independent in ADLs/IADLs    PT AM-PAC:   /24  OT AM-PAC:   /24    Family can provide assistance at DC: No  Would you like Case Management to discuss the discharge plan with any other family members/significant others, and if so, who?  No  Plans to Return to Present Housing:      Potential

## 2023-11-02 LAB
L PNEUMO1 AG UR QL IA.RAPID: NEGATIVE
S PNEUM AG SPEC QL: NEGATIVE
SPECIMEN SOURCE: NORMAL

## 2023-11-02 PROCEDURE — 99232 SBSQ HOSP IP/OBS MODERATE 35: CPT | Performed by: INTERNAL MEDICINE

## 2023-11-02 PROCEDURE — 2580000003 HC RX 258: Performed by: FAMILY MEDICINE

## 2023-11-02 PROCEDURE — 94640 AIRWAY INHALATION TREATMENT: CPT

## 2023-11-02 PROCEDURE — 6360000002 HC RX W HCPCS: Performed by: FAMILY MEDICINE

## 2023-11-02 PROCEDURE — 6360000002 HC RX W HCPCS: Performed by: INTERNAL MEDICINE

## 2023-11-02 PROCEDURE — 36415 COLL VENOUS BLD VENIPUNCTURE: CPT

## 2023-11-02 PROCEDURE — 86738 MYCOPLASMA ANTIBODY: CPT

## 2023-11-02 PROCEDURE — 1200000000 HC SEMI PRIVATE

## 2023-11-02 PROCEDURE — 6370000000 HC RX 637 (ALT 250 FOR IP): Performed by: FAMILY MEDICINE

## 2023-11-02 PROCEDURE — 2700000000 HC OXYGEN THERAPY PER DAY

## 2023-11-02 RX ORDER — ALBUTEROL SULFATE 90 UG/1
2 AEROSOL, METERED RESPIRATORY (INHALATION) EVERY 6 HOURS PRN
Status: DISCONTINUED | OUTPATIENT
Start: 2023-11-02 | End: 2023-11-02

## 2023-11-02 RX ADMIN — HYDROCODONE BITARTRATE AND ACETAMINOPHEN 1 TABLET: 5; 325 TABLET ORAL at 06:36

## 2023-11-02 RX ADMIN — ASPIRIN 81 MG CHEWABLE TABLET 81 MG: 81 TABLET CHEWABLE at 09:56

## 2023-11-02 RX ADMIN — HYDROCODONE BITARTRATE AND ACETAMINOPHEN 1 TABLET: 5; 325 TABLET ORAL at 19:46

## 2023-11-02 RX ADMIN — SUCRALFATE 1 G: 1 TABLET ORAL at 06:36

## 2023-11-02 RX ADMIN — WATER 1000 MG: 1 INJECTION INTRAMUSCULAR; INTRAVENOUS; SUBCUTANEOUS at 16:30

## 2023-11-02 RX ADMIN — IPRATROPIUM BROMIDE 0.5 MG: 0.5 SOLUTION RESPIRATORY (INHALATION) at 18:03

## 2023-11-02 RX ADMIN — LOSARTAN POTASSIUM 100 MG: 50 TABLET, FILM COATED ORAL at 09:56

## 2023-11-02 RX ADMIN — PANTOPRAZOLE SODIUM 40 MG: 40 TABLET, DELAYED RELEASE ORAL at 16:30

## 2023-11-02 RX ADMIN — BUDESONIDE INHALATION 500 MCG: 0.5 SUSPENSION RESPIRATORY (INHALATION) at 06:13

## 2023-11-02 RX ADMIN — SUCRALFATE 1 G: 1 TABLET ORAL at 16:30

## 2023-11-02 RX ADMIN — PANTOPRAZOLE SODIUM 40 MG: 40 TABLET, DELAYED RELEASE ORAL at 06:36

## 2023-11-02 RX ADMIN — DOXYCYCLINE HYCLATE 100 MG: 100 CAPSULE ORAL at 09:56

## 2023-11-02 RX ADMIN — METHYLPREDNISOLONE SODIUM SUCCINATE 40 MG: 40 INJECTION, POWDER, FOR SOLUTION INTRAMUSCULAR; INTRAVENOUS at 09:56

## 2023-11-02 RX ADMIN — DOXYCYCLINE HYCLATE 100 MG: 100 CAPSULE ORAL at 19:45

## 2023-11-02 RX ADMIN — METHYLPREDNISOLONE SODIUM SUCCINATE 40 MG: 40 INJECTION, POWDER, FOR SOLUTION INTRAMUSCULAR; INTRAVENOUS at 19:45

## 2023-11-02 RX ADMIN — METOPROLOL SUCCINATE 100 MG: 100 TABLET, FILM COATED, EXTENDED RELEASE ORAL at 09:56

## 2023-11-02 RX ADMIN — CETIRIZINE HYDROCHLORIDE 10 MG: 10 TABLET, FILM COATED ORAL at 09:56

## 2023-11-02 RX ADMIN — IPRATROPIUM BROMIDE 0.5 MG: 0.5 SOLUTION RESPIRATORY (INHALATION) at 15:01

## 2023-11-02 RX ADMIN — METOPROLOL SUCCINATE 100 MG: 100 TABLET, FILM COATED, EXTENDED RELEASE ORAL at 19:45

## 2023-11-02 RX ADMIN — BUDESONIDE INHALATION 500 MCG: 0.5 SUSPENSION RESPIRATORY (INHALATION) at 18:03

## 2023-11-02 RX ADMIN — IPRATROPIUM BROMIDE 0.5 MG: 0.5 SOLUTION RESPIRATORY (INHALATION) at 10:01

## 2023-11-02 RX ADMIN — Medication 400 UNITS: at 09:56

## 2023-11-02 RX ADMIN — SUCRALFATE 1 G: 1 TABLET ORAL at 11:11

## 2023-11-02 RX ADMIN — IPRATROPIUM BROMIDE 0.5 MG: 0.5 SOLUTION RESPIRATORY (INHALATION) at 06:13

## 2023-11-02 RX ADMIN — SUCRALFATE 1 G: 1 TABLET ORAL at 19:46

## 2023-11-02 ASSESSMENT — PAIN DESCRIPTION - DESCRIPTORS
DESCRIPTORS: ACHING;DULL;SHOOTING
DESCRIPTORS: ACHING;NAGGING;SORE

## 2023-11-02 ASSESSMENT — PAIN DESCRIPTION - ORIENTATION
ORIENTATION: LOWER;MID
ORIENTATION: RIGHT

## 2023-11-02 ASSESSMENT — PAIN DESCRIPTION - FREQUENCY: FREQUENCY: INTERMITTENT

## 2023-11-02 ASSESSMENT — PAIN DESCRIPTION - ONSET: ONSET: ON-GOING

## 2023-11-02 ASSESSMENT — PAIN DESCRIPTION - LOCATION
LOCATION: BACK;NECK
LOCATION: NECK;BACK

## 2023-11-02 ASSESSMENT — PAIN SCALES - GENERAL
PAINLEVEL_OUTOF10: 10
PAINLEVEL_OUTOF10: 10

## 2023-11-02 ASSESSMENT — PAIN DESCRIPTION - PAIN TYPE: TYPE: CHRONIC PAIN

## 2023-11-03 PROBLEM — J44.0 CHRONIC OBSTRUCTIVE PULMONARY DISEASE WITH ACUTE LOWER RESPIRATORY INFECTION (HCC): Status: ACTIVE | Noted: 2023-11-03

## 2023-11-03 LAB
ALBUMIN SERPL-MCNC: 4 G/DL (ref 3.5–5.2)
ALP SERPL-CCNC: 106 U/L (ref 40–129)
ALT SERPL-CCNC: <5 U/L (ref 0–40)
ANION GAP SERPL CALCULATED.3IONS-SCNC: 6 MMOL/L (ref 7–16)
AST SERPL-CCNC: 12 U/L (ref 0–39)
BASOPHILS # BLD: 0 K/UL (ref 0–0.2)
BASOPHILS NFR BLD: 0 % (ref 0–2)
BILIRUB SERPL-MCNC: 0.2 MG/DL (ref 0–1.2)
BUN SERPL-MCNC: 28 MG/DL (ref 6–23)
CALCIUM SERPL-MCNC: 9.1 MG/DL (ref 8.6–10.2)
CHLORIDE SERPL-SCNC: 92 MMOL/L (ref 98–107)
CO2 SERPL-SCNC: 37 MMOL/L (ref 22–29)
CREAT SERPL-MCNC: 1.3 MG/DL (ref 0.7–1.2)
EOSINOPHIL # BLD: 0 K/UL (ref 0.05–0.5)
EOSINOPHILS RELATIVE PERCENT: 0 % (ref 0–6)
ERYTHROCYTE [DISTWIDTH] IN BLOOD BY AUTOMATED COUNT: 13.1 % (ref 11.5–15)
GFR SERPL CREATININE-BSD FRML MDRD: 58 ML/MIN/1.73M2
GLUCOSE SERPL-MCNC: 185 MG/DL (ref 74–99)
HCT VFR BLD AUTO: 39.6 % (ref 37–54)
HGB BLD-MCNC: 11.2 G/DL (ref 12.5–16.5)
LYMPHOCYTES NFR BLD: 0.25 K/UL (ref 1.5–4)
LYMPHOCYTES RELATIVE PERCENT: 3 % (ref 20–42)
MCH RBC QN AUTO: 29.5 PG (ref 26–35)
MCHC RBC AUTO-ENTMCNC: 28.3 G/DL (ref 32–34.5)
MCV RBC AUTO: 104.2 FL (ref 80–99.9)
MICROORGANISM SPEC CULT: ABNORMAL
MICROORGANISM SPEC CULT: NORMAL
MICROORGANISM/AGENT SPEC: ABNORMAL
MONOCYTES NFR BLD: 0.25 K/UL (ref 0.1–0.95)
MONOCYTES NFR BLD: 3 % (ref 2–12)
NEUTROPHILS NFR BLD: 95 % (ref 43–80)
NEUTS SEG NFR BLD: 8.99 K/UL (ref 1.8–7.3)
PLATELET # BLD AUTO: 352 K/UL (ref 130–450)
PMV BLD AUTO: 10.4 FL (ref 7–12)
POTASSIUM SERPL-SCNC: 4.5 MMOL/L (ref 3.5–5)
PROT SERPL-MCNC: 7.5 G/DL (ref 6.4–8.3)
RBC # BLD AUTO: 3.8 M/UL (ref 3.8–5.8)
RBC # BLD: ABNORMAL 10*6/UL
SODIUM SERPL-SCNC: 135 MMOL/L (ref 132–146)
SPECIMEN DESCRIPTION: ABNORMAL
SPECIMEN DESCRIPTION: NORMAL
WBC OTHER # BLD: 9.5 K/UL (ref 4.5–11.5)

## 2023-11-03 PROCEDURE — 2700000000 HC OXYGEN THERAPY PER DAY

## 2023-11-03 PROCEDURE — 6360000002 HC RX W HCPCS: Performed by: INTERNAL MEDICINE

## 2023-11-03 PROCEDURE — 6360000002 HC RX W HCPCS: Performed by: FAMILY MEDICINE

## 2023-11-03 PROCEDURE — 85025 COMPLETE CBC W/AUTO DIFF WBC: CPT

## 2023-11-03 PROCEDURE — 6370000000 HC RX 637 (ALT 250 FOR IP): Performed by: FAMILY MEDICINE

## 2023-11-03 PROCEDURE — 2580000003 HC RX 258: Performed by: FAMILY MEDICINE

## 2023-11-03 PROCEDURE — 94640 AIRWAY INHALATION TREATMENT: CPT

## 2023-11-03 PROCEDURE — 99232 SBSQ HOSP IP/OBS MODERATE 35: CPT | Performed by: INTERNAL MEDICINE

## 2023-11-03 PROCEDURE — 36415 COLL VENOUS BLD VENIPUNCTURE: CPT

## 2023-11-03 PROCEDURE — 80053 COMPREHEN METABOLIC PANEL: CPT

## 2023-11-03 PROCEDURE — 6370000000 HC RX 637 (ALT 250 FOR IP): Performed by: INTERNAL MEDICINE

## 2023-11-03 PROCEDURE — 1200000000 HC SEMI PRIVATE

## 2023-11-03 RX ORDER — PREDNISONE 20 MG/1
40 TABLET ORAL DAILY
Status: DISCONTINUED | OUTPATIENT
Start: 2023-11-03 | End: 2023-11-06 | Stop reason: HOSPADM

## 2023-11-03 RX ORDER — HYDROCORTISONE 25 MG/G
CREAM TOPICAL 2 TIMES DAILY
Status: DISCONTINUED | OUTPATIENT
Start: 2023-11-03 | End: 2023-11-06 | Stop reason: HOSPADM

## 2023-11-03 RX ADMIN — HYDROCODONE BITARTRATE AND ACETAMINOPHEN 1 TABLET: 5; 325 TABLET ORAL at 05:41

## 2023-11-03 RX ADMIN — WATER 1000 MG: 1 INJECTION INTRAMUSCULAR; INTRAVENOUS; SUBCUTANEOUS at 18:06

## 2023-11-03 RX ADMIN — ENOXAPARIN SODIUM 40 MG: 100 INJECTION SUBCUTANEOUS at 10:11

## 2023-11-03 RX ADMIN — SUCRALFATE 1 G: 1 TABLET ORAL at 10:10

## 2023-11-03 RX ADMIN — CETIRIZINE HYDROCHLORIDE 10 MG: 10 TABLET, FILM COATED ORAL at 10:11

## 2023-11-03 RX ADMIN — ALBUTEROL SULFATE 2.5 MG: 2.5 SOLUTION RESPIRATORY (INHALATION) at 18:35

## 2023-11-03 RX ADMIN — LOSARTAN POTASSIUM 100 MG: 50 TABLET, FILM COATED ORAL at 10:10

## 2023-11-03 RX ADMIN — PANTOPRAZOLE SODIUM 40 MG: 40 TABLET, DELAYED RELEASE ORAL at 05:41

## 2023-11-03 RX ADMIN — METHYLPREDNISOLONE SODIUM SUCCINATE 40 MG: 40 INJECTION, POWDER, FOR SOLUTION INTRAMUSCULAR; INTRAVENOUS at 10:15

## 2023-11-03 RX ADMIN — SUCRALFATE 1 G: 1 TABLET ORAL at 05:41

## 2023-11-03 RX ADMIN — METOPROLOL SUCCINATE 100 MG: 100 TABLET, FILM COATED, EXTENDED RELEASE ORAL at 10:17

## 2023-11-03 RX ADMIN — HYDROCODONE BITARTRATE AND ACETAMINOPHEN 1 TABLET: 5; 325 TABLET ORAL at 19:32

## 2023-11-03 RX ADMIN — DOXYCYCLINE HYCLATE 100 MG: 100 CAPSULE ORAL at 21:26

## 2023-11-03 RX ADMIN — SUCRALFATE 1 G: 1 TABLET ORAL at 19:32

## 2023-11-03 RX ADMIN — Medication 400 UNITS: at 10:11

## 2023-11-03 RX ADMIN — PANTOPRAZOLE SODIUM 40 MG: 40 TABLET, DELAYED RELEASE ORAL at 18:05

## 2023-11-03 RX ADMIN — MICONAZOLE NITRATE: 20 CREAM TOPICAL at 10:14

## 2023-11-03 RX ADMIN — PREDNISONE 40 MG: 20 TABLET ORAL at 21:37

## 2023-11-03 RX ADMIN — IPRATROPIUM BROMIDE 0.5 MG: 0.5 SOLUTION RESPIRATORY (INHALATION) at 10:03

## 2023-11-03 RX ADMIN — IPRATROPIUM BROMIDE 0.5 MG: 0.5 SOLUTION RESPIRATORY (INHALATION) at 18:35

## 2023-11-03 RX ADMIN — METOPROLOL SUCCINATE 100 MG: 100 TABLET, FILM COATED, EXTENDED RELEASE ORAL at 21:26

## 2023-11-03 RX ADMIN — DOXYCYCLINE HYCLATE 100 MG: 100 CAPSULE ORAL at 10:11

## 2023-11-03 RX ADMIN — BUDESONIDE INHALATION 500 MCG: 0.5 SUSPENSION RESPIRATORY (INHALATION) at 18:36

## 2023-11-03 RX ADMIN — ASPIRIN 81 MG CHEWABLE TABLET 81 MG: 81 TABLET CHEWABLE at 10:14

## 2023-11-03 RX ADMIN — SUCRALFATE 1 G: 1 TABLET ORAL at 21:26

## 2023-11-03 ASSESSMENT — PAIN DESCRIPTION - DESCRIPTORS
DESCRIPTORS: ACHING;DISCOMFORT;NAGGING
DESCRIPTORS: ACHING;DISCOMFORT;SORE

## 2023-11-03 ASSESSMENT — PAIN DESCRIPTION - LOCATION
LOCATION: BACK;NECK
LOCATION: BACK

## 2023-11-03 ASSESSMENT — PAIN DESCRIPTION - ORIENTATION
ORIENTATION: INNER
ORIENTATION: MID;LOWER

## 2023-11-03 ASSESSMENT — PAIN SCALES - GENERAL
PAINLEVEL_OUTOF10: 6
PAINLEVEL_OUTOF10: 10

## 2023-11-03 ASSESSMENT — PAIN - FUNCTIONAL ASSESSMENT: PAIN_FUNCTIONAL_ASSESSMENT: PREVENTS OR INTERFERES SOME ACTIVE ACTIVITIES AND ADLS

## 2023-11-04 PROCEDURE — 6360000002 HC RX W HCPCS: Performed by: FAMILY MEDICINE

## 2023-11-04 PROCEDURE — 2700000000 HC OXYGEN THERAPY PER DAY

## 2023-11-04 PROCEDURE — 6370000000 HC RX 637 (ALT 250 FOR IP): Performed by: FAMILY MEDICINE

## 2023-11-04 PROCEDURE — 6370000000 HC RX 637 (ALT 250 FOR IP): Performed by: INTERNAL MEDICINE

## 2023-11-04 PROCEDURE — 99232 SBSQ HOSP IP/OBS MODERATE 35: CPT | Performed by: INTERNAL MEDICINE

## 2023-11-04 PROCEDURE — 94640 AIRWAY INHALATION TREATMENT: CPT

## 2023-11-04 PROCEDURE — 1200000000 HC SEMI PRIVATE

## 2023-11-04 PROCEDURE — 6360000002 HC RX W HCPCS: Performed by: INTERNAL MEDICINE

## 2023-11-04 PROCEDURE — 2580000003 HC RX 258: Performed by: FAMILY MEDICINE

## 2023-11-04 RX ADMIN — HYDROCODONE BITARTRATE AND ACETAMINOPHEN 1 TABLET: 5; 325 TABLET ORAL at 09:48

## 2023-11-04 RX ADMIN — LOSARTAN POTASSIUM 100 MG: 50 TABLET, FILM COATED ORAL at 09:43

## 2023-11-04 RX ADMIN — METOPROLOL SUCCINATE 100 MG: 100 TABLET, FILM COATED, EXTENDED RELEASE ORAL at 09:44

## 2023-11-04 RX ADMIN — BUDESONIDE INHALATION 500 MCG: 0.5 SUSPENSION RESPIRATORY (INHALATION) at 17:20

## 2023-11-04 RX ADMIN — HYDROCODONE BITARTRATE AND ACETAMINOPHEN 1 TABLET: 5; 325 TABLET ORAL at 21:54

## 2023-11-04 RX ADMIN — METOPROLOL SUCCINATE 100 MG: 100 TABLET, FILM COATED, EXTENDED RELEASE ORAL at 21:53

## 2023-11-04 RX ADMIN — ASPIRIN 81 MG CHEWABLE TABLET 81 MG: 81 TABLET CHEWABLE at 09:43

## 2023-11-04 RX ADMIN — WATER 1000 MG: 1 INJECTION INTRAMUSCULAR; INTRAVENOUS; SUBCUTANEOUS at 16:38

## 2023-11-04 RX ADMIN — DOXYCYCLINE HYCLATE 100 MG: 100 CAPSULE ORAL at 21:53

## 2023-11-04 RX ADMIN — DOXYCYCLINE HYCLATE 100 MG: 100 CAPSULE ORAL at 09:44

## 2023-11-04 RX ADMIN — IPRATROPIUM BROMIDE 0.5 MG: 0.5 SOLUTION RESPIRATORY (INHALATION) at 13:12

## 2023-11-04 RX ADMIN — PANTOPRAZOLE SODIUM 40 MG: 40 TABLET, DELAYED RELEASE ORAL at 05:14

## 2023-11-04 RX ADMIN — PANTOPRAZOLE SODIUM 40 MG: 40 TABLET, DELAYED RELEASE ORAL at 16:37

## 2023-11-04 RX ADMIN — SUCRALFATE 1 G: 1 TABLET ORAL at 05:14

## 2023-11-04 RX ADMIN — Medication 400 UNITS: at 09:44

## 2023-11-04 RX ADMIN — PREDNISONE 40 MG: 20 TABLET ORAL at 09:43

## 2023-11-04 RX ADMIN — IPRATROPIUM BROMIDE 0.5 MG: 0.5 SOLUTION RESPIRATORY (INHALATION) at 17:20

## 2023-11-04 RX ADMIN — ALBUTEROL SULFATE 2.5 MG: 2.5 SOLUTION RESPIRATORY (INHALATION) at 13:12

## 2023-11-04 RX ADMIN — CETIRIZINE HYDROCHLORIDE 10 MG: 10 TABLET, FILM COATED ORAL at 09:44

## 2023-11-04 RX ADMIN — ENOXAPARIN SODIUM 40 MG: 100 INJECTION SUBCUTANEOUS at 09:44

## 2023-11-04 RX ADMIN — IPRATROPIUM BROMIDE 0.5 MG: 0.5 SOLUTION RESPIRATORY (INHALATION) at 09:57

## 2023-11-04 RX ADMIN — SUCRALFATE 1 G: 1 TABLET ORAL at 16:37

## 2023-11-04 RX ADMIN — SUCRALFATE 1 G: 1 TABLET ORAL at 11:23

## 2023-11-04 RX ADMIN — SUCRALFATE 1 G: 1 TABLET ORAL at 21:53

## 2023-11-04 ASSESSMENT — PAIN SCALES - GENERAL
PAINLEVEL_OUTOF10: 10
PAINLEVEL_OUTOF10: 9

## 2023-11-04 ASSESSMENT — PAIN DESCRIPTION - ORIENTATION
ORIENTATION: RIGHT;LEFT;MID;UPPER;LOWER
ORIENTATION: LOWER;MID

## 2023-11-04 ASSESSMENT — PAIN DESCRIPTION - DESCRIPTORS
DESCRIPTORS: ACHING;BURNING;DISCOMFORT;SORE;PRESSURE
DESCRIPTORS: ACHING;SORE;TENDER

## 2023-11-04 ASSESSMENT — PAIN DESCRIPTION - LOCATION
LOCATION: BACK
LOCATION: BACK;NECK

## 2023-11-04 ASSESSMENT — PAIN - FUNCTIONAL ASSESSMENT: PAIN_FUNCTIONAL_ASSESSMENT: PREVENTS OR INTERFERES SOME ACTIVE ACTIVITIES AND ADLS

## 2023-11-04 NOTE — PLAN OF CARE
Problem: Pain  Goal: Verbalizes/displays adequate comfort level or baseline comfort level  11/4/2023 0317 by Shira Stanley RN  Outcome: Progressing  11/3/2023 1649 by Sherrie Barahona RN  Outcome: Progressing     Problem: Safety - Adult  Goal: Free from fall injury  11/4/2023 0317 by Shira Stanley RN  Outcome: Progressing  11/3/2023 1649 by Sherrie Barahona RN  Outcome: Progressing     Problem: Chronic Conditions and Co-morbidities  Goal: Patient's chronic conditions and co-morbidity symptoms are monitored and maintained or improved  11/4/2023 0317 by Shira Stanley RN  Outcome: Progressing  11/3/2023 1649 by Sherrie Barahona RN  Outcome: Progressing

## 2023-11-04 NOTE — PLAN OF CARE
Problem: Pain  Goal: Verbalizes/displays adequate comfort level or baseline comfort level  11/4/2023 1434 by Bashir Null RN  Outcome: Progressing     Problem: Safety - Adult  Goal: Free from fall injury  11/4/2023 1434 by Bashir Null RN  Outcome: Progressing     Problem: Chronic Conditions and Co-morbidities  Goal: Patient's chronic conditions and co-morbidity symptoms are monitored and maintained or improved  11/4/2023 1434 by Bashir Null RN  Outcome: Progressing

## 2023-11-05 LAB
MICROORGANISM SPEC CULT: NORMAL
MICROORGANISM SPEC CULT: NORMAL
SERVICE CMNT-IMP: NORMAL
SERVICE CMNT-IMP: NORMAL
SPECIMEN DESCRIPTION: NORMAL
SPECIMEN DESCRIPTION: NORMAL

## 2023-11-05 PROCEDURE — 6370000000 HC RX 637 (ALT 250 FOR IP): Performed by: INTERNAL MEDICINE

## 2023-11-05 PROCEDURE — 99232 SBSQ HOSP IP/OBS MODERATE 35: CPT | Performed by: INTERNAL MEDICINE

## 2023-11-05 PROCEDURE — 1200000000 HC SEMI PRIVATE

## 2023-11-05 PROCEDURE — 6360000002 HC RX W HCPCS: Performed by: INTERNAL MEDICINE

## 2023-11-05 PROCEDURE — 2580000003 HC RX 258: Performed by: FAMILY MEDICINE

## 2023-11-05 PROCEDURE — 6360000002 HC RX W HCPCS: Performed by: FAMILY MEDICINE

## 2023-11-05 PROCEDURE — 94640 AIRWAY INHALATION TREATMENT: CPT

## 2023-11-05 PROCEDURE — 6370000000 HC RX 637 (ALT 250 FOR IP): Performed by: FAMILY MEDICINE

## 2023-11-05 PROCEDURE — 2700000000 HC OXYGEN THERAPY PER DAY

## 2023-11-05 RX ORDER — PREDNISONE 20 MG/1
40 TABLET ORAL DAILY
Qty: 20 TABLET | Refills: 0 | Status: SHIPPED | OUTPATIENT
Start: 2023-11-06 | End: 2023-11-16

## 2023-11-05 RX ADMIN — DOXYCYCLINE HYCLATE 100 MG: 100 CAPSULE ORAL at 09:45

## 2023-11-05 RX ADMIN — HYDROCODONE BITARTRATE AND ACETAMINOPHEN 1 TABLET: 5; 325 TABLET ORAL at 17:10

## 2023-11-05 RX ADMIN — HYDROCODONE BITARTRATE AND ACETAMINOPHEN 1 TABLET: 5; 325 TABLET ORAL at 09:44

## 2023-11-05 RX ADMIN — GABAPENTIN 300 MG: 300 CAPSULE ORAL at 20:50

## 2023-11-05 RX ADMIN — PANTOPRAZOLE SODIUM 40 MG: 40 TABLET, DELAYED RELEASE ORAL at 16:36

## 2023-11-05 RX ADMIN — SUCRALFATE 1 G: 1 TABLET ORAL at 06:22

## 2023-11-05 RX ADMIN — PREDNISONE 40 MG: 20 TABLET ORAL at 09:44

## 2023-11-05 RX ADMIN — ALBUTEROL SULFATE 2.5 MG: 2.5 SOLUTION RESPIRATORY (INHALATION) at 06:02

## 2023-11-05 RX ADMIN — IPRATROPIUM BROMIDE 0.5 MG: 0.5 SOLUTION RESPIRATORY (INHALATION) at 06:02

## 2023-11-05 RX ADMIN — PANTOPRAZOLE SODIUM 40 MG: 40 TABLET, DELAYED RELEASE ORAL at 06:22

## 2023-11-05 RX ADMIN — WATER 1000 MG: 1 INJECTION INTRAMUSCULAR; INTRAVENOUS; SUBCUTANEOUS at 17:01

## 2023-11-05 RX ADMIN — ENOXAPARIN SODIUM 40 MG: 100 INJECTION SUBCUTANEOUS at 09:45

## 2023-11-05 RX ADMIN — SUCRALFATE 1 G: 1 TABLET ORAL at 16:36

## 2023-11-05 RX ADMIN — METOPROLOL SUCCINATE 100 MG: 100 TABLET, FILM COATED, EXTENDED RELEASE ORAL at 20:50

## 2023-11-05 RX ADMIN — SUCRALFATE 1 G: 1 TABLET ORAL at 20:50

## 2023-11-05 RX ADMIN — BUDESONIDE INHALATION 500 MCG: 0.5 SUSPENSION RESPIRATORY (INHALATION) at 06:02

## 2023-11-05 RX ADMIN — IPRATROPIUM BROMIDE 0.5 MG: 0.5 SOLUTION RESPIRATORY (INHALATION) at 18:21

## 2023-11-05 RX ADMIN — LOSARTAN POTASSIUM 100 MG: 50 TABLET, FILM COATED ORAL at 09:45

## 2023-11-05 RX ADMIN — DOXYCYCLINE HYCLATE 100 MG: 100 CAPSULE ORAL at 20:50

## 2023-11-05 RX ADMIN — IPRATROPIUM BROMIDE 0.5 MG: 0.5 SOLUTION RESPIRATORY (INHALATION) at 15:09

## 2023-11-05 RX ADMIN — SUCRALFATE 1 G: 1 TABLET ORAL at 11:43

## 2023-11-05 RX ADMIN — BUDESONIDE INHALATION 500 MCG: 0.5 SUSPENSION RESPIRATORY (INHALATION) at 18:21

## 2023-11-05 RX ADMIN — Medication 400 UNITS: at 09:44

## 2023-11-05 RX ADMIN — IPRATROPIUM BROMIDE 0.5 MG: 0.5 SOLUTION RESPIRATORY (INHALATION) at 09:55

## 2023-11-05 RX ADMIN — METOPROLOL SUCCINATE 100 MG: 100 TABLET, FILM COATED, EXTENDED RELEASE ORAL at 09:45

## 2023-11-05 RX ADMIN — CETIRIZINE HYDROCHLORIDE 10 MG: 10 TABLET, FILM COATED ORAL at 09:44

## 2023-11-05 RX ADMIN — ASPIRIN 81 MG CHEWABLE TABLET 81 MG: 81 TABLET CHEWABLE at 09:44

## 2023-11-05 ASSESSMENT — PAIN SCALES - GENERAL
PAINLEVEL_OUTOF10: 10
PAINLEVEL_OUTOF10: 8
PAINLEVEL_OUTOF10: 2

## 2023-11-05 ASSESSMENT — PAIN SCALES - WONG BAKER: WONGBAKER_NUMERICALRESPONSE: 0

## 2023-11-05 ASSESSMENT — PAIN DESCRIPTION - ORIENTATION: ORIENTATION: RIGHT;LEFT;LOWER;MID

## 2023-11-05 ASSESSMENT — PAIN DESCRIPTION - LOCATION
LOCATION: BACK;NECK
LOCATION: BACK;NECK

## 2023-11-05 ASSESSMENT — PAIN DESCRIPTION - DESCRIPTORS: DESCRIPTORS: ACHING;DISCOMFORT;SORE;TENDER

## 2023-11-05 ASSESSMENT — PAIN - FUNCTIONAL ASSESSMENT: PAIN_FUNCTIONAL_ASSESSMENT: PREVENTS OR INTERFERES SOME ACTIVE ACTIVITIES AND ADLS

## 2023-11-05 NOTE — PLAN OF CARE
Problem: Pain  Goal: Verbalizes/displays adequate comfort level or baseline comfort level  11/5/2023 0305 by Bhavya Krishnan RN  Outcome: Progressing  11/4/2023 1434 by Tanvi Boyle RN  Outcome: Progressing     Problem: Safety - Adult  Goal: Free from fall injury  11/5/2023 0305 by Bhavya Krishnan RN  Outcome: Progressing  11/4/2023 1434 by Tanvi Boyle RN  Outcome: Progressing     Problem: Chronic Conditions and Co-morbidities  Goal: Patient's chronic conditions and co-morbidity symptoms are monitored and maintained or improved  11/5/2023 0305 by Bhavya Krishnan RN  Outcome: Progressing  11/4/2023 1434 by Tanvi Boyle RN  Outcome: Progressing

## 2023-11-06 VITALS
HEART RATE: 71 BPM | OXYGEN SATURATION: 100 % | TEMPERATURE: 97.7 F | BODY MASS INDEX: 29.44 KG/M2 | WEIGHT: 183.2 LBS | HEIGHT: 66 IN | DIASTOLIC BLOOD PRESSURE: 91 MMHG | SYSTOLIC BLOOD PRESSURE: 134 MMHG | RESPIRATION RATE: 18 BRPM

## 2023-11-06 PROCEDURE — 6370000000 HC RX 637 (ALT 250 FOR IP): Performed by: INTERNAL MEDICINE

## 2023-11-06 PROCEDURE — 6370000000 HC RX 637 (ALT 250 FOR IP): Performed by: FAMILY MEDICINE

## 2023-11-06 PROCEDURE — 2700000000 HC OXYGEN THERAPY PER DAY

## 2023-11-06 PROCEDURE — 6360000002 HC RX W HCPCS: Performed by: INTERNAL MEDICINE

## 2023-11-06 RX ADMIN — CETIRIZINE HYDROCHLORIDE 10 MG: 10 TABLET, FILM COATED ORAL at 08:58

## 2023-11-06 RX ADMIN — HYDROCODONE BITARTRATE AND ACETAMINOPHEN 1 TABLET: 5; 325 TABLET ORAL at 08:59

## 2023-11-06 RX ADMIN — PANTOPRAZOLE SODIUM 40 MG: 40 TABLET, DELAYED RELEASE ORAL at 06:46

## 2023-11-06 RX ADMIN — ASPIRIN 81 MG CHEWABLE TABLET 81 MG: 81 TABLET CHEWABLE at 08:58

## 2023-11-06 RX ADMIN — SUCRALFATE 1 G: 1 TABLET ORAL at 11:53

## 2023-11-06 RX ADMIN — PREDNISONE 40 MG: 20 TABLET ORAL at 08:58

## 2023-11-06 RX ADMIN — Medication 400 UNITS: at 08:58

## 2023-11-06 RX ADMIN — SUCRALFATE 1 G: 1 TABLET ORAL at 06:47

## 2023-11-06 RX ADMIN — METOPROLOL SUCCINATE 100 MG: 100 TABLET, FILM COATED, EXTENDED RELEASE ORAL at 08:58

## 2023-11-06 RX ADMIN — HYDROCODONE BITARTRATE AND ACETAMINOPHEN 1 TABLET: 5; 325 TABLET ORAL at 00:10

## 2023-11-06 RX ADMIN — LOSARTAN POTASSIUM 100 MG: 50 TABLET, FILM COATED ORAL at 08:59

## 2023-11-06 RX ADMIN — ENOXAPARIN SODIUM 40 MG: 100 INJECTION SUBCUTANEOUS at 08:59

## 2023-11-06 ASSESSMENT — PAIN SCALES - GENERAL
PAINLEVEL_OUTOF10: 8
PAINLEVEL_OUTOF10: 10

## 2023-11-06 ASSESSMENT — PAIN DESCRIPTION - ORIENTATION
ORIENTATION: RIGHT;LEFT;LOWER;MID;UPPER
ORIENTATION: LOWER

## 2023-11-06 ASSESSMENT — PAIN DESCRIPTION - LOCATION
LOCATION: BACK
LOCATION: BACK

## 2023-11-06 ASSESSMENT — PAIN DESCRIPTION - DESCRIPTORS
DESCRIPTORS: ACHING;DISCOMFORT;TENDER
DESCRIPTORS: ACHING;BURNING;DISCOMFORT;SORE;PRESSURE

## 2023-11-06 NOTE — DISCHARGE INSTRUCTIONS
Your information:  Name: Charlie Tyson  : 1950    Your instructions: You are being discharged home. Please follow up with your PCP and take your medications as prescribed. IF YOU EXPERIENCE ANY OF THE FOLLOWING SYMPTOMS, CHEST PAIN, SHORTNESS OF BREATH, COUGHING UP BLOOD OR BLOODY SPUTUM, STOMACH PAIN OR CRAMPING, DARK, TARRY STOOLS, LOSS OF APPETITE, GENERAL NOT FEELING WELL, SIGNS AND SYMPTOMS OF INFECTION LIKE FEVER AND OR CHILLS, PLEASE CALL DR Lark Mortimer OR RETURN TO THE EMERGENCY ROOM. What to do after you leave the hospital:    Recommended diet: regular diet    Recommended activity: activity as tolerated        The following personal items were collected during your admission and were returned to you:    Belongings  Dental Appliances: None  Vision - Corrective Lenses: None  Hearing Aid: None  Clothing: Footwear, Jacket/Coat, Pants, Shirt, Socks, Undergarments, At bedside  Jewelry: Ring, At bedside, Bracelet, Necklace  Body Piercings Removed: No  Electronic Devices: Cell Phone  Weapons (Notify Protective Services/Security): None  Other Valuables: Wallet  Home Medications: None  Valuables Given To: Patient  Provide Name(s) of Who Valuable(s) Were Given To: patient  Responsible person(s) in the waiting room: none  Patient approves for provider to speak to responsible person post operatively: Yes    Information obtained by:  By signing below, I understand that if any problems occur once I leave the hospital I am to contact PCP. I understand and acknowledge receipt of the instructions indicated above.

## 2023-11-09 LAB — MYCOPLASMA AB,IGM: NORMAL

## 2023-11-09 NOTE — DISCHARGE SUMMARY
Physician Discharge Summary     Patient ID:  Natalie Schumacher  [id number]  44 y.o.  1950    Admit date: 10/31/2023    Discharge date and time: 11/6/2023  1:28 PM     Admitting Physician: Acosta Alvares DO     Discharge Physician: Acosta Alvares DO    Admission Diagnoses: Essential hypertension [I10]  Hypoxia [R09.02]  COPD exacerbation ([address]) [J44.1]  Acute on chronic respiratory failure with hypercapnia (HCC) [J96.22]  Hypercapnic respiratory failure ([address]) [J96.92]    Discharge Diagnoses: HYPERCAPNIC RESPIRATORY FAILURE                                          COPD EXACERBATION     Admission Condition: fair    Discharged Condition: good    Indication for Admission: [address] Course: 11/01/2023   The patient is a 72 y.o. male with significant past medical history of COPD who presents with SHORTNESS OF BREATH. Patient presents with a history of COPD and is oxygen dependent, complaining of increasing short of breath for several days. Having some general anterior chest pain with it over the same amount of time. No worsening of his chronic mildly productive cough. No palpitations. No lightheadedness or syncope. No recent traveling or surgeries, no leg pain or swelling, no history of blood clots. No fevers at home. No abdominal pain or nausea or vomiting or diarrhea. HE IS ADMITTED TO A MONITORED BED. 11/02/2023   BENJAMÍN IS SEEN IN FOLLOW UP TODAY ON 4 TELEMETRY. HE IS TOLERATING TREATMENT. HE IS FEELING BETTER. HIS BREATHING IS GOOD. NOT COMPLAINING ABOUT NOT HAVING HIS RESCUE INHALER AT THE BEDSIDE. GROWING GRAM POSITIVE COCCI IN PAIRS IN THE SPUTUM. 11/03/2023   BENJAMÍN IS SEEN IN FOLLOW UP TODAY ON 4 TELEMETRY. HE IS TOLERATING TREATMENT. HE IS FEELING BETTER. HIS BREATHING IS GOOD. DR. Gayla Hull CHANGED HIS IV STEROID TO ORAL AT 40 MG DAILY. IT IS TO BE TAPERED OVER 2 WEEKS. HE USUALLY TAKES 20 PREDNISONE DAILY AT HOME. HE IS VOIDING AND STOOLING WELL.   HE IS

## 2024-02-15 ENCOUNTER — HOSPITAL ENCOUNTER (INPATIENT)
Age: 74
LOS: 7 days | Discharge: HOME HEALTH | DRG: 190 | End: 2024-02-22
Attending: EMERGENCY MEDICINE | Admitting: FAMILY MEDICINE
Payer: COMMERCIAL

## 2024-02-15 ENCOUNTER — APPOINTMENT (OUTPATIENT)
Dept: GENERAL RADIOLOGY | Age: 74
DRG: 190 | End: 2024-02-15
Payer: COMMERCIAL

## 2024-02-15 DIAGNOSIS — U07.1 COVID-19: Primary | ICD-10-CM

## 2024-02-15 LAB
ALBUMIN SERPL-MCNC: 4.3 G/DL (ref 3.5–5.2)
ALP SERPL-CCNC: 58 U/L (ref 40–129)
ALT SERPL-CCNC: 6 U/L (ref 0–40)
ANION GAP SERPL CALCULATED.3IONS-SCNC: 8 MMOL/L (ref 7–16)
AST SERPL-CCNC: 21 U/L (ref 0–39)
BASOPHILS # BLD: 0.04 K/UL (ref 0–0.2)
BASOPHILS NFR BLD: 1 % (ref 0–2)
BILIRUB SERPL-MCNC: 0.6 MG/DL (ref 0–1.2)
BNP SERPL-MCNC: 1530 PG/ML (ref 0–450)
BUN SERPL-MCNC: 16 MG/DL (ref 6–23)
CALCIUM SERPL-MCNC: 9.2 MG/DL (ref 8.6–10.2)
CHLORIDE SERPL-SCNC: 90 MMOL/L (ref 98–107)
CO2 SERPL-SCNC: 37 MMOL/L (ref 22–29)
CREAT SERPL-MCNC: 1.4 MG/DL (ref 0.7–1.2)
EOSINOPHIL # BLD: 0.03 K/UL (ref 0.05–0.5)
EOSINOPHILS RELATIVE PERCENT: 0 % (ref 0–6)
ERYTHROCYTE [DISTWIDTH] IN BLOOD BY AUTOMATED COUNT: 13.4 % (ref 11.5–15)
GFR SERPL CREATININE-BSD FRML MDRD: 54 ML/MIN/1.73M2
GLUCOSE SERPL-MCNC: 129 MG/DL (ref 74–99)
HCT VFR BLD AUTO: 39.6 % (ref 37–54)
HGB BLD-MCNC: 12.4 G/DL (ref 12.5–16.5)
IMM GRANULOCYTES # BLD AUTO: 0.03 K/UL (ref 0–0.58)
IMM GRANULOCYTES NFR BLD: 0 % (ref 0–5)
INFLUENZA A BY PCR: NOT DETECTED
INFLUENZA B BY PCR: NOT DETECTED
INR PPP: 1
LYMPHOCYTES NFR BLD: 1.25 K/UL (ref 1.5–4)
LYMPHOCYTES RELATIVE PERCENT: 17 % (ref 20–42)
MCH RBC QN AUTO: 30.2 PG (ref 26–35)
MCHC RBC AUTO-ENTMCNC: 31.3 G/DL (ref 32–34.5)
MCV RBC AUTO: 96.4 FL (ref 80–99.9)
MONOCYTES NFR BLD: 0.75 K/UL (ref 0.1–0.95)
MONOCYTES NFR BLD: 10 % (ref 2–12)
NEUTROPHILS NFR BLD: 72 % (ref 43–80)
NEUTS SEG NFR BLD: 5.41 K/UL (ref 1.8–7.3)
PLATELET # BLD AUTO: 252 K/UL (ref 130–450)
PMV BLD AUTO: 10.1 FL (ref 7–12)
POTASSIUM SERPL-SCNC: 4.7 MMOL/L (ref 3.5–5)
PROT SERPL-MCNC: 7.7 G/DL (ref 6.4–8.3)
PROTHROMBIN TIME: 11.6 SEC (ref 9.3–12.4)
RBC # BLD AUTO: 4.11 M/UL (ref 3.8–5.8)
SARS-COV-2 RDRP RESP QL NAA+PROBE: DETECTED
SODIUM SERPL-SCNC: 135 MMOL/L (ref 132–146)
SPECIMEN DESCRIPTION: ABNORMAL
TROPONIN I SERPL HS-MCNC: 95 NG/L (ref 0–11)
TROPONIN I SERPL HS-MCNC: 95 NG/L (ref 0–11)
WBC OTHER # BLD: 7.5 K/UL (ref 4.5–11.5)

## 2024-02-15 PROCEDURE — 80053 COMPREHEN METABOLIC PANEL: CPT

## 2024-02-15 PROCEDURE — 71045 X-RAY EXAM CHEST 1 VIEW: CPT

## 2024-02-15 PROCEDURE — 94640 AIRWAY INHALATION TREATMENT: CPT

## 2024-02-15 PROCEDURE — 87502 INFLUENZA DNA AMP PROBE: CPT

## 2024-02-15 PROCEDURE — 6360000002 HC RX W HCPCS: Performed by: FAMILY MEDICINE

## 2024-02-15 PROCEDURE — 96374 THER/PROPH/DIAG INJ IV PUSH: CPT

## 2024-02-15 PROCEDURE — 6360000002 HC RX W HCPCS: Performed by: EMERGENCY MEDICINE

## 2024-02-15 PROCEDURE — 99285 EMERGENCY DEPT VISIT HI MDM: CPT

## 2024-02-15 PROCEDURE — 85610 PROTHROMBIN TIME: CPT

## 2024-02-15 PROCEDURE — 84484 ASSAY OF TROPONIN QUANT: CPT

## 2024-02-15 PROCEDURE — 83880 ASSAY OF NATRIURETIC PEPTIDE: CPT

## 2024-02-15 PROCEDURE — 87635 SARS-COV-2 COVID-19 AMP PRB: CPT

## 2024-02-15 PROCEDURE — 6370000000 HC RX 637 (ALT 250 FOR IP): Performed by: FAMILY MEDICINE

## 2024-02-15 PROCEDURE — 6370000000 HC RX 637 (ALT 250 FOR IP): Performed by: EMERGENCY MEDICINE

## 2024-02-15 PROCEDURE — 1200000000 HC SEMI PRIVATE

## 2024-02-15 PROCEDURE — 85025 COMPLETE CBC W/AUTO DIFF WBC: CPT

## 2024-02-15 RX ORDER — TRIAMCINOLONE ACETONIDE 1 MG/G
CREAM TOPICAL 2 TIMES DAILY
Status: DISCONTINUED | OUTPATIENT
Start: 2024-02-15 | End: 2024-02-22 | Stop reason: HOSPADM

## 2024-02-15 RX ORDER — ALBUTEROL SULFATE 2.5 MG/3ML
2.5 SOLUTION RESPIRATORY (INHALATION) EVERY 6 HOURS PRN
Status: DISCONTINUED | OUTPATIENT
Start: 2024-02-15 | End: 2024-02-22 | Stop reason: HOSPADM

## 2024-02-15 RX ORDER — METOPROLOL SUCCINATE 100 MG/1
100 TABLET, EXTENDED RELEASE ORAL 2 TIMES DAILY
Status: DISCONTINUED | OUTPATIENT
Start: 2024-02-15 | End: 2024-02-22 | Stop reason: HOSPADM

## 2024-02-15 RX ORDER — CETIRIZINE HYDROCHLORIDE 10 MG/1
10 TABLET ORAL DAILY
Status: DISCONTINUED | OUTPATIENT
Start: 2024-02-15 | End: 2024-02-22 | Stop reason: HOSPADM

## 2024-02-15 RX ORDER — VITAMIN E 268 MG
400 CAPSULE ORAL DAILY
Status: DISCONTINUED | OUTPATIENT
Start: 2024-02-15 | End: 2024-02-22 | Stop reason: HOSPADM

## 2024-02-15 RX ORDER — GABAPENTIN 300 MG/1
300 CAPSULE ORAL NIGHTLY
Status: DISCONTINUED | OUTPATIENT
Start: 2024-02-15 | End: 2024-02-22 | Stop reason: HOSPADM

## 2024-02-15 RX ORDER — SUCRALFATE 1 G/1
1 TABLET ORAL
Status: DISCONTINUED | OUTPATIENT
Start: 2024-02-15 | End: 2024-02-22 | Stop reason: HOSPADM

## 2024-02-15 RX ORDER — HYDROCODONE BITARTRATE AND ACETAMINOPHEN 5; 325 MG/1; MG/1
1 TABLET ORAL EVERY 6 HOURS PRN
Status: DISCONTINUED | OUTPATIENT
Start: 2024-02-15 | End: 2024-02-19

## 2024-02-15 RX ORDER — LABETALOL HYDROCHLORIDE 5 MG/ML
10 INJECTION, SOLUTION INTRAVENOUS ONCE
Status: COMPLETED | OUTPATIENT
Start: 2024-02-15 | End: 2024-02-15

## 2024-02-15 RX ORDER — VITAMIN B COMPLEX
1000 TABLET ORAL DAILY
Status: DISCONTINUED | OUTPATIENT
Start: 2024-02-16 | End: 2024-02-22 | Stop reason: HOSPADM

## 2024-02-15 RX ORDER — ASCORBIC ACID 500 MG
500 TABLET ORAL DAILY
Status: DISCONTINUED | OUTPATIENT
Start: 2024-02-16 | End: 2024-02-22 | Stop reason: HOSPADM

## 2024-02-15 RX ORDER — BENZOCAINE/MENTHOL 6 MG-10 MG
LOZENGE MUCOUS MEMBRANE 2 TIMES DAILY
Status: DISCONTINUED | OUTPATIENT
Start: 2024-02-15 | End: 2024-02-15 | Stop reason: SDUPTHER

## 2024-02-15 RX ORDER — DEXAMETHASONE SODIUM PHOSPHATE 10 MG/ML
10 INJECTION INTRAMUSCULAR; INTRAVENOUS ONCE
Status: COMPLETED | OUTPATIENT
Start: 2024-02-15 | End: 2024-02-15

## 2024-02-15 RX ORDER — ZINC SULFATE 50(220)MG
50 CAPSULE ORAL DAILY
Status: DISCONTINUED | OUTPATIENT
Start: 2024-02-16 | End: 2024-02-22 | Stop reason: HOSPADM

## 2024-02-15 RX ORDER — ASPIRIN 81 MG/1
81 TABLET, CHEWABLE ORAL DAILY
Status: DISCONTINUED | OUTPATIENT
Start: 2024-02-15 | End: 2024-02-22 | Stop reason: HOSPADM

## 2024-02-15 RX ORDER — IPRATROPIUM BROMIDE AND ALBUTEROL SULFATE 2.5; .5 MG/3ML; MG/3ML
1 SOLUTION RESPIRATORY (INHALATION) ONCE
Status: COMPLETED | OUTPATIENT
Start: 2024-02-15 | End: 2024-02-15

## 2024-02-15 RX ORDER — LOSARTAN POTASSIUM 50 MG/1
100 TABLET ORAL DAILY
Status: DISCONTINUED | OUTPATIENT
Start: 2024-02-15 | End: 2024-02-22 | Stop reason: HOSPADM

## 2024-02-15 RX ORDER — BUDESONIDE 0.5 MG/2ML
0.5 INHALANT ORAL
Status: DISCONTINUED | OUTPATIENT
Start: 2024-02-15 | End: 2024-02-22 | Stop reason: HOSPADM

## 2024-02-15 RX ORDER — PANTOPRAZOLE SODIUM 40 MG/1
40 TABLET, DELAYED RELEASE ORAL
Status: DISCONTINUED | OUTPATIENT
Start: 2024-02-15 | End: 2024-02-22 | Stop reason: HOSPADM

## 2024-02-15 RX ORDER — TACROLIMUS 1 MG/G
OINTMENT TOPICAL 2 TIMES DAILY PRN
Status: DISCONTINUED | OUTPATIENT
Start: 2024-02-15 | End: 2024-02-15

## 2024-02-15 RX ADMIN — BUDESONIDE 500 MCG: 0.5 SUSPENSION RESPIRATORY (INHALATION) at 18:33

## 2024-02-15 RX ADMIN — PANTOPRAZOLE SODIUM 40 MG: 40 TABLET, DELAYED RELEASE ORAL at 18:35

## 2024-02-15 RX ADMIN — ASPIRIN 81 MG CHEWABLE TABLET 81 MG: 81 TABLET CHEWABLE at 18:35

## 2024-02-15 RX ADMIN — IPRATROPIUM BROMIDE 0.5 MG: 0.5 SOLUTION RESPIRATORY (INHALATION) at 18:33

## 2024-02-15 RX ADMIN — IPRATROPIUM BROMIDE AND ALBUTEROL SULFATE 1 DOSE: .5; 3 SOLUTION RESPIRATORY (INHALATION) at 11:48

## 2024-02-15 RX ADMIN — LABETALOL HYDROCHLORIDE 10 MG: 5 INJECTION INTRAVENOUS at 16:38

## 2024-02-15 RX ADMIN — METOPROLOL SUCCINATE 100 MG: 100 TABLET, EXTENDED RELEASE ORAL at 22:15

## 2024-02-15 RX ADMIN — Medication 400 UNITS: at 22:15

## 2024-02-15 RX ADMIN — TRIAMCINOLONE ACETONIDE: 1 CREAM TOPICAL at 22:16

## 2024-02-15 RX ADMIN — SUCRALFATE 1 G: 1 TABLET ORAL at 22:15

## 2024-02-15 RX ADMIN — DEXAMETHASONE SODIUM PHOSPHATE 10 MG: 10 INJECTION INTRAMUSCULAR; INTRAVENOUS at 11:28

## 2024-02-15 RX ADMIN — GABAPENTIN 300 MG: 300 CAPSULE ORAL at 22:15

## 2024-02-15 RX ADMIN — MICONAZOLE NITRATE: 20 CREAM TOPICAL at 22:15

## 2024-02-15 RX ADMIN — LOSARTAN POTASSIUM 100 MG: 50 TABLET, FILM COATED ORAL at 18:35

## 2024-02-15 RX ADMIN — CETIRIZINE HYDROCHLORIDE 10 MG: 10 TABLET, FILM COATED ORAL at 18:35

## 2024-02-15 ASSESSMENT — PAIN SCALES - GENERAL: PAINLEVEL_OUTOF10: 4

## 2024-02-15 ASSESSMENT — PAIN - FUNCTIONAL ASSESSMENT: PAIN_FUNCTIONAL_ASSESSMENT: 0-10

## 2024-02-15 NOTE — ED PROVIDER NOTES
decision making with patient, consults, disposition:            Chronic Conditions:   Past Medical History:   Diagnosis Date    JULISSA (acute kidney injury) ([facility]) 5/29/2023    JULISSA (acute kidney injury) ([facility]) 5/29/2023    Chest pain 3-6-2015    lexiscan nuclear stress    Chronic diastolic CHF (congestive heart failure) ([facility])     Echo 1/18/2016; EF 57%    COPD (chronic obstructive pulmonary disease) ([facility])     Hepatitis C     Hilar lymphadenopathy     CT 1/2016; 1.8 cm    History of echocardiogram 01/02/2019    EF 52%; Stage I diastolic dysfunction    Hypertension     Irregular heart beat     Lumbar spondylosis 10/14/2022    Oxygen dependent        CONSULTS: (Who and What was discussed)  IP CONSULT TO INTERNAL MEDICINE  IP CONSULT TO PULMONOLOGY    Discussion with Other Profesionals : Admitting Team Dr. Powers    Social Determinants : None    Records Reviewed : Source Recent Dr. Powers note    CC/HPI Summary, DDx, ED Course, and Reassessment: EKG is ordered to evaluate patient's current cardiac rhythm, and to interpret QT interval prior to administering medications. CBC is ordered to evaluate for any signs of infection or inflammation by obtaining a WBC count, or any signs of acute anemia by interpreting hemoglobin. CMP for any electrolyte imbalances, kidney function, hepatic injury or any elevations in anion gap. Viral swabs to evaluate for viral etiology of symptoms. Chest x-ray for any possible signs of, but without limitation to, pneumonia, pleural effusions, cardiomegaly, pneumothorax, atelectasis, rib or sternal abnormalities including fractures.      Disposition Considerations (Tests not ordered but considered, Shared Decision Making, Pt Expectation of Test or Tx.): This is a 73-year-old male who presented to the ED for evaluation of coughing and shortness of breath for about 1 week patient has a longstanding history of CHF and COPD he had a broad differential which included but was not limited to pneumonia, CHF

## 2024-02-15 NOTE — ED NOTES
Pt ambulated with a steady gait. Pt HR was 120 and SPO2 was 97% on 4L O2. HR was 98 before ambulation and decreased to 108 at rest after ambulation. No complaints of dizziness.

## 2024-02-16 LAB
ANION GAP SERPL CALCULATED.3IONS-SCNC: 8 MMOL/L (ref 7–16)
BASOPHILS # BLD: 0.02 K/UL (ref 0–0.2)
BASOPHILS NFR BLD: 0 % (ref 0–2)
BUN SERPL-MCNC: 22 MG/DL (ref 6–23)
CALCIUM SERPL-MCNC: 9 MG/DL (ref 8.6–10.2)
CHLORIDE SERPL-SCNC: 95 MMOL/L (ref 98–107)
CO2 SERPL-SCNC: 34 MMOL/L (ref 22–29)
CREAT SERPL-MCNC: 1.7 MG/DL (ref 0.7–1.2)
CRP SERPL HS-MCNC: 1.1 MG/L (ref 0–3)
EOSINOPHIL # BLD: 0 K/UL (ref 0.05–0.5)
EOSINOPHILS RELATIVE PERCENT: 0 % (ref 0–6)
ERYTHROCYTE [DISTWIDTH] IN BLOOD BY AUTOMATED COUNT: 13.4 % (ref 11.5–15)
ERYTHROCYTE [SEDIMENTATION RATE] IN BLOOD BY WESTERGREN METHOD: 24 MM/HR (ref 0–15)
GFR SERPL CREATININE-BSD FRML MDRD: 44 ML/MIN/1.73M2
GLUCOSE SERPL-MCNC: 93 MG/DL (ref 74–99)
HCT VFR BLD AUTO: 33.9 % (ref 37–54)
HGB BLD-MCNC: 10.3 G/DL (ref 12.5–16.5)
IMM GRANULOCYTES # BLD AUTO: 0.04 K/UL (ref 0–0.58)
IMM GRANULOCYTES NFR BLD: 0 % (ref 0–5)
LYMPHOCYTES NFR BLD: 1.21 K/UL (ref 1.5–4)
LYMPHOCYTES RELATIVE PERCENT: 11 % (ref 20–42)
MCH RBC QN AUTO: 30.1 PG (ref 26–35)
MCHC RBC AUTO-ENTMCNC: 30.4 G/DL (ref 32–34.5)
MCV RBC AUTO: 99.1 FL (ref 80–99.9)
MONOCYTES NFR BLD: 1.23 K/UL (ref 0.1–0.95)
MONOCYTES NFR BLD: 12 % (ref 2–12)
NEUTROPHILS NFR BLD: 77 % (ref 43–80)
NEUTS SEG NFR BLD: 8.22 K/UL (ref 1.8–7.3)
PLATELET # BLD AUTO: 245 K/UL (ref 130–450)
PMV BLD AUTO: 10.2 FL (ref 7–12)
POTASSIUM SERPL-SCNC: 4.9 MMOL/L (ref 3.5–5)
RBC # BLD AUTO: 3.42 M/UL (ref 3.8–5.8)
SODIUM SERPL-SCNC: 137 MMOL/L (ref 132–146)
WBC OTHER # BLD: 10.7 K/UL (ref 4.5–11.5)

## 2024-02-16 PROCEDURE — 99223 1ST HOSP IP/OBS HIGH 75: CPT | Performed by: INTERNAL MEDICINE

## 2024-02-16 PROCEDURE — 6370000000 HC RX 637 (ALT 250 FOR IP): Performed by: FAMILY MEDICINE

## 2024-02-16 PROCEDURE — 85652 RBC SED RATE AUTOMATED: CPT

## 2024-02-16 PROCEDURE — 97165 OT EVAL LOW COMPLEX 30 MIN: CPT

## 2024-02-16 PROCEDURE — 2580000003 HC RX 258: Performed by: INTERNAL MEDICINE

## 2024-02-16 PROCEDURE — 85025 COMPLETE CBC W/AUTO DIFF WBC: CPT

## 2024-02-16 PROCEDURE — 6360000002 HC RX W HCPCS: Performed by: INTERNAL MEDICINE

## 2024-02-16 PROCEDURE — 1200000000 HC SEMI PRIVATE

## 2024-02-16 PROCEDURE — 80048 BASIC METABOLIC PNL TOTAL CA: CPT

## 2024-02-16 PROCEDURE — 36415 COLL VENOUS BLD VENIPUNCTURE: CPT

## 2024-02-16 PROCEDURE — 6360000002 HC RX W HCPCS: Performed by: FAMILY MEDICINE

## 2024-02-16 PROCEDURE — 86141 C-REACTIVE PROTEIN HS: CPT

## 2024-02-16 PROCEDURE — 97530 THERAPEUTIC ACTIVITIES: CPT

## 2024-02-16 PROCEDURE — 94640 AIRWAY INHALATION TREATMENT: CPT

## 2024-02-16 RX ORDER — ENOXAPARIN SODIUM 100 MG/ML
40 INJECTION SUBCUTANEOUS DAILY
Status: DISCONTINUED | OUTPATIENT
Start: 2024-02-16 | End: 2024-02-22 | Stop reason: HOSPADM

## 2024-02-16 RX ORDER — LEVALBUTEROL INHALATION SOLUTION 0.31 MG/3ML
0.31 SOLUTION RESPIRATORY (INHALATION) EVERY 8 HOURS
Status: DISCONTINUED | OUTPATIENT
Start: 2024-02-16 | End: 2024-02-22 | Stop reason: HOSPADM

## 2024-02-16 RX ADMIN — IPRATROPIUM BROMIDE 0.5 MG: 0.5 SOLUTION RESPIRATORY (INHALATION) at 05:33

## 2024-02-16 RX ADMIN — BUDESONIDE 500 MCG: 0.5 SUSPENSION RESPIRATORY (INHALATION) at 05:33

## 2024-02-16 RX ADMIN — SUCRALFATE 1 G: 1 TABLET ORAL at 06:30

## 2024-02-16 RX ADMIN — BUDESONIDE 500 MCG: 0.5 SUSPENSION RESPIRATORY (INHALATION) at 18:22

## 2024-02-16 RX ADMIN — METOPROLOL SUCCINATE 100 MG: 100 TABLET, EXTENDED RELEASE ORAL at 09:01

## 2024-02-16 RX ADMIN — SUCRALFATE 1 G: 1 TABLET ORAL at 16:55

## 2024-02-16 RX ADMIN — SUCRALFATE 1 G: 1 TABLET ORAL at 15:07

## 2024-02-16 RX ADMIN — GABAPENTIN 300 MG: 300 CAPSULE ORAL at 22:45

## 2024-02-16 RX ADMIN — Medication 400 UNITS: at 09:02

## 2024-02-16 RX ADMIN — SUCRALFATE 1 G: 1 TABLET ORAL at 22:44

## 2024-02-16 RX ADMIN — AZITHROMYCIN DIHYDRATE 500 MG: 500 INJECTION, POWDER, LYOPHILIZED, FOR SOLUTION INTRAVENOUS at 17:51

## 2024-02-16 RX ADMIN — OXYCODONE HYDROCHLORIDE AND ACETAMINOPHEN 500 MG: 500 TABLET ORAL at 09:01

## 2024-02-16 RX ADMIN — ASPIRIN 81 MG CHEWABLE TABLET 81 MG: 81 TABLET CHEWABLE at 09:01

## 2024-02-16 RX ADMIN — ENOXAPARIN SODIUM 40 MG: 100 INJECTION SUBCUTANEOUS at 15:08

## 2024-02-16 RX ADMIN — LEVALBUTEROL HYDROCHLORIDE 0.31 MG: 0.31 SOLUTION RESPIRATORY (INHALATION) at 18:23

## 2024-02-16 RX ADMIN — HYDROCODONE BITARTRATE AND ACETAMINOPHEN 1 TABLET: 5; 325 TABLET ORAL at 15:08

## 2024-02-16 RX ADMIN — PANTOPRAZOLE SODIUM 40 MG: 40 TABLET, DELAYED RELEASE ORAL at 06:30

## 2024-02-16 RX ADMIN — IPRATROPIUM BROMIDE 0.5 MG: 0.5 SOLUTION RESPIRATORY (INHALATION) at 18:22

## 2024-02-16 RX ADMIN — Medication 50 MG: at 09:01

## 2024-02-16 RX ADMIN — HYDROCODONE BITARTRATE AND ACETAMINOPHEN 1 TABLET: 5; 325 TABLET ORAL at 00:14

## 2024-02-16 RX ADMIN — HYDROCODONE BITARTRATE AND ACETAMINOPHEN 1 TABLET: 5; 325 TABLET ORAL at 09:00

## 2024-02-16 RX ADMIN — CETIRIZINE HYDROCHLORIDE 10 MG: 10 TABLET, FILM COATED ORAL at 09:01

## 2024-02-16 RX ADMIN — WATER 20 MG: 1 INJECTION INTRAMUSCULAR; INTRAVENOUS; SUBCUTANEOUS at 17:46

## 2024-02-16 RX ADMIN — LOSARTAN POTASSIUM 100 MG: 50 TABLET, FILM COATED ORAL at 09:01

## 2024-02-16 RX ADMIN — PANTOPRAZOLE SODIUM 40 MG: 40 TABLET, DELAYED RELEASE ORAL at 15:08

## 2024-02-16 RX ADMIN — HYDROCODONE BITARTRATE AND ACETAMINOPHEN 1 TABLET: 5; 325 TABLET ORAL at 22:43

## 2024-02-16 RX ADMIN — METOPROLOL SUCCINATE 100 MG: 100 TABLET, EXTENDED RELEASE ORAL at 22:44

## 2024-02-16 RX ADMIN — Medication 1000 UNITS: at 09:01

## 2024-02-16 ASSESSMENT — PAIN DESCRIPTION - LOCATION
LOCATION: BACK;NECK
LOCATION: BACK

## 2024-02-16 ASSESSMENT — PAIN SCALES - GENERAL
PAINLEVEL_OUTOF10: 10
PAINLEVEL_OUTOF10: 10
PAINLEVEL_OUTOF10: 9
PAINLEVEL_OUTOF10: 10

## 2024-02-16 ASSESSMENT — PAIN DESCRIPTION - DESCRIPTORS
DESCRIPTORS: ACHING
DESCRIPTORS: DISCOMFORT;ACHING;DULL

## 2024-02-16 ASSESSMENT — PAIN DESCRIPTION - ORIENTATION: ORIENTATION: LOWER

## 2024-02-16 NOTE — H&P
of drug use.  Patient currently lives alone    Family History:       Problem Relation Age of Onset    Heart Disease Mother     Alcohol Abuse Father     Diabetes Father     Diabetes Sister     Heart Disease Sister     Diabetes Sister     High Blood Pressure Sister      REVIEW OF SYSTEMS:  CONSTITUTIONAL:  positive for  fatigue and malaise  HEENT:  negative  RESPIRATORY:  positive for  dry cough, dyspnea, and wheezing  CARDIOVASCULAR:  negative  GASTROINTESTINAL:  negative  GENITOURINARY:  negative  MUSCULOSKELETAL:  positive for  arthralgias, pain, stiff joints, and decreased range of motion  PHYSICAL EXAM:    Vitals:  BP (!) 163/86   Pulse 92   Temp 98.3 °F (36.8 °C) (Oral)   Resp 21   Ht 1.676 m (5' 6\")   Wt 83.9 kg (185 lb)   SpO2 100%   BMI 29.86 kg/m²     CONSTITUTIONAL:  awake, alert, cooperative, no apparent distress, and appears stated age  ENT:  Normocephalic, without obvious abnormality, atraumatic, sinuses nontender on palpation, external ears without lesions, oral pharynx with moist mucus membranes, tonsils without erythema or exudates, gums normal and good dentition.  BACK:  Symmetric, no curvature, spinous processes are non-tender on palpation, paraspinous muscles are non-tender on palpation, no costal vertebral tenderness  LUNGS:  No increased work of breathing, good air exchange, clear to auscultation bilaterally, no crackles or wheezing  CARDIOVASCULAR:  Normal apical impulse, regular rate and rhythm, normal S1 and S2, no S3 or S4, and no murmur noted  ABDOMEN:  No scars, normal bowel sounds, soft, non-distended, non-tender, no masses palpated, no hepatosplenomegally  SKIN:  no bruising or bleeding, ABnormal skin color, DRY texture, turgor, and PATCHES OF DRY FLAKY SKIN ON FACE, ARMS TRUNK    DATA:  CBC with Differential:    Lab Results   Component Value Date/Time    WBC 7.5 02/15/2024 11:20 AM    RBC 4.11 02/15/2024 11:20 AM    HGB 12.4 02/15/2024 11:20 AM    HCT 39.6 02/15/2024 11:20 AM

## 2024-02-16 NOTE — CONSULTS
Assessment and Plan  Patient is a 73 y.o. male with the following medical Problems:   COVID-19 pneumonia  COPD with acute exacerbation  Heart failure with mildly reduced ejection fraction (EF 45% on July 7, 2023)  CKD stage III    Plan of care:  Patient has severely diminished breath sounds.  No pneumonic infiltrate on x-ray.  COVID likely triggered COPD exacerbation.  Supplemental oxygen to keep sat 88 to 94%  Scheduled Solu-Medrol, Xopenex with ipratropium and Pulmicort  No indication for advanced therapy for COVID-19.  Monitor respiratory status closely.  Patient was advised to stay up-to-date with vaccination  Azithromycin for acute exacerbation of COPD    History of Present Illness:   Patient is a 73-year-old woman with above-mentioned medical problems who was admitted on February 15, 2024 with progressive shortness of breath and cough.  Patient was diagnosed with COVID-19 pneumonia.  She has no fever, chills, or rigors but endorses dry cough.    Past Medical History:  Past Medical History:   Diagnosis Date    JULISSA (acute kidney injury) (HCC) 5/29/2023    JULISSA (acute kidney injury) (HCC) 5/29/2023    Chest pain 3-6-2015    lexiscan nuclear stress    Chronic diastolic CHF (congestive heart failure) (HCC)     Echo 1/18/2016; EF 57%    COPD (chronic obstructive pulmonary disease) (HCC)     Hepatitis C     Hilar lymphadenopathy     CT 1/2016; 1.8 cm    History of echocardiogram 01/02/2019    EF 52%; Stage I diastolic dysfunction    Hypertension     Irregular heart beat     Lumbar spondylosis 10/14/2022    Oxygen dependent       Past Surgical History:   Procedure Laterality Date    CATARACT REMOVAL Right 05/2019    CATARACT REMOVAL Left 06/2019    CERVICAL FUSION  03/09/2018    ACF C4-C7    COLONOSCOPY      HERNIA REPAIR      tracee inguinal repair    UPPER GASTROINTESTINAL ENDOSCOPY N/A 1/3/2023    EGD BIOPSY performed by Spike Petit MD at [facility] ENDOSCOPY    UPPER GASTROINTESTINAL ENDOSCOPY N/A 6/2/2023    EGD BIOPSY

## 2024-02-16 NOTE — ACP (ADVANCE CARE PLANNING)
Advance Care Planning     Advance Care Planning Activator (Inpatient)  Conversation Note      Date of ACP Conversation: 2/16/2024     Conversation Conducted with: Patient   ACP Activator: Linette Johnson RN    Health Care Decision Maker:     Current Designated Health Care Decision Maker:     Primary Decision Maker: Anny Rhodes - Brother/Sister - [phone]  Click here to complete Healthcare Decision Makers including section of the Healthcare Decision Maker Relationship (ie \"Primary\")      Care Preferences    Ventilation:  \"If you were in your present state of health and suddenly became very ill and were unable to breathe on your own, what would your preference be about the use of a ventilator (breathing machine) if it were available to you?\"      Would the patient desire the use of ventilator (breathing machine)?: \"unsure    \"If your health worsens and it becomes clear that your chance of recovery is unlikely, what would your preference be about the use of a ventilator (breathing machine) if it were available to you?\"     Would the patient desire the use of ventilator (breathing machine)?: \"unsure\"      Resuscitation  \"CPR works best to restart the heart when there is a sudden event, like a heart attack, in someone who is otherwise healthy. Unfortunately, CPR does not typically restart the heart for people who have serious health conditions or who are very sick.\"    \"In the event your heart stopped as a result of an underlying serious health condition, would you want attempts to be made to restart your heart (answer \"yes\" for attempt to resuscitate) or would you prefer a natural death (answer \"no\" for do not attempt to resuscitate)?\" yes       [] Yes   [x] No   Educated Patient / Decision Maker regarding differences between Advance Directives and portable DNR orders.    Length of ACP Conversation in minutes:  5-10 minutes    Conversation Outcomes:  ACP discussion completed    Follow-up plan:    [] Schedule follow-up

## 2024-02-16 NOTE — CARE COORDINATION
Case Management Assessment  Initial Evaluation    Date/Time of Evaluation: 2/16/2024 1:49 PM  Assessment Completed by: Linette Johnson RN    If patient is discharged prior to next notation, then this note serves as note for discharge by case management.    Patient Name: Jose Antonio Martinez                   YOB: 1950  Diagnosis: COVID-19 [U07.1]                   Date / Time: 2/15/2024 11:01 AM    Patient Admission Status: Inpatient   Readmission Risk (Low < 19, Mod (19-27), High > 27): Readmission Risk Score: 14.4    Current PCP: Debbie Powers, DO  PCP verified by CM? Yes    Chart Reviewed: Yes      History Provided by: Patient  Patient Orientation: Alert and Oriented, Person, Place, Situation    Patient Cognition: Alert    Hospitalization in the last 30 days (Readmission):  No        Advance Directives:      Code Status: Full Code   Patient's Primary Decision Maker is: Legal Next of Kin    Primary Decision Maker: Anny Rhodes - Brother/Sister - [phone]    Discharge Planning:    Patient lives with: Alone Type of Home: House  Primary Care Giver: Other (Comment) (see CM note)  Patient Support Systems include: Family Members, Friends/Neighbors   Current Financial resources:    Current community resources:    Current services prior to admission: Home Care            Current DME:              Type of Home Care services:  Nursing Services    ADLS  Prior functional level: Assistance with the following:, Cooking, Housework, Shopping  Current functional level: Assistance with the following:, Cooking, Housework, Shopping    PT AM-PAC:   /24  OT AM-PAC: 20 /24    Family can provide assistance at DC: Yes  Would you like Case Management to discuss the discharge plan with any other family members/significant others, and if so, who? No  Plans to Return to Present Housing: Yes    Potential Assistance needed at discharge: Home Care            Potential DME:    Patient expects to discharge to: House  Plan for

## 2024-02-17 LAB
ANION GAP SERPL CALCULATED.3IONS-SCNC: 6 MMOL/L (ref 7–16)
BUN SERPL-MCNC: 23 MG/DL (ref 6–23)
CALCIUM SERPL-MCNC: 9.1 MG/DL (ref 8.6–10.2)
CHLORIDE SERPL-SCNC: 93 MMOL/L (ref 98–107)
CO2 SERPL-SCNC: 35 MMOL/L (ref 22–29)
CREAT SERPL-MCNC: 1.6 MG/DL (ref 0.7–1.2)
CRP SERPL HS-MCNC: 0.4 MG/L (ref 0–3)
ERYTHROCYTE [DISTWIDTH] IN BLOOD BY AUTOMATED COUNT: 13.5 % (ref 11.5–15)
ERYTHROCYTE [SEDIMENTATION RATE] IN BLOOD BY WESTERGREN METHOD: 25 MM/HR (ref 0–15)
GFR SERPL CREATININE-BSD FRML MDRD: 45 ML/MIN/1.73M2
GLUCOSE SERPL-MCNC: 135 MG/DL (ref 74–99)
HCT VFR BLD AUTO: 38.4 % (ref 37–54)
HGB BLD-MCNC: 11.4 G/DL (ref 12.5–16.5)
MCH RBC QN AUTO: 30.3 PG (ref 26–35)
MCHC RBC AUTO-ENTMCNC: 29.7 G/DL (ref 32–34.5)
MCV RBC AUTO: 102.1 FL (ref 80–99.9)
PLATELET # BLD AUTO: 280 K/UL (ref 130–450)
PMV BLD AUTO: 10.3 FL (ref 7–12)
POTASSIUM SERPL-SCNC: 4.9 MMOL/L (ref 3.5–5)
RBC # BLD AUTO: 3.76 M/UL (ref 3.8–5.8)
SARS-COV-2 RDRP RESP QL NAA+PROBE: NOT DETECTED
SODIUM SERPL-SCNC: 134 MMOL/L (ref 132–146)
SPECIMEN DESCRIPTION: NORMAL
WBC OTHER # BLD: 8 K/UL (ref 4.5–11.5)

## 2024-02-17 PROCEDURE — 99233 SBSQ HOSP IP/OBS HIGH 50: CPT | Performed by: INTERNAL MEDICINE

## 2024-02-17 PROCEDURE — 94640 AIRWAY INHALATION TREATMENT: CPT

## 2024-02-17 PROCEDURE — 2580000003 HC RX 258: Performed by: INTERNAL MEDICINE

## 2024-02-17 PROCEDURE — 6360000002 HC RX W HCPCS: Performed by: FAMILY MEDICINE

## 2024-02-17 PROCEDURE — 6360000002 HC RX W HCPCS: Performed by: INTERNAL MEDICINE

## 2024-02-17 PROCEDURE — 6370000000 HC RX 637 (ALT 250 FOR IP): Performed by: FAMILY MEDICINE

## 2024-02-17 PROCEDURE — 2700000000 HC OXYGEN THERAPY PER DAY

## 2024-02-17 PROCEDURE — 80048 BASIC METABOLIC PNL TOTAL CA: CPT

## 2024-02-17 PROCEDURE — 85652 RBC SED RATE AUTOMATED: CPT

## 2024-02-17 PROCEDURE — 36415 COLL VENOUS BLD VENIPUNCTURE: CPT

## 2024-02-17 PROCEDURE — 85027 COMPLETE CBC AUTOMATED: CPT

## 2024-02-17 PROCEDURE — 86141 C-REACTIVE PROTEIN HS: CPT

## 2024-02-17 PROCEDURE — 87635 SARS-COV-2 COVID-19 AMP PRB: CPT

## 2024-02-17 PROCEDURE — 1200000000 HC SEMI PRIVATE

## 2024-02-17 RX ORDER — SODIUM CHLORIDE/ALOE VERA
GEL (GRAM) NASAL
Status: DISCONTINUED | OUTPATIENT
Start: 2024-02-17 | End: 2024-02-22 | Stop reason: HOSPADM

## 2024-02-17 RX ADMIN — BUDESONIDE 500 MCG: 0.5 SUSPENSION RESPIRATORY (INHALATION) at 06:43

## 2024-02-17 RX ADMIN — METOPROLOL SUCCINATE 100 MG: 100 TABLET, EXTENDED RELEASE ORAL at 08:19

## 2024-02-17 RX ADMIN — IPRATROPIUM BROMIDE 0.5 MG: 0.5 SOLUTION RESPIRATORY (INHALATION) at 14:46

## 2024-02-17 RX ADMIN — AZITHROMYCIN DIHYDRATE 500 MG: 500 INJECTION, POWDER, LYOPHILIZED, FOR SOLUTION INTRAVENOUS at 17:35

## 2024-02-17 RX ADMIN — SUCRALFATE 1 G: 1 TABLET ORAL at 06:18

## 2024-02-17 RX ADMIN — OXYCODONE HYDROCHLORIDE AND ACETAMINOPHEN 500 MG: 500 TABLET ORAL at 08:19

## 2024-02-17 RX ADMIN — Medication: at 23:14

## 2024-02-17 RX ADMIN — ENOXAPARIN SODIUM 40 MG: 100 INJECTION SUBCUTANEOUS at 08:19

## 2024-02-17 RX ADMIN — METOPROLOL SUCCINATE 100 MG: 100 TABLET, EXTENDED RELEASE ORAL at 21:07

## 2024-02-17 RX ADMIN — PANTOPRAZOLE SODIUM 40 MG: 40 TABLET, DELAYED RELEASE ORAL at 06:18

## 2024-02-17 RX ADMIN — LEVALBUTEROL HYDROCHLORIDE 0.31 MG: 0.31 SOLUTION RESPIRATORY (INHALATION) at 18:53

## 2024-02-17 RX ADMIN — CETIRIZINE HYDROCHLORIDE 10 MG: 10 TABLET, FILM COATED ORAL at 08:19

## 2024-02-17 RX ADMIN — Medication 400 UNITS: at 08:19

## 2024-02-17 RX ADMIN — TRIAMCINOLONE ACETONIDE: 1 CREAM TOPICAL at 21:09

## 2024-02-17 RX ADMIN — PANTOPRAZOLE SODIUM 40 MG: 40 TABLET, DELAYED RELEASE ORAL at 16:54

## 2024-02-17 RX ADMIN — IPRATROPIUM BROMIDE 0.5 MG: 0.5 SOLUTION RESPIRATORY (INHALATION) at 09:40

## 2024-02-17 RX ADMIN — MICONAZOLE NITRATE: 20 CREAM TOPICAL at 21:10

## 2024-02-17 RX ADMIN — LEVALBUTEROL HYDROCHLORIDE 0.31 MG: 0.31 SOLUTION RESPIRATORY (INHALATION) at 09:40

## 2024-02-17 RX ADMIN — WATER 20 MG: 1 INJECTION INTRAMUSCULAR; INTRAVENOUS; SUBCUTANEOUS at 16:55

## 2024-02-17 RX ADMIN — SUCRALFATE 1 G: 1 TABLET ORAL at 10:57

## 2024-02-17 RX ADMIN — IPRATROPIUM BROMIDE 0.5 MG: 0.5 SOLUTION RESPIRATORY (INHALATION) at 06:43

## 2024-02-17 RX ADMIN — Medication 1000 UNITS: at 08:19

## 2024-02-17 RX ADMIN — SUCRALFATE 1 G: 1 TABLET ORAL at 16:54

## 2024-02-17 RX ADMIN — SUCRALFATE 1 G: 1 TABLET ORAL at 21:07

## 2024-02-17 RX ADMIN — HYDROCODONE BITARTRATE AND ACETAMINOPHEN 1 TABLET: 5; 325 TABLET ORAL at 23:06

## 2024-02-17 RX ADMIN — LEVALBUTEROL HYDROCHLORIDE 0.31 MG: 0.31 SOLUTION RESPIRATORY (INHALATION) at 02:36

## 2024-02-17 RX ADMIN — IPRATROPIUM BROMIDE 0.5 MG: 0.5 SOLUTION RESPIRATORY (INHALATION) at 18:53

## 2024-02-17 RX ADMIN — Medication 50 MG: at 08:19

## 2024-02-17 RX ADMIN — LOSARTAN POTASSIUM 100 MG: 50 TABLET, FILM COATED ORAL at 08:18

## 2024-02-17 RX ADMIN — GABAPENTIN 300 MG: 300 CAPSULE ORAL at 21:07

## 2024-02-17 RX ADMIN — BUDESONIDE 500 MCG: 0.5 SUSPENSION RESPIRATORY (INHALATION) at 18:53

## 2024-02-17 RX ADMIN — WATER 20 MG: 1 INJECTION INTRAMUSCULAR; INTRAVENOUS; SUBCUTANEOUS at 04:10

## 2024-02-17 RX ADMIN — ASPIRIN 81 MG CHEWABLE TABLET 81 MG: 81 TABLET CHEWABLE at 08:19

## 2024-02-17 RX ADMIN — HYDROCODONE BITARTRATE AND ACETAMINOPHEN 1 TABLET: 5; 325 TABLET ORAL at 16:54

## 2024-02-17 ASSESSMENT — PAIN SCALES - GENERAL
PAINLEVEL_OUTOF10: 10
PAINLEVEL_OUTOF10: 10

## 2024-02-17 ASSESSMENT — PAIN DESCRIPTION - LOCATION
LOCATION: BACK;NECK
LOCATION: NECK;BACK

## 2024-02-17 ASSESSMENT — PAIN DESCRIPTION - ORIENTATION: ORIENTATION: LOWER;MID

## 2024-02-18 LAB
ANION GAP SERPL CALCULATED.3IONS-SCNC: 5 MMOL/L (ref 7–16)
B PARAP IS1001 DNA NPH QL NAA+NON-PROBE: NOT DETECTED
B PERT DNA SPEC QL NAA+PROBE: NOT DETECTED
BUN SERPL-MCNC: 24 MG/DL (ref 6–23)
C PNEUM DNA NPH QL NAA+NON-PROBE: NOT DETECTED
CALCIUM SERPL-MCNC: 9.1 MG/DL (ref 8.6–10.2)
CHLORIDE SERPL-SCNC: 95 MMOL/L (ref 98–107)
CO2 SERPL-SCNC: 35 MMOL/L (ref 22–29)
CREAT SERPL-MCNC: 1.6 MG/DL (ref 0.7–1.2)
CRP SERPL HS-MCNC: 0.4 MG/L (ref 0–3)
ERYTHROCYTE [DISTWIDTH] IN BLOOD BY AUTOMATED COUNT: 13.2 % (ref 11.5–15)
ERYTHROCYTE [SEDIMENTATION RATE] IN BLOOD BY WESTERGREN METHOD: 15 MM/HR (ref 0–15)
FLUAV RNA NPH QL NAA+NON-PROBE: NOT DETECTED
FLUBV RNA NPH QL NAA+NON-PROBE: NOT DETECTED
GFR SERPL CREATININE-BSD FRML MDRD: 47 ML/MIN/1.73M2
GLUCOSE SERPL-MCNC: 113 MG/DL (ref 74–99)
HADV DNA NPH QL NAA+NON-PROBE: NOT DETECTED
HCOV 229E RNA NPH QL NAA+NON-PROBE: NOT DETECTED
HCOV HKU1 RNA NPH QL NAA+NON-PROBE: NOT DETECTED
HCOV NL63 RNA NPH QL NAA+NON-PROBE: NOT DETECTED
HCOV OC43 RNA NPH QL NAA+NON-PROBE: NOT DETECTED
HCT VFR BLD AUTO: 35.3 % (ref 37–54)
HGB BLD-MCNC: 10.6 G/DL (ref 12.5–16.5)
HMPV RNA NPH QL NAA+NON-PROBE: NOT DETECTED
HPIV1 RNA NPH QL NAA+NON-PROBE: NOT DETECTED
HPIV2 RNA NPH QL NAA+NON-PROBE: NOT DETECTED
HPIV3 RNA NPH QL NAA+NON-PROBE: NOT DETECTED
HPIV4 RNA NPH QL NAA+NON-PROBE: NOT DETECTED
M PNEUMO DNA NPH QL NAA+NON-PROBE: NOT DETECTED
MCH RBC QN AUTO: 30.4 PG (ref 26–35)
MCHC RBC AUTO-ENTMCNC: 30 G/DL (ref 32–34.5)
MCV RBC AUTO: 101.1 FL (ref 80–99.9)
PLATELET # BLD AUTO: 260 K/UL (ref 130–450)
PMV BLD AUTO: 10.5 FL (ref 7–12)
POTASSIUM SERPL-SCNC: 5 MMOL/L (ref 3.5–5)
RBC # BLD AUTO: 3.49 M/UL (ref 3.8–5.8)
RSV RNA NPH QL NAA+NON-PROBE: NOT DETECTED
RV+EV RNA NPH QL NAA+NON-PROBE: NOT DETECTED
SARS-COV-2 RNA NPH QL NAA+NON-PROBE: NOT DETECTED
SODIUM SERPL-SCNC: 135 MMOL/L (ref 132–146)
SPECIMEN DESCRIPTION: NORMAL
WBC OTHER # BLD: 11.1 K/UL (ref 4.5–11.5)

## 2024-02-18 PROCEDURE — 6360000002 HC RX W HCPCS: Performed by: INTERNAL MEDICINE

## 2024-02-18 PROCEDURE — 0202U NFCT DS 22 TRGT SARS-COV-2: CPT

## 2024-02-18 PROCEDURE — 97530 THERAPEUTIC ACTIVITIES: CPT | Performed by: PHYSICAL THERAPIST

## 2024-02-18 PROCEDURE — 86141 C-REACTIVE PROTEIN HS: CPT

## 2024-02-18 PROCEDURE — 2700000000 HC OXYGEN THERAPY PER DAY

## 2024-02-18 PROCEDURE — 80048 BASIC METABOLIC PNL TOTAL CA: CPT

## 2024-02-18 PROCEDURE — 36415 COLL VENOUS BLD VENIPUNCTURE: CPT

## 2024-02-18 PROCEDURE — 85027 COMPLETE CBC AUTOMATED: CPT

## 2024-02-18 PROCEDURE — 2580000003 HC RX 258: Performed by: INTERNAL MEDICINE

## 2024-02-18 PROCEDURE — 97161 PT EVAL LOW COMPLEX 20 MIN: CPT | Performed by: PHYSICAL THERAPIST

## 2024-02-18 PROCEDURE — 1200000000 HC SEMI PRIVATE

## 2024-02-18 PROCEDURE — 85652 RBC SED RATE AUTOMATED: CPT

## 2024-02-18 PROCEDURE — 6370000000 HC RX 637 (ALT 250 FOR IP): Performed by: FAMILY MEDICINE

## 2024-02-18 PROCEDURE — 6360000002 HC RX W HCPCS: Performed by: FAMILY MEDICINE

## 2024-02-18 PROCEDURE — 99233 SBSQ HOSP IP/OBS HIGH 50: CPT | Performed by: INTERNAL MEDICINE

## 2024-02-18 PROCEDURE — 94640 AIRWAY INHALATION TREATMENT: CPT

## 2024-02-18 RX ORDER — GUAIFENESIN 600 MG/1
600 TABLET, EXTENDED RELEASE ORAL 2 TIMES DAILY
Status: DISCONTINUED | OUTPATIENT
Start: 2024-02-18 | End: 2024-02-22 | Stop reason: HOSPADM

## 2024-02-18 RX ADMIN — HYDROCODONE BITARTRATE AND ACETAMINOPHEN 1 TABLET: 5; 325 TABLET ORAL at 11:00

## 2024-02-18 RX ADMIN — SUCRALFATE 1 G: 1 TABLET ORAL at 18:09

## 2024-02-18 RX ADMIN — IPRATROPIUM BROMIDE 0.5 MG: 0.5 SOLUTION RESPIRATORY (INHALATION) at 18:26

## 2024-02-18 RX ADMIN — METOPROLOL SUCCINATE 100 MG: 100 TABLET, EXTENDED RELEASE ORAL at 21:34

## 2024-02-18 RX ADMIN — Medication 1000 UNITS: at 09:08

## 2024-02-18 RX ADMIN — Medication: at 05:35

## 2024-02-18 RX ADMIN — WATER 20 MG: 1 INJECTION INTRAMUSCULAR; INTRAVENOUS; SUBCUTANEOUS at 18:07

## 2024-02-18 RX ADMIN — PANTOPRAZOLE SODIUM 40 MG: 40 TABLET, DELAYED RELEASE ORAL at 05:35

## 2024-02-18 RX ADMIN — ENOXAPARIN SODIUM 40 MG: 100 INJECTION SUBCUTANEOUS at 09:12

## 2024-02-18 RX ADMIN — METOPROLOL SUCCINATE 100 MG: 100 TABLET, EXTENDED RELEASE ORAL at 09:08

## 2024-02-18 RX ADMIN — MICONAZOLE NITRATE: 20 CREAM TOPICAL at 09:12

## 2024-02-18 RX ADMIN — Medication 400 UNITS: at 09:08

## 2024-02-18 RX ADMIN — IPRATROPIUM BROMIDE 0.5 MG: 0.5 SOLUTION RESPIRATORY (INHALATION) at 13:47

## 2024-02-18 RX ADMIN — Medication: at 12:47

## 2024-02-18 RX ADMIN — TRIAMCINOLONE ACETONIDE: 1 CREAM TOPICAL at 09:12

## 2024-02-18 RX ADMIN — LEVALBUTEROL HYDROCHLORIDE 0.31 MG: 0.31 SOLUTION RESPIRATORY (INHALATION) at 06:15

## 2024-02-18 RX ADMIN — PANTOPRAZOLE SODIUM 40 MG: 40 TABLET, DELAYED RELEASE ORAL at 18:09

## 2024-02-18 RX ADMIN — HYDROCODONE BITARTRATE AND ACETAMINOPHEN 1 TABLET: 5; 325 TABLET ORAL at 18:13

## 2024-02-18 RX ADMIN — ASPIRIN 81 MG CHEWABLE TABLET 81 MG: 81 TABLET CHEWABLE at 09:08

## 2024-02-18 RX ADMIN — BUDESONIDE 500 MCG: 0.5 SUSPENSION RESPIRATORY (INHALATION) at 18:26

## 2024-02-18 RX ADMIN — LEVALBUTEROL HYDROCHLORIDE 0.31 MG: 0.31 SOLUTION RESPIRATORY (INHALATION) at 21:35

## 2024-02-18 RX ADMIN — AZITHROMYCIN DIHYDRATE 500 MG: 500 INJECTION, POWDER, LYOPHILIZED, FOR SOLUTION INTRAVENOUS at 18:16

## 2024-02-18 RX ADMIN — IPRATROPIUM BROMIDE 0.5 MG: 0.5 SOLUTION RESPIRATORY (INHALATION) at 06:18

## 2024-02-18 RX ADMIN — SUCRALFATE 1 G: 1 TABLET ORAL at 05:35

## 2024-02-18 RX ADMIN — SUCRALFATE 1 G: 1 TABLET ORAL at 11:00

## 2024-02-18 RX ADMIN — LEVALBUTEROL HYDROCHLORIDE 0.31 MG: 0.31 SOLUTION RESPIRATORY (INHALATION) at 13:47

## 2024-02-18 RX ADMIN — Medication: at 09:13

## 2024-02-18 RX ADMIN — Medication: at 21:37

## 2024-02-18 RX ADMIN — Medication 50 MG: at 09:08

## 2024-02-18 RX ADMIN — LOSARTAN POTASSIUM 100 MG: 50 TABLET, FILM COATED ORAL at 09:08

## 2024-02-18 RX ADMIN — GABAPENTIN 300 MG: 300 CAPSULE ORAL at 21:34

## 2024-02-18 RX ADMIN — CETIRIZINE HYDROCHLORIDE 10 MG: 10 TABLET, FILM COATED ORAL at 09:08

## 2024-02-18 RX ADMIN — BUDESONIDE 500 MCG: 0.5 SUSPENSION RESPIRATORY (INHALATION) at 06:18

## 2024-02-18 RX ADMIN — SUCRALFATE 1 G: 1 TABLET ORAL at 21:34

## 2024-02-18 RX ADMIN — WATER 20 MG: 1 INJECTION INTRAMUSCULAR; INTRAVENOUS; SUBCUTANEOUS at 05:15

## 2024-02-18 RX ADMIN — OXYCODONE HYDROCHLORIDE AND ACETAMINOPHEN 500 MG: 500 TABLET ORAL at 09:08

## 2024-02-18 RX ADMIN — HYDROCODONE BITARTRATE AND ACETAMINOPHEN 1 TABLET: 5; 325 TABLET ORAL at 05:34

## 2024-02-18 RX ADMIN — GUAIFENESIN 600 MG: 600 TABLET, EXTENDED RELEASE ORAL at 21:34

## 2024-02-18 ASSESSMENT — PAIN DESCRIPTION - LOCATION
LOCATION: BACK;NECK

## 2024-02-18 ASSESSMENT — PAIN DESCRIPTION - DESCRIPTORS
DESCRIPTORS: POUNDING;ACHING;THROBBING
DESCRIPTORS: ACHING
DESCRIPTORS: ACHING;DISCOMFORT;NAGGING

## 2024-02-18 ASSESSMENT — PAIN SCALES - GENERAL
PAINLEVEL_OUTOF10: 5
PAINLEVEL_OUTOF10: 10

## 2024-02-18 ASSESSMENT — PAIN DESCRIPTION - ORIENTATION
ORIENTATION: LOWER
ORIENTATION: LOWER;MID

## 2024-02-19 PROCEDURE — 2700000000 HC OXYGEN THERAPY PER DAY

## 2024-02-19 PROCEDURE — 99233 SBSQ HOSP IP/OBS HIGH 50: CPT | Performed by: INTERNAL MEDICINE

## 2024-02-19 PROCEDURE — 6360000002 HC RX W HCPCS: Performed by: INTERNAL MEDICINE

## 2024-02-19 PROCEDURE — 1200000000 HC SEMI PRIVATE

## 2024-02-19 PROCEDURE — 6370000000 HC RX 637 (ALT 250 FOR IP): Performed by: FAMILY MEDICINE

## 2024-02-19 PROCEDURE — 94640 AIRWAY INHALATION TREATMENT: CPT

## 2024-02-19 PROCEDURE — 6360000002 HC RX W HCPCS: Performed by: FAMILY MEDICINE

## 2024-02-19 PROCEDURE — 2580000003 HC RX 258: Performed by: INTERNAL MEDICINE

## 2024-02-19 RX ORDER — HYDROCODONE BITARTRATE AND ACETAMINOPHEN 5; 325 MG/1; MG/1
2 TABLET ORAL EVERY 6 HOURS PRN
Status: DISCONTINUED | OUTPATIENT
Start: 2024-02-19 | End: 2024-02-22 | Stop reason: HOSPADM

## 2024-02-19 RX ORDER — HYDROCODONE BITARTRATE AND ACETAMINOPHEN 5; 325 MG/1; MG/1
1 TABLET ORAL ONCE
Status: COMPLETED | OUTPATIENT
Start: 2024-02-19 | End: 2024-02-19

## 2024-02-19 RX ADMIN — LOSARTAN POTASSIUM 100 MG: 50 TABLET, FILM COATED ORAL at 10:56

## 2024-02-19 RX ADMIN — METOPROLOL SUCCINATE 100 MG: 100 TABLET, EXTENDED RELEASE ORAL at 10:56

## 2024-02-19 RX ADMIN — GABAPENTIN 300 MG: 300 CAPSULE ORAL at 21:08

## 2024-02-19 RX ADMIN — PANTOPRAZOLE SODIUM 40 MG: 40 TABLET, DELAYED RELEASE ORAL at 16:58

## 2024-02-19 RX ADMIN — AZITHROMYCIN DIHYDRATE 500 MG: 500 INJECTION, POWDER, LYOPHILIZED, FOR SOLUTION INTRAVENOUS at 18:16

## 2024-02-19 RX ADMIN — IPRATROPIUM BROMIDE 0.5 MG: 0.5 SOLUTION RESPIRATORY (INHALATION) at 15:48

## 2024-02-19 RX ADMIN — BUDESONIDE 500 MCG: 0.5 SUSPENSION RESPIRATORY (INHALATION) at 05:28

## 2024-02-19 RX ADMIN — Medication: at 10:58

## 2024-02-19 RX ADMIN — LEVALBUTEROL HYDROCHLORIDE 0.31 MG: 0.31 SOLUTION RESPIRATORY (INHALATION) at 05:28

## 2024-02-19 RX ADMIN — IPRATROPIUM BROMIDE 0.5 MG: 0.5 SOLUTION RESPIRATORY (INHALATION) at 05:28

## 2024-02-19 RX ADMIN — Medication 1000 UNITS: at 10:57

## 2024-02-19 RX ADMIN — LEVALBUTEROL HYDROCHLORIDE 0.31 MG: 0.31 SOLUTION RESPIRATORY (INHALATION) at 15:47

## 2024-02-19 RX ADMIN — OXYCODONE HYDROCHLORIDE AND ACETAMINOPHEN 500 MG: 500 TABLET ORAL at 10:53

## 2024-02-19 RX ADMIN — GUAIFENESIN 600 MG: 600 TABLET, EXTENDED RELEASE ORAL at 21:08

## 2024-02-19 RX ADMIN — GUAIFENESIN 600 MG: 600 TABLET, EXTENDED RELEASE ORAL at 10:55

## 2024-02-19 RX ADMIN — SUCRALFATE 1 G: 1 TABLET ORAL at 21:08

## 2024-02-19 RX ADMIN — METOPROLOL SUCCINATE 100 MG: 100 TABLET, EXTENDED RELEASE ORAL at 21:08

## 2024-02-19 RX ADMIN — SUCRALFATE 1 G: 1 TABLET ORAL at 11:02

## 2024-02-19 RX ADMIN — Medication: at 05:12

## 2024-02-19 RX ADMIN — PANTOPRAZOLE SODIUM 40 MG: 40 TABLET, DELAYED RELEASE ORAL at 05:11

## 2024-02-19 RX ADMIN — Medication 400 UNITS: at 10:57

## 2024-02-19 RX ADMIN — Medication 50 MG: at 10:57

## 2024-02-19 RX ADMIN — WATER 20 MG: 1 INJECTION INTRAMUSCULAR; INTRAVENOUS; SUBCUTANEOUS at 16:52

## 2024-02-19 RX ADMIN — HYDROCODONE BITARTRATE AND ACETAMINOPHEN 1 TABLET: 5; 325 TABLET ORAL at 21:08

## 2024-02-19 RX ADMIN — MICONAZOLE NITRATE: 20 CREAM TOPICAL at 11:00

## 2024-02-19 RX ADMIN — HYDROCODONE BITARTRATE AND ACETAMINOPHEN 1 TABLET: 5; 325 TABLET ORAL at 11:01

## 2024-02-19 RX ADMIN — ASPIRIN 81 MG CHEWABLE TABLET 81 MG: 81 TABLET CHEWABLE at 10:54

## 2024-02-19 RX ADMIN — SUCRALFATE 1 G: 1 TABLET ORAL at 16:51

## 2024-02-19 RX ADMIN — HYDROCODONE BITARTRATE AND ACETAMINOPHEN 1 TABLET: 5; 325 TABLET ORAL at 22:05

## 2024-02-19 RX ADMIN — WATER 20 MG: 1 INJECTION INTRAMUSCULAR; INTRAVENOUS; SUBCUTANEOUS at 05:11

## 2024-02-19 RX ADMIN — SUCRALFATE 1 G: 1 TABLET ORAL at 05:11

## 2024-02-19 RX ADMIN — TRIAMCINOLONE ACETONIDE: 1 CREAM TOPICAL at 10:59

## 2024-02-19 RX ADMIN — CETIRIZINE HYDROCHLORIDE 10 MG: 10 TABLET, FILM COATED ORAL at 10:54

## 2024-02-19 RX ADMIN — ENOXAPARIN SODIUM 40 MG: 100 INJECTION SUBCUTANEOUS at 10:54

## 2024-02-19 ASSESSMENT — PAIN DESCRIPTION - FREQUENCY: FREQUENCY: CONTINUOUS

## 2024-02-19 ASSESSMENT — PAIN DESCRIPTION - LOCATION
LOCATION: BACK
LOCATION: BACK

## 2024-02-19 ASSESSMENT — PAIN SCALES - GENERAL
PAINLEVEL_OUTOF10: 0
PAINLEVEL_OUTOF10: 7
PAINLEVEL_OUTOF10: 10

## 2024-02-19 ASSESSMENT — PAIN DESCRIPTION - ORIENTATION: ORIENTATION: RIGHT;LEFT

## 2024-02-19 ASSESSMENT — PAIN DESCRIPTION - DESCRIPTORS
DESCRIPTORS: ACHING;DISCOMFORT;SORE
DESCRIPTORS: ACHING;DISCOMFORT;SORE;TENDER

## 2024-02-19 ASSESSMENT — PAIN DESCRIPTION - PAIN TYPE: TYPE: CHRONIC PAIN

## 2024-02-19 ASSESSMENT — PAIN - FUNCTIONAL ASSESSMENT: PAIN_FUNCTIONAL_ASSESSMENT: PREVENTS OR INTERFERES SOME ACTIVE ACTIVITIES AND ADLS

## 2024-02-19 NOTE — CARE COORDINATION
2/19/2024 0924 CM note: COVID+ 2/15/24. ACP done. Pt resides alone in a 1 story home. He has aides from [facility] [employer]([phone]) M-F , 4hrs/day. He relays family and friends assist him as needed. DME includes a nebulizer and home o2@4L(portable and concentrator) from [facility]. Plan is for pt to return home at d/c, family/friend to provide transportation. [facility] will need notified of d/c. Sari TAYLOR

## 2024-02-19 NOTE — PLAN OF CARE
Problem: Pain  Goal: Verbalizes/displays adequate comfort level or baseline comfort level  Outcome: Progressing     Problem: Skin/Tissue Integrity  Goal: Absence of new skin breakdown  Description: 1.  Monitor for areas of redness and/or skin breakdown  2.  Assess vascular access sites hourly  3.  Every 4-6 hours minimum:  Change oxygen saturation probe site  4.  Every 4-6 hours:  If on nasal continuous positive airway pressure, respiratory therapy assess nares and determine need for appliance change or resting period.  2/19/2024 0241 by Mary Lou Coats, RN  Outcome: Progressing     Problem: Safety - Adult  Goal: Free from fall injury  2/19/2024 0241 by Mary Lou Coats, RN  Outcome: Progressing

## 2024-02-20 PROCEDURE — 6360000002 HC RX W HCPCS: Performed by: INTERNAL MEDICINE

## 2024-02-20 PROCEDURE — 6360000002 HC RX W HCPCS: Performed by: FAMILY MEDICINE

## 2024-02-20 PROCEDURE — 2700000000 HC OXYGEN THERAPY PER DAY

## 2024-02-20 PROCEDURE — 2580000003 HC RX 258: Performed by: INTERNAL MEDICINE

## 2024-02-20 PROCEDURE — 6370000000 HC RX 637 (ALT 250 FOR IP): Performed by: FAMILY MEDICINE

## 2024-02-20 PROCEDURE — 94640 AIRWAY INHALATION TREATMENT: CPT

## 2024-02-20 PROCEDURE — 1200000000 HC SEMI PRIVATE

## 2024-02-20 PROCEDURE — 97110 THERAPEUTIC EXERCISES: CPT

## 2024-02-20 PROCEDURE — 99233 SBSQ HOSP IP/OBS HIGH 50: CPT | Performed by: INTERNAL MEDICINE

## 2024-02-20 RX ORDER — LEVALBUTEROL INHALATION SOLUTION 0.31 MG/3ML
0.31 SOLUTION RESPIRATORY (INHALATION) EVERY 8 HOURS
Qty: 270 ML | Refills: 0 | Status: SHIPPED | OUTPATIENT
Start: 2024-02-21 | End: 2024-03-22

## 2024-02-20 RX ORDER — GUAIFENESIN 600 MG/1
600 TABLET, EXTENDED RELEASE ORAL 2 TIMES DAILY
Qty: 60 TABLET | Refills: 0 | Status: SHIPPED | OUTPATIENT
Start: 2024-02-20 | End: 2024-03-21

## 2024-02-20 RX ADMIN — MICONAZOLE NITRATE: 20 CREAM TOPICAL at 21:50

## 2024-02-20 RX ADMIN — GUAIFENESIN 600 MG: 600 TABLET, EXTENDED RELEASE ORAL at 10:20

## 2024-02-20 RX ADMIN — METOPROLOL SUCCINATE 100 MG: 100 TABLET, EXTENDED RELEASE ORAL at 10:21

## 2024-02-20 RX ADMIN — WATER 20 MG: 1 INJECTION INTRAMUSCULAR; INTRAVENOUS; SUBCUTANEOUS at 04:16

## 2024-02-20 RX ADMIN — Medication 400 UNITS: at 10:22

## 2024-02-20 RX ADMIN — WATER 20 MG: 1 INJECTION INTRAMUSCULAR; INTRAVENOUS; SUBCUTANEOUS at 16:09

## 2024-02-20 RX ADMIN — AZITHROMYCIN DIHYDRATE 500 MG: 500 INJECTION, POWDER, LYOPHILIZED, FOR SOLUTION INTRAVENOUS at 16:09

## 2024-02-20 RX ADMIN — MICONAZOLE NITRATE: 20 CREAM TOPICAL at 10:23

## 2024-02-20 RX ADMIN — CETIRIZINE HYDROCHLORIDE 10 MG: 10 TABLET, FILM COATED ORAL at 10:19

## 2024-02-20 RX ADMIN — SUCRALFATE 1 G: 1 TABLET ORAL at 16:14

## 2024-02-20 RX ADMIN — SUCRALFATE 1 G: 1 TABLET ORAL at 06:54

## 2024-02-20 RX ADMIN — OXYCODONE HYDROCHLORIDE AND ACETAMINOPHEN 500 MG: 500 TABLET ORAL at 10:18

## 2024-02-20 RX ADMIN — Medication 50 MG: at 10:22

## 2024-02-20 RX ADMIN — LEVALBUTEROL HYDROCHLORIDE 0.31 MG: 0.31 SOLUTION RESPIRATORY (INHALATION) at 06:20

## 2024-02-20 RX ADMIN — Medication: at 10:23

## 2024-02-20 RX ADMIN — LOSARTAN POTASSIUM 100 MG: 50 TABLET, FILM COATED ORAL at 10:20

## 2024-02-20 RX ADMIN — SUCRALFATE 1 G: 1 TABLET ORAL at 10:27

## 2024-02-20 RX ADMIN — ASPIRIN 81 MG CHEWABLE TABLET 81 MG: 81 TABLET CHEWABLE at 10:18

## 2024-02-20 RX ADMIN — IPRATROPIUM BROMIDE 0.5 MG: 0.5 SOLUTION RESPIRATORY (INHALATION) at 06:20

## 2024-02-20 RX ADMIN — Medication 1000 UNITS: at 10:22

## 2024-02-20 RX ADMIN — Medication: at 07:46

## 2024-02-20 RX ADMIN — BUDESONIDE 500 MCG: 0.5 SUSPENSION RESPIRATORY (INHALATION) at 06:20

## 2024-02-20 RX ADMIN — ENOXAPARIN SODIUM 40 MG: 100 INJECTION SUBCUTANEOUS at 10:19

## 2024-02-20 RX ADMIN — SUCRALFATE 1 G: 1 TABLET ORAL at 21:50

## 2024-02-20 RX ADMIN — TRIAMCINOLONE ACETONIDE: 1 CREAM TOPICAL at 10:23

## 2024-02-20 RX ADMIN — GABAPENTIN 300 MG: 300 CAPSULE ORAL at 21:49

## 2024-02-20 RX ADMIN — HYDROCODONE BITARTRATE AND ACETAMINOPHEN 2 TABLET: 5; 325 TABLET ORAL at 23:17

## 2024-02-20 RX ADMIN — PANTOPRAZOLE SODIUM 40 MG: 40 TABLET, DELAYED RELEASE ORAL at 06:54

## 2024-02-20 RX ADMIN — PANTOPRAZOLE SODIUM 40 MG: 40 TABLET, DELAYED RELEASE ORAL at 16:09

## 2024-02-20 RX ADMIN — GUAIFENESIN 600 MG: 600 TABLET, EXTENDED RELEASE ORAL at 21:50

## 2024-02-20 RX ADMIN — METOPROLOL SUCCINATE 100 MG: 100 TABLET, EXTENDED RELEASE ORAL at 21:50

## 2024-02-20 RX ADMIN — TRIAMCINOLONE ACETONIDE: 1 CREAM TOPICAL at 21:53

## 2024-02-20 RX ADMIN — HYDROCODONE BITARTRATE AND ACETAMINOPHEN 2 TABLET: 5; 325 TABLET ORAL at 06:53

## 2024-02-20 ASSESSMENT — PAIN SCALES - GENERAL
PAINLEVEL_OUTOF10: 10
PAINLEVEL_OUTOF10: 9

## 2024-02-20 ASSESSMENT — PAIN DESCRIPTION - LOCATION
LOCATION: BACK;LEG
LOCATION: BACK;NECK

## 2024-02-20 ASSESSMENT — PAIN DESCRIPTION - ORIENTATION
ORIENTATION: MID
ORIENTATION: LEFT;RIGHT

## 2024-02-20 ASSESSMENT — PAIN DESCRIPTION - DESCRIPTORS: DESCRIPTORS: ACHING

## 2024-02-20 NOTE — CARE COORDINATION
2/20/2024 1101 CM note: Rapid COVID+ 2/15/24, PCR neg on 2/18/24. Pt resides alone in a 1 story home. He has aides from [facility]([phone]) M-F , 4hrs/day.  DME includes a nebulizer and home o2@4L(portable and concentrator) from [facility]. Plan is for pt to return home at d/c, family/friend to provide transportation. [facility] will need notified of d/c.  Sari TAYLOR

## 2024-02-21 ENCOUNTER — APPOINTMENT (OUTPATIENT)
Dept: GENERAL RADIOLOGY | Age: 74
DRG: 190 | End: 2024-02-21
Payer: COMMERCIAL

## 2024-02-21 PROBLEM — I16.0 HYPERTENSIVE URGENCY: Status: ACTIVE | Noted: 2024-02-21

## 2024-02-21 PROBLEM — U07.1 COVID-19: Status: ACTIVE | Noted: 2024-02-21

## 2024-02-21 LAB
B.E.: 10.7 MMOL/L (ref -3–3)
COHB: 0.9 % (ref 0–1.5)
COMMENT: ABNORMAL
CRITICAL: ABNORMAL
DATE ANALYZED: ABNORMAL
DATE OF COLLECTION: ABNORMAL
GLUCOSE BLD-MCNC: 106 MG/DL (ref 74–99)
HCO3: 42.5 MMOL/L (ref 22–26)
HHB: 0.6 % (ref 0–5)
LAB: ABNORMAL
Lab: 750
METHB: 0.1 % (ref 0–1.5)
MODE: ABNORMAL
O2 CONTENT: 17.4 ML/DL
O2 SATURATION: 99.4 % (ref 92–98.5)
O2HB: 98.4 % (ref 94–97)
OPERATOR ID: ABNORMAL
PATIENT TEMP: 37 C
PCO2: 107.9 MMHG (ref 35–45)
PH BLOOD GAS: 7.21 (ref 7.35–7.45)
PO2: 146.8 MMHG (ref 75–100)
SOURCE, BLOOD GAS: ABNORMAL
THB: 12.4 G/DL (ref 11.5–16.5)
TIME ANALYZED: 755

## 2024-02-21 PROCEDURE — 82962 GLUCOSE BLOOD TEST: CPT

## 2024-02-21 PROCEDURE — 6360000002 HC RX W HCPCS: Performed by: INTERNAL MEDICINE

## 2024-02-21 PROCEDURE — 6370000000 HC RX 637 (ALT 250 FOR IP): Performed by: FAMILY MEDICINE

## 2024-02-21 PROCEDURE — 2700000000 HC OXYGEN THERAPY PER DAY

## 2024-02-21 PROCEDURE — 99291 CRITICAL CARE FIRST HOUR: CPT | Performed by: INTERNAL MEDICINE

## 2024-02-21 PROCEDURE — 71045 X-RAY EXAM CHEST 1 VIEW: CPT

## 2024-02-21 PROCEDURE — 6360000002 HC RX W HCPCS: Performed by: FAMILY MEDICINE

## 2024-02-21 PROCEDURE — 2580000003 HC RX 258: Performed by: INTERNAL MEDICINE

## 2024-02-21 PROCEDURE — 82805 BLOOD GASES W/O2 SATURATION: CPT

## 2024-02-21 PROCEDURE — 94640 AIRWAY INHALATION TREATMENT: CPT

## 2024-02-21 PROCEDURE — 6360000002 HC RX W HCPCS

## 2024-02-21 PROCEDURE — 1200000000 HC SEMI PRIVATE

## 2024-02-21 PROCEDURE — 99222 1ST HOSP IP/OBS MODERATE 55: CPT

## 2024-02-21 RX ORDER — HYDRALAZINE HYDROCHLORIDE 20 MG/ML
10 INJECTION INTRAMUSCULAR; INTRAVENOUS ONCE
Status: COMPLETED | OUTPATIENT
Start: 2024-02-21 | End: 2024-02-21

## 2024-02-21 RX ORDER — LORAZEPAM 2 MG/ML
INJECTION INTRAMUSCULAR
Status: COMPLETED
Start: 2024-02-21 | End: 2024-02-21

## 2024-02-21 RX ORDER — LORAZEPAM 2 MG/ML
0.5 INJECTION INTRAMUSCULAR ONCE
Status: COMPLETED | OUTPATIENT
Start: 2024-02-21 | End: 2024-02-21

## 2024-02-21 RX ORDER — HYDRALAZINE HYDROCHLORIDE 20 MG/ML
INJECTION INTRAMUSCULAR; INTRAVENOUS
Status: COMPLETED
Start: 2024-02-21 | End: 2024-02-21

## 2024-02-21 RX ORDER — LORAZEPAM 0.5 MG/1
0.5 TABLET ORAL ONCE
Status: DISCONTINUED | OUTPATIENT
Start: 2024-02-21 | End: 2024-02-21

## 2024-02-21 RX ORDER — HYDRALAZINE HYDROCHLORIDE 10 MG/1
10 TABLET, FILM COATED ORAL ONCE
Status: DISCONTINUED | OUTPATIENT
Start: 2024-02-21 | End: 2024-02-21

## 2024-02-21 RX ADMIN — IPRATROPIUM BROMIDE 0.5 MG: 0.5 SOLUTION RESPIRATORY (INHALATION) at 08:00

## 2024-02-21 RX ADMIN — ENOXAPARIN SODIUM 40 MG: 100 INJECTION SUBCUTANEOUS at 10:21

## 2024-02-21 RX ADMIN — PANTOPRAZOLE SODIUM 40 MG: 40 TABLET, DELAYED RELEASE ORAL at 06:58

## 2024-02-21 RX ADMIN — Medication: at 10:28

## 2024-02-21 RX ADMIN — LOSARTAN POTASSIUM 100 MG: 50 TABLET, FILM COATED ORAL at 10:22

## 2024-02-21 RX ADMIN — WATER 20 MG: 1 INJECTION INTRAMUSCULAR; INTRAVENOUS; SUBCUTANEOUS at 03:55

## 2024-02-21 RX ADMIN — LEVALBUTEROL HYDROCHLORIDE 0.31 MG: 0.31 SOLUTION RESPIRATORY (INHALATION) at 21:28

## 2024-02-21 RX ADMIN — METOPROLOL SUCCINATE 100 MG: 100 TABLET, EXTENDED RELEASE ORAL at 20:13

## 2024-02-21 RX ADMIN — GABAPENTIN 300 MG: 300 CAPSULE ORAL at 20:11

## 2024-02-21 RX ADMIN — CETIRIZINE HYDROCHLORIDE 10 MG: 10 TABLET, FILM COATED ORAL at 10:21

## 2024-02-21 RX ADMIN — WATER 20 MG: 1 INJECTION INTRAMUSCULAR; INTRAVENOUS; SUBCUTANEOUS at 18:12

## 2024-02-21 RX ADMIN — GUAIFENESIN 600 MG: 600 TABLET, EXTENDED RELEASE ORAL at 10:22

## 2024-02-21 RX ADMIN — LORAZEPAM 0.5 MG: 2 INJECTION INTRAMUSCULAR; INTRAVENOUS at 07:32

## 2024-02-21 RX ADMIN — IPRATROPIUM BROMIDE 0.5 MG: 0.5 SOLUTION RESPIRATORY (INHALATION) at 12:38

## 2024-02-21 RX ADMIN — LORAZEPAM 0.5 MG: 2 INJECTION INTRAMUSCULAR at 07:32

## 2024-02-21 RX ADMIN — SUCRALFATE 1 G: 1 TABLET ORAL at 10:22

## 2024-02-21 RX ADMIN — ALBUTEROL SULFATE 2.5 MG: 2.5 SOLUTION RESPIRATORY (INHALATION) at 12:38

## 2024-02-21 RX ADMIN — GUAIFENESIN 600 MG: 600 TABLET, EXTENDED RELEASE ORAL at 20:11

## 2024-02-21 RX ADMIN — SUCRALFATE 1 G: 1 TABLET ORAL at 06:57

## 2024-02-21 RX ADMIN — ASPIRIN 81 MG CHEWABLE TABLET 81 MG: 81 TABLET CHEWABLE at 10:21

## 2024-02-21 RX ADMIN — HYDRALAZINE HYDROCHLORIDE 10 MG: 20 INJECTION, SOLUTION INTRAMUSCULAR; INTRAVENOUS at 07:33

## 2024-02-21 RX ADMIN — SUCRALFATE 1 G: 1 TABLET ORAL at 20:12

## 2024-02-21 RX ADMIN — HYDRALAZINE HYDROCHLORIDE 10 MG: 20 INJECTION INTRAMUSCULAR; INTRAVENOUS at 07:33

## 2024-02-21 RX ADMIN — PANTOPRAZOLE SODIUM 40 MG: 40 TABLET, DELAYED RELEASE ORAL at 18:10

## 2024-02-21 RX ADMIN — METOPROLOL SUCCINATE 100 MG: 100 TABLET, EXTENDED RELEASE ORAL at 10:22

## 2024-02-21 RX ADMIN — SUCRALFATE 1 G: 1 TABLET ORAL at 18:10

## 2024-02-21 RX ADMIN — ALBUTEROL SULFATE 2.5 MG: 2.5 SOLUTION RESPIRATORY (INHALATION) at 16:30

## 2024-02-21 RX ADMIN — BUDESONIDE 500 MCG: 0.5 SUSPENSION RESPIRATORY (INHALATION) at 16:31

## 2024-02-21 RX ADMIN — IPRATROPIUM BROMIDE 0.5 MG: 0.5 SOLUTION RESPIRATORY (INHALATION) at 16:30

## 2024-02-21 RX ADMIN — ALBUTEROL SULFATE 2.5 MG: 2.5 SOLUTION RESPIRATORY (INHALATION) at 08:00

## 2024-02-21 ASSESSMENT — PAIN SCALES - GENERAL: PAINLEVEL_OUTOF10: 0

## 2024-02-21 NOTE — CARE COORDINATION
2/21/2024 1122 CM note: Rapid COVID+ 2/15/24, PCR neg on 2/18/24. Pt resides alone in a 1 story home. He has aides from [facility]([phone]) M-F , 4hrs/day.  DME includes a nebulizer and home o2@4L from [facility]. Pt had RRT this am and is currently on 6 L HF o2(pox 90%), pt declining BIPAP. He is a DNRCCA , Palliative Care consulted and [place] [facility] Plan was for pt to return home with continuation of aide services. Unit SW/CM to follow. Sari TAYLOR

## 2024-02-21 NOTE — CARE COORDINATION
Apparently after RRT patient was agreeable to ABG.  This came back showing hypercapnia with respiratory acidosis.  CXR ordered by attending NAD. BiPAP has been ordered but patient was declining.    I contacted pulmonary/critical care as they were already on the case to inform them of such and they will try to make room for patient to get to the ICU and be put on Precedex drip to wear the BiPAP.    Furthermore, patient's RN  informed me that patient is wanting to be DNR but I do not think he is able to make the decision as he is acutely hypercapnic and received Ativan within the last hour. However, he is conversing and seemingly oriented.    Case discussed with Dr. Mcmillan of pulmonary/critical care.

## 2024-02-21 NOTE — CONSULTS
Palliative Care Department  [phone]  Palliative Care Initial Consult  Provider Esther Woods, VANIA - CNP      PATIENT: Jose Antonio Martinez  : 1950  MRN: [id number]  ADMISSION DATE: 2/15/2024 11:01 AM  Referring Provider: Debbie Powers DO     Palliative Medicine was consulted on hospital day 6 for assistance with Goals of care, Symptom management     HPI:     Clinical Summary:Jose Antonio Martinez is a 73 y.o. y/o male with a history of diastolic dysfunction, CHF, hypertension, COPD, 4 L O2 at baseline, CHF who presented to [facility] on 2/15/2024 with from home with shortness of breath.  Was admitted for further management of COPD exacerbation, palliative improvement 60 minutes plan was for him to be discharged today on prednisone taper, however hospital course complicated today with RRT due to acute hypoxic respiratory failure, placed on nonrebreather, patient had acute blood gases, currently on BiPAP.  Palliative medicine consulted to discuss goals of care, symptom management.      ASSESSMENT/PLAN:     Pertinent Hospital Diagnoses     COPD exacerbation  Acute respiratory failure with hypoxia      Palliative Care Encounter / Counseling Regarding Goals of Care  Please see detailed goals of care discussion as below  At this time, Jose Antonio Martinez, Does have capacity for medical decision-making.  Capacity is time limited and situation/question specific  Outcome of goals of care meeting:  Patient does not want to escalate his care, goal is to go home, and be kept comfortable among family  Confirmed CODE STATUS to continue DNR CC  Patient declined hospice consult  Code status DNR-CC  Advanced Directives: no POA or living will in epic  Surrogate/Legal NOK:  Aki Rivera (Son) [phone]    Spiritual assessment: no spiritual distress identified  Bereavement and grief: to be determined  Referrals to: none today    Thank you for the opportunity to participate in the care of Jose Antonio Martinez.     Esther

## 2024-02-21 NOTE — ACP (ADVANCE CARE PLANNING)
Advance Care Planning   Healthcare Decision Maker:    Primary Decision Maker: Jose Antonio Rivera [facility] [phone]

## 2024-02-21 NOTE — PLAN OF CARE
Problem: Pain  Goal: Verbalizes/displays adequate comfort level or baseline comfort level  2/21/2024 0257 by Prema Lovell, RN  Outcome: Progressing  2/20/2024 1905 by Linda Mata RN  Outcome: Progressing     Problem: Skin/Tissue Integrity  Goal: Absence of new skin breakdown  Description: 1.  Monitor for areas of redness and/or skin breakdown  2.  Assess vascular access sites hourly  3.  Every 4-6 hours minimum:  Change oxygen saturation probe site  4.  Every 4-6 hours:  If on nasal continuous positive airway pressure, respiratory therapy assess nares and determine need for appliance change or resting period.  2/21/2024 0257 by Prema Lovell, RN  Outcome: Progressing  2/20/2024 1905 by Linda Mata RN  Outcome: Progressing     Problem: Safety - Adult  Goal: Free from fall injury  2/21/2024 0257 by Prema Lovell RN  Outcome: Progressing  2/20/2024 1905 by Linda Mata RN  Outcome: Progressing     Problem: Chronic Conditions and Co-morbidities  Goal: Patient's chronic conditions and co-morbidity symptoms are monitored and maintained or improved  2/21/2024 0257 by Prema Lovell RN  Outcome: Progressing  2/20/2024 1905 by Linda Mata RN  Outcome: Progressing

## 2024-02-21 NOTE — PLAN OF CARE
Problem: Pain  Goal: Verbalizes/displays adequate comfort level or baseline comfort level  Outcome: Progressing     Problem: Skin/Tissue Integrity  Goal: Absence of new skin breakdown  Description: 1.  Monitor for areas of redness and/or skin breakdown  2.  Assess vascular access sites hourly  3.  Every 4-6 hours minimum:  Change oxygen saturation probe site  4.  Every 4-6 hours:  If on nasal continuous positive airway pressure, respiratory therapy assess nares and determine need for appliance change or resting period.  Outcome: Progressing     Problem: Safety - Adult  Goal: Free from fall injury  Outcome: Progressing     Problem: Chronic Conditions and Co-morbidities  Goal: Patient's chronic conditions and co-morbidity symptoms are monitored and maintained or improved  Outcome: Progressing

## 2024-02-21 NOTE — SIGNIFICANT EVENT
Esmer Rhodes who patient stated was sister is actually a cousin. Esmer is aware that RRT was called this morning. She is also aware patient is using accessory muscles to breath and Bipap has been recommended. She is aware patient is refusing bipap. Yumi relays patient does have siblings and is trying contact them. However, patient is estranged from family and wants Esmer to be his primary decision maker.   
time 31 minutes not including procedures.    NOTE: This report was transcribed using voice recognition software. Every effort was made to ensure accuracy; however, inadvertent computerized transcription errors may be present.     Electronically signed by Blaise Marie MD on 2/21/2024 at 8:58 AM

## 2024-02-22 VITALS
HEART RATE: 76 BPM | HEIGHT: 66 IN | OXYGEN SATURATION: 90 % | SYSTOLIC BLOOD PRESSURE: 188 MMHG | WEIGHT: 185 LBS | TEMPERATURE: 98.1 F | DIASTOLIC BLOOD PRESSURE: 92 MMHG | BODY MASS INDEX: 29.73 KG/M2 | RESPIRATION RATE: 20 BRPM

## 2024-02-22 PROCEDURE — 94640 AIRWAY INHALATION TREATMENT: CPT

## 2024-02-22 PROCEDURE — 6360000002 HC RX W HCPCS: Performed by: INTERNAL MEDICINE

## 2024-02-22 PROCEDURE — 2700000000 HC OXYGEN THERAPY PER DAY

## 2024-02-22 PROCEDURE — 6370000000 HC RX 637 (ALT 250 FOR IP): Performed by: FAMILY MEDICINE

## 2024-02-22 PROCEDURE — 97530 THERAPEUTIC ACTIVITIES: CPT

## 2024-02-22 PROCEDURE — 6360000002 HC RX W HCPCS: Performed by: FAMILY MEDICINE

## 2024-02-22 PROCEDURE — 2580000003 HC RX 258: Performed by: INTERNAL MEDICINE

## 2024-02-22 PROCEDURE — 99233 SBSQ HOSP IP/OBS HIGH 50: CPT | Performed by: INTERNAL MEDICINE

## 2024-02-22 RX ADMIN — LOSARTAN POTASSIUM 100 MG: 50 TABLET, FILM COATED ORAL at 09:40

## 2024-02-22 RX ADMIN — WATER 20 MG: 1 INJECTION INTRAMUSCULAR; INTRAVENOUS; SUBCUTANEOUS at 03:50

## 2024-02-22 RX ADMIN — METOPROLOL SUCCINATE 100 MG: 100 TABLET, EXTENDED RELEASE ORAL at 09:40

## 2024-02-22 RX ADMIN — PANTOPRAZOLE SODIUM 40 MG: 40 TABLET, DELAYED RELEASE ORAL at 06:19

## 2024-02-22 RX ADMIN — SUCRALFATE 1 G: 1 TABLET ORAL at 06:19

## 2024-02-22 RX ADMIN — GUAIFENESIN 600 MG: 600 TABLET, EXTENDED RELEASE ORAL at 09:42

## 2024-02-22 RX ADMIN — CETIRIZINE HYDROCHLORIDE 10 MG: 10 TABLET, FILM COATED ORAL at 09:42

## 2024-02-22 RX ADMIN — Medication 1000 UNITS: at 09:40

## 2024-02-22 RX ADMIN — IPRATROPIUM BROMIDE 0.5 MG: 0.5 SOLUTION RESPIRATORY (INHALATION) at 10:08

## 2024-02-22 RX ADMIN — Medication 50 MG: at 09:40

## 2024-02-22 RX ADMIN — ASPIRIN 81 MG CHEWABLE TABLET 81 MG: 81 TABLET CHEWABLE at 09:42

## 2024-02-22 RX ADMIN — ENOXAPARIN SODIUM 40 MG: 100 INJECTION SUBCUTANEOUS at 09:42

## 2024-02-22 RX ADMIN — HYDROCODONE BITARTRATE AND ACETAMINOPHEN 2 TABLET: 5; 325 TABLET ORAL at 09:41

## 2024-02-22 RX ADMIN — OXYCODONE HYDROCHLORIDE AND ACETAMINOPHEN 500 MG: 500 TABLET ORAL at 09:40

## 2024-02-22 RX ADMIN — Medication 400 UNITS: at 09:40

## 2024-02-22 ASSESSMENT — PAIN SCALES - GENERAL
PAINLEVEL_OUTOF10: 5
PAINLEVEL_OUTOF10: 5
PAINLEVEL_OUTOF10: 10

## 2024-02-22 ASSESSMENT — PAIN DESCRIPTION - PAIN TYPE: TYPE: CHRONIC PAIN

## 2024-02-22 ASSESSMENT — PAIN DESCRIPTION - DESCRIPTORS: DESCRIPTORS: ACHING;DISCOMFORT;SORE

## 2024-02-22 ASSESSMENT — PAIN DESCRIPTION - ONSET: ONSET: GRADUAL

## 2024-02-22 ASSESSMENT — PAIN - FUNCTIONAL ASSESSMENT: PAIN_FUNCTIONAL_ASSESSMENT: PREVENTS OR INTERFERES SOME ACTIVE ACTIVITIES AND ADLS

## 2024-02-22 ASSESSMENT — PAIN DESCRIPTION - FREQUENCY: FREQUENCY: INTERMITTENT

## 2024-02-22 ASSESSMENT — PAIN DESCRIPTION - LOCATION: LOCATION: BACK;NECK

## 2024-02-22 ASSESSMENT — PAIN DESCRIPTION - ORIENTATION: ORIENTATION: LOWER

## 2024-02-22 NOTE — PROGRESS NOTES
Physical Therapy    Room #:   0430/0430-02    Date: 2024       Patient Name: Jose Antonio Martinez  : 1950      MRN: [id number]     Patient unavailable for physical therapy treatment due to  patient checked on twice this morning first attempt patient refused d/t being tired, second attempt patient in restroom asking for privacy . Will attempt PT treatment at a later time or date. Thank you.      Mery Martinez, PTA    
  Physical Therapy    Room #:   0430/0430-02    Date: 2024       Patient Name: Jose Antonio Martinez  : 1950      MRN: [id number]     Patient unavailable for physical therapy treatment due to refusal. States \"I'm not feeling well\", therapist suggested to just sit at edge of bed, however still refused. Will attempt PT treatment tomorrow. Thank you.      Mery Martinez, PTA    
  Physician Progress Note      PATIENT:               BENJAMÍN BAEZA  [facility] #:                  [id number]  :                       1950  ADMIT DATE:       2/15/2024 11:01 AM  DISCH DATE:        2024 4:45 PM  RESPONDING  PROVIDER #:        Debbie Powers DO          QUERY TEXT:    Patient admitted with oxygen dependent COPD with acute exacerbation, positive   COVID. Noted documentation of Acute hypoxic/hypercapnic respiratory failure in   H&P and progress notes. In order to support the diagnosis of acute   respiratory failure, please include additional clinical indicators in your   documentation.  Or please document if the diagnosis of acute respiratory   failure has been ruled out after further study.    The medical record reflects the following:  Risk Factors: COPD with chronic oxygen 4L at baseline, COVID,  Clinical Indicators: RR 16-22, SPO2 % on 4L, No ABGs performed, + dry   cough, dyspnea, wheezing per H&P, mild resp distress per Pulmonology notes     Treatment: Oxygen at baseline 4L, Solumedrol, Mucinex, antibiotics, nebs.    Thank you, Leticia Dolan RN [facility]  [phone]  Options provided:  -- Acute Respiratory Failure as evidenced by, Please document evidence.  -- Acute Respiratory Failure ruled out after study and Chronic Respiratory   Failure confirmed  -- Other - I will add my own diagnosis  -- Disagree - Not applicable / Not valid  -- Disagree - Clinically unable to determine / Unknown  -- Refer to Clinical Documentation Reviewer    PROVIDER RESPONSE TEXT:    This patient is in acute respiratory failure as evidenced by hypercapnia and   hypoxia    Query created by: Leticia Dolan on 2024 7:08 AM      Electronically signed by:  Debbie Powers DO 2024 6:33 PM          
4 Eyes Skin Assessment     NAME:  Jose Antonio Martinez  YOB: 1950  MEDICAL RECORD NUMBER:  [id number]    The patient is being assessed for  Admission    I agree that at least one RN has performed a thorough Head to Toe Skin Assessment on the patient. ALL assessment sites listed below have been assessed.      Areas assessed by both nurses:    Head, Face, Ears, Shoulders, Back, Chest, Arms, Elbows, Hands, Sacrum. Buttock, Coccyx, Ischium, and Legs. Feet and Heels        Does the Patient have a Wound? No noted wound(s)       Blake Prevention initiated by RN: Yes  Wound Care Orders initiated by RN: No    Pressure Injury (Stage 3,4, Unstageable, DTI, NWPT, and Complex wounds) if present, place Wound referral order by RN under : No    New Ostomies, if present place, Ostomy referral order under : No     Nurse 1 eSignature: Electronically signed by Nakia Jonas RN on 2/15/24 at 6:28 PM EST    **SHARE this note so that the co-signing nurse can place an eSignature**    Nurse 2 eSignature: {Esignature:[id number]}   
Assessment and Plan  Patient is a 73 y.o. male with the following medical Problems:   COVID-19 pneumonia  COPD with acute exacerbation  Heart failure with mildly reduced ejection fraction (EF 45% on July 7, 2023)  CKD stage III    Plan of care:  Patient has severely diminished breath sounds.  No pneumonic infiltrate on x-ray.  COVID likely triggered COPD exacerbation.  Supplemental oxygen to keep sat 88 to 94%  Scheduled Solu-Medrol, Xopenex with ipratropium and Pulmicort  No indication for advanced therapy for COVID-19.  Monitor respiratory status closely.  Patient was advised to stay up-to-date with vaccination  Azithromycin for acute exacerbation of COPD  Patient's follow-up will be arranged with Dr. De León as outpatient after discharge    History of Present Illness:   Patient is a 73-year-old woman with above-mentioned medical problems who was admitted on February 15, 2024 with progressive shortness of breath and cough.  Patient was diagnosed with COVID-19 pneumonia.  She has no fever, chills, or rigors but endorses dry cough.    Daily progress:  February 17, 2024: Patient's symptoms have improved.  She continues to require 4 L nasal cannula.  He has no fever, chills, or rigors.  Patient has been questioning his positive COVID swab and it was repeated today.  He denies chest pain.  Patient is looking for a pulmonologist upon discharge to follow-up as outpatient.  Will arrange follow-up with Dr. De León.    Past Medical History:  Past Medical History:   Diagnosis Date    JULISSA (acute kidney injury) (HCC) 5/29/2023    JULISSA (acute kidney injury) (HCC) 5/29/2023    Chest pain 3-6-2015    lexiscan nuclear stress    Chronic diastolic CHF (congestive heart failure) (HCC)     Echo 1/18/2016; EF 57%    COPD (chronic obstructive pulmonary disease) (HCC)     Hepatitis C     Hilar lymphadenopathy     CT 1/2016; 1.8 cm    History of echocardiogram 01/02/2019    EF 52%; Stage I diastolic dysfunction    Hypertension     Irregular 
Assessment and Plan  Patient is a 73 y.o. male with the following medical Problems:   COVID-19 was ruled out  COPD with acute exacerbation  Heart failure with mildly reduced ejection fraction (EF 45% on July 7, 2023)  CKD stage III    Plan of care:  Patient has severely diminished breath sounds.  No pneumonic infiltrate on x-ray.   Supplemental oxygen to keep sat 88 to 94%  Scheduled Solu-Medrol, Xopenex with ipratropium and Pulmicort  Monitor respiratory status closely.  Patient was advised to stay up-to-date with vaccination  Azithromycin for acute exacerbation of COPD  Patient's follow-up will be arranged with Dr. De León as outpatient after discharge.  Patient can come off isolation.  Respiratory viral panel was negative for COVID-19.    History of Present Illness:   Patient is a 73-year-old woman with above-mentioned medical problems who was admitted on February 15, 2024 with progressive shortness of breath and cough.  Patient was diagnosed with COVID-19 pneumonia.  She has no fever, chills, or rigors but endorses dry cough.    Daily progress:  February 17, 2024: Patient's symptoms have improved.  She continues to require 4 L nasal cannula.  He has no fever, chills, or rigors.  Patient has been questioning his positive COVID swab and it was repeated today.  He denies chest pain.  Patient is looking for a pulmonologist upon discharge to follow-up as outpatient.  Will arrange follow-up with Dr. De León.    February 18, 2024: Patient continues to require supplemental oxygen and continues to endorse exertional dyspnea.  He has no fever, chills, rigors.  COVID swab came back negative x 2.  Creatinine has been maintained around 1.6.  Patient continues to endorse exertional dyspnea and cough.    February 19, 2024: Patient remains dyspneic on exertion.  He has no fever, chills, rigors.  Creatinine has been maintained around 1.6.  He had an uneventful night.  Chest examination continues to reveal severely diminished breath 
Attempted tx with pt, however pt refused occupational therapy at this time. Pt states he is having pain. When therapist asked where his pain was, he states, \"it doesn't matter\"; pt declined that therapist ask nsg to bring pt pain medication.  Pt states he will work with therapy \"the next time\".   Will attempt tx with pt at later time/date. TRESSA Allison/SHANITA #[id number]      
Department of Family Practice  Adult Daily Progress Note      JAVIER BUTTS IS SEEN IN FOLLOW UP TODAY ON 4 TELEMETRY.  HE IS TOLERATING TREATMENT.  HE FEELS A LITTLE BETTER.  NOSE IS BETTER WITH AYR GEL.  HE HAS BEEN SEEN BY PULMONARY.  HIS COVID TEST HE'S BEEN REPEATED.  IT IS NEGATIVE.   HIS LAST DOSE OF ZITHROMAX IS TOMORROW.  HE IS ON IV METHYLPREDNISOLONE.  HE DOES TAKE ORAL PREDNISONE AT HOME.  COULD PLAN FOR DISCHARGE DAY AFTER TOMORROW IF PULMONARY APPROVES.    OBJECTIVE    Physical  VITALS:  BP (!) 148/86   Pulse 77   Temp 98.2 °F (36.8 °C) (Oral)   Resp 20   Ht 1.676 m (5' 6\")   Wt 83.9 kg (185 lb)   SpO2 100%   BMI 29.86 kg/m²   CONSTITUTIONAL:  awake, alert, cooperative, no apparent distress, and appears stated age  ENT:  Normocephalic, without obvious abnormality, atraumatic, sinuses nontender on palpation, external ears without lesions, oral pharynx with moist mucus membranes, tonsils without erythema or exudates, gums normal and good dentition.  LUNGS:  No increased work of breathing, air exchange AT THE BASES SLIGHTLY IMPROVED, MORE IMPROVEMENT NOTED IN UPPER FIELDS BUT STILL DECREASED, clear to auscultation bilaterally, no crackles or wheezing  CARDIOVASCULAR:  Normal apical impulse, regular rate and rhythm, normal S1 and S2, no S3 or S4, and no murmur noted  ABDOMEN:  No scars, normal bowel sounds, soft, non-distended, non-tender, no masses palpated, no hepatosplenomegally  Data  CBC with Differential:    Lab Results   Component Value Date/Time    WBC 11.1 02/18/2024 07:04 AM    RBC 3.49 02/18/2024 07:04 AM    HGB 10.6 02/18/2024 07:04 AM    HCT 35.3 02/18/2024 07:04 AM     02/18/2024 07:04 AM    .1 02/18/2024 07:04 AM    MCH 30.4 02/18/2024 07:04 AM    MCHC 30.0 02/18/2024 07:04 AM    RDW 13.2 02/18/2024 07:04 AM    NRBC 1.7 09/27/2022 05:30 AM    SEGSPCT 60 04/24/2013 03:25 PM    LYMPHOPCT 11 02/16/2024 02:12 PM    MONOPCT 12 02/16/2024 02:12 PM    BASOPCT 0 02/16/2024 
Department of Family Practice  Adult Daily Progress Note      JAVIER BUTTS IS SEEN IN FOLLOW UP TODAY ON 4 TELEMETRY.  HE IS TOLERATING TREATMENT.  HE FEELS A LITTLE BETTER.  NOSE IS BETTER WITH AYR GEL.  HE HAS BEEN SEEN BY PULMONARY.  HIS COVID TEST HE'S BEEN REPEATED.  IT IS NEGATIVE.   HIS LAST DOSE OF ZITHROMAX IS WAS TODAY.  HE IS ON IV METHYLPREDNISOLONE.  HE DOES TAKE ORAL PREDNISONE AT HOME, 20 MG DAILY.  HE CAN RESUME THAT AT HOME.  WILL DISCHARGE TOMORROW.  URGED HIM TO MAKE CONTACT WITH DR. DESTINY MCKEON.      OBJECTIVE    Physical  VITALS:  BP (!) 145/76   Pulse 84   Temp 97.3 °F (36.3 °C) (Oral)   Resp 18   Ht 1.676 m (5' 6\")   Wt 83.9 kg (185 lb)   SpO2 100%   BMI 29.86 kg/m²   CONSTITUTIONAL:  awake, alert, cooperative, no apparent distress, and appears stated age  ENT:  Normocephalic, without obvious abnormality, atraumatic, sinuses nontender on palpation, external ears without lesions, oral pharynx with moist mucus membranes, tonsils without erythema or exudates, gums normal and good dentition.  LUNGS:  No increased work of breathing, air exchange AT THE BASES SLIGHTLY IMPROVED, MORE IMPROVEMENT NOTED IN UPPER FIELDS BUT STILL DECREASED, clear to auscultation bilaterally, no crackles or wheezing  CARDIOVASCULAR:  Normal apical impulse, regular rate and rhythm, normal S1 and S2, no S3 or S4, and no murmur noted  ABDOMEN:  No scars, normal bowel sounds, soft, non-distended, non-tender, no masses palpated, no hepatosplenomegally  Data  CBC with Differential:    Lab Results   Component Value Date/Time    WBC 11.1 02/18/2024 07:04 AM    RBC 3.49 02/18/2024 07:04 AM    HGB 10.6 02/18/2024 07:04 AM    HCT 35.3 02/18/2024 07:04 AM     02/18/2024 07:04 AM    .1 02/18/2024 07:04 AM    MCH 30.4 02/18/2024 07:04 AM    MCHC 30.0 02/18/2024 07:04 AM    RDW 13.2 02/18/2024 07:04 AM    NRBC 1.7 09/27/2022 05:30 AM    SEGSPCT 60 04/24/2013 03:25 PM    LYMPHOPCT 11 02/16/2024 02:12 PM    
Department of Family Practice  Adult Daily Progress Note      JAVIER BUTTS IS SEEN IN FOLLOW UP TODAY ON 4 TELEMETRY.  HE IS TOLERATING TREATMENT.  HE FEELS A LITTLE BETTER.  NOSE IS DRY FROM OXYGEN.  REQUESTING SALINE.  HE USES AYR GEL AT HOME.  HE HAS BEEN SEEN BY PULMONARY.  HIS COVID TEST HE'S BEEN REPEATED.  IT IS NEGATIVE.     OBJECTIVE    Physical  VITALS:  /84   Pulse 78   Temp 98.1 °F (36.7 °C) (Oral)   Resp 18   Ht 1.676 m (5' 6\")   Wt 83.9 kg (185 lb)   SpO2 100%   BMI 29.86 kg/m²   CONSTITUTIONAL:  awake, alert, cooperative, no apparent distress, and appears stated age  ENT:  Normocephalic, without obvious abnormality, atraumatic, sinuses nontender on palpation, external ears without lesions, oral pharynx with moist mucus membranes, tonsils without erythema or exudates, gums normal and good dentition.  LUNGS:  No increased work of breathing, POOR air exchange AT THE BASES, SLIGHTLY IMPROVED IN UPPER FIELDS BUT STILL DECREASED, clear to auscultation bilaterally, no crackles or wheezing  CARDIOVASCULAR:  Normal apical impulse, regular rate and rhythm, normal S1 and S2, no S3 or S4, and no murmur noted  ABDOMEN:  No scars, normal bowel sounds, soft, non-distended, non-tender, no masses palpated, no hepatosplenomegally  Data  CBC with Differential:    Lab Results   Component Value Date/Time    WBC 8.0 02/17/2024 07:07 AM    RBC 3.76 02/17/2024 07:07 AM    HGB 11.4 02/17/2024 07:07 AM    HCT 38.4 02/17/2024 07:07 AM     02/17/2024 07:07 AM    .1 02/17/2024 07:07 AM    MCH 30.3 02/17/2024 07:07 AM    MCHC 29.7 02/17/2024 07:07 AM    RDW 13.5 02/17/2024 07:07 AM    NRBC 1.7 09/27/2022 05:30 AM    SEGSPCT 60 04/24/2013 03:25 PM    LYMPHOPCT 11 02/16/2024 02:12 PM    MONOPCT 12 02/16/2024 02:12 PM    BASOPCT 0 02/16/2024 02:12 PM    MONOSABS 1.23 02/16/2024 02:12 PM    LYMPHSABS 1.21 02/16/2024 02:12 PM    EOSABS 0.00 02/16/2024 02:12 PM    BASOSABS 0.02 02/16/2024 02:12 PM 
Department of Family Practice  Adult Daily Progress Note      JAVIER BUTTS IS SEEN IN FOLLOW UP TODAY ON 4 TELEMETRY.  HE WAS SUPPOSED TO BE DISCHARGED TODAY BUT HE HAD SIGNIFICANT RESPIRATORY DIFFICULTY WITH HIS PULSE OX DROPPING TO THE 80'S.  IT WAS DETERMINED THAT HE NEEDED A BIPAP ONE OR TWO HOSPITALIZATIONS AGO BUT HE SAYS HE CANNOT TOLERATE IT AND REFUSED TO WEAR IT.  AS A RESULT I'M SURE HIS CO2 HAS REMAINED VERY HIGH.   HIS ABG'S TODAY SHOWED A PH OF  7.213, PCO2 .9, PO2 146.8, HCO3 42.5.  PALLIATIVE CARE HAD SPOKEN TO HIM AFTER HE HAD BEEN TREATED WITH INCREASED OXYGEN AND BREATHING TREATMENTS AND HE CONFIRMED THAT HE WANTED HIS CODE STATUS CHANGED TO DNRCC.  HIS SON CAME IN AND CONFIRMED IT.  HE WAS NOT, HOWEVER INTERESTED IN HOSPICE.  I WANTED TO SPEAK TO HIM ABOUT HOSPICE BUT HE IS VERY SLOW TO RESPOND.  HIS NURSE DID GET HIM TO STAND UP; HE WAS NOT ABLE TO DO THAT EARLIER.    FROM PHONE CALLS EARLIER HE HAD APPARENTLY SAID \"I'M TIRED, I'M DONE!\"    OBJECTIVE    Physical  VITALS:  /68   Pulse 75   Temp 97.9 °F (36.6 °C) (Oral)   Resp 19   Ht 1.676 m (5' 6\")   Wt 83.9 kg (185 lb)   SpO2 99%   BMI 29.86 kg/m²   CONSTITUTIONAL:  SOMNOLENT, SLOW TO RESPOND, no apparent distress, and appears stated age  ENT:  Normocephalic, without obvious abnormality, atraumatic, sinuses nontender on palpation, external ears without lesions, oral pharynx with moist mucus membranes, tonsils without erythema or exudates, gums normal and good dentition.  LUNGS:  No increased work of breathing, air exchange POOR THROUGHOUT, VERY DIMINISHED to auscultation bilaterally, no crackles or wheezing  CARDIOVASCULAR:  Normal apical impulse, regular rate and rhythm, normal S1 and S2, no S3 or S4, and no murmur noted  ABDOMEN:  No scars, normal bowel sounds, soft, non-distended, non-tender, no masses palpated, no hepatosplenomegally  Data  CBC with Differential:    Lab Results   Component Value Date/Time    WBC 
Dr. Powers called and updated. Palliative consulted and transfer to intermediate placed. PS message sent to Dr. Mcmillan and updated  
OCCUPATIONAL THERAPY INITIAL EVALUATION    [place]  [address]  [place]. [place]        Date:2024                                                  Patient Name: Jose Antonio Martinez    MRN: [id number]    : 1950    Room: [address] [place] [address]      Evaluating OT: Kenton Grigsby OTR/L; #[id number]     Referring Provider and Specific Provider Orders/Date:      02/15/24 1815  OT eval and treat  Start:  02/15/24 1815,   End:  02/15/24 1815,   ONE TIME,   Standing Count:  1 Occurrences,   R         Debbie Powers, DO      Placement Recommendation: Home no occupational therapy       Diagnosis:   1. COVID-19         Surgery: None      Pertinent Medical History:       Past Medical History:   Diagnosis Date    JULISSA (acute kidney injury) (HCC) 2023    JULISSA (acute kidney injury) (HCC) 2023    Chest pain 3-6-    lexiscan nuclear stress    Chronic diastolic CHF (congestive heart failure) (HCC)     Echo 2016; EF 57%    COPD (chronic obstructive pulmonary disease) (HCC)     Hepatitis C     Hilar lymphadenopathy     CT 2016; 1.8 cm    History of echocardiogram 2019    EF 52%; Stage I diastolic dysfunction    Hypertension     Irregular heart beat     Lumbar spondylosis 10/14/2022    Oxygen dependent          Past Surgical History:   Procedure Laterality Date    CATARACT REMOVAL Right 2019    CATARACT REMOVAL Left 2019    CERVICAL FUSION  2018    ACF C4-C7    COLONOSCOPY      HERNIA REPAIR      tracee inguinal repair    UPPER GASTROINTESTINAL ENDOSCOPY N/A 1/3/2023    EGD BIOPSY performed by Spike Petit MD at [facility] ENDOSCOPY    UPPER GASTROINTESTINAL ENDOSCOPY N/A 2023    EGD BIOPSY performed by Alex Hoover MD at [facility] ENDOSCOPY        Precautions:  Fall Risk, Droplet Plus/COVID-19, Activity as Tolerated, NC O2     Assessment of current deficits:     [x] Functional mobility  [x]ADLs  [x] Strength               []Cognition    [x] Functional 
Patient yelling obscenities at unit nurse, calling him a \"fag\". Patient nurse entered room to administer evening medications perform assessment etc. Patient began referring to him by slur mentioned. Charge nurse, nursing supervisor at bedside educated patient that he cannot refer to staff or other patients in that manner, patient very argumentative refusing care states he can \"do and say what he wants he is a 72 year old man, what are we going to do about it\". After further discussion with nursing supervisor, patient ultimately assigned care to another RN. Per  primary physician patient has history of similar behavior in prior interactions with male staff members in various capacities and facilities. Is frequently rude and inappropriate. Patient thoroughly educated that this kind of behavior will not be tolerated.   
Physical Therapy    Room #: 0430/0430-02  Date: 2024       Patient Name: Jose Antonio Martinez  : 1950      MRN: [id number]     Patient unavailable for physical therapy eval due to refusal. Will attempt PT evaluation tomorrow. Thank you.       Zayda Solomon, PT   
Physical Therapy  Physical Therapy Treatment Note/Plan of Care    Room #:  0430/0430-02  Patient Name: Jose Antonio Martinez  YOB: 1950  MRN: [id number]    Date of Service: 2/20/2024     Tentative placement recommendation: Home with Home Health Physical Therapy vs subacute  Equipment recommendation: None      Evaluating Physical Therapist: David Elliott, PT, DPT #[id number]      Specific Provider Orders/Date/Referring Provider :     02/15/24 1815    PT eval and treat  Start:  02/15/24 1815,   End:  02/15/24 1815,   ONE TIME,   Standing Count:  1 Occurrences,   R       Debbie Powers, DO Acknowledge New    Admitting Diagnosis:   COVID-19 [U07.1]      Surgery: none  Visit Diagnoses         Codes    COVID-19    -  Primary U07.1            Patient Active Problem List   Diagnosis    Hepatitis C    Testicular swelling    COPD exacerbation (HCC)    NSTEMI (non-ST elevated myocardial infarction) (HCC)    Essential hypertension    Neck pain    Cervical disc herniation    Acute respiratory failure with hypoxia and hypercapnia (HCC)    Elbow effusion, right    Acute on chronic respiratory failure with hypoxia and hypercapnia (HCC)    Chronic pain syndrome [G89.4 (ICD-10-CM)]    Lumbar spondylosis    Spinal stenosis of lumbar region without neurogenic claudication    Lumbar facet arthropathy    Lumbar disc disorder    Cervical spondylosis    Cervical facet joint syndrome    Cervical stenosis of spine    Cervical disc disorder    Cervical radiculopathy    Lumbar radiculopathy    Acute respiratory failure with hypoxia (HCC)    Hypercapnic respiratory failure (HCC)    Chronic obstructive pulmonary disease with acute lower respiratory infection (HCC)        ASSESSMENT of Current Deficits Patient exhibits decreased strength, balance, and endurance impairing functional mobility, transfers, gait , gait distance, and tolerance to activity. Patient dizzy at edge of bed, only accepted seated exercise and sitting edge of bed. 
Physical Therapy  Physical Therapy Treatment Note/Plan of Care    Room #:  0430/0430-02  Patient Name: Jose Antonio Martinez  YOB: 1950  MRN: [id number]    Date of Service: 2/22/2024     Tentative placement recommendation: Home with Home Health Physical Therapy vs subacute  Equipment recommendation: None      Evaluating Physical Therapist: David Elliott, PT, DPT #[id number]      Specific Provider Orders/Date/Referring Provider :     02/15/24 1815    PT eval and treat  Start:  02/15/24 1815,   End:  02/15/24 1815,   ONE TIME,   Standing Count:  1 Occurrences,   R       Debbie Powers, DO Acknowledge New    Admitting Diagnosis:   COVID-19 [U07.1]      Surgery: none        Patient Active Problem List   Diagnosis    Hepatitis C    Testicular swelling    COPD exacerbation (HCC)    NSTEMI (non-ST elevated myocardial infarction) (HCC)    Essential hypertension    Neck pain    Cervical disc herniation    Acute respiratory failure with hypoxia and hypercapnia (HCC)    Elbow effusion, right    Acute on chronic respiratory failure with hypoxia and hypercapnia (HCC)    Chronic pain syndrome [G89.4 (ICD-10-CM)]    Lumbar spondylosis    Spinal stenosis of lumbar region without neurogenic claudication    Lumbar facet arthropathy    Lumbar disc disorder    Cervical spondylosis    Cervical facet joint syndrome    Cervical stenosis of spine    Cervical disc disorder    Cervical radiculopathy    Lumbar radiculopathy    Acute respiratory failure with hypoxia (HCC)    Hypercapnic respiratory failure (HCC)    Chronic obstructive pulmonary disease with acute lower respiratory infection (HCC)    Hypertensive urgency    COVID-19        ASSESSMENT of Current Deficits Patient exhibits decreased strength, balance, and endurance impairing functional mobility, transfers, gait , gait distance, and tolerance to activity. Patient limited in further activity/function due to wanting inhaler/respiratory treatment and having breakfast tray. 
EOSABS 0.00 02/16/2024 02:12 PM    BASOSABS 0.02 02/16/2024 02:12 PM     BMP:    Lab Results   Component Value Date/Time     02/18/2024 07:04 AM    K 5.0 02/18/2024 07:04 AM    K 4.8 09/25/2022 08:17 AM    CL 95 02/18/2024 07:04 AM    CO2 35 02/18/2024 07:04 AM    BUN 24 02/18/2024 07:04 AM    LABALBU 4.3 02/15/2024 11:20 AM    LABALBU 4.6 07/14/2011 02:10 PM    CREATININE 1.6 02/18/2024 07:04 AM    CALCIUM 9.1 02/18/2024 07:04 AM    GFRAA >60 10/02/2022 05:33 AM    LABGLOM 47 02/18/2024 07:04 AM    GLUCOSE 113 02/18/2024 07:04 AM    GLUCOSE 101 07/14/2011 02:10 PM     Current Medications  Scheduled Meds:   guaiFENesin  600 mg Oral BID    saline nasal gel   Nasal 6x Daily    enoxaparin  40 mg SubCUTAneous Daily    levalbuterol  0.31 mg Nebulization q8h    methylPREDNISolone  20 mg IntraVENous Q12H    azithromycin  500 mg IntraVENous Q24H    aspirin  81 mg Oral Daily    budesonide  0.5 mg Nebulization BID RT    cetirizine  10 mg Oral Daily    gabapentin  300 mg Oral Nightly    ipratropium  0.5 mg Nebulization 4x Daily RT    losartan  100 mg Oral Daily    metoprolol succinate  100 mg Oral BID    pantoprazole  40 mg Oral BID AC    sucralfate  1 g Oral 4x Daily AC & HS    triamcinolone   Topical BID    vitamin E  400 Units Oral Daily    miconazole   Topical BID    zinc sulfate  50 mg Oral Daily    Vitamin D  1,000 Units Oral Daily    ascorbic acid  500 mg Oral Daily     Continuous Infusions:  PRN Meds:.albuterol, HYDROcodone 5 mg - acetaminophen    ASSESSMENT AND PLAN      Principal Problem:    COPD exacerbation (HCC)  Active Problems:    Acute respiratory failure with hypoxia and hypercapnia (HCC)    Essential hypertension  Resolved Problems:    * No resolved hospital problems. *      CONTINUE PLAN OF CARE.  GUAIFENESIN 600 MG BID TO HELP WITH EXPECTORATION.  
Energy Requirements: Current  Energy (kcal/day): 1800-2000kcal/day (MSJ x 1.2-1.3 SF)  Weight Used for Protein Requirements: Ideal  Protein (g/day): 85-10g/day (1.3-1.5g/kg IBW)  Method Used for Fluid Requirements: 1 ml/kcal  Fluid (ml/day): 1800-2000ml/day    Nutrition Diagnosis:   Inadequate oral intake related to impaired respiratory function as evidenced by intake 0-25%    Nutrition Interventions:   Food and/or Nutrient Delivery: Continue Current Diet, Start Oral Nutrition Supplement (Recommend ONS Ensure Enlive BID)  Nutrition Education/Counseling: No recommendation at this time  Coordination of Nutrition Care: Continue to monitor while inpatient     Goals:  Goals: PO intake 75% or greater, by next RD assessment     Nutrition Monitoring and Evaluation:   Behavioral-Environmental Outcomes: None Identified  Food/Nutrient Intake Outcomes: Supplement Intake, Food and Nutrient Intake  Physical Signs/Symptoms Outcomes: Biochemical Data, Chewing or Swallowing, GI Status, Fluid Status or Edema, Skin, Weight, Nutrition Focused Physical Findings    Discharge Planning:    Too soon to determine     Minerva Zamora RD  Contact: [phone]       
  Tobacco Use    Smoking status: Former     Current packs/day: 0.00     Types: Cigarettes     Quit date: 2015     Years since quittin.1    Smokeless tobacco: Never   Vaping Use    Vaping Use: Never used   Substance and Sexual Activity    Alcohol use: Not Currently     Alcohol/week: 4.0 standard drinks of alcohol     Types: 4 Cans of beer per week    Drug use: No    Sexual activity: Never   Other Topics Concern    Not on file   Social History Narrative    Not on file     Social Determinants of Health     Financial Resource Strain: Not on file   Food Insecurity: No Food Insecurity (2/15/2024)    Hunger Vital Sign     Worried About Running Out of Food in the Last Year: Never true     Ran Out of Food in the Last Year: Never true   Transportation Needs: No Transportation Needs (2/15/2024)    PRAPARE - Transportation     Lack of Transportation (Medical): No     Lack of Transportation (Non-Medical): No   Physical Activity: Not on file   Stress: Not on file   Social Connections: Not on file   Intimate Partner Violence: Not on file   Housing Stability: Low Risk  (2/15/2024)    Housing Stability Vital Sign     Unable to Pay for Housing in the Last Year: No     Number of Places Lived in the Last Year: 1     Unstable Housing in the Last Year: No       Current Medications:     Current Facility-Administered Medications:     HYDROcodone-acetaminophen (NORCO) 5-325 MG per tablet 2 tablet, 2 tablet, Oral, Q6H PRN, Debbie Powers DO, 2 tablet at 24 0653    guaiFENesin (MUCINEX) extended release tablet 600 mg, 600 mg, Oral, BID, Debbie Powers DO, 600 mg at 24 1020    saline nasal gel (AYR), , Nasal, 6x Daily, Debbie Powers DO, Given at 24 1023    enoxaparin (LOVENOX) injection 40 mg, 40 mg, SubCUTAneous, Daily, Debbie Powers DO, 40 mg at 24 1019    levalbuterol (XOPENEX) nebulization 0.31 mg, 0.31 mg, Nebulization, q8h, Don Mcmillan MD, 0.31 mg at 24 0620    
Insecurity (2/15/2024)    Hunger Vital Sign     Worried About Running Out of Food in the Last Year: Never true     Ran Out of Food in the Last Year: Never true   Transportation Needs: No Transportation Needs (2/15/2024)    PRAPARE - Transportation     Lack of Transportation (Medical): No     Lack of Transportation (Non-Medical): No   Physical Activity: Not on file   Stress: Not on file   Social Connections: Not on file   Intimate Partner Violence: Not on file   Housing Stability: Low Risk  (2/15/2024)    Housing Stability Vital Sign     Unable to Pay for Housing in the Last Year: No     Number of Places Lived in the Last Year: 1     Unstable Housing in the Last Year: No       Current Medications:     Current Facility-Administered Medications:     saline nasal gel (AYR), , Nasal, 6x Daily, Debbie Powers DO, Given at 02/18/24 1247    enoxaparin (LOVENOX) injection 40 mg, 40 mg, SubCUTAneous, Daily, Debbie Powers DO, 40 mg at 02/18/24 0912    levalbuterol (XOPENEX) nebulization 0.31 mg, 0.31 mg, Nebulization, q8h, Don Mcmillan MD, 0.31 mg at 02/18/24 1347    methylPREDNISolone sodium succ (SOLU-MEDROL) 20 mg in sterile water 0.5 mL injection, 20 mg, IntraVENous, Q12H, Don Mcmillan MD, 20 mg at 02/18/24 0515    azithromycin (ZITHROMAX) 500 mg in sodium chloride 0.9 % 250 mL IVPB ([id number]), 500 mg, IntraVENous, Q24H, Don Mcmillan MD, Last Rate: 250 mL/hr at 02/17/24 1735, 500 mg at 02/17/24 1735    albuterol (PROVENTIL) (2.5 MG/3ML) 0.083% nebulizer solution 2.5 mg, 2.5 mg, Nebulization, Q6H PRN, Debbie Powers DO    aspirin chewable tablet 81 mg, 81 mg, Oral, Daily, Debbie Powers DO, 81 mg at 02/18/24 0908    budesonide (PULMICORT) nebulizer suspension 500 mcg, 0.5 mg, Nebulization, BID RT, Debbie Powers DO, 500 mcg at 02/18/24 0618    cetirizine (ZYRTEC) tablet 10 mg, 10 mg, Oral, Daily, Debbie Powers DO, 10 mg at 02/18/24 0908    gabapentin 
maximize respiratory function  -Stairs: Stair training with instruction on proper technique and hand placement on rail    PT long term treatment goals are located in below grid    Patient and or family understand(s) diagnosis, prognosis, and plan of care.    Frequency of treatments: Patient will be seen  daily.         Prior Level of Function: Patient ambulated independently   Rehab Potential: good - for baseline    Past medical history:   Past Medical History:   Diagnosis Date    JULISSA (acute kidney injury) (HCC) 5/29/2023    JULISSA (acute kidney injury) (HCC) 5/29/2023    Chest pain 3-6-2015    lexiscan nuclear stress    Chronic diastolic CHF (congestive heart failure) (HCC)     Echo 1/18/2016; EF 57%    COPD (chronic obstructive pulmonary disease) (HCC)     Hepatitis C     Hilar lymphadenopathy     CT 1/2016; 1.8 cm    History of echocardiogram 01/02/2019    EF 52%; Stage I diastolic dysfunction    Hypertension     Irregular heart beat     Lumbar spondylosis 10/14/2022    Oxygen dependent      Past Surgical History:   Procedure Laterality Date    CATARACT REMOVAL Right 05/2019    CATARACT REMOVAL Left 06/2019    CERVICAL FUSION  03/09/2018    ACF C4-C7    COLONOSCOPY      HERNIA REPAIR      tracee inguinal repair    UPPER GASTROINTESTINAL ENDOSCOPY N/A 1/3/2023    EGD BIOPSY performed by Spike Petit MD at [facility] ENDOSCOPY    UPPER GASTROINTESTINAL ENDOSCOPY N/A 6/2/2023    EGD BIOPSY performed by Alex Hoover MD at [facility] ENDOSCOPY       SUBJECTIVE:    Precautions: Up with assistance, falls and O2    Social history: Patient lives alone in a ranch home with aids 5 days a week for 4 hours each day with no steps  to enter home Tub shower       Equipment owned: Shower chair, O2, and Lift chair    AM-PAC Basic Mobility       AM-PAC Basic Mobility - Inpatient   How much help is needed turning from your back to your side while in a flat bed without using bedrails?: A Little  How much help is needed moving from lying on your 
02/22/24 0619    triamcinolone (KENALOG) 0.1 % cream, , Topical, BID, Debbie Powers DO, Given at 02/20/24 2153    vitamin E capsule 400 Units, 400 Units, Oral, Daily, Debbie Powers DO, 400 Units at 02/22/24 0940    miconazole (MICOTIN) 2 % cream, , Topical, BID, Debbie Powers DO, Given at 02/20/24 2150    zinc sulfate (ZINCATE) 220 mg capsule - elemental zinc 50 mg, 50 mg, Oral, Daily, Debbie Powers DO, 50 mg at 02/22/24 0940    Vitamin D (CHOLECALCIFEROL) tablet 1,000 Units, 1,000 Units, Oral, Daily, Debbie Powers DO, 1,000 Units at 02/22/24 0940    ascorbic acid (VITAMIN C) tablet 500 mg, 500 mg, Oral, Daily, Debbie Powers DO, 500 mg at 02/22/24 0940      Review of Systems:   General: denies weight gain, denies loss of appetite, fever, chills, night sweats.  HEENT: denies headaches, dizziness, head trauma, visual changes, eye pain, tinnitus, nosebleeds, hoarseness or throat pain    Respiratory: denies chest pain,+ve dyspnea, cough but no hemoptysis  Cardiovascular: denies orthopnea, paroxysmal nocturnal dyspnea, leg swelling, and previous heart attack.    Gastrointestinal: denies pain, nausea vomiting, diarrhea, constipation, melena or bleeding.  Genitourinary: denies hematuria, frequency, urgency or dysuria  Neurology: denies syncope, seizures, paralysis, paraesthesia   Endocrine: denies polyuria, polydipsia, skin or hair changes, and heat or cold intolerance  Musculoskeletal: denies joint pain, swelling, arthritis or myalgia  Hematologic: denies bleeding, adenopathy and easy bruising  Skin: denies rashes and skin discoloration  Psychiatry: denies depression    Physical Exam:   Vital Signs:  BP (!) 188/92   Pulse 76   Temp 98.1 °F (36.7 °C) (Oral)   Resp 20   Ht 1.676 m (5' 5.98\")   Wt 83.9 kg (185 lb)   SpO2 90%   BMI 29.87 kg/m²     Input/Output:  In: 540 [P.O.:540]  Out: 950     Oxygen requirements: NC      General appearance:  not in pain but in moderate to severe respiratory 
regular rate and rhythm, no murmur, rub or gallop  Abdomen: Normal sounds present, soft, lax with no tenderness, no hepatosplenomegaly, and no masses  Extremities: No edema. Pulses are equally present.   Skin: intact, no rashes   Neurologic: Alert and oriented x 3, No focal deficit     Investigations:  Labs, radiological imaging and cardiac work up were personally reviewed    ICU STAFF PHYSICIAN NOTE OF PERSONAL INVOLVEMENT IN CARE  As the attending physician, I certify that I personally reviewed the patient's history and personally examined the patient to confirm the physical findings described above, and that I reviewed the relevant imaging studies and available reports.  I also discussed the differential diagnosis and all of the proposed management plans with the patient and individuals accompanying the patient to this visit.  They had the opportunity to ask questions about the proposed management plans and to have those questions answered.    This patient has a high probability of sudden, clinically significant deterioration, which requires the highest level of physician preparedness to intervene urgently.  I managed/supervised life or organ supporting interventions that required frequent physician assessment.  I devoted my full attention to the direct care of this patient for the amount of time indicated below.  Time I spent with family or surrogate(s) is included only if the patient was incapable of providing the necessary information or participating in medical decision-making.  Time devoted to teaching and to any procedures I billed separately is not included.  Critical Care Time: 31 minutes          Electronically signed by Don Mcmillan MD on 2/21/2024 at 3:46 PM

## 2024-02-22 NOTE — PLAN OF CARE
Problem: Skin/Tissue Integrity  Goal: Absence of new skin breakdown  2/22/2024 1302 by Sona Head RN  Outcome: Completed     Problem: Safety - Adult  Goal: Free from fall injury  2/22/2024 1302 by Sona Head RN  Outcome: Completed     Problem: Chronic Conditions and Co-morbidities  Goal: Patient's chronic conditions and co-morbidity symptoms are monitored and maintained or improved  2/22/2024 1302 by Sona Head RN  Outcome: Completed     Problem: ABCDS Injury Assessment  Goal: Absence of physical injury  2/22/2024 1302 by Sona Head RN  Outcome: Completed     Problem: Nutrition Deficit:  Goal: Optimize nutritional status  2/22/2024 1302 by Sona Head RN  Outcome: Completed

## 2024-02-22 NOTE — PLAN OF CARE
Problem: Pain  Goal: Verbalizes/displays adequate comfort level or baseline comfort level  2/22/2024 1300 by Sona Head RN  Outcome: Progressing     Problem: Skin/Tissue Integrity  Goal: Absence of new skin breakdown  2/22/2024 1300 by Sona Head RN  Outcome: Progressing     Problem: Safety - Adult  Goal: Free from fall injury  2/22/2024 1300 by Sona Head RN  Outcome: Progressing     Problem: Chronic Conditions and Co-morbidities  Goal: Patient's chronic conditions and co-morbidity symptoms are monitored and maintained or improved  2/22/2024 1300 by Sona Head RN  Outcome: Progressing     Problem: ABCDS Injury Assessment  Goal: Absence of physical injury  2/22/2024 1300 by Sona Head RN  Outcome: Progressing     Problem: Nutrition Deficit:  Goal: Optimize nutritional status  2/22/2024 1300 by Sona Head RN  Outcome: Progressing

## 2024-02-22 NOTE — PLAN OF CARE
Problem: Pain  Goal: Verbalizes/displays adequate comfort level or baseline comfort level  Outcome: Progressing     Problem: Skin/Tissue Integrity  Goal: Absence of new skin breakdown  Description: 1.  Monitor for areas of redness and/or skin breakdown  2.  Assess vascular access sites hourly  3.  Every 4-6 hours minimum:  Change oxygen saturation probe site  4.  Every 4-6 hours:  If on nasal continuous positive airway pressure, respiratory therapy assess nares and determine need for appliance change or resting period.  Outcome: Progressing     Problem: Safety - Adult  Goal: Free from fall injury  Outcome: Progressing     Problem: Chronic Conditions and Co-morbidities  Goal: Patient's chronic conditions and co-morbidity symptoms are monitored and maintained or improved  Outcome: Progressing     Problem: ABCDS Injury Assessment  Goal: Absence of physical injury  Outcome: Progressing

## 2024-02-22 NOTE — CARE COORDINATION
2/22/2024 1118 CM note: Rapid COVID+ 2/15/24, PCR neg on 2/18/24. Pt resides alone in a 1 story home. He has aides from [facility]([phone]) M-F , 4hrs/day.  DME includes a nebulizer and home o2@4L from [employer]. He is active with [facility] and received HHC orders for SN/PT. Palliative Care met with pt on 2/21/24, pt declined hospice. CM spoke with pt and his son Aki at bedside for transition of care needs. Pt declines [facility] and and plans to return home with [facility] and [facility] Aide services. Family to provide transportation home and will bring in pt's portable o2 tank. Sari TAYLOR

## 2024-02-22 NOTE — DISCHARGE INSTRUCTIONS
Your information:  Name: Jose Antonio Martinez  : 1950    Your instructions:    YOU ARE BEING DISCHARGED HOME. PLEASE MAKE AND KEEP YOUR FOLLOW UP APPOINTMENTS.    IF YOU EXPERIENCE ANY OF THE FOLLOWING SYMPTOMS, CHEST PAIN, SHORTNESS OF BREATH, COUGHING UP BLOOD OR BLOODY SPUTUM, STOMACH PAIN OR CRAMPING, DARK, TARRY STOOLS, LOSS OF APPETITE, GENERAL NOT FEELING WELL, SIGNS AND SYMPTOMS OF INFECTION LIKE FEVER AND OR CHILLS, PLEASE CALL PCP OR RETURN TO THE EMERGENCY ROOM.\    What to do after you leave the hospital:    Recommended diet: regular diet    Recommended activity: activity as tolerated        The following personal items were collected during your admission and were returned to you:    Belongings  Dental Appliances: None  Vision - Corrective Lenses: None  Hearing Aid: None  Clothing: Shirt, Pants, Footwear  Jewelry: Ring, Bracelet, Necklace  Electronic Devices: Cell Phone,   Weapons (Notify Protective Services/Security): None  Home Medications: None  Valuables Given To: Patient  Provide Name(s) of Who Valuable(s) Were Given To: n/a    Information obtained by:  By signing below, I understand that if any problems occur once I leave the hospital I am to contact PCP.  I understand and acknowledge receipt of the instructions indicated above.

## 2024-03-29 NOTE — PROGRESS NOTES
ABG drawn x 1 from Right Radial. Patient had Normal Miguel's Test.  Patient was on Bipap 12/6, 40% FIO2  at time of puncture. Pressure held for 5. No bleeding or bruising noted at puncture site. Patient tolerated procedure well.        Performed by Yasemin Workman Completion note & Special Treatment Procedure note

## 2024-04-09 ENCOUNTER — TELEPHONE (OUTPATIENT)
Dept: PULMONOLOGY | Age: 74
End: 2024-04-09

## 2024-04-09 NOTE — TELEPHONE ENCOUNTER
Called patient multiple times to get him scheduled, he said that he has transportation issues and will call when he can arrange a ride.

## 2024-04-10 ASSESSMENT — ENCOUNTER SYMPTOMS
SHORTNESS OF BREATH: 1
BACK PAIN: 0
WHEEZING: 1
DIARRHEA: 0
COUGH: 0
ABDOMINAL PAIN: 0
CHEST TIGHTNESS: 0
VOMITING: 0
NAUSEA: 0
SORE THROAT: 0

## 2024-05-08 ENCOUNTER — APPOINTMENT (OUTPATIENT)
Dept: GENERAL RADIOLOGY | Age: 74
End: 2024-05-08
Payer: COMMERCIAL

## 2024-05-08 ENCOUNTER — HOSPITAL ENCOUNTER (EMERGENCY)
Age: 74
Discharge: HOME | End: 2024-05-08
Attending: EMERGENCY MEDICINE
Payer: COMMERCIAL

## 2024-05-08 VITALS
OXYGEN SATURATION: 98 % | SYSTOLIC BLOOD PRESSURE: 178 MMHG | HEART RATE: 100 BPM | DIASTOLIC BLOOD PRESSURE: 107 MMHG | RESPIRATION RATE: 28 BRPM | TEMPERATURE: 97.4 F

## 2024-05-08 DIAGNOSIS — J44.1 COPD EXACERBATION (HCC): Primary | ICD-10-CM

## 2024-05-08 LAB
ALBUMIN SERPL-MCNC: 4.3 G/DL (ref 3.5–5.2)
ALP SERPL-CCNC: 57 U/L (ref 40–129)
ALT SERPL-CCNC: <5 U/L (ref 0–40)
ANION GAP SERPL CALCULATED.3IONS-SCNC: 8 MMOL/L (ref 7–16)
AST SERPL-CCNC: 21 U/L (ref 0–39)
B PARAP IS1001 DNA NPH QL NAA+NON-PROBE: NOT DETECTED
B PERT DNA SPEC QL NAA+PROBE: NOT DETECTED
BASOPHILS # BLD: 0.04 K/UL (ref 0–0.2)
BASOPHILS NFR BLD: 0 % (ref 0–2)
BILIRUB SERPL-MCNC: 0.6 MG/DL (ref 0–1.2)
BNP SERPL-MCNC: 1175 PG/ML (ref 0–450)
BUN SERPL-MCNC: 15 MG/DL (ref 6–23)
C PNEUM DNA NPH QL NAA+NON-PROBE: NOT DETECTED
CALCIUM SERPL-MCNC: 9.8 MG/DL (ref 8.6–10.2)
CHLORIDE SERPL-SCNC: 90 MMOL/L (ref 98–107)
CO2 SERPL-SCNC: 37 MMOL/L (ref 22–29)
CREAT SERPL-MCNC: 1.2 MG/DL (ref 0.7–1.2)
D DIMER: <200 NG/ML DDU (ref 0–230)
EOSINOPHIL # BLD: 0.02 K/UL (ref 0.05–0.5)
EOSINOPHILS RELATIVE PERCENT: 0 % (ref 0–6)
ERYTHROCYTE [DISTWIDTH] IN BLOOD BY AUTOMATED COUNT: 13.3 % (ref 11.5–15)
FLUAV RNA NPH QL NAA+NON-PROBE: NOT DETECTED
FLUBV RNA NPH QL NAA+NON-PROBE: NOT DETECTED
GFR, ESTIMATED: 67 ML/MIN/1.73M2
GLUCOSE SERPL-MCNC: 122 MG/DL (ref 74–99)
HADV DNA NPH QL NAA+NON-PROBE: NOT DETECTED
HCOV 229E RNA NPH QL NAA+NON-PROBE: NOT DETECTED
HCOV HKU1 RNA NPH QL NAA+NON-PROBE: NOT DETECTED
HCOV NL63 RNA NPH QL NAA+NON-PROBE: NOT DETECTED
HCOV OC43 RNA NPH QL NAA+NON-PROBE: NOT DETECTED
HCT VFR BLD AUTO: 37.6 % (ref 37–54)
HGB BLD-MCNC: 11.7 G/DL (ref 12.5–16.5)
HMPV RNA NPH QL NAA+NON-PROBE: NOT DETECTED
HPIV1 RNA NPH QL NAA+NON-PROBE: NOT DETECTED
HPIV2 RNA NPH QL NAA+NON-PROBE: NOT DETECTED
HPIV3 RNA NPH QL NAA+NON-PROBE: NOT DETECTED
HPIV4 RNA NPH QL NAA+NON-PROBE: NOT DETECTED
IMM GRANULOCYTES # BLD AUTO: 0.04 K/UL (ref 0–0.58)
IMM GRANULOCYTES NFR BLD: 0 % (ref 0–5)
INFLUENZA A BY PCR: NOT DETECTED
INFLUENZA B BY PCR: NOT DETECTED
LYMPHOCYTES NFR BLD: 1.19 K/UL (ref 1.5–4)
LYMPHOCYTES RELATIVE PERCENT: 12 % (ref 20–42)
M PNEUMO DNA NPH QL NAA+NON-PROBE: NOT DETECTED
MAGNESIUM SERPL-MCNC: 2.4 MG/DL (ref 1.6–2.6)
MCH RBC QN AUTO: 31.2 PG (ref 26–35)
MCHC RBC AUTO-ENTMCNC: 31.1 G/DL (ref 32–34.5)
MCV RBC AUTO: 100.3 FL (ref 80–99.9)
MONOCYTES NFR BLD: 0.84 K/UL (ref 0.1–0.95)
MONOCYTES NFR BLD: 9 % (ref 2–12)
NEUTROPHILS NFR BLD: 78 % (ref 43–80)
NEUTS SEG NFR BLD: 7.57 K/UL (ref 1.8–7.3)
PLATELET # BLD AUTO: 295 K/UL (ref 130–450)
PMV BLD AUTO: 9.6 FL (ref 7–12)
POTASSIUM SERPL-SCNC: 4.5 MMOL/L (ref 3.5–5)
PROT SERPL-MCNC: 7.9 G/DL (ref 6.4–8.3)
RBC # BLD AUTO: 3.75 M/UL (ref 3.8–5.8)
RSV RNA NPH QL NAA+NON-PROBE: NOT DETECTED
RV+EV RNA NPH QL NAA+NON-PROBE: NOT DETECTED
SARS-COV-2 RDRP RESP QL NAA+PROBE: NOT DETECTED
SARS-COV-2 RNA NPH QL NAA+NON-PROBE: NOT DETECTED
SODIUM SERPL-SCNC: 135 MMOL/L (ref 132–146)
SPECIMEN DESCRIPTION: NORMAL
SPECIMEN DESCRIPTION: NORMAL
TROPONIN I SERPL HS-MCNC: 56 NG/L (ref 0–11)
TROPONIN I SERPL HS-MCNC: 61 NG/L (ref 0–11)
WBC OTHER # BLD: 9.7 K/UL (ref 4.5–11.5)

## 2024-05-08 PROCEDURE — 6370000000 HC RX 637 (ALT 250 FOR IP)

## 2024-05-08 PROCEDURE — 94640 AIRWAY INHALATION TREATMENT: CPT

## 2024-05-08 PROCEDURE — 85025 COMPLETE CBC W/AUTO DIFF WBC: CPT

## 2024-05-08 PROCEDURE — 6360000002 HC RX W HCPCS

## 2024-05-08 PROCEDURE — 96374 THER/PROPH/DIAG INJ IV PUSH: CPT

## 2024-05-08 PROCEDURE — 80053 COMPREHEN METABOLIC PANEL: CPT

## 2024-05-08 PROCEDURE — 87635 SARS-COV-2 COVID-19 AMP PRB: CPT

## 2024-05-08 PROCEDURE — 0202U NFCT DS 22 TRGT SARS-COV-2: CPT

## 2024-05-08 PROCEDURE — 93005 ELECTROCARDIOGRAM TRACING: CPT

## 2024-05-08 PROCEDURE — 87502 INFLUENZA DNA AMP PROBE: CPT

## 2024-05-08 PROCEDURE — 83880 ASSAY OF NATRIURETIC PEPTIDE: CPT

## 2024-05-08 PROCEDURE — 84484 ASSAY OF TROPONIN QUANT: CPT

## 2024-05-08 PROCEDURE — 2580000003 HC RX 258

## 2024-05-08 PROCEDURE — 83735 ASSAY OF MAGNESIUM: CPT

## 2024-05-08 PROCEDURE — 85379 FIBRIN DEGRADATION QUANT: CPT

## 2024-05-08 PROCEDURE — 71045 X-RAY EXAM CHEST 1 VIEW: CPT

## 2024-05-08 PROCEDURE — 99285 EMERGENCY DEPT VISIT HI MDM: CPT

## 2024-05-08 RX ORDER — ALBUTEROL SULFATE 90 UG/1
2 AEROSOL, METERED RESPIRATORY (INHALATION) 4 TIMES DAILY PRN
Qty: 18 G | Refills: 0 | Status: SHIPPED | OUTPATIENT
Start: 2024-05-08

## 2024-05-08 RX ORDER — IPRATROPIUM BROMIDE AND ALBUTEROL SULFATE 2.5; .5 MG/3ML; MG/3ML
3 SOLUTION RESPIRATORY (INHALATION) ONCE
Status: COMPLETED | OUTPATIENT
Start: 2024-05-08 | End: 2024-05-08

## 2024-05-08 RX ORDER — FUROSEMIDE 20 MG/1
20 TABLET ORAL DAILY
Qty: 5 TABLET | Refills: 0 | Status: SHIPPED | OUTPATIENT
Start: 2024-05-08

## 2024-05-08 RX ADMIN — METHYLPREDNISOLONE SODIUM SUCCINATE 125 MG: 125 INJECTION INTRAMUSCULAR; INTRAVENOUS at 15:40

## 2024-05-08 RX ADMIN — IPRATROPIUM BROMIDE AND ALBUTEROL SULFATE 3 DOSE: .5; 3 SOLUTION RESPIRATORY (INHALATION) at 15:09

## 2024-05-08 NOTE — ED PROVIDER NOTES
[facility] EMERGENCY DEPARTMENT  EMERGENCY DEPARTMENT ENCOUNTER      Pt Name: Jose Antonio Martinez  MRN: [id number]  Birthdate 1950  Date of evaluation: 5/8/2024  Provider: John Duong MD  PCP: Debbie Powers DO  Note Started: 12:16 PM EDT 5/8/24    CHIEF COMPLAINT       Chief Complaint   Patient presents with    Shortness of Breath     SOB that started 1 week ago. Denies any CP. Pt does wear 4 liters at all times.        HISTORY OF PRESENT ILLNESS: 1 or more Elements   History From: Patient  Limitations to history : None    Jose Antonio Martinez is a 74 y.o. male who presents presents with shortness of breath acute on chronic.  Patient with known history of CHF and COPD, uses 4 L nasal cannula at baseline.  Reports he has had increasing shortness of breath over the past few days with increased dry cough and generalized fatigue and lethargy.  Denies hemoptysis or inspiratory chest pain.  Denies chest pain or pressure.  Denies increased oxygen requirement.  Currently denies vomiting, objective fevers, dizziness, numbness, tingling extremities, dysuria, constipation.    Nursing Notes were all reviewed and agreed with or any disagreements were addressed in the HPI.    REVIEW OF SYSTEMS :    Positives and Pertinent negatives as per HPI.     PAST MEDICAL HISTORY/Chronic Conditions Affecting Care    has a past medical history of JULISSA (acute kidney injury) ([facility]) (5/29/2023), JULISSA (acute kidney injury) (HCC) (5/29/2023), Chest pain (3-6-2015), Chronic diastolic CHF (congestive heart failure) (HCC), COPD (chronic obstructive pulmonary disease) (HCC), Hepatitis C, Hilar lymphadenopathy, History of echocardiogram (01/02/2019), Hypertension, Irregular heart beat, Lumbar spondylosis (10/14/2022), and Oxygen dependent.     SURGICAL HISTORY     Past Surgical History:   Procedure Laterality Date    CATARACT REMOVAL Right 05/2019    CATARACT REMOVAL Left 06/2019    CERVICAL FUSION  03/09/2018    ACF

## 2024-05-09 LAB
EKG ATRIAL RATE: 106 BPM
EKG P AXIS: 65 DEGREES
EKG P-R INTERVAL: 168 MS
EKG Q-T INTERVAL: 374 MS
EKG QRS DURATION: 106 MS
EKG QTC CALCULATION (BAZETT): 496 MS
EKG R AXIS: 52 DEGREES
EKG T AXIS: 41 DEGREES
EKG VENTRICULAR RATE: 106 BPM

## 2024-05-09 PROCEDURE — 93010 ELECTROCARDIOGRAM REPORT: CPT | Performed by: INTERNAL MEDICINE

## (undated) DEVICE — CONTAINER SPEC COLL 960ML POLYPR TRIANG GRAD INTAKE/OUTPUT

## (undated) DEVICE — TOWEL,OR,DSP,ST,BLUE,STD,6/PK,12PK/CS: Brand: MEDLINE

## (undated) DEVICE — 6 X 9  1.75MIL 4-WALL LABGUARD: Brand: MINIGRIP COMMERCIAL LLC

## (undated) DEVICE — KENDALL 450 SERIES MONITORING FOAM ELECTRODE - RECTANGULAR SHAPE ( 3/PK): Brand: KENDALL

## (undated) DEVICE — TUBING, SUCTION, 1/4" X 10', STRAIGHT: Brand: MEDLINE

## (undated) DEVICE — Device: Brand: DEFENDO VALVE AND CONNECTOR KIT

## (undated) DEVICE — BLOCK BITE 60FR CAREGUARD

## (undated) DEVICE — KIT BEDSIDE REVITAL OX 500ML

## (undated) DEVICE — COVER,LIGHT HANDLE,FLX,1/PK: Brand: MEDLINE INDUSTRIES, INC.

## (undated) DEVICE — FORCEPS BX L240CM JAW DIA2.8MM L CAP W/ NDL MIC MESH TOOTH

## (undated) DEVICE — ADAPTER CLEANING PORPOISE CLEANING

## (undated) DEVICE — SPONGE GZ 4IN 4IN 4 PLY N WVN AVANT

## (undated) DEVICE — YANKAUER,BULB TIP,W/O VENT,RIGID,STERILE: Brand: MEDLINE

## (undated) DEVICE — GOWN ISOLATN REG YEL M WT MULTIPLY SIDETIE LEV 2

## (undated) DEVICE — CLOTH SURG PREP PREOPERATIVE CHLORHEXIDINE GLUC 2% READYPREP

## (undated) DEVICE — MASK,FACE,MAXFLUIDPROTECT,SHIELD/ERLPS: Brand: MEDLINE

## (undated) DEVICE — LUBRICANT SURG JELLY ST BACTER TUBE 4.25OZ